# Patient Record
Sex: FEMALE | Race: ASIAN | NOT HISPANIC OR LATINO
[De-identification: names, ages, dates, MRNs, and addresses within clinical notes are randomized per-mention and may not be internally consistent; named-entity substitution may affect disease eponyms.]

---

## 2015-10-21 RX ORDER — LOSARTAN POTASSIUM 100 MG/1
1 TABLET, FILM COATED ORAL
Qty: 0 | Refills: 0 | COMMUNITY
Start: 2015-10-21

## 2015-10-22 RX ORDER — METFORMIN HYDROCHLORIDE 850 MG/1
1 TABLET ORAL
Qty: 0 | Refills: 0 | COMMUNITY
Start: 2015-10-22

## 2015-12-09 RX ORDER — FUROSEMIDE 40 MG
1 TABLET ORAL
Qty: 0 | Refills: 0 | COMMUNITY
Start: 2015-12-09

## 2016-02-14 RX ORDER — GLIMEPIRIDE 1 MG
1 TABLET ORAL
Qty: 90 | Refills: 0 | COMMUNITY
Start: 2016-02-14

## 2017-01-27 ENCOUNTER — MEDICATION RENEWAL (OUTPATIENT)
Age: 82
End: 2017-01-27

## 2017-03-16 ENCOUNTER — RX RENEWAL (OUTPATIENT)
Age: 82
End: 2017-03-16

## 2017-05-11 ENCOUNTER — OTHER (OUTPATIENT)
Age: 82
End: 2017-05-11

## 2017-05-25 ENCOUNTER — OTHER (OUTPATIENT)
Age: 82
End: 2017-05-25

## 2017-05-26 ENCOUNTER — OTHER (OUTPATIENT)
Age: 82
End: 2017-05-26

## 2017-06-02 ENCOUNTER — OTHER (OUTPATIENT)
Age: 82
End: 2017-06-02

## 2017-06-05 ENCOUNTER — RX RENEWAL (OUTPATIENT)
Age: 82
End: 2017-06-05

## 2017-06-14 ENCOUNTER — OUTPATIENT (OUTPATIENT)
Dept: OUTPATIENT SERVICES | Facility: HOSPITAL | Age: 82
LOS: 1 days | End: 2017-06-14

## 2017-06-14 ENCOUNTER — APPOINTMENT (OUTPATIENT)
Dept: CARDIOLOGY | Facility: HOSPITAL | Age: 82
End: 2017-06-14

## 2017-06-14 ENCOUNTER — APPOINTMENT (OUTPATIENT)
Dept: ELECTROPHYSIOLOGY | Facility: CLINIC | Age: 82
End: 2017-06-14

## 2017-06-14 VITALS
DIASTOLIC BLOOD PRESSURE: 67 MMHG | OXYGEN SATURATION: 98 % | HEART RATE: 67 BPM | SYSTOLIC BLOOD PRESSURE: 159 MMHG | BODY MASS INDEX: 31.55 KG/M2 | RESPIRATION RATE: 16 BRPM | WEIGHT: 167 LBS

## 2017-06-14 VITALS — DIASTOLIC BLOOD PRESSURE: 84 MMHG | SYSTOLIC BLOOD PRESSURE: 137 MMHG

## 2017-06-14 DIAGNOSIS — K92.2 GASTROINTESTINAL HEMORRHAGE, UNSPECIFIED: ICD-10-CM

## 2017-06-14 DIAGNOSIS — Z86.2 PERSONAL HISTORY OF DISEASES OF THE BLOOD AND BLOOD-FORMING ORGANS AND CERTAIN DISORDERS INVOLVING THE IMMUNE MECHANISM: ICD-10-CM

## 2017-06-14 LAB
BASOPHILS # BLD AUTO: 0.02 K/UL — SIGNIFICANT CHANGE UP (ref 0–0.2)
BASOPHILS NFR BLD AUTO: 0.2 % — SIGNIFICANT CHANGE UP (ref 0–2)
BUN SERPL-MCNC: 45 MG/DL — HIGH (ref 7–23)
CALCIUM SERPL-MCNC: 10.5 MG/DL — SIGNIFICANT CHANGE UP (ref 8.4–10.5)
CHLORIDE SERPL-SCNC: 102 MMOL/L — SIGNIFICANT CHANGE UP (ref 98–107)
CO2 SERPL-SCNC: 23 MMOL/L — SIGNIFICANT CHANGE UP (ref 22–31)
CREAT SERPL-MCNC: 1.52 MG/DL — HIGH (ref 0.5–1.3)
EOSINOPHIL # BLD AUTO: 0.38 K/UL — SIGNIFICANT CHANGE UP (ref 0–0.5)
EOSINOPHIL NFR BLD AUTO: 3.7 % — SIGNIFICANT CHANGE UP (ref 0–6)
GLUCOSE SERPL-MCNC: 111 MG/DL — HIGH (ref 70–99)
HCT VFR BLD CALC: 36.8 % — SIGNIFICANT CHANGE UP (ref 34.5–45)
HGB BLD-MCNC: 11.5 G/DL — SIGNIFICANT CHANGE UP (ref 11.5–15.5)
IMM GRANULOCYTES NFR BLD AUTO: 0.3 % — SIGNIFICANT CHANGE UP (ref 0–1.5)
LYMPHOCYTES # BLD AUTO: 2.83 K/UL — SIGNIFICANT CHANGE UP (ref 1–3.3)
LYMPHOCYTES # BLD AUTO: 27.2 % — SIGNIFICANT CHANGE UP (ref 13–44)
MCHC RBC-ENTMCNC: 30.9 PG — SIGNIFICANT CHANGE UP (ref 27–34)
MCHC RBC-ENTMCNC: 31.3 % — LOW (ref 32–36)
MCV RBC AUTO: 98.9 FL — SIGNIFICANT CHANGE UP (ref 80–100)
MONOCYTES # BLD AUTO: 1.02 K/UL — HIGH (ref 0–0.9)
MONOCYTES NFR BLD AUTO: 9.8 % — SIGNIFICANT CHANGE UP (ref 2–14)
NEUTROPHILS # BLD AUTO: 6.11 K/UL — SIGNIFICANT CHANGE UP (ref 1.8–7.4)
NEUTROPHILS NFR BLD AUTO: 58.8 % — SIGNIFICANT CHANGE UP (ref 43–77)
NT-PROBNP SERPL-SCNC: 1517 PG/ML — SIGNIFICANT CHANGE UP
PLATELET # BLD AUTO: 196 K/UL — SIGNIFICANT CHANGE UP (ref 150–400)
PMV BLD: 11.3 FL — SIGNIFICANT CHANGE UP (ref 7–13)
POTASSIUM SERPL-MCNC: 4.7 MMOL/L — SIGNIFICANT CHANGE UP (ref 3.5–5.3)
POTASSIUM SERPL-SCNC: 4.7 MMOL/L — SIGNIFICANT CHANGE UP (ref 3.5–5.3)
RBC # BLD: 3.72 M/UL — LOW (ref 3.8–5.2)
RBC # FLD: 13.4 % — SIGNIFICANT CHANGE UP (ref 10.3–14.5)
SODIUM SERPL-SCNC: 141 MMOL/L — SIGNIFICANT CHANGE UP (ref 135–145)
WBC # BLD: 10.39 K/UL — SIGNIFICANT CHANGE UP (ref 3.8–10.5)
WBC # FLD AUTO: 10.39 K/UL — SIGNIFICANT CHANGE UP (ref 3.8–10.5)

## 2017-06-15 ENCOUNTER — APPOINTMENT (OUTPATIENT)
Dept: CARDIOLOGY | Facility: HOSPITAL | Age: 82
End: 2017-06-15

## 2017-06-15 DIAGNOSIS — I50.30 UNSPECIFIED DIASTOLIC (CONGESTIVE) HEART FAILURE: ICD-10-CM

## 2017-06-19 ENCOUNTER — LABORATORY RESULT (OUTPATIENT)
Age: 82
End: 2017-06-19

## 2017-06-19 ENCOUNTER — APPOINTMENT (OUTPATIENT)
Dept: INTERNAL MEDICINE | Facility: HOSPITAL | Age: 82
End: 2017-06-19

## 2017-06-19 ENCOUNTER — OUTPATIENT (OUTPATIENT)
Dept: OUTPATIENT SERVICES | Facility: HOSPITAL | Age: 82
LOS: 1 days | End: 2017-06-19

## 2017-06-19 ENCOUNTER — RESULT CHARGE (OUTPATIENT)
Age: 82
End: 2017-06-19

## 2017-06-19 VITALS — HEIGHT: 61 IN | BODY MASS INDEX: 30.96 KG/M2 | WEIGHT: 164 LBS

## 2017-06-19 VITALS — SYSTOLIC BLOOD PRESSURE: 152 MMHG | DIASTOLIC BLOOD PRESSURE: 92 MMHG | HEART RATE: 61 BPM

## 2017-06-19 DIAGNOSIS — I48.91 UNSPECIFIED ATRIAL FIBRILLATION: ICD-10-CM

## 2017-06-19 DIAGNOSIS — I50.30 UNSPECIFIED DIASTOLIC (CONGESTIVE) HEART FAILURE: ICD-10-CM

## 2017-06-19 DIAGNOSIS — Z00.00 ENCOUNTER FOR GENERAL ADULT MEDICAL EXAMINATION WITHOUT ABNORMAL FINDINGS: ICD-10-CM

## 2017-06-19 DIAGNOSIS — I10 ESSENTIAL (PRIMARY) HYPERTENSION: ICD-10-CM

## 2017-06-19 DIAGNOSIS — E83.52 HYPERCALCEMIA: ICD-10-CM

## 2017-06-19 DIAGNOSIS — E21.0 PRIMARY HYPERPARATHYROIDISM: ICD-10-CM

## 2017-06-19 DIAGNOSIS — N18.3 CHRONIC KIDNEY DISEASE, STAGE 3 (MODERATE): ICD-10-CM

## 2017-06-19 DIAGNOSIS — E11.65 TYPE 2 DIABETES MELLITUS WITH HYPERGLYCEMIA: ICD-10-CM

## 2017-06-19 LAB
APPEARANCE UR: CLEAR — SIGNIFICANT CHANGE UP
BILIRUB UR-MCNC: NEGATIVE — SIGNIFICANT CHANGE UP
BLOOD UR QL VISUAL: NEGATIVE — SIGNIFICANT CHANGE UP
BUN SERPL-MCNC: 50 MG/DL — HIGH (ref 7–23)
CA-I BLD-SCNC: 1.33 MMOL/L — HIGH (ref 1.03–1.23)
CALCIUM SERPL-MCNC: 10.5 MG/DL — SIGNIFICANT CHANGE UP (ref 8.4–10.5)
CALCIUM UR-MCNC: 0.5 MG/DL — SIGNIFICANT CHANGE UP
CHLORIDE SERPL-SCNC: 103 MMOL/L — SIGNIFICANT CHANGE UP (ref 98–107)
CO2 SERPL-SCNC: 25 MMOL/L — SIGNIFICANT CHANGE UP (ref 22–31)
COLOR SPEC: YELLOW — SIGNIFICANT CHANGE UP
CREAT ?TM UR-MCNC: 131.85 MG/DL — SIGNIFICANT CHANGE UP
CREAT SERPL-MCNC: 1.63 MG/DL — HIGH (ref 0.5–1.3)
EPI CELLS # UR: SIGNIFICANT CHANGE UP
GLUCOSE SERPL-MCNC: 142 MG/DL — HIGH (ref 70–99)
GLUCOSE UR-MCNC: NEGATIVE — SIGNIFICANT CHANGE UP
HBA1C BLD-MCNC: 7.6 % — HIGH (ref 4–5.6)
HYALINE CASTS # UR AUTO: SIGNIFICANT CHANGE UP (ref 0–?)
KETONES UR-MCNC: NEGATIVE — SIGNIFICANT CHANGE UP
LEUKOCYTE ESTERASE UR-ACNC: NEGATIVE — SIGNIFICANT CHANGE UP
MAGNESIUM SERPL-MCNC: 1.9 MG/DL — SIGNIFICANT CHANGE UP (ref 1.6–2.6)
MUCOUS THREADS # UR AUTO: SIGNIFICANT CHANGE UP
NITRITE UR-MCNC: NEGATIVE — SIGNIFICANT CHANGE UP
NON-SQ EPI CELLS # UR AUTO: <1 — SIGNIFICANT CHANGE UP
PH UR: 6.5 — SIGNIFICANT CHANGE UP (ref 5–8)
PHOSPHATE SERPL-MCNC: 3.3 MG/DL — SIGNIFICANT CHANGE UP (ref 2.5–4.5)
POTASSIUM SERPL-MCNC: 4.9 MMOL/L — SIGNIFICANT CHANGE UP (ref 3.5–5.3)
POTASSIUM SERPL-SCNC: 4.9 MMOL/L — SIGNIFICANT CHANGE UP (ref 3.5–5.3)
PROT UR-MCNC: 30 — HIGH
PTH-INTACT SERPL-MCNC: 180.9 PG/ML — HIGH (ref 15–65)
RBC CASTS # UR COMP ASSIST: SIGNIFICANT CHANGE UP (ref 0–?)
SODIUM SERPL-SCNC: 142 MMOL/L — SIGNIFICANT CHANGE UP (ref 135–145)
SP GR SPEC: 1.02 — SIGNIFICANT CHANGE UP (ref 1–1.03)
SQUAMOUS # UR AUTO: SIGNIFICANT CHANGE UP
UROBILINOGEN FLD QL: NORMAL E.U. — SIGNIFICANT CHANGE UP (ref 0.2–1)
WBC UR QL: SIGNIFICANT CHANGE UP (ref 0–?)

## 2017-06-20 ENCOUNTER — OTHER (OUTPATIENT)
Age: 82
End: 2017-06-20

## 2017-06-20 LAB
24R-OH-CALCIDIOL SERPL-MCNC: 29 NG/ML — LOW (ref 30–100)
CREAT UR-MCNC: 130 MG/DL — SIGNIFICANT CHANGE UP
GLUCOSE BLDC GLUCOMTR-MCNC: 147
MICROALBUMIN UR-MCNC: 52.6 MG/DL — HIGH (ref 0–0.1)
MICROALBUMIN/CREAT UR-RTO: 405 MG/G — HIGH (ref 0–30)
VIT D25+D1,25 OH+D1,25 PNL SERPL-MCNC: 21.5 PG/ML — SIGNIFICANT CHANGE UP (ref 19.9–79.3)

## 2017-06-20 RX ORDER — METFORMIN HYDROCHLORIDE 500 MG/1
500 TABLET, COATED ORAL DAILY
Qty: 90 | Refills: 3 | Status: DISCONTINUED | COMMUNITY
Start: 2017-05-25 | End: 2017-06-20

## 2017-07-01 ENCOUNTER — OUTPATIENT (OUTPATIENT)
Dept: OUTPATIENT SERVICES | Facility: HOSPITAL | Age: 82
LOS: 1 days | End: 2017-07-01
Payer: MEDICAID

## 2017-07-17 DIAGNOSIS — R69 ILLNESS, UNSPECIFIED: ICD-10-CM

## 2017-07-21 ENCOUNTER — APPOINTMENT (OUTPATIENT)
Dept: ENDOCRINOLOGY | Facility: HOSPITAL | Age: 82
End: 2017-07-21

## 2017-09-01 PROCEDURE — G9001: CPT

## 2017-09-23 ENCOUNTER — INPATIENT (INPATIENT)
Facility: HOSPITAL | Age: 82
LOS: 1 days | Discharge: HOME | End: 2017-09-25
Attending: HOSPITALIST | Admitting: HOSPITALIST
Payer: MEDICARE

## 2017-09-23 VITALS
DIASTOLIC BLOOD PRESSURE: 82 MMHG | SYSTOLIC BLOOD PRESSURE: 162 MMHG | RESPIRATION RATE: 20 BRPM | TEMPERATURE: 97 F | HEART RATE: 65 BPM | OXYGEN SATURATION: 95 %

## 2017-09-23 DIAGNOSIS — I10 ESSENTIAL (PRIMARY) HYPERTENSION: ICD-10-CM

## 2017-09-23 DIAGNOSIS — I50.9 HEART FAILURE, UNSPECIFIED: ICD-10-CM

## 2017-09-23 DIAGNOSIS — E11.9 TYPE 2 DIABETES MELLITUS WITHOUT COMPLICATIONS: ICD-10-CM

## 2017-09-23 DIAGNOSIS — Z29.9 ENCOUNTER FOR PROPHYLACTIC MEASURES, UNSPECIFIED: ICD-10-CM

## 2017-09-23 DIAGNOSIS — R06.02 SHORTNESS OF BREATH: ICD-10-CM

## 2017-09-23 DIAGNOSIS — I49.5 SICK SINUS SYNDROME: ICD-10-CM

## 2017-09-23 DIAGNOSIS — N18.9 CHRONIC KIDNEY DISEASE, UNSPECIFIED: ICD-10-CM

## 2017-09-23 DIAGNOSIS — E78.5 HYPERLIPIDEMIA, UNSPECIFIED: ICD-10-CM

## 2017-09-23 LAB
ALBUMIN SERPL ELPH-MCNC: 3.8 G/DL — SIGNIFICANT CHANGE UP (ref 3.3–5)
ALP SERPL-CCNC: 182 U/L — HIGH (ref 40–120)
ALT FLD-CCNC: 43 U/L — HIGH (ref 4–33)
APPEARANCE UR: CLEAR — SIGNIFICANT CHANGE UP
APTT BLD: 35.4 SEC — SIGNIFICANT CHANGE UP (ref 27.5–37.4)
AST SERPL-CCNC: 46 U/L — HIGH (ref 4–32)
BACTERIA # UR AUTO: SIGNIFICANT CHANGE UP
BASE EXCESS BLDV CALC-SCNC: -3.4 MMOL/L — SIGNIFICANT CHANGE UP
BASOPHILS # BLD AUTO: 0.05 K/UL — SIGNIFICANT CHANGE UP (ref 0–0.2)
BASOPHILS NFR BLD AUTO: 0.4 % — SIGNIFICANT CHANGE UP (ref 0–2)
BILIRUB SERPL-MCNC: 0.6 MG/DL — SIGNIFICANT CHANGE UP (ref 0.2–1.2)
BILIRUB UR-MCNC: NEGATIVE — SIGNIFICANT CHANGE UP
BLOOD GAS VENOUS - CREATININE: 1.28 MG/DL — SIGNIFICANT CHANGE UP (ref 0.5–1.3)
BLOOD UR QL VISUAL: NEGATIVE — SIGNIFICANT CHANGE UP
BUN SERPL-MCNC: 43 MG/DL — HIGH (ref 7–23)
CALCIUM SERPL-MCNC: 10.8 MG/DL — HIGH (ref 8.4–10.5)
CHLORIDE BLDV-SCNC: 99 MMOL/L — SIGNIFICANT CHANGE UP (ref 96–108)
CHLORIDE SERPL-SCNC: 101 MMOL/L — SIGNIFICANT CHANGE UP (ref 98–107)
CK MB BLD-MCNC: 3.46 NG/ML — SIGNIFICANT CHANGE UP (ref 1–4.7)
CK MB BLD-MCNC: 3.81 NG/ML — SIGNIFICANT CHANGE UP (ref 1–4.7)
CK MB BLD-MCNC: SIGNIFICANT CHANGE UP (ref 0–2.5)
CK MB BLD-MCNC: SIGNIFICANT CHANGE UP (ref 0–2.5)
CK SERPL-CCNC: 54 U/L — SIGNIFICANT CHANGE UP (ref 25–170)
CK SERPL-CCNC: 63 U/L — SIGNIFICANT CHANGE UP (ref 25–170)
CO2 SERPL-SCNC: 24 MMOL/L — SIGNIFICANT CHANGE UP (ref 22–31)
COLOR SPEC: SIGNIFICANT CHANGE UP
CREAT SERPL-MCNC: 1.33 MG/DL — HIGH (ref 0.5–1.3)
EOSINOPHIL # BLD AUTO: 0.24 K/UL — SIGNIFICANT CHANGE UP (ref 0–0.5)
EOSINOPHIL NFR BLD AUTO: 1.9 % — SIGNIFICANT CHANGE UP (ref 0–6)
GAS PNL BLDV: 138 MMOL/L — SIGNIFICANT CHANGE UP (ref 136–146)
GLUCOSE BLDV-MCNC: 273 — HIGH (ref 70–99)
GLUCOSE SERPL-MCNC: 270 MG/DL — HIGH (ref 70–99)
GLUCOSE UR-MCNC: NEGATIVE — SIGNIFICANT CHANGE UP
HBA1C BLD-MCNC: 7.7 % — HIGH (ref 4–5.6)
HCO3 BLDV-SCNC: 21 MMOL/L — SIGNIFICANT CHANGE UP (ref 20–27)
HCT VFR BLD CALC: 35.8 % — SIGNIFICANT CHANGE UP (ref 34.5–45)
HCT VFR BLDV CALC: 37.8 % — SIGNIFICANT CHANGE UP (ref 34.5–45)
HGB BLD-MCNC: 11.5 G/DL — SIGNIFICANT CHANGE UP (ref 11.5–15.5)
HGB BLDV-MCNC: 12.3 G/DL — SIGNIFICANT CHANGE UP (ref 11.5–15.5)
HYALINE CASTS # UR AUTO: SIGNIFICANT CHANGE UP (ref 0–?)
IMM GRANULOCYTES # BLD AUTO: 0.03 # — SIGNIFICANT CHANGE UP
IMM GRANULOCYTES NFR BLD AUTO: 0.2 % — SIGNIFICANT CHANGE UP (ref 0–1.5)
INR BLD: 1.17 — SIGNIFICANT CHANGE UP (ref 0.88–1.17)
KETONES UR-MCNC: NEGATIVE — SIGNIFICANT CHANGE UP
LACTATE BLDV-MCNC: 2.2 MMOL/L — HIGH (ref 0.5–2)
LEUKOCYTE ESTERASE UR-ACNC: NEGATIVE — SIGNIFICANT CHANGE UP
LYMPHOCYTES # BLD AUTO: 1.98 K/UL — SIGNIFICANT CHANGE UP (ref 1–3.3)
LYMPHOCYTES # BLD AUTO: 15.7 % — SIGNIFICANT CHANGE UP (ref 13–44)
MAGNESIUM SERPL-MCNC: 1.8 MG/DL — SIGNIFICANT CHANGE UP (ref 1.6–2.6)
MCHC RBC-ENTMCNC: 31.3 PG — SIGNIFICANT CHANGE UP (ref 27–34)
MCHC RBC-ENTMCNC: 32.1 % — SIGNIFICANT CHANGE UP (ref 32–36)
MCV RBC AUTO: 97.3 FL — SIGNIFICANT CHANGE UP (ref 80–100)
MONOCYTES # BLD AUTO: 0.9 K/UL — SIGNIFICANT CHANGE UP (ref 0–0.9)
MONOCYTES NFR BLD AUTO: 7.1 % — SIGNIFICANT CHANGE UP (ref 2–14)
MUCOUS THREADS # UR AUTO: SIGNIFICANT CHANGE UP
NEUTROPHILS # BLD AUTO: 9.39 K/UL — HIGH (ref 1.8–7.4)
NEUTROPHILS NFR BLD AUTO: 74.7 % — SIGNIFICANT CHANGE UP (ref 43–77)
NITRITE UR-MCNC: NEGATIVE — SIGNIFICANT CHANGE UP
NON-SQ EPI CELLS # UR AUTO: <1 — SIGNIFICANT CHANGE UP
NRBC # FLD: 0 — SIGNIFICANT CHANGE UP
NT-PROBNP SERPL-SCNC: 4464 PG/ML — SIGNIFICANT CHANGE UP
PCO2 BLDV: 44 MMHG — SIGNIFICANT CHANGE UP (ref 41–51)
PH BLDV: 7.31 PH — LOW (ref 7.32–7.43)
PH UR: 6 — SIGNIFICANT CHANGE UP (ref 4.6–8)
PLATELET # BLD AUTO: 214 K/UL — SIGNIFICANT CHANGE UP (ref 150–400)
PMV BLD: 10.6 FL — SIGNIFICANT CHANGE UP (ref 7–13)
PO2 BLDV: 49 MMHG — HIGH (ref 35–40)
POTASSIUM BLDV-SCNC: 5.6 MMOL/L — HIGH (ref 3.4–4.5)
POTASSIUM SERPL-MCNC: 4.5 MMOL/L — SIGNIFICANT CHANGE UP (ref 3.5–5.3)
POTASSIUM SERPL-SCNC: 4.5 MMOL/L — SIGNIFICANT CHANGE UP (ref 3.5–5.3)
PROT SERPL-MCNC: 8.3 G/DL — SIGNIFICANT CHANGE UP (ref 6–8.3)
PROT UR-MCNC: 100 — HIGH
PROTHROM AB SERPL-ACNC: 13.1 SEC — SIGNIFICANT CHANGE UP (ref 9.8–13.1)
RBC # BLD: 3.68 M/UL — LOW (ref 3.8–5.2)
RBC # FLD: 13.8 % — SIGNIFICANT CHANGE UP (ref 10.3–14.5)
RBC CASTS # UR COMP ASSIST: SIGNIFICANT CHANGE UP (ref 0–?)
SAO2 % BLDV: 78.6 % — SIGNIFICANT CHANGE UP (ref 60–85)
SODIUM SERPL-SCNC: 137 MMOL/L — SIGNIFICANT CHANGE UP (ref 135–145)
SP GR SPEC: 1.01 — SIGNIFICANT CHANGE UP (ref 1–1.03)
SQUAMOUS # UR AUTO: SIGNIFICANT CHANGE UP
TROPONIN T SERPL-MCNC: 0.11 NG/ML — HIGH (ref 0–0.06)
TROPONIN T SERPL-MCNC: 0.11 NG/ML — HIGH (ref 0–0.06)
TSH SERPL-MCNC: 1.92 UIU/ML — SIGNIFICANT CHANGE UP (ref 0.27–4.2)
UROBILINOGEN FLD QL: NORMAL E.U. — SIGNIFICANT CHANGE UP (ref 0.1–0.2)
WBC # BLD: 12.59 K/UL — HIGH (ref 3.8–10.5)
WBC # FLD AUTO: 12.59 K/UL — HIGH (ref 3.8–10.5)
WBC UR QL: SIGNIFICANT CHANGE UP (ref 0–?)

## 2017-09-23 PROCEDURE — 71010: CPT | Mod: 26

## 2017-09-23 PROCEDURE — 99223 1ST HOSP IP/OBS HIGH 75: CPT

## 2017-09-23 RX ORDER — FUROSEMIDE 40 MG
40 TABLET ORAL DAILY
Qty: 0 | Refills: 0 | Status: DISCONTINUED | OUTPATIENT
Start: 2017-09-24 | End: 2017-09-25

## 2017-09-23 RX ORDER — HEPARIN SODIUM 5000 [USP'U]/ML
5000 INJECTION INTRAVENOUS; SUBCUTANEOUS EVERY 8 HOURS
Qty: 0 | Refills: 0 | Status: DISCONTINUED | OUTPATIENT
Start: 2017-09-23 | End: 2017-09-25

## 2017-09-23 RX ORDER — INSULIN LISPRO 100/ML
VIAL (ML) SUBCUTANEOUS
Qty: 0 | Refills: 0 | Status: DISCONTINUED | OUTPATIENT
Start: 2017-09-23 | End: 2017-09-25

## 2017-09-23 RX ORDER — DEXTROSE 50 % IN WATER 50 %
25 SYRINGE (ML) INTRAVENOUS ONCE
Qty: 0 | Refills: 0 | Status: DISCONTINUED | OUTPATIENT
Start: 2017-09-23 | End: 2017-09-25

## 2017-09-23 RX ORDER — DEXTROSE 50 % IN WATER 50 %
12.5 SYRINGE (ML) INTRAVENOUS ONCE
Qty: 0 | Refills: 0 | Status: DISCONTINUED | OUTPATIENT
Start: 2017-09-23 | End: 2017-09-25

## 2017-09-23 RX ORDER — FUROSEMIDE 40 MG
40 TABLET ORAL ONCE
Qty: 0 | Refills: 0 | Status: COMPLETED | OUTPATIENT
Start: 2017-09-23 | End: 2017-09-23

## 2017-09-23 RX ORDER — ASPIRIN/CALCIUM CARB/MAGNESIUM 324 MG
81 TABLET ORAL DAILY
Qty: 0 | Refills: 0 | Status: DISCONTINUED | OUTPATIENT
Start: 2017-09-23 | End: 2017-09-25

## 2017-09-23 RX ORDER — AMLODIPINE BESYLATE 2.5 MG/1
10 TABLET ORAL DAILY
Qty: 0 | Refills: 0 | Status: DISCONTINUED | OUTPATIENT
Start: 2017-09-23 | End: 2017-09-25

## 2017-09-23 RX ORDER — INFLUENZA VIRUS VACCINE 15; 15; 15; 15 UG/.5ML; UG/.5ML; UG/.5ML; UG/.5ML
0.5 SUSPENSION INTRAMUSCULAR ONCE
Qty: 0 | Refills: 0 | Status: COMPLETED | OUTPATIENT
Start: 2017-09-23 | End: 2017-09-25

## 2017-09-23 RX ORDER — ISOSORBIDE MONONITRATE 60 MG/1
30 TABLET, EXTENDED RELEASE ORAL DAILY
Qty: 0 | Refills: 0 | Status: DISCONTINUED | OUTPATIENT
Start: 2017-09-23 | End: 2017-09-23

## 2017-09-23 RX ORDER — FUROSEMIDE 40 MG
40 TABLET ORAL
Qty: 0 | Refills: 0 | Status: DISCONTINUED | OUTPATIENT
Start: 2017-09-23 | End: 2017-09-23

## 2017-09-23 RX ORDER — NITROGLYCERIN 6.5 MG
0.3 CAPSULE, EXTENDED RELEASE ORAL ONCE
Qty: 0 | Refills: 0 | Status: COMPLETED | OUTPATIENT
Start: 2017-09-23 | End: 2017-09-23

## 2017-09-23 RX ORDER — ISOSORBIDE MONONITRATE 60 MG/1
60 TABLET, EXTENDED RELEASE ORAL DAILY
Qty: 0 | Refills: 0 | Status: DISCONTINUED | OUTPATIENT
Start: 2017-09-24 | End: 2017-09-25

## 2017-09-23 RX ORDER — SODIUM CHLORIDE 9 MG/ML
1000 INJECTION, SOLUTION INTRAVENOUS
Qty: 0 | Refills: 0 | Status: DISCONTINUED | OUTPATIENT
Start: 2017-09-23 | End: 2017-09-25

## 2017-09-23 RX ORDER — ATORVASTATIN CALCIUM 80 MG/1
40 TABLET, FILM COATED ORAL AT BEDTIME
Qty: 0 | Refills: 0 | Status: DISCONTINUED | OUTPATIENT
Start: 2017-09-23 | End: 2017-09-25

## 2017-09-23 RX ORDER — METOPROLOL TARTRATE 50 MG
200 TABLET ORAL DAILY
Qty: 0 | Refills: 0 | Status: DISCONTINUED | OUTPATIENT
Start: 2017-09-23 | End: 2017-09-25

## 2017-09-23 RX ORDER — DEXTROSE 50 % IN WATER 50 %
1 SYRINGE (ML) INTRAVENOUS ONCE
Qty: 0 | Refills: 0 | Status: DISCONTINUED | OUTPATIENT
Start: 2017-09-23 | End: 2017-09-25

## 2017-09-23 RX ORDER — LOSARTAN POTASSIUM 100 MG/1
100 TABLET, FILM COATED ORAL DAILY
Qty: 0 | Refills: 0 | Status: DISCONTINUED | OUTPATIENT
Start: 2017-09-23 | End: 2017-09-25

## 2017-09-23 RX ORDER — INSULIN LISPRO 100/ML
VIAL (ML) SUBCUTANEOUS AT BEDTIME
Qty: 0 | Refills: 0 | Status: DISCONTINUED | OUTPATIENT
Start: 2017-09-23 | End: 2017-09-25

## 2017-09-23 RX ORDER — GLUCAGON INJECTION, SOLUTION 0.5 MG/.1ML
1 INJECTION, SOLUTION SUBCUTANEOUS ONCE
Qty: 0 | Refills: 0 | Status: DISCONTINUED | OUTPATIENT
Start: 2017-09-23 | End: 2017-09-25

## 2017-09-23 RX ADMIN — Medication 40 MILLIGRAM(S): at 17:14

## 2017-09-23 RX ADMIN — Medication 81 MILLIGRAM(S): at 17:14

## 2017-09-23 RX ADMIN — Medication 2: at 17:14

## 2017-09-23 RX ADMIN — AMLODIPINE BESYLATE 10 MILLIGRAM(S): 2.5 TABLET ORAL at 17:14

## 2017-09-23 RX ADMIN — ATORVASTATIN CALCIUM 40 MILLIGRAM(S): 80 TABLET, FILM COATED ORAL at 21:51

## 2017-09-23 RX ADMIN — ISOSORBIDE MONONITRATE 30 MILLIGRAM(S): 60 TABLET, EXTENDED RELEASE ORAL at 17:14

## 2017-09-23 RX ADMIN — Medication 0.3 MILLIGRAM(S): at 10:04

## 2017-09-23 RX ADMIN — HEPARIN SODIUM 5000 UNIT(S): 5000 INJECTION INTRAVENOUS; SUBCUTANEOUS at 21:51

## 2017-09-23 RX ADMIN — LOSARTAN POTASSIUM 100 MILLIGRAM(S): 100 TABLET, FILM COATED ORAL at 17:14

## 2017-09-23 RX ADMIN — Medication 40 MILLIGRAM(S): at 09:30

## 2017-09-23 NOTE — H&P ADULT - PROBLEM SELECTOR PLAN 2
monitor bp, con't meds, adjust as needed BP above goal, but in setting of acute decompensated HF. Will administer home doses of medications--losartan, isosorbide MN, metoprolol. Can increase isosorbide dose if BP remains >150/90.

## 2017-09-23 NOTE — ED ADULT TRIAGE NOTE - CHIEF COMPLAINT QUOTE
pt c/o severe sob for few days now. pt has hx of fluid on lings/ deneis cp but c/o not being able to catch breath/ sat 95

## 2017-09-23 NOTE — H&P ADULT - PROBLEM SELECTOR PLAN 5
VTE 2 (age, cancer history) - started on Heparin 5,000u sc tid (due to slightly elevated cr) PPM in place. ECG with demand pacing, rate in 60s. Will continue to monitor on cardiac telemetry.

## 2017-09-23 NOTE — H&P ADULT - ASSESSMENT
81 y/o female, with a PmHx of HTN, DM, CHF, Uterine Ca s/p radiation, PPM - Medtronic (placed for tachybrady syndrome), HLD, is being admitted to telemetry for acute on chronic heart failure.

## 2017-09-23 NOTE — ED ADULT NURSE NOTE - OBJECTIVE STATEMENT
pt presents to the ER with c/o SOB x 3 days getting worse today pt unable to lay flat and getting more sob on exertion   pt arrived in the ER alert and anxious lungs + wheezing noted decreased BS tachypneic O2 on via N/C PIV placed labs drawn and sent cardiac monitor shows sinus bradycardia   Plan of care discussed  with pt and daughter  will continue to monitor closely

## 2017-09-23 NOTE — ED PROVIDER NOTE - CHIEF COMPLAINT
The patient is a 82y Female complaining of The patient is a 82y Female complaining of shortness of breath

## 2017-09-23 NOTE — H&P ADULT - NEGATIVE OPHTHALMOLOGIC SYMPTOMS
no diplopia/no photophobia/no loss of vision L/no blurred vision L/no blurred vision R/no loss of vision R

## 2017-09-23 NOTE — ED ADULT NURSE NOTE - PMH
Cardiac Pacemaker (ICD9 V45.01)  in , due to bradycardia, started on coumadin since then, pt unsure why  Complete Spontaneous     DM Type 2 (Diabetes Mellitus, Type 2) (ICD9 250.00)    HTN (Hypertension) (ICD9 401.9)    Hyperlipidemia    Obese    Osteoarthritis of Knee (ICD9 715.96)  b/l knees  Uterine Cancer (ICD9 179)  treated with hysterectomy and radiation therapy in

## 2017-09-23 NOTE — H&P ADULT - PMH
DM Type 2 (Diabetes Mellitus, Type 2) (ICD9 250.00)    HTN (Hypertension) (ICD9 401.9)    Hyperlipidemia    Osteoarthritis of Knee (ICD9 715.96)  b/l knees  Uterine Cancer (ICD9 179)  treated with hysterectomy and radiation therapy in 2008

## 2017-09-23 NOTE — H&P ADULT - PSYCHIATRIC
Affect and characteristics of appearance, verbalizations, behaviors are appropriate negative detailed exam details…

## 2017-09-23 NOTE — H&P ADULT - NSHPSOCIALHISTORY_GEN_ALL_CORE
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

## 2017-09-23 NOTE — H&P ADULT - PROBLEM SELECTOR PLAN 4
monitor fs, insulin ssc, hgba1-c, hold po meds sCr below baseline. Electrolytes wnl. Patient mildly hypervolemic in setting of acute decompensated HF; receiving IV furosemide.     -trend chemistry, monitor K+

## 2017-09-23 NOTE — H&P ADULT - PROBLEM SELECTOR PLAN 3
fasting lipid profile, con't statin Hold oral hypoglycemics while admitted.     Monitor FSG. ISS as needed. Check Hgb A1c.

## 2017-09-23 NOTE — PATIENT PROFILE ADULT. - LIVES WITH, PROFILE
children/(daughter), private home, 4 steps-to-enter/exit home with unilateral handrail, no stairs to bedroom/bathroom

## 2017-09-23 NOTE — H&P ADULT - PROBLEM SELECTOR PLAN 1
ekg/telemetry, f/u ce x 2, mg, tsh, echo, house cardio c/s, probnp, daily wts, fluid restriction, strict I & O's, Lasix 40 iv bid Decompensated HF in setting of medication non-adherence. Patient reports frequent urination as cause of not taking furosemide. She feels improved at present. Cardiology recommendations reviewed. ECG without ischemic changes. TPN 0.11, which is new from 2016. May represent demand ischemia vs new baseline TPN in setting of CKD. Lower suspicion for acute MI given lack of chest pain, gradual onset of symptoms over course of a week, and rapid improvement with diuresis. Will continue to trend TPN.    -cardiac telemetry  -IV furosemide 40mg BID  -monitor K+  -daily weights  -monitor I's&O's  -fluid restriction  -TTE  -c/w losartan 100mg daily, metoprolol 200mg ER daily

## 2017-09-23 NOTE — ED ADULT NURSE NOTE - PSH
Cardiac Pacemaker (ICD9 V45.01)  2003  History of Hysterectomy (ICD9 V88.01)    Status Post Total Knee Replacement  LISA, 6 months apart 2010

## 2017-09-23 NOTE — CONSULT NOTE ADULT - ASSESSMENT
80 F PMHx HTN, DM2, obesity, Uterine CA s/p radiation several years prior, Afib on AC, biliary stent, for tachybrady syndrome PPM c/o 2d dyspnea on exertion and orthopnea    + CHF exacerbation Patient is an 80 year old female with PMH of CHF, HTN, DM, Afib, PPM for tachy/wellington syndrome, uterine ca s/p radiation presents with acute heart failure exacerbation in the setting of medication non-adherence. Would recommend gentle diuresis with lasix 40 IV daily. LFTs are slightly elevated, CXR mild edema. BP elevated.   -Would recommend increasing the oral nitrate, imdur to 60 qd.  - Continue hydralazine at 10 qd, toprol xl at 200 qd.  - Patient has a baseline serum creatinine of 1.3-would continue losartan 100 qd.    -Abdomen is slightly distended and patient reports blood in her stool, would recommend fecal occult blood to rule out GI causes of abdominal distension. Would also continue stool softeners.   -Patient does not own a scale. Discussed with patient to weigh herself daily to evaluate if she is accumulating fluid. Daily standing weights while in the hospital, fluid restrict to 1 liter/daily and restrict salt  Plan discussed with .

## 2017-09-23 NOTE — CONSULT NOTE ADULT - SUBJECTIVE AND OBJECTIVE BOX
Date of Admission:  17  CHIEF COMPLAINT:  shortness of breath at home x 3 days, unable to sleep at night because she is short of breath  HISTORY OF PRESENT ILLNESS:      Allergies: NKDA      MEDICATIONS:  hydralazine 10 TID  imdur 30 qd  lasix 40 po qd  glimeperide 4mg po qd  januvia 50 qd,  losartan 100mg po qd  metformin 500  torpol xl 200   [id number]  tolterodine 2mg qd      PAST MEDICAL & SURGICAL HISTORY:  Obese  Complete Spontaneous   Hyperlipidemia  Uterine Cancer (ICD9 179): treated with hysterectomy and radiation therapy in   Osteoarthritis of Knee (ICD9 715.96): b/l knees  DM Type 2 (Diabetes Mellitus, Type 2) (ICD9 250.00)  HTN (Hypertension) (ICD9 401.9)  Cardiac Pacemaker (ICD9 V45.01): in , due to bradycardia, started on coumadin since then, pt unsure why  Status Post Total Knee Replacement: LISA, 6 months apart   Cardiac Pacemaker (ICD9 V45.01):   History of Hysterectomy (ICD9 V88.01)      FAMILY HISTORY:  No pertinent family history in first degree relatives      SOCIAL HISTORY:    [ ] Non-smoker  [ ] Smoker  [ ] Alcohol      REVIEW OF SYSTEMS:  See HPI. Otherwise, 10 point ROS done and otherwise negative.    PHYSICAL EXAM:  T(C): 36.1 (17 @ 08:51), Max: 36.1 (17 @ 08:51)  HR: 60 (17 @ 12:55) (60 - 65)  BP: 159/85 (17 @ 12:55) (131/70 - 181/63)  RR: 22 (17 @ 12:55) (18 - 37)  SpO2: 97% (17 @ 12:55) (92% - 100%)  Wt(kg): --  I&O's Summary    Past Medical History:  Cardiac Pacemaker (ICD9 V45.01)  in , due to bradycardia, started on coumadin since then, pt unsure why  Complete Spontaneous     DM Type 2 (Diabetes Mellitus, Type 2) (ICD9 250.00)    HTN (Hypertension) (ICD9 401.9)    Hyperlipidemia    Obese    Osteoarthritis of Knee (ICD9 715.96)  b/l knees  Uterine Cancer (ICD9 179)  treated with hysterectomy and radiation therapy in .    Past Surgical History:  Cardiac Pacemaker (ICD9 V45.01)  2003  History of Hysterectomy (ICD9 V88.01)    Status Post Total Knee Replacement  LISA, 6 months apart .  Appearance: Normal	      LABS:	 	    CBC Full  -  ( 23 Sep 2017 09:15 )  WBC Count : 12.59 K/uL  Hemoglobin : 11.5 g/dL  Hematocrit : 35.8 %  Platelet Count - Automated : 214 K/uL  Mean Cell Volume : 97.3 fL  Mean Cell Hemoglobin : 31.3 pg  Mean Cell Hemoglobin Concentration : 32.1 %  Auto Neutrophil # : 9.39 K/uL  Auto Lymphocyte # : 1.98 K/uL  Auto Monocyte # : 0.90 K/uL  Auto Eosinophil # : 0.24 K/uL  Auto Basophil # : 0.05 K/uL  Auto Neutrophil % : 74.7 %  Auto Lymphocyte % : 15.7 %  Auto Monocyte % : 7.1 %  Auto Eosinophil % : 1.9 %  Auto Basophil % : 0.4 %        137  |  101  |  43<H>  ----------------------------<  270<H>  4.5   |  24  |  1.33<H>    Ca    10.8<H>      23 Sep 2017 09:15    TPro  8.3  /  Alb  3.8  /  TBili  0.6  /  DBili  x   /  AST  46<H>  /  ALT  43<H>  /  AlkPhos  182<H>        proBNP: Serum Pro-Brain Natriuretic Peptide: 4464 pg/mL ( @ 09:15)  Tele: ventricular pacing  EKG: Date of Admission:  17  CHIEF COMPLAINT:  shortness of breath at home x 3 days, unable to sleep at night because she is short of breath  HISTORY OF PRESENT ILLNESS:  Patient is an 80 year old female with PMH of CHF, HTN, DM, Afib, PPM for tachy/wellington syndrome, uterine ca s/p radiation in   Allergies: NKDA      MEDICATIONS:  hydralazine 10 TID  imdur 30 qd  lasix 40 po qd  glimeperide 4mg po qd  januvia 50 qd,  losartan 100mg po qd  metformin 500  torpol xl 200   [id number]  tolterodine 2mg qd      PAST MEDICAL & SURGICAL HISTORY:  Obese  Complete Spontaneous   Hyperlipidemia  Uterine Cancer (ICD9 179): treated with hysterectomy and radiation therapy in   Osteoarthritis of Knee (ICD9 715.96): b/l knees  DM Type 2 (Diabetes Mellitus, Type 2) (ICD9 250.00)  HTN (Hypertension) (ICD9 401.9)  Cardiac Pacemaker (ICD9 V45.01): in , due to bradycardia, started on coumadin since then, pt unsure why  Status Post Total Knee Replacement: LISA, 6 months apart   Cardiac Pacemaker (ICD9 V45.01):   History of Hysterectomy (ICD9 V88.01)      FAMILY HISTORY:  No pertinent family history in first degree relatives      SOCIAL HISTORY:    [ ] Non-smoker  [ ] Smoker  [ ] Alcohol      REVIEW OF SYSTEMS:  See HPI. Otherwise, 10 point ROS done and otherwise negative.    PHYSICAL EXAM:  T(C): 36.1 (17 @ 08:51), Max: 36.1 (17 @ 08:51)  HR: 60 (17 @ 12:55) (60 - 65)  BP: 159/85 (17 @ 12:55) (131/70 - 181/63)  RR: 22 (17 @ 12:55) (18 - 37)  SpO2: 97% (17 @ 12:55) (92% - 100%)  Wt(kg): --  I&O's Summary    Past Medical History:  Cardiac Pacemaker (ICD9 V45.01)  in , due to bradycardia, started on coumadin since then, pt unsure why  Complete Spontaneous     DM Type 2 (Diabetes Mellitus, Type 2) (ICD9 250.00)    HTN (Hypertension) (ICD9 401.9)    Hyperlipidemia    Obese    Osteoarthritis of Knee (ICD9 715.96)  b/l knees  Uterine Cancer (ICD9 179)  treated with hysterectomy and radiation therapy in .    Past Surgical History:  Cardiac Pacemaker (ICD9 V45.01)  2003  History of Hysterectomy (ICD9 V88.01)    Status Post Total Knee Replacement  LISA, 6 months apart .  Appearance: Normal	      LABS:	 	    CBC Full  -  ( 23 Sep 2017 09:15 )  WBC Count : 12.59 K/uL  Hemoglobin : 11.5 g/dL  Hematocrit : 35.8 %  Platelet Count - Automated : 214 K/uL  Mean Cell Volume : 97.3 fL  Mean Cell Hemoglobin : 31.3 pg  Mean Cell Hemoglobin Concentration : 32.1 %  Auto Neutrophil # : 9.39 K/uL  Auto Lymphocyte # : 1.98 K/uL  Auto Monocyte # : 0.90 K/uL  Auto Eosinophil # : 0.24 K/uL  Auto Basophil # : 0.05 K/uL  Auto Neutrophil % : 74.7 %  Auto Lymphocyte % : 15.7 %  Auto Monocyte % : 7.1 %  Auto Eosinophil % : 1.9 %  Auto Basophil % : 0.4 %        137  |  101  |  43<H>  ----------------------------<  270<H>  4.5   |  24  |  1.33<H>    Ca    10.8<H>      23 Sep 2017 09:15    TPro  8.3  /  Alb  3.8  /  TBili  0.6  /  DBili  x   /  AST  46<H>  /  ALT  43<H>  /  AlkPhos  182<H>        proBNP: Serum Pro-Brain Natriuretic Peptide: 4464 pg/mL ( @ 09:15)  Tele: ventricular pacing  EKG: Date of Admission:  17  CHIEF COMPLAINT:  shortness of breath at home x 3 days, unable to sleep at night because she is short of breath  HISTORY OF PRESENT ILLNESS:  Patient is an 80 year old female with PMH of CHF, HTN, DM, Afib, PPM for tachy/wellington syndrome, uterine ca s/p radiation presents with shortness of breath, PND, unable to sleep at night, worsening in the last several days. Patient had a stress test in 2016-normal. Cardiac cath in -normal  Allergies: NKDA      MEDICATIONS:  hydralazine 10 TID  imdur 30 qd  lasix 40 po qd  glimeperide 4mg po qd  januvia 50 qd,  losartan 100mg po qd  metformin 500  toprol xl 200   [id number]  tolterodine 2mg qd      PAST MEDICAL & SURGICAL HISTORY:  Obese  Complete Spontaneous   Hyperlipidemia  Uterine Cancer (ICD9 179): treated with hysterectomy and radiation therapy in   Osteoarthritis of Knee (ICD9 715.96): b/l knees  DM Type 2 (Diabetes Mellitus, Type 2) (ICD9 250.00)  HTN (Hypertension) (ICD9 401.9)  Cardiac Pacemaker (ICD9 V45.01): in , due to bradycardia, started on coumadin since then, pt unsure why  Status Post Total Knee Replacement: LISA, 6 months apart   Cardiac Pacemaker (ICD9 V45.01):   History of Hysterectomy (ICD9 V88.01)      FAMILY HISTORY:  No pertinent family history in first degree relatives      SOCIAL HISTORY:    [ ] Non-smoker  [ ] Smoker  [ ] Alcohol      REVIEW OF SYSTEMS:  General: +fatigue, +weakness  Resp: +SOB, +KRAUS, +PND  Card: negative for chest pain  Abd: +bloating  Ext: negative for edema      PHYSICAL EXAM:  T(C): 36.1 (17 @ 08:51), Max: 36.1 (17 @ 08:51)  HR: 60 (17 @ 12:55) (60 - 65)  BP: 159/85 (17 @ 12:55) (131/70 - 181/63)  RR: 22 (17 @ 12:55) (18 - 37)  SpO2: 97% (17 @ 12:55) (92% - 100%)  Wt(kg): --  I&O's Summary     Appearance: NAD  Cards: +S1+S2 No M/R/G  Resp: crackles at the bases  Abd: +distended, soft, non-tender  Ext: no edema, no cyanosis, no clubbing  Skin: warm and dry  Neuro: alert and oriented x 3, no focal deficits  Past Medical History:  Cardiac Pacemaker (ICD9 V45.01)  in , due to bradycardia, started on coumadin since then, pt unsure why  Complete Spontaneous     DM Type 2 (Diabetes Mellitus, Type 2) (ICD9 250.00)    HTN (Hypertension) (ICD9 401.9)    Hyperlipidemia    Obese    Osteoarthritis of Knee (ICD9 715.96)  b/l knees  Uterine Cancer (ICD9 179)  treated with hysterectomy and radiation therapy in .    Past Surgical History:  Cardiac Pacemaker (ICD9 V45.01)    History of Hysterectomy (ICD9 V88.01)    Status Post Total Knee Replacement  LISA, 6 months apart .  Appearance: Normal	      LABS:	 	    CBC Full  -  ( 23 Sep 2017 09:15 )  WBC Count : 12.59 K/uL  Hemoglobin : 11.5 g/dL  Hematocrit : 35.8 %  Platelet Count - Automated : 214 K/uL  Mean Cell Volume : 97.3 fL  Mean Cell Hemoglobin : 31.3 pg  Mean Cell Hemoglobin Concentration : 32.1 %  Auto Neutrophil # : 9.39 K/uL  Auto Lymphocyte # : 1.98 K/uL  Auto Monocyte # : 0.90 K/uL  Auto Eosinophil # : 0.24 K/uL  Auto Basophil # : 0.05 K/uL  Auto Neutrophil % : 74.7 %  Auto Lymphocyte % : 15.7 %  Auto Monocyte % : 7.1 %  Auto Eosinophil % : 1.9 %  Auto Basophil % : 0.4 %        137  |  101  |  43<H>  ----------------------------<  270<H>  4.5   |  24  |  1.33<H>    Ca    10.8<H>      23 Sep 2017 09:15    TPro  8.3  /  Alb  3.8  /  TBili  0.6  /  DBili  x   /  AST  46<H>  /  ALT  43<H>  /  AlkPhos  182<H>        proBNP: Serum Pro-Brain Natriuretic Peptide: 4464 pg/mL ( @ 09:15)  Tele: atrial pacing  EKG: demand pacing

## 2017-09-23 NOTE — H&P ADULT - PROBLEM SELECTOR PROBLEM 3
Hyperlipidemia Type 2 diabetes mellitus without complication, without long-term current use of insulin

## 2017-09-23 NOTE — H&P ADULT - NSHPPHYSICALEXAM_GEN_ALL_CORE
Vital Signs Last 24 Hrs  T(C): 36.1 (23 Sep 2017 08:51), Max: 36.1 (23 Sep 2017 08:51)  T(F): 97 (23 Sep 2017 08:51), Max: 97 (23 Sep 2017 08:51)  HR: 60 (23 Sep 2017 12:55) (60 - 65)  BP: 159/85 (23 Sep 2017 12:55) (131/70 - 181/63)  BP(mean): --  RR: 22 (23 Sep 2017 12:55) (18 - 37)  SpO2: 97% (23 Sep 2017 12:55) (92% - 100%)    EKG: Demand Paced @ 67

## 2017-09-23 NOTE — ED PROVIDER NOTE - MEDICAL DECISION MAKING DETAILS
Alfred ARCINIEGA: Patient is an 83 yo F with history of DM, HTN, hyperlipidemia, PM here for 3 days of progressive sob, orthopnea and non-productive cough. Patient reports KRAUS and shortness of breath when laying down. VS noted: 93% on RA, tachypneic, wheezeson R>L, RRR, minimal b/l pitting edema. a/p: CHF vs pneumonia, labs, CXR, ekg, monitor, lasix, admit. Alfred ARCINIEGA: Patient is an 83 yo F with history of DM, HTN, hyperlipidemia, PM, diastolic CHF here for 3 days of progressive sob, orthopnea and non-productive cough. Patient reports KRAUS and shortness of breath when laying down. She admits to not taking her Lasix for the last 3-4 days. VS noted: 93% on RA, tachypneic, wheezes on R>L, RRR, minimal b/l pitting edema. a/p: CHF vs pneumonia, labs, CXR, ekg, monitor, lasix, admit.

## 2017-09-23 NOTE — H&P ADULT - PSH
Cardiac Pacemaker (ICD9 V45.01)  Medtronic (2003 - for tacybrady syndrome @ [facility])  History of Hysterectomy (ICD9 V88.01)    Status Post Total Knee Replacement  LISA, 6 months apart 2010

## 2017-09-23 NOTE — ED ADULT NURSE REASSESSMENT NOTE - NS ED NURSE REASSESS COMMENT FT1
pt resting, tachypneic with exertion, resolved with rest. pt verbalized improvement. safety maintained. pt admitted into hospital to telemetry. cardiology at bedside for evaluation. bed assigned to pt. report to receiving KRISTAL Zepeda. pt transported to floor on 3Lnc, safety maintained in nad.
received report on pt from KRISTAL Mittal at 12pm. pt presents awake a&ox4, denies dizziness or ha. skin warm, dry, appropriate for race. respirations even, unlabored at rest. pt noted to have dyspnea with exertion. denies cp or sob at rest. abdomen soft nontender nondistended. denies n/v/d denies fever or chills. ivl site clean, dry, intact patent. pt voided approximately 100cc's clear yellow urine on bedpan. cardiac monitor in place paced. o2 via nasal canula in place at 4L. family at bedside. safety maintained.

## 2017-09-23 NOTE — H&P ADULT - MUSCULOSKELETAL
No joint pain, swelling or deformity; no limitation of movement details… detailed exam no joint warmth/normal/ROM intact/no joint swelling/no joint erythema/no calf tenderness/normal strength

## 2017-09-23 NOTE — H&P ADULT - HISTORY OF PRESENT ILLNESS
83 y/o female, with a PmHx of HTN, DM, CHF, Uterine Ca s/p radiation, PPM - Medtronic (placed for tachybrady syndrome), HLD, presented with SOB x 3 days. Pt states last week she stopped taking her furosemide due to neglect and then for the last 3 days she has been having worsening sob so she restarted her furosemide yesterday. She states she has been having trouble laying flat due to worsening sob at night but states when she sits up during the day she feels okay. Pt states since she was unable to sleep she has been feeling very uncomfortable so she came to the [facility] ED for an evaluation. She denies any fever, chills, chest pain, HA, dizziness, blurred vision, n/v, back pain, abdominal pain, recent travel (last travel was 7 months ago). She states she is currently feeling a lot better but has some wheezing. The SOB has resolved a lot. In the ED, she was found to have a proBNP of 4,464, She is being admitted to telemetry for acute on chronic chf exacerbation. 81 y/o W, with a PmHx of HTN, T2DM on oral hypoglycemics,  chronic diastolic HF, Uterine Ca s/p radiation, PPM - Medtronic (placed for tachybrady syndrome), HLD, presents with SOB x 3 days. Pt states last week she stopped taking her furosemide because she was urinating frequently and was afraid to leave her house. For the last 3 days she has been having worsening sob so she restarted her furosemide yesterday. She states she has been having trouble laying flat due to worsening sob at night but states when she sits up during the day she feels okay. Pt states since she was unable to sleep she has been feeling very uncomfortable so she came to the [facility] ED for an evaluation. She denies any fever, chills, chest pain, HA, dizziness, blurred vision, n/v, back pain, abdominal pain, recent travel (last travel was 7 months ago). She states she is currently feeling a lot better but has some wheezing. The SOB has resolved a lot. In the ED, she was found to have a proBNP of 4,464, She is being admitted to telemetry for acute on chronic chf exacerbation.

## 2017-09-23 NOTE — H&P ADULT - PROBLEM SELECTOR PROBLEM 1
Acute heart failure Acute on chronic congestive heart failure, unspecified congestive heart failure type

## 2017-09-23 NOTE — ED PROVIDER NOTE - PROGRESS NOTE DETAILS
Alfred ARCINIEGA: Patient tachypneic, tripoding and with accessory muscle use. EKG with no STEMI. 1 SL nitro given with improvement. Alfred ARCINIEGA: Patient now on Bipap. Patient reasessed: She appears comfortable and states she feels much better. Will continue to monitor. Alfred ARCINIEGA: Patient taken off Bipap. She is feeling much better. Will continue to monitor. Alfred ARCINIEGA: Patient reassessed: She is still feeling fine off of bipap. Will admit to medicine.

## 2017-09-24 LAB
BUN SERPL-MCNC: 45 MG/DL — HIGH (ref 7–23)
CALCIUM SERPL-MCNC: 10.4 MG/DL — SIGNIFICANT CHANGE UP (ref 8.4–10.5)
CHLORIDE SERPL-SCNC: 106 MMOL/L — SIGNIFICANT CHANGE UP (ref 98–107)
CHOLEST SERPL-MCNC: 103 MG/DL — LOW (ref 120–199)
CO2 SERPL-SCNC: 22 MMOL/L — SIGNIFICANT CHANGE UP (ref 22–31)
CREAT SERPL-MCNC: 1.3 MG/DL — SIGNIFICANT CHANGE UP (ref 0.5–1.3)
GLUCOSE SERPL-MCNC: 100 MG/DL — HIGH (ref 70–99)
HCT VFR BLD CALC: 33.5 % — LOW (ref 34.5–45)
HDLC SERPL-MCNC: 42 MG/DL — LOW (ref 45–65)
HGB BLD-MCNC: 10.8 G/DL — LOW (ref 11.5–15.5)
LIPID PNL WITH DIRECT LDL SERPL: 51 MG/DL — SIGNIFICANT CHANGE UP
MCHC RBC-ENTMCNC: 31.7 PG — SIGNIFICANT CHANGE UP (ref 27–34)
MCHC RBC-ENTMCNC: 32.2 % — SIGNIFICANT CHANGE UP (ref 32–36)
MCV RBC AUTO: 98.2 FL — SIGNIFICANT CHANGE UP (ref 80–100)
NRBC # FLD: 0 — SIGNIFICANT CHANGE UP
NT-PROBNP SERPL-SCNC: 5452 PG/ML — SIGNIFICANT CHANGE UP
PLATELET # BLD AUTO: 175 K/UL — SIGNIFICANT CHANGE UP (ref 150–400)
PMV BLD: 10.8 FL — SIGNIFICANT CHANGE UP (ref 7–13)
POTASSIUM SERPL-MCNC: 4.2 MMOL/L — SIGNIFICANT CHANGE UP (ref 3.5–5.3)
POTASSIUM SERPL-SCNC: 4.2 MMOL/L — SIGNIFICANT CHANGE UP (ref 3.5–5.3)
RBC # BLD: 3.41 M/UL — LOW (ref 3.8–5.2)
RBC # FLD: 14 % — SIGNIFICANT CHANGE UP (ref 10.3–14.5)
SODIUM SERPL-SCNC: 146 MMOL/L — HIGH (ref 135–145)
TRIGL SERPL-MCNC: 72 MG/DL — SIGNIFICANT CHANGE UP (ref 10–149)
WBC # BLD: 10 K/UL — SIGNIFICANT CHANGE UP (ref 3.8–10.5)
WBC # FLD AUTO: 10 K/UL — SIGNIFICANT CHANGE UP (ref 3.8–10.5)

## 2017-09-24 PROCEDURE — 99233 SBSQ HOSP IP/OBS HIGH 50: CPT | Mod: GC

## 2017-09-24 PROCEDURE — 99233 SBSQ HOSP IP/OBS HIGH 50: CPT

## 2017-09-24 RX ADMIN — Medication 200 MILLIGRAM(S): at 22:25

## 2017-09-24 RX ADMIN — Medication 40 MILLIGRAM(S): at 05:43

## 2017-09-24 RX ADMIN — ATORVASTATIN CALCIUM 40 MILLIGRAM(S): 80 TABLET, FILM COATED ORAL at 21:09

## 2017-09-24 RX ADMIN — Medication 81 MILLIGRAM(S): at 11:06

## 2017-09-24 RX ADMIN — AMLODIPINE BESYLATE 10 MILLIGRAM(S): 2.5 TABLET ORAL at 05:43

## 2017-09-24 RX ADMIN — ISOSORBIDE MONONITRATE 60 MILLIGRAM(S): 60 TABLET, EXTENDED RELEASE ORAL at 11:07

## 2017-09-24 RX ADMIN — LOSARTAN POTASSIUM 100 MILLIGRAM(S): 100 TABLET, FILM COATED ORAL at 05:43

## 2017-09-24 RX ADMIN — Medication 200 MILLIGRAM(S): at 07:28

## 2017-09-24 RX ADMIN — HEPARIN SODIUM 5000 UNIT(S): 5000 INJECTION INTRAVENOUS; SUBCUTANEOUS at 21:10

## 2017-09-24 RX ADMIN — Medication 6: at 17:27

## 2017-09-24 RX ADMIN — Medication 2: at 13:27

## 2017-09-24 RX ADMIN — HEPARIN SODIUM 5000 UNIT(S): 5000 INJECTION INTRAVENOUS; SUBCUTANEOUS at 05:44

## 2017-09-24 RX ADMIN — HEPARIN SODIUM 5000 UNIT(S): 5000 INJECTION INTRAVENOUS; SUBCUTANEOUS at 13:27

## 2017-09-24 NOTE — PROGRESS NOTE ADULT - PROBLEM SELECTOR PLAN 7
VTE 2 (age, cancer history) - started on Heparin 5,000u sc tid (due to slightly elevated cr) c/w Heparin 5,000u sc tid

## 2017-09-24 NOTE — PHYSICAL THERAPY INITIAL EVALUATION ADULT - PREDICTED DURATION OF THERAPY (DAYS/WKS), PT EVAL
Pt. will not be placed on PT program at this time secondary to independently amb. 150' without assistive device & safely amb. up & down 8 stairs.

## 2017-09-24 NOTE — PHYSICAL THERAPY INITIAL EVALUATION ADULT - ADDITIONAL COMMENTS
Pt. left in chair, as received, post-PT in NAD with all lines/tubes intact & call bell within reach.  RN William Winn aware.

## 2017-09-24 NOTE — PHYSICAL THERAPY INITIAL EVALUATION ADULT - PERTINENT HX OF CURRENT PROBLEM, REHAB EVAL
Pt. is an 81 y/o female admitted to [facility] on 09/23/17 with a dx of SOB.  PT consult request secondary to heart failure.  H/O uterine CA, s/p radiation.  CXR = edema.

## 2017-09-24 NOTE — PROGRESS NOTE ADULT - PROBLEM SELECTOR PLAN 2
BP above goal, but in setting of acute decompensated HF. Will administer home doses of medications--losartan, metoprolol. BPs elevated in a.m. before meds. c/w losartan, metoprolol and lasix.  monitoring BPs.

## 2017-09-24 NOTE — PHYSICAL THERAPY INITIAL EVALUATION ADULT - ACTIVE RANGE OF MOTION EXAMINATION, REHAB EVAL
deficits as listed below/Bilateral UE's WFL's except shoulder flexion/extension no greater than 0-90 degrees secondary to history of PPM placement

## 2017-09-24 NOTE — PROGRESS NOTE ADULT - SUBJECTIVE AND OBJECTIVE BOX
Patient is a 82y old  Female who presents with a chief complaint of SOB (23 Sep 2017 14:34)      SUBJECTIVE / OVERNIGHT EVENTS:  Patient has no new complaints. Denies cp, SOB, abdominal pain, N/V/D.     MEDICATIONS  (STANDING):  heparin  Injectable 5000 Unit(s) SubCutaneous every 8 hours  aspirin enteric coated 81 milliGRAM(s) Oral daily  losartan 100 milliGRAM(s) Oral daily  atorvastatin 40 milliGRAM(s) Oral at bedtime  metoprolol succinate  milliGRAM(s) Oral daily  amLODIPine   Tablet 10 milliGRAM(s) Oral daily  insulin lispro (HumaLOG) corrective regimen sliding scale   SubCutaneous three times a day before meals  insulin lispro (HumaLOG) corrective regimen sliding scale   SubCutaneous at bedtime  dextrose 5%. 1000 milliLiter(s) (50 mL/Hr) IV Continuous <Continuous>  dextrose 50% Injectable 12.5 Gram(s) IV Push once  dextrose 50% Injectable 25 Gram(s) IV Push once  dextrose 50% Injectable 25 Gram(s) IV Push once  influenza   Vaccine 0.5 milliLiter(s) IntraMuscular once  furosemide   Injectable 40 milliGRAM(s) IV Push daily  isosorbide   mononitrate ER Tablet (IMDUR) 60 milliGRAM(s) Oral daily    MEDICATIONS  (PRN):  dextrose Gel 1 Dose(s) Oral once PRN Blood Glucose LESS THAN 70 milliGRAM(s)/deciliter  glucagon  Injectable 1 milliGRAM(s) IntraMuscular once PRN Glucose LESS THAN 70 milligrams/deciliter      Vital Signs Last 24 Hrs  T(C): 36.7 (24 Sep 2017 05:02), Max: 36.8 (23 Sep 2017 21:37)  T(F): 98 (24 Sep 2017 05:02), Max: 98.3 (23 Sep 2017 21:37)  HR: 60 (24 Sep 2017 05:02) (59 - 68)  BP: 148/100 (24 Sep 2017 05:02) (147/63 - 171/64)  BP(mean): --  RR: 18 (24 Sep 2017 05:02) (17 - 22)  SpO2: 96% (24 Sep 2017 05:02) (96% - 100%)  CAPILLARY BLOOD GLUCOSE  106 (24 Sep 2017 07:56)  245 (23 Sep 2017 22:05)  195 (23 Sep 2017 17:03)        I&O's Summary    23 Sep 2017 07:01  -  24 Sep 2017 07:00  --------------------------------------------------------  IN: 0 mL / OUT: 600 mL / NET: -600 mL    24 Sep 2017 07:01  -  24 Sep 2017 10:29  --------------------------------------------------------  IN: 200 mL / OUT: 400 mL / NET: -200 mL        PHYSICAL EXAM:  GENERAL: NAD, well-developed  HEAD:  Atraumatic, Normocephalic  EYES: EOMI, PERRLA, conjunctiva and sclera clear  NECK: Supple, No JVD  CHEST/LUNG: Clear to auscultation bilaterally; No wheeze  HEART: Regular rate and rhythm; No murmurs, rubs, or gallops  ABDOMEN: Soft, Nontender, Nondistended; Bowel sounds present  EXTREMITIES:  2+ Peripheral Pulses, No clubbing, cyanosis, or edema  PSYCH: AAOx3  NEUROLOGY: non-focal  SKIN: No rashes or lesions    LABS:                        10.8   10.00 )-----------( 175      ( 24 Sep 2017 06:35 )             33.5         146<H>  |  106  |  45<H>  ----------------------------<  100<H>  4.2   |  22  |  1.30    Ca    10.4      24 Sep 2017 06:35  Mg     1.8         TPro  8.3  /  Alb  3.8  /  TBili  0.6  /  DBili  x   /  AST  46<H>  /  ALT  43<H>  /  AlkPhos  182<H>      PT/INR - ( 23 Sep 2017 09:15 )   PT: 13.1 SEC;   INR: 1.17          PTT - ( 23 Sep 2017 09:15 )  PTT:35.4 SEC  CARDIAC MARKERS ( 23 Sep 2017 15:41 )  x     / 0.11 ng/mL / 54 u/L / 3.81 ng/mL / x      CARDIAC MARKERS ( 23 Sep 2017 09:15 )  x     / 0.11 ng/mL / 63 u/L / 3.46 ng/mL / x          Urinalysis Basic - ( 23 Sep 2017 21:45 )    Color: PLYEL / Appearance: CLEAR / S.009 / pH: 6.0  Gluc: NEGATIVE / Ketone: NEGATIVE  / Bili: NEGATIVE / Urobili: NORMAL E.U.   Blood: NEGATIVE / Protein: 100 / Nitrite: NEGATIVE   Leuk Esterase: NEGATIVE / RBC: 0-2 / WBC 0-2   Sq Epi: OCC / Non Sq Epi: x / Bacteria: FEW        RADIOLOGY & ADDITIONAL TESTS:    Imaging Personally Reviewed: < from: Xray Chest 1 View AP-PORTABLE IMMEDIATE (17 @ 09:33) >  Generalized mild interstitial prominence with mild peribronchial cuffing   and slightly hazy hilar shadows compatible with mild congestion and   edema. No focal patchy airspace consolidations, pleural effusions, or   pneumothorax.    Stable left chest wall dual-lead pacemaker and cardiac and mediastinal   silhouettes including scant aortic arch calcification.    Trachea midline.    Generalized osteopenia and spine and left AC joint degenerative changes   again noted.    < end of copied text >      Consultant(s) Notes Reviewed:      Care Discussed with Consultants/Other Providers:

## 2017-09-24 NOTE — PROGRESS NOTE ADULT - PROBLEM SELECTOR PLAN 4
sCr below baseline. Electrolytes wnl. Patient mildly hypervolemic in setting of acute decompensated HF; receiving IV furosemide.     -trend chemistry, monitor K+ sCr below baseline. Electrolytes wnl. Patient mildly hypervolemic in setting of acute decompensated HF; receiving IV furosemide.   trend creatitine and potassium CKD stage 3  sCr below baseline. Electrolytes wnl. Patient mildly hypervolemic in setting of acute decompensated HF; receiving IV furosemide.   trend creatitine and potassium

## 2017-09-24 NOTE — PROGRESS NOTE ADULT - ASSESSMENT
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

## 2017-09-25 ENCOUNTER — TRANSCRIPTION ENCOUNTER (OUTPATIENT)
Age: 82
End: 2017-09-25

## 2017-09-25 VITALS — WEIGHT: 158.95 LBS

## 2017-09-25 LAB
BUN SERPL-MCNC: 51 MG/DL — HIGH (ref 7–23)
CALCIUM SERPL-MCNC: 10.4 MG/DL — SIGNIFICANT CHANGE UP (ref 8.4–10.5)
CHLORIDE SERPL-SCNC: 102 MMOL/L — SIGNIFICANT CHANGE UP (ref 98–107)
CO2 SERPL-SCNC: 21 MMOL/L — LOW (ref 22–31)
CREAT SERPL-MCNC: 1.5 MG/DL — HIGH (ref 0.5–1.3)
GLUCOSE SERPL-MCNC: 106 MG/DL — HIGH (ref 70–99)
HCT VFR BLD CALC: 33.5 % — LOW (ref 34.5–45)
HGB BLD-MCNC: 10.8 G/DL — LOW (ref 11.5–15.5)
MCHC RBC-ENTMCNC: 31.8 PG — SIGNIFICANT CHANGE UP (ref 27–34)
MCHC RBC-ENTMCNC: 32.2 % — SIGNIFICANT CHANGE UP (ref 32–36)
MCV RBC AUTO: 98.5 FL — SIGNIFICANT CHANGE UP (ref 80–100)
NRBC # FLD: 0 — SIGNIFICANT CHANGE UP
PLATELET # BLD AUTO: 175 K/UL — SIGNIFICANT CHANGE UP (ref 150–400)
PMV BLD: 10.5 FL — SIGNIFICANT CHANGE UP (ref 7–13)
POTASSIUM SERPL-MCNC: 4.2 MMOL/L — SIGNIFICANT CHANGE UP (ref 3.5–5.3)
POTASSIUM SERPL-SCNC: 4.2 MMOL/L — SIGNIFICANT CHANGE UP (ref 3.5–5.3)
RBC # BLD: 3.4 M/UL — LOW (ref 3.8–5.2)
RBC # FLD: 14.3 % — SIGNIFICANT CHANGE UP (ref 10.3–14.5)
SODIUM SERPL-SCNC: 137 MMOL/L — SIGNIFICANT CHANGE UP (ref 135–145)
WBC # BLD: 9.54 K/UL — SIGNIFICANT CHANGE UP (ref 3.8–10.5)
WBC # FLD AUTO: 9.54 K/UL — SIGNIFICANT CHANGE UP (ref 3.8–10.5)

## 2017-09-25 PROCEDURE — 99239 HOSP IP/OBS DSCHRG MGMT >30: CPT

## 2017-09-25 PROCEDURE — 99232 SBSQ HOSP IP/OBS MODERATE 35: CPT

## 2017-09-25 RX ORDER — FUROSEMIDE 40 MG
40 TABLET ORAL DAILY
Qty: 0 | Refills: 0 | Status: DISCONTINUED | OUTPATIENT
Start: 2017-09-25 | End: 2017-09-25

## 2017-09-25 RX ORDER — ISOSORBIDE MONONITRATE 60 MG/1
1 TABLET, EXTENDED RELEASE ORAL
Qty: 30 | Refills: 0 | OUTPATIENT
Start: 2017-09-25 | End: 2017-10-25

## 2017-09-25 RX ADMIN — LOSARTAN POTASSIUM 100 MILLIGRAM(S): 100 TABLET, FILM COATED ORAL at 05:09

## 2017-09-25 RX ADMIN — HEPARIN SODIUM 5000 UNIT(S): 5000 INJECTION INTRAVENOUS; SUBCUTANEOUS at 13:08

## 2017-09-25 RX ADMIN — Medication 200 MILLIGRAM(S): at 06:51

## 2017-09-25 RX ADMIN — Medication 81 MILLIGRAM(S): at 12:01

## 2017-09-25 RX ADMIN — HEPARIN SODIUM 5000 UNIT(S): 5000 INJECTION INTRAVENOUS; SUBCUTANEOUS at 05:09

## 2017-09-25 RX ADMIN — Medication 6: at 13:11

## 2017-09-25 RX ADMIN — INFLUENZA VIRUS VACCINE 0.5 MILLILITER(S): 15; 15; 15; 15 SUSPENSION INTRAMUSCULAR at 16:40

## 2017-09-25 RX ADMIN — Medication 200 MILLIGRAM(S): at 05:10

## 2017-09-25 RX ADMIN — AMLODIPINE BESYLATE 10 MILLIGRAM(S): 2.5 TABLET ORAL at 05:09

## 2017-09-25 RX ADMIN — Medication 40 MILLIGRAM(S): at 05:09

## 2017-09-25 RX ADMIN — ISOSORBIDE MONONITRATE 60 MILLIGRAM(S): 60 TABLET, EXTENDED RELEASE ORAL at 12:01

## 2017-09-25 NOTE — PROGRESS NOTE ADULT - PROBLEM SELECTOR PLAN 3
Hold oral hypoglycemics while admitted.     Monitor FSG. ISS as needed. Check Hgb A1c.
-c/w amlodipine, Toprol XL, losartan, Imdur

## 2017-09-25 NOTE — DISCHARGE NOTE ADULT - MEDICATION SUMMARY - MEDICATIONS TO CHANGE
I will SWITCH the dose or number of times a day I take the medications listed below when I get home from the hospital:  None I will SWITCH the dose or number of times a day I take the medications listed below when I get home from the hospital:    isosorbide mononitrate 30 mg oral tablet, extended release  -- 1 tab(s) by mouth once a day (in the morning)

## 2017-09-25 NOTE — DISCHARGE NOTE ADULT - PROVIDER TOKENS
FREE:[LAST:[Cardiology Clinic],PHONE:[phone]],FAX:[(   )    -]] FREE:[LAST:[Cardiology Clinic],PHONE:[phone]],FAX:[(   )    -]]

## 2017-09-25 NOTE — DIETITIAN INITIAL EVALUATION ADULT. - PERTINENT LABORATORY DATA
(9/25) H/H 10.8/33.5 L, BUN 51 H, Creat 1.50 H, Glu 106 H;        (9/24) Cholesterol 103 L, HDL 42 L;             (9/23) HbA1c 7.8% H, Albumin 3.8, AST 46 H,  H, ALT 43 H

## 2017-09-25 NOTE — DISCHARGE NOTE ADULT - PLAN OF CARE
resolution of symptoms Continue taking Lasix 40 mg daily. Abide by a fluid and sodium restricted diet. You will need to schedule  an appointment with the cardiology clinic to obtain an echocardiogram.

## 2017-09-25 NOTE — DISCHARGE NOTE ADULT - CARE PLAN
Principal Discharge DX:	Acute diastolic (congestive) heart failure  Goal:	resolution of symptoms  Instructions for follow-up, activity and diet:	Continue taking Lasix 40 mg daily. Abide by a fluid and sodium restricted diet. You will need to schedule  an appointment with the cardiology clinic to obtain an echocardiogram.

## 2017-09-25 NOTE — DISCHARGE NOTE ADULT - CARE PROVIDER_API CALL
Cardiology Clinic,   Phone: [phone]  Fax: (   )    - Cardiology Clinic,   Phone: [phone]  Fax: (   )    -

## 2017-09-25 NOTE — PROGRESS NOTE ADULT - SUBJECTIVE AND OBJECTIVE BOX
Patient is a 82y old  Female who presents with a chief complaint of SOB (23 Sep 2017 14:34)      SUBJECTIVE / OVERNIGHT EVENTS: Denies CP, SOB, palpitations, dizziness, LE edema, KRAUS. Wants to go home.    MEDICATIONS  (STANDING):  heparin  Injectable 5000 Unit(s) SubCutaneous every 8 hours  aspirin enteric coated 81 milliGRAM(s) Oral daily  losartan 100 milliGRAM(s) Oral daily  atorvastatin 40 milliGRAM(s) Oral at bedtime  metoprolol succinate  milliGRAM(s) Oral daily  amLODIPine   Tablet 10 milliGRAM(s) Oral daily  insulin lispro (HumaLOG) corrective regimen sliding scale   SubCutaneous three times a day before meals  insulin lispro (HumaLOG) corrective regimen sliding scale   SubCutaneous at bedtime  dextrose 5%. 1000 milliLiter(s) (50 mL/Hr) IV Continuous <Continuous>  dextrose 50% Injectable 12.5 Gram(s) IV Push once  dextrose 50% Injectable 25 Gram(s) IV Push once  dextrose 50% Injectable 25 Gram(s) IV Push once  influenza   Vaccine 0.5 milliLiter(s) IntraMuscular once  isosorbide   mononitrate ER Tablet (IMDUR) 60 milliGRAM(s) Oral daily  furosemide    Tablet 40 milliGRAM(s) Oral daily    MEDICATIONS  (PRN):  dextrose Gel 1 Dose(s) Oral once PRN Blood Glucose LESS THAN 70 milliGRAM(s)/deciliter  glucagon  Injectable 1 milliGRAM(s) IntraMuscular once PRN Glucose LESS THAN 70 milligrams/deciliter  guaiFENesin   Syrup  (Sugar-Free) 200 milliGRAM(s) Oral every 6 hours PRN Cough      Vital Signs Last 24 Hrs  T(C): 36.7 (17 @ 13:24)  T(F): 98 (17 @ 13:24), Max: 98.7 (17 @ 21:14)  HR: 61 (17 @ 13:24) (60 - 64)  BP: 130/60 (17 @ 13:24)  BP(mean): --  RR: 19 (17 @ 13:24) (12 - 19)  SpO2: 100% (17 @ 13:24) (97% - 100%)  Wt(kg): --     @ 07: @ 07:00  --------------------------------------------------------  IN: 535 mL / OUT: 1125 mL / NET: -590 mL     @ 07: @ 14:19  --------------------------------------------------------  IN: 300 mL / OUT: 0 mL / NET: 300 mL      CAPILLARY BLOOD GLUCOSE  295 (25 Sep 2017 12:54)  98 (25 Sep 2017 08:43)  113 (24 Sep 2017 21:38)  254 (24 Sep 2017 17:27)        I&O's Summary    24 Sep 2017 07:  -  25 Sep 2017 07:00  --------------------------------------------------------  IN: 535 mL / OUT: 1125 mL / NET: -590 mL    25 Sep 2017 07:  -  25 Sep 2017 14:19  --------------------------------------------------------  IN: 300 mL / OUT: 0 mL / NET: 300 mL        PHYSICAL EXAM:  GENERAL: NAD, well-developed, on RA, in chair  HEAD:  Atraumatic, Normocephalic  EYES: EOMI, PERRLA, conjunctiva and sclera clear  NECK: Supple, No JVD  CHEST/LUNG: Clear to auscultation bilaterally; No wheeze  HEART: Regular rate and rhythm; No murmurs, rubs, or gallops  ABDOMEN: Soft, Nontender, Nondistended; Bowel sounds present  EXTREMITIES:  2+ Peripheral Pulses, No clubbing, cyanosis, or edema  PSYCH: AAOx3  NEUROLOGY: non-focal  SKIN: No rashes or lesions    LABS:                        10.8   9.54  )-----------( 175      ( 25 Sep 2017 06:57 )             33.5         137  |  102  |  51<H>  ----------------------------<  106<H>  4.2   |  21<L>  |  1.50<H>    Ca    10.4      25 Sep 2017 06:57  Mg     1.8         Creatinine, Serum: 1.30 mg/dL (17 @ 06:35)    Hemoglobin A1C, Whole Blood: 7.7      CARDIAC MARKERS ( 23 Sep 2017 15:41 )  x     / 0.11 ng/mL / 54 u/L / 3.81 ng/mL / x          Urinalysis Basic - ( 23 Sep 2017 21:45 )    Color: PLYEL / Appearance: CLEAR / S.009 / pH: 6.0  Gluc: NEGATIVE / Ketone: NEGATIVE  / Bili: NEGATIVE / Urobili: NORMAL E.U.   Blood: NEGATIVE / Protein: 100 / Nitrite: NEGATIVE   Leuk Esterase: NEGATIVE / RBC: 0-2 / WBC 0-2   Sq Epi: OCC / Non Sq Epi: x / Bacteria: FEW        RADIOLOGY & ADDITIONAL TESTS:    Imaging Personally Reviewed:    Consultant(s) Notes Reviewed:  Cardiology    Care Discussed with Consultants/Other Providers: Cardiology re: dispo planning    Assessment and Plan:

## 2017-09-25 NOTE — DIETITIAN INITIAL EVALUATION ADULT. - PROBLEM SELECTOR PLAN 1
Decompensated HF in setting of medication non-adherence. Patient reports frequent urination as cause of not taking furosemide. She feels improved at present. Cardiology recommendations reviewed. ECG without ischemic changes. TPN 0.11, which is new from 2016. May represent demand ischemia vs new baseline TPN in setting of CKD. Lower suspicion for acute MI given lack of chest pain, gradual onset of symptoms over course of a week, and rapid improvement with diuresis. Will continue to trend TPN.    -cardiac telemetry  -IV furosemide 40mg BID  -monitor K+  -daily weights  -monitor I's&O's  -fluid restriction  -TTE  -c/w losartan 100mg daily, metoprolol 200mg ER daily

## 2017-09-25 NOTE — PROGRESS NOTE ADULT - ATTENDING COMMENTS
Change to oral Lasix. No further inpt w/u. Needs outpt f/u with clinic 1-2 weeks. [phone].
Stable for dc home today. Needs outpatient cardiology follow-up in 1-2 weeks.    DISCHARGE TIME SPENT: 35mins

## 2017-09-25 NOTE — DISCHARGE NOTE ADULT - PATIENT PORTAL LINK FT
“You can access the [employer] Patient Portal, offered by [facility], by registering with the following website: [email or web address]”

## 2017-09-25 NOTE — DIETITIAN INITIAL EVALUATION ADULT. - PROBLEM SELECTOR PLAN 2
BP above goal, but in setting of acute decompensated HF. Will administer home doses of medications--losartan, isosorbide MN, metoprolol. Can increase isosorbide dose if BP remains >150/90.

## 2017-09-25 NOTE — PROGRESS NOTE ADULT - PROBLEM SELECTOR PROBLEM 3
Type 2 diabetes mellitus without complication, without long-term current use of insulin
Essential hypertension

## 2017-09-25 NOTE — PROGRESS NOTE ADULT - SUBJECTIVE AND OBJECTIVE BOX
24H hour events: No acute events overnight. Pt reports that she is feeling better.    MEDICATIONS:  heparin  Injectable 5000 Unit(s) SubCutaneous every 8 hours  aspirin enteric coated 81 milliGRAM(s) Oral daily  losartan 100 milliGRAM(s) Oral daily  metoprolol succinate  milliGRAM(s) Oral daily  amLODIPine   Tablet 10 milliGRAM(s) Oral daily  furosemide   Injectable 40 milliGRAM(s) IV Push daily  isosorbide   mononitrate ER Tablet (IMDUR) 60 milliGRAM(s) Oral daily      guaiFENesin   Syrup  (Sugar-Free) 200 milliGRAM(s) Oral every 6 hours PRN        atorvastatin 40 milliGRAM(s) Oral at bedtime  insulin lispro (HumaLOG) corrective regimen sliding scale   SubCutaneous three times a day before meals  insulin lispro (HumaLOG) corrective regimen sliding scale   SubCutaneous at bedtime  dextrose Gel 1 Dose(s) Oral once PRN  dextrose 50% Injectable 12.5 Gram(s) IV Push once  dextrose 50% Injectable 25 Gram(s) IV Push once  dextrose 50% Injectable 25 Gram(s) IV Push once  glucagon  Injectable 1 milliGRAM(s) IntraMuscular once PRN    dextrose 5%. 1000 milliLiter(s) IV Continuous <Continuous>  influenza   Vaccine 0.5 milliLiter(s) IntraMuscular once      REVIEW OF SYSTEMS:  General: no fatigue/malaise, weight loss/gain.  Skin: no rashes.  Ophthalmologic: no blurred vision, no loss of vision. 	  ENT: no sore throat, rhinorrhea, sinus congestion.  Respiratory: no SOB, cough or wheeze.  Gastrointestinal:  no N/V/D, no melena/hematemesis/hematochezia.  Genitourinary: no dysuria/hesitancy or hematuria.  Musculoskeletal: no myalgias or arthralgias.  Neurological: no changes in vision or hearing, no lightheadedness/dizziness, no syncope/near syncope	  Psychiatric: no unusual stress/anxiety.   Hematology/Lymphatics: no unusual bleeding, bruising and no lymphadenopathy.  Endocrine: no unusual thirst.   All others negative except as stated above and in HPI.    PHYSICAL EXAM:  T(C): 36.7 (09-25-17 @ 13:24), Max: 37.1 (09-24-17 @ 21:14)  HR: 61 (09-25-17 @ 13:24) (60 - 64)  BP: 130/60 (09-25-17 @ 13:24) (130/60 - 151/57)  RR: 19 (09-25-17 @ 13:24) (12 - 19)  SpO2: 100% (09-25-17 @ 13:24) (97% - 100%)  Wt(kg): --  I&O's Summary    24 Sep 2017 07:01  -  25 Sep 2017 07:00  --------------------------------------------------------  IN: 535 mL / OUT: 1125 mL / NET: -590 mL    25 Sep 2017 07:01  -  25 Sep 2017 13:52  --------------------------------------------------------  IN: 300 mL / OUT: 0 mL / NET: 300 mL        Appearance: Normal	  HEENT:   Normal oral mucosa, PERRL, EOMI	  Lymphatic: No lymphadenopathy  Cardiovascular: Normal S1 S2, No JVD, No murmurs, No edema  Respiratory: Lungs clear to auscultation	  Psychiatry: A & O x 3, Mood & affect appropriate  Gastrointestinal:  Soft, Non-tender, + BS	  Skin: No rashes, No ecchymoses, No cyanosis	  Neurologic: Non-focal  Extremities: Normal range of motion, No clubbing, cyanosis or edema  Vascular: Peripheral pulses palpable 2+ bilaterally        LABS:	 	    CBC Full  -  ( 25 Sep 2017 06:57 )  WBC Count : 9.54 K/uL  Hemoglobin : 10.8 g/dL  Hematocrit : 33.5 %  Platelet Count - Automated : 175 K/uL  Mean Cell Volume : 98.5 fL  Mean Cell Hemoglobin : 31.8 pg  Mean Cell Hemoglobin Concentration : 32.2 %  Auto Neutrophil # : x  Auto Lymphocyte # : x  Auto Monocyte # : x  Auto Eosinophil # : x  Auto Basophil # : x  Auto Neutrophil % : x  Auto Lymphocyte % : x  Auto Monocyte % : x  Auto Eosinophil % : x  Auto Basophil % : x    09-25    137  |  102  |  51<H>  ----------------------------<  106<H>  4.2   |  21<L>  |  1.50<H>  09-24    146<H>  |  106  |  45<H>  ----------------------------<  100<H>  4.2   |  22  |  1.30    Ca    10.4      25 Sep 2017 06:57  Ca    10.4      24 Sep 2017 06:35  Mg     1.8     09-23        proBNP: Serum Pro-Brain Natriuretic Peptide: 5452 pg/mL (09-24-17 @ 06:35)  Serum Pro-Brain Natriuretic Peptide: 4464 pg/mL (09-23-17 @ 09:15)    CARDIAC MARKERS:  Troponin T, Serum: 0.11 ng/mL (09-23 @ 15:41)  Troponin T, Serum: 0.11 ng/mL (09-23 @ 09:15)    ASSESSMENT/PLAN: 79 y/o woman with h/o Diastolic HF, HTN, DM, A-Fib, Tachy/Misha Syndrome s/p PPM, Uterine Cancer s/p XRT who p/w ADHF    1) Acute on Chronic Diastolic HF - improved, patient appears to be at or near euvolemic state, would transition to Furosemide 40 mg PO Daily  - continue afterload reduction with BB/CBB/ARB  - elevated CTNT likely due to Demand Ischemia in the setting of ADHF, no further ischemic evaluation planned at this time     Kenyon Luo  Cardiology Fellow  [phone]

## 2017-09-25 NOTE — PROGRESS NOTE ADULT - PROBLEM SELECTOR PROBLEM 2
Essential hypertension
Type 2 diabetes mellitus without complication, without long-term current use of insulin

## 2017-09-25 NOTE — PROGRESS NOTE ADULT - PROBLEM SELECTOR PLAN 1
-Patient now well-appearing and clinically euvolemic  -Cards recs appreciated; will decrease lasix back to patient's home 40mg po Qdaily and c/w losartan, Lipitor, Toprol XL  -Needs cardiology follow-up in the next 1-2 weeks for further outpatient management

## 2017-09-25 NOTE — DIETITIAN INITIAL EVALUATION ADULT. - NS AS NUTRI INTERV MEALS SNACK
Other (specify)/1. Suggest: PO diet rx: Regular, Consistent Carbohydrate (no snacks), DASH/TLC (cholesterol and sodium restricted), Low Fat; Fluid restriction per MD discretion;             2. Monitor PO diet tolerance;              3. Monitor labs, weights, hydration status;/Diets modified for specific foods and ingredients

## 2017-09-25 NOTE — DIETITIAN INITIAL EVALUATION ADULT. - OTHER INFO
Nutrition Consult X hemoglobin A1C greater than 7%. Pt 81 yo female appears alert, oriented. Per Pt her appetite "normal"; No chew/swallow problem voiced; no nausea/vomiting/diarrhea reported @ present. Pt also stated her usual body weight: ~159#. No weight loss or weight changes voiced. Of note Pt's HbA1c level 7.7% (9/23). Consistent Carbohydrate diet with Low Sodium restriction discussed with Pt including better food choices, foods to avoid. Written materials on diet also provided. RDN remains available, Pt made aware.

## 2017-09-25 NOTE — DISCHARGE NOTE ADULT - MEDICATION SUMMARY - MEDICATIONS TO TAKE
I will START or STAY ON the medications listed below when I get home from the hospital:    Aspirin Enteric Coated 81 mg oral delayed release tablet  -- 1 tab(s) by mouth once a day  -- Indication: For Cardiac prophylaxis    losartan 100 mg oral tablet  -- 1 tab(s) by mouth once a day  -- Indication: For Hypertension    isosorbide mononitrate 60 mg oral tablet, extended release  -- 1 tab(s) by mouth once a day  -- Indication: For Hypertension    Januvia 50 mg oral tablet  -- 1 tab(s) by mouth once a day  -- Indication: For Diabetes mellitus    metFORMIN 500 mg oral tablet  -- 1 tab(s) by mouth once a day  -- Indication: For Diabetes mellitus    glimepiride 4 mg oral tablet  -- 1 tab(s) by mouth once a day  -- Indication: For Diabetes mellitus    atorvastatin 40 mg oral tablet  -- 1 tab(s) by mouth once a day (at bedtime)  -- Indication: For Hyperlipidemia    metoprolol succinate 200 mg oral tablet, extended release  -- 1 tab(s) by mouth once a day  -- Indication: For Hypertension    amLODIPine 10 mg oral tablet  -- 1 tab(s) by mouth once a day  -- Indication: For Hypertension    furosemide 40 mg oral tablet  -- 1 tab(s) by mouth once a day  -- Indication: For Diastolic heart failure

## 2017-09-25 NOTE — DISCHARGE NOTE ADULT - HOSPITAL COURSE
81 y/o woman with h/o Diastolic HF, HTN, DM, A-Fib, Tachy/Misha Syndrome s/p PPM, Uterine Cancer s/p XRT who p/w ADHF  Pt was diuresed with IV Lasix 40mg BID, then transitioned to 40 mg daily.Patient appears to be at or near euvolemic state, and is now transitioned to PO Lasix. Pt is cleared for discharge home. Echo planned for outpatient. 81 y/o woman with h/o Diastolic HF, HTN, DM, A-Fib, Tachy/Misha Syndrome s/p PPM, Uterine Cancer s/p XRT who p/w ADHF  Pt was diuresed with IV Lasix 40mg BID, then transitioned to 40 mg daily.Patient appears to be at or near euvolemic state, and is now transitioned to PO Lasix. Pt is cleared for discharge home. Echo planned for outpatient. She will need to follow-up with cardiologist in 1-2 weeks for further evaluation and repeat BMP.

## 2017-09-25 NOTE — DIETITIAN INITIAL EVALUATION ADULT. - PROBLEM SELECTOR PLAN 4
sCr below baseline. Electrolytes wnl. Patient mildly hypervolemic in setting of acute decompensated HF; receiving IV furosemide.     -trend chemistry, monitor K+

## 2017-09-29 ENCOUNTER — RECORD ABSTRACTING (OUTPATIENT)
Age: 82
End: 2017-09-29

## 2017-10-04 ENCOUNTER — APPOINTMENT (OUTPATIENT)
Dept: CARDIOLOGY | Facility: HOSPITAL | Age: 82
End: 2017-10-04

## 2017-10-07 ENCOUNTER — INPATIENT (INPATIENT)
Facility: HOSPITAL | Age: 82
LOS: 1 days | Discharge: HOME | End: 2017-10-09
Attending: HOSPITALIST | Admitting: HOSPITALIST
Payer: MEDICARE

## 2017-10-07 VITALS
DIASTOLIC BLOOD PRESSURE: 53 MMHG | HEART RATE: 63 BPM | TEMPERATURE: 98 F | RESPIRATION RATE: 22 BRPM | OXYGEN SATURATION: 97 % | SYSTOLIC BLOOD PRESSURE: 157 MMHG

## 2017-10-07 DIAGNOSIS — E78.5 HYPERLIPIDEMIA, UNSPECIFIED: ICD-10-CM

## 2017-10-07 DIAGNOSIS — I50.9 HEART FAILURE, UNSPECIFIED: ICD-10-CM

## 2017-10-07 DIAGNOSIS — I10 ESSENTIAL (PRIMARY) HYPERTENSION: ICD-10-CM

## 2017-10-07 DIAGNOSIS — I50.33 ACUTE ON CHRONIC DIASTOLIC (CONGESTIVE) HEART FAILURE: ICD-10-CM

## 2017-10-07 DIAGNOSIS — N18.3 CHRONIC KIDNEY DISEASE, STAGE 3 (MODERATE): ICD-10-CM

## 2017-10-07 DIAGNOSIS — R74.0 NONSPECIFIC ELEVATION OF LEVELS OF TRANSAMINASE AND LACTIC ACID DEHYDROGENASE [LDH]: ICD-10-CM

## 2017-10-07 DIAGNOSIS — E11.9 TYPE 2 DIABETES MELLITUS WITHOUT COMPLICATIONS: ICD-10-CM

## 2017-10-07 DIAGNOSIS — D64.9 ANEMIA, UNSPECIFIED: ICD-10-CM

## 2017-10-07 DIAGNOSIS — D72.829 ELEVATED WHITE BLOOD CELL COUNT, UNSPECIFIED: ICD-10-CM

## 2017-10-07 DIAGNOSIS — Z29.9 ENCOUNTER FOR PROPHYLACTIC MEASURES, UNSPECIFIED: ICD-10-CM

## 2017-10-07 LAB
ALBUMIN SERPL ELPH-MCNC: 3.7 G/DL — SIGNIFICANT CHANGE UP (ref 3.3–5)
ALP SERPL-CCNC: 183 U/L — HIGH (ref 40–120)
ALT FLD-CCNC: 57 U/L — HIGH (ref 4–33)
APPEARANCE UR: SIGNIFICANT CHANGE UP
APTT BLD: 35.1 SEC — SIGNIFICANT CHANGE UP (ref 27.5–37.4)
AST SERPL-CCNC: 70 U/L — HIGH (ref 4–32)
BASE EXCESS BLDV CALC-SCNC: -3.1 MMOL/L — SIGNIFICANT CHANGE UP
BASE EXCESS BLDV CALC-SCNC: -4.8 MMOL/L — SIGNIFICANT CHANGE UP
BASOPHILS # BLD AUTO: 0.05 K/UL — SIGNIFICANT CHANGE UP (ref 0–0.2)
BASOPHILS NFR BLD AUTO: 0.3 % — SIGNIFICANT CHANGE UP (ref 0–2)
BILIRUB SERPL-MCNC: 0.7 MG/DL — SIGNIFICANT CHANGE UP (ref 0.2–1.2)
BILIRUB UR-MCNC: NEGATIVE — SIGNIFICANT CHANGE UP
BLOOD GAS VENOUS - CREATININE: 1.05 MG/DL — SIGNIFICANT CHANGE UP (ref 0.5–1.3)
BLOOD GAS VENOUS - CREATININE: SIGNIFICANT CHANGE UP MG/DL (ref 0.5–1.3)
BLOOD UR QL VISUAL: NEGATIVE — SIGNIFICANT CHANGE UP
BUN SERPL-MCNC: 42 MG/DL — HIGH (ref 7–23)
CALCIUM SERPL-MCNC: 10.3 MG/DL — SIGNIFICANT CHANGE UP (ref 8.4–10.5)
CHLORIDE BLDV-SCNC: 103 MMOL/L — SIGNIFICANT CHANGE UP (ref 96–108)
CHLORIDE BLDV-SCNC: 104 MMOL/L — SIGNIFICANT CHANGE UP (ref 96–108)
CHLORIDE SERPL-SCNC: 99 MMOL/L — SIGNIFICANT CHANGE UP (ref 98–107)
CK MB BLD-MCNC: 2.91 NG/ML — SIGNIFICANT CHANGE UP (ref 1–4.7)
CK MB BLD-MCNC: 3.11 NG/ML — SIGNIFICANT CHANGE UP (ref 1–4.7)
CK SERPL-CCNC: 55 U/L — SIGNIFICANT CHANGE UP (ref 25–170)
CK SERPL-CCNC: 55 U/L — SIGNIFICANT CHANGE UP (ref 25–170)
CO2 SERPL-SCNC: 18 MMOL/L — LOW (ref 22–31)
COLOR SPEC: SIGNIFICANT CHANGE UP
CREAT SERPL-MCNC: 1.23 MG/DL — SIGNIFICANT CHANGE UP (ref 0.5–1.3)
EOSINOPHIL # BLD AUTO: 0.18 K/UL — SIGNIFICANT CHANGE UP (ref 0–0.5)
EOSINOPHIL NFR BLD AUTO: 1.1 % — SIGNIFICANT CHANGE UP (ref 0–6)
GAS PNL BLDV: 131 MMOL/L — LOW (ref 136–146)
GAS PNL BLDV: 132 MMOL/L — LOW (ref 136–146)
GLUCOSE BLDV-MCNC: 181 — HIGH (ref 70–99)
GLUCOSE BLDV-MCNC: 199 — HIGH (ref 70–99)
GLUCOSE SERPL-MCNC: 174 MG/DL — HIGH (ref 70–99)
GLUCOSE UR-MCNC: NEGATIVE — SIGNIFICANT CHANGE UP
HCO3 BLDV-SCNC: 20 MMOL/L — SIGNIFICANT CHANGE UP (ref 20–27)
HCO3 BLDV-SCNC: 21 MMOL/L — SIGNIFICANT CHANGE UP (ref 20–27)
HCT VFR BLD CALC: 34.2 % — LOW (ref 34.5–45)
HCT VFR BLDV CALC: 31.4 % — LOW (ref 34.5–45)
HCT VFR BLDV CALC: 35.1 % — SIGNIFICANT CHANGE UP (ref 34.5–45)
HGB BLD-MCNC: 10.9 G/DL — LOW (ref 11.5–15.5)
HGB BLDV-MCNC: 10.1 G/DL — LOW (ref 11.5–15.5)
HGB BLDV-MCNC: 11.4 G/DL — LOW (ref 11.5–15.5)
IMM GRANULOCYTES # BLD AUTO: 0.08 # — SIGNIFICANT CHANGE UP
IMM GRANULOCYTES NFR BLD AUTO: 0.5 % — SIGNIFICANT CHANGE UP (ref 0–1.5)
INR BLD: 1.24 — HIGH (ref 0.88–1.17)
KETONES UR-MCNC: NEGATIVE — SIGNIFICANT CHANGE UP
LACTATE BLDV-MCNC: 1.4 MMOL/L — SIGNIFICANT CHANGE UP (ref 0.5–2)
LACTATE BLDV-MCNC: 1.9 MMOL/L — SIGNIFICANT CHANGE UP (ref 0.5–2)
LEUKOCYTE ESTERASE UR-ACNC: NEGATIVE — SIGNIFICANT CHANGE UP
LYMPHOCYTES # BLD AUTO: 16 % — SIGNIFICANT CHANGE UP (ref 13–44)
LYMPHOCYTES # BLD AUTO: 2.55 K/UL — SIGNIFICANT CHANGE UP (ref 1–3.3)
MCHC RBC-ENTMCNC: 31.4 PG — SIGNIFICANT CHANGE UP (ref 27–34)
MCHC RBC-ENTMCNC: 31.9 % — LOW (ref 32–36)
MCV RBC AUTO: 98.6 FL — SIGNIFICANT CHANGE UP (ref 80–100)
MONOCYTES # BLD AUTO: 0.87 K/UL — SIGNIFICANT CHANGE UP (ref 0–0.9)
MONOCYTES NFR BLD AUTO: 5.4 % — SIGNIFICANT CHANGE UP (ref 2–14)
MUCOUS THREADS # UR AUTO: SIGNIFICANT CHANGE UP
NEUTROPHILS # BLD AUTO: 12.25 K/UL — HIGH (ref 1.8–7.4)
NEUTROPHILS NFR BLD AUTO: 76.7 % — SIGNIFICANT CHANGE UP (ref 43–77)
NITRITE UR-MCNC: NEGATIVE — SIGNIFICANT CHANGE UP
NRBC # FLD: 0 — SIGNIFICANT CHANGE UP
NT-PROBNP SERPL-SCNC: 5926 PG/ML — SIGNIFICANT CHANGE UP
PCO2 BLDV: 38 MMHG — LOW (ref 41–51)
PCO2 BLDV: 42 MMHG — SIGNIFICANT CHANGE UP (ref 41–51)
PH BLDV: 7.31 PH — LOW (ref 7.32–7.43)
PH BLDV: 7.37 PH — SIGNIFICANT CHANGE UP (ref 7.32–7.43)
PH UR: 6 — SIGNIFICANT CHANGE UP (ref 4.6–8)
PLATELET # BLD AUTO: 272 K/UL — SIGNIFICANT CHANGE UP (ref 150–400)
PMV BLD: 10.1 FL — SIGNIFICANT CHANGE UP (ref 7–13)
PO2 BLDV: 38 MMHG — SIGNIFICANT CHANGE UP (ref 35–40)
PO2 BLDV: 45 MMHG — HIGH (ref 35–40)
POTASSIUM BLDV-SCNC: 4.5 MMOL/L — SIGNIFICANT CHANGE UP (ref 3.4–4.5)
POTASSIUM BLDV-SCNC: 4.7 MMOL/L — HIGH (ref 3.4–4.5)
POTASSIUM SERPL-MCNC: 4.7 MMOL/L — SIGNIFICANT CHANGE UP (ref 3.5–5.3)
POTASSIUM SERPL-SCNC: 4.7 MMOL/L — SIGNIFICANT CHANGE UP (ref 3.5–5.3)
PROT SERPL-MCNC: 8.4 G/DL — HIGH (ref 6–8.3)
PROT UR-MCNC: 100 — HIGH
PROTHROM AB SERPL-ACNC: 14 SEC — HIGH (ref 9.8–13.1)
RBC # BLD: 3.47 M/UL — LOW (ref 3.8–5.2)
RBC # FLD: 14.3 % — SIGNIFICANT CHANGE UP (ref 10.3–14.5)
RBC CASTS # UR COMP ASSIST: SIGNIFICANT CHANGE UP (ref 0–?)
SAO2 % BLDV: 71.9 % — SIGNIFICANT CHANGE UP (ref 60–85)
SAO2 % BLDV: 75.7 % — SIGNIFICANT CHANGE UP (ref 60–85)
SODIUM SERPL-SCNC: 134 MMOL/L — LOW (ref 135–145)
SP GR SPEC: 1.01 — SIGNIFICANT CHANGE UP (ref 1–1.03)
SQUAMOUS # UR AUTO: SIGNIFICANT CHANGE UP
TROPONIN T SERPL-MCNC: < 0.06 NG/ML — SIGNIFICANT CHANGE UP (ref 0–0.06)
TROPONIN T SERPL-MCNC: < 0.06 NG/ML — SIGNIFICANT CHANGE UP (ref 0–0.06)
UROBILINOGEN FLD QL: NORMAL E.U. — SIGNIFICANT CHANGE UP (ref 0.1–0.2)
WBC # BLD: 15.98 K/UL — HIGH (ref 3.8–10.5)
WBC # FLD AUTO: 15.98 K/UL — HIGH (ref 3.8–10.5)
WBC UR QL: SIGNIFICANT CHANGE UP (ref 0–?)

## 2017-10-07 PROCEDURE — 71010: CPT | Mod: 26

## 2017-10-07 PROCEDURE — 99223 1ST HOSP IP/OBS HIGH 75: CPT

## 2017-10-07 RX ORDER — DEXTROSE 50 % IN WATER 50 %
25 SYRINGE (ML) INTRAVENOUS ONCE
Qty: 0 | Refills: 0 | Status: DISCONTINUED | OUTPATIENT
Start: 2017-10-07 | End: 2017-10-09

## 2017-10-07 RX ORDER — DEXTROSE 50 % IN WATER 50 %
12.5 SYRINGE (ML) INTRAVENOUS ONCE
Qty: 0 | Refills: 0 | Status: DISCONTINUED | OUTPATIENT
Start: 2017-10-07 | End: 2017-10-09

## 2017-10-07 RX ORDER — DEXTROSE 50 % IN WATER 50 %
1 SYRINGE (ML) INTRAVENOUS ONCE
Qty: 0 | Refills: 0 | Status: DISCONTINUED | OUTPATIENT
Start: 2017-10-07 | End: 2017-10-09

## 2017-10-07 RX ORDER — ASPIRIN/CALCIUM CARB/MAGNESIUM 324 MG
81 TABLET ORAL DAILY
Qty: 0 | Refills: 0 | Status: DISCONTINUED | OUTPATIENT
Start: 2017-10-07 | End: 2017-10-09

## 2017-10-07 RX ORDER — VANCOMYCIN HCL 1 G
1000 VIAL (EA) INTRAVENOUS ONCE
Qty: 0 | Refills: 0 | Status: COMPLETED | OUTPATIENT
Start: 2017-10-07 | End: 2017-10-07

## 2017-10-07 RX ORDER — GLUCAGON INJECTION, SOLUTION 0.5 MG/.1ML
1 INJECTION, SOLUTION SUBCUTANEOUS ONCE
Qty: 0 | Refills: 0 | Status: DISCONTINUED | OUTPATIENT
Start: 2017-10-07 | End: 2017-10-09

## 2017-10-07 RX ORDER — INSULIN LISPRO 100/ML
VIAL (ML) SUBCUTANEOUS AT BEDTIME
Qty: 0 | Refills: 0 | Status: DISCONTINUED | OUTPATIENT
Start: 2017-10-07 | End: 2017-10-09

## 2017-10-07 RX ORDER — FUROSEMIDE 40 MG
40 TABLET ORAL ONCE
Qty: 0 | Refills: 0 | Status: COMPLETED | OUTPATIENT
Start: 2017-10-07 | End: 2017-10-07

## 2017-10-07 RX ORDER — FUROSEMIDE 40 MG
40 TABLET ORAL
Qty: 0 | Refills: 0 | Status: DISCONTINUED | OUTPATIENT
Start: 2017-10-07 | End: 2017-10-09

## 2017-10-07 RX ORDER — INSULIN LISPRO 100/ML
VIAL (ML) SUBCUTANEOUS
Qty: 0 | Refills: 0 | Status: DISCONTINUED | OUTPATIENT
Start: 2017-10-07 | End: 2017-10-09

## 2017-10-07 RX ORDER — PIPERACILLIN AND TAZOBACTAM 4; .5 G/20ML; G/20ML
3.38 INJECTION, POWDER, LYOPHILIZED, FOR SOLUTION INTRAVENOUS ONCE
Qty: 0 | Refills: 0 | Status: COMPLETED | OUTPATIENT
Start: 2017-10-07 | End: 2017-10-07

## 2017-10-07 RX ORDER — SODIUM CHLORIDE 9 MG/ML
1000 INJECTION, SOLUTION INTRAVENOUS
Qty: 0 | Refills: 0 | Status: DISCONTINUED | OUTPATIENT
Start: 2017-10-07 | End: 2017-10-09

## 2017-10-07 RX ORDER — METOPROLOL TARTRATE 50 MG
200 TABLET ORAL DAILY
Qty: 0 | Refills: 0 | Status: DISCONTINUED | OUTPATIENT
Start: 2017-10-07 | End: 2017-10-09

## 2017-10-07 RX ORDER — LOSARTAN POTASSIUM 100 MG/1
100 TABLET, FILM COATED ORAL DAILY
Qty: 0 | Refills: 0 | Status: DISCONTINUED | OUTPATIENT
Start: 2017-10-07 | End: 2017-10-09

## 2017-10-07 RX ORDER — ATORVASTATIN CALCIUM 80 MG/1
40 TABLET, FILM COATED ORAL AT BEDTIME
Qty: 0 | Refills: 0 | Status: DISCONTINUED | OUTPATIENT
Start: 2017-10-07 | End: 2017-10-09

## 2017-10-07 RX ORDER — AMLODIPINE BESYLATE 2.5 MG/1
10 TABLET ORAL DAILY
Qty: 0 | Refills: 0 | Status: DISCONTINUED | OUTPATIENT
Start: 2017-10-07 | End: 2017-10-09

## 2017-10-07 RX ORDER — ISOSORBIDE MONONITRATE 60 MG/1
60 TABLET, EXTENDED RELEASE ORAL DAILY
Qty: 0 | Refills: 0 | Status: DISCONTINUED | OUTPATIENT
Start: 2017-10-07 | End: 2017-10-09

## 2017-10-07 RX ADMIN — Medication 250 MILLIGRAM(S): at 11:46

## 2017-10-07 RX ADMIN — PIPERACILLIN AND TAZOBACTAM 200 GRAM(S): 4; .5 INJECTION, POWDER, LYOPHILIZED, FOR SOLUTION INTRAVENOUS at 11:00

## 2017-10-07 RX ADMIN — Medication 40 MILLIGRAM(S): at 17:51

## 2017-10-07 RX ADMIN — ATORVASTATIN CALCIUM 40 MILLIGRAM(S): 80 TABLET, FILM COATED ORAL at 22:36

## 2017-10-07 RX ADMIN — Medication 40 MILLIGRAM(S): at 09:47

## 2017-10-07 NOTE — ED PROVIDER NOTE - PSH
Cardiac Pacemaker (ICD9 V45.01)  [employer]tr[employer] (2003 - for tacybrady syndrome @ [facility])  History of Hysterectomy (ICD9 V88.01)    Status Post Total Knee Replacement  LISA, 6 months apart 2010

## 2017-10-07 NOTE — H&P ADULT - PROBLEM SELECTOR PROBLEM 2
Type 2 diabetes mellitus without complication, without long-term current use of insulin Leukocytosis, unspecified type

## 2017-10-07 NOTE — CONSULT NOTE ADULT - SUBJECTIVE AND OBJECTIVE BOX
Patient seen and evaluated at bedside    Chief Complaint:  shortness of breath    HPI:  82F h/o DM2, HTN, Cardiac Pacemaker for Tachybrady Syndrome, a-fib on AC, Uterine CA s/p hysterectomy, HLD, HFpEF presents with increased SOB for the past 3 days, worsening orthopnea and abdominal "gas" pains associated with some nausea, no vomiting or diarrhea. She was recently discharged from the hospital for the same symptoms, treated with Lasix, no BiPAP, sent home on Lasix PO which she has been taking regularly however she reports drinking a lot of fluids at home. She reports some increased swelling in her lower extremities as well as a productive cough with some clear sputum. No chest pain or palpitations. Currently on Bipap and feeling better. Received lasix 40 IV x1 and started on vanc/zosyn in ED.    PMH:   Obese  Complete Spontaneous   Hyperlipidemia  Uterine Cancer (ICD9 179)  Osteoarthritis of Knee (ICD9 715.96)  DM Type 2 (Diabetes Mellitus, Type 2) (ICD9 250.00)  HTN (Hypertension) (ICD9 401.9)  Cardiac Pacemaker (ICD9 V45.01)      PSH:   Status Post Total Knee Replacement  Cardiac Pacemaker (ICD9 V45.01)  History of Hysterectomy (ICD9 V88.01)      Medications:       Allergies:  No Known Allergies      FAMILY HISTORY:  No pertinent family history in first degree relatives      Social History:  Smoking History:  Alcohol Use:  Drug Use:    Review of Systems:  REVIEW OF SYSTEMS:    CONSTITUTIONAL: No weakness, fevers or chills  EYES/ENT: No visual changes;  No dysphagia  NECK: No pain or stiffness  RESPIRATORY: +cough, no wheezing, hemoptysis; +shortness of breath  CARDIOVASCULAR: No chest pain or palpitations; +lower extremity edema  GASTROINTESTINAL: No abdominal or epigastric pain. No nausea, vomiting, or hematemesis; No diarrhea or constipation. No melena or hematochezia.  BACK: No back pain  GENITOURINARY: No dysuria, frequency or hematuria  NEUROLOGICAL: No numbness or weakness  SKIN: No itching, burning, rashes, or lesions   All other review of systems is negative unless indicated above.    Physical Exam:  T(F): 98 (10-), Max: 98 (10-07)  HR: 67 (10-07) (60 - 67)  BP: 140/52 (10-07) (140/52 - 157/53)  RR: 22 (10-07)  SpO2: 100% (10-07)  GENERAL: No acute distress  HEAD:  Atraumatic, Normocephalic  ENT: EOMI, PERRLA, +JVD  CHEST/LUNG: Mild crackles diffusely   BACK: No spinal tenderness  HEART: Regular rate and rhythm; No murmurs, rubs, or gallops  ABDOMEN: Soft, Nontender, Nondistended; Bowel sounds present  EXTREMITIES:  trace to 1+ pitting edema in LE bilaterally  PSYCH: Nl behavior, nl affect  NEUROLOGY: AAOx3, non-focal, cranial nerves intact  SKIN: Normal color, No rashes or lesions        Labs: Personally reviewed                        10.   15.98 )-----------( 272      ( 07 Oct 2017 09:30 )             34.2     10-07    134<L>  |  99  |  42<H>  ----------------------------<  174<H>  4.7   |  18<L>  |  1.23    Ca    10.3      07 Oct 2017 09:30    TPro  8.4<H>  /  Alb  3.7  /  TBili  0.7  /  DBili  x   /  AST  70<H>  /  ALT  57<H>  /  AlkPhos  183<H>  10-07    PT/INR - ( 07 Oct 2017 09:30 )   PT: 14.0 SEC;   INR: 1.24          PTT - ( 07 Oct 2017 09:30 )  PTT:35.1 SEC  CARDIAC MARKERS ( 07 Oct 2017 09:30 )  x     / < 0.06 ng/mL / 55 u/L / 2.91 ng/mL / x          Serum Pro-Brain Natriuretic Peptide: 5926 pg/mL (10-07 @ 09:30)

## 2017-10-07 NOTE — H&P ADULT - PROBLEM SELECTOR PLAN 6
Pt has a baseline Cr of 1.2-1.3, which is where she currently is. No CLARI at this time, so no concerns about holding lasix.   - daily BMP  - monitor I+O's/daily weights  - c/w losartan Review of allacripts reveals that pt has a hx of Afib. CHADs VASC 6 and HAS BLED of 4. She has been followed by cardiology. Last seen 6/2017. Per allscripts pt was previously on warfarin w/ labile INRs and DOAC. She has been off a/c in setting of hemorrhoidal bleeds and radiation proctitis. Cards reccommended pt to be started back on a/c 2/2 higher risk of stroke compared to bleeding risk, however pt did not want to restart a/c. Plan was for patient to f/u w/ GI in re to her hemorrhoids and proctitis w/ plan to likely restart a/c after GI eval. Pt has not followed up with GI yet.   -c/w Toprol for rate control  -will need to revisit a/c w/ patient. She will also need to f/u w/ GI as outpatient.

## 2017-10-07 NOTE — CONSULT NOTE ADULT - ASSESSMENT
82F h/o DM2, HTN, Cardiac Pacemaker for Tachybrady Syndrome, a-fib on AC, Uterine CA s/p hysterectomy, HLD, HFpEF presents with increased SOB for the past 3 days likely 2/2 to acute on chronic diastolic HF in setting of increased fluid intake and possible PNA  -would wean off Bipap as patient received lasix earlier this AM and already feeling better, no indication for CCU at this time  -if patient admitted and primary team would like general cardiology to follow, please consult general cardiology team

## 2017-10-07 NOTE — ED PROVIDER NOTE - RESPIRATORY [+], MLM
ORTHOPNEA/EXERTIONAL DYSPNEA/SHORTNESS OF BREATH EXERTIONAL DYSPNEA/ORTHOPNEA/SHORTNESS OF BREATH/COUGH

## 2017-10-07 NOTE — ED PROVIDER NOTE - CHPI ED SYMPTOMS POS
DIFFICULTY BREATHING/SHORTNESS OF BREATH/DYSPNEA ON EXERTION SHORTNESS OF BREATH/DYSPNEA ON EXERTION/COUGH/DIFFICULTY BREATHING

## 2017-10-07 NOTE — PATIENT PROFILE ADULT. - LIVES WITH, PROFILE
(daughter), private home, 4 steps-to-enter/exit home with unilateral handrail, no stairs to bedroom/bathroom/children

## 2017-10-07 NOTE — H&P ADULT - PROBLEM SELECTOR PLAN 4
On lipitor 40 mg daily. Will continue. Pt has AST/ALT of 70/57. This is most likely 2/2 hepatic congestion from HF.   - will order hep panel to r/o other causes, but low suspicion Pt has AST/ALT of 70/57 and elevated alk phos . This is most likely 2/2 hepatic congestion from HF.   - will order hep panel to r/o other causes, but low suspicion

## 2017-10-07 NOTE — ED PROVIDER NOTE - ATTENDING CONTRIBUTION TO CARE
Attending Statement: I have personally seen and examined this patient. I have fully participated in the care of this patient. I have reviewed all pertinent clinical information, including history physical exam, plan and the Resident's note and agree except as noted   80 year old female with PMH of CHF, HTN, DM, Afib, PPM for tachy/wellington syndrome, uterine ca s/p radiation presents with shortness of breath, PND, unable to sleep at night, worsening in the last several days. no fever or chills. +nausea w dec appetite. no vomit/diarrhea. no change in meds.   Vital signs noted. pulse ox 92 RA tachycardic, tachypneic, talking in short 1-2 words. Dec air entry, +rales bl. soft nondistended nontender abdomen. trace pedal edema bl. no calf tenderness. Aox3  plan labs, ekg, cxr, BIPAP, lasi, cardiac monitor, admission Attending Statement: I have personally seen and examined this patient. I have fully participated in the care of this patient. I have reviewed all pertinent clinical information, including history physical exam, plan and the Resident's note and agree except as noted   80 year old female with PMH of CHF, HTN, DM, Afib, PPM for tachy/wellington syndrome, uterine ca s/p radiation presents with shortness of breath, PND, unable to sleep at night, worsening in the last several days. no fever or chills. +nausea w dec appetite. no vomit/diarrhea. no change in meds. slight productive cough. no chest pain. no palpitations.   Vital signs noted. pulse ox 92 RA tachycardic, tachypneic, talking in short 1-2 words. Dec air entry, +rales bl. soft nondistended nontender abdomen. trace pedal edema bl. no calf tenderness. Aox3  plan labs, ekg, cxr, BIPAP, lasi, cardiac monitor, admission

## 2017-10-07 NOTE — H&P ADULT - PROBLEM SELECTOR PLAN 1
Acute exacerbation appears to be 2/2 drinking lots of fluids at home. She has been adherent to her lasix regimen and has not missed any doses. She has not had any other acute illness that would have tipped her over into an acute episode either. Her pro-BNP is elevated at 5926, CXR showed bilateral pleural effusions, interstitial pulmonary edema. Trops negative x2.   - IV lasix 40 mg BID  - I+O's, if unable daily weights  - c/w nasal cannula and PRN BiPAP Acute exacerbation appears to be 2/2 drinking lots of fluids at home. She has been adherent to her lasix regimen and has not missed any doses. She has not had any other acute illness that would have tipped her over into an acute episode either. Her pro-BNP is elevated at 5926, CXR showed bilateral pleural effusions, interstitial pulmonary edema. Trops negative x2.   - IV lasix 40 mg BID  - I+O's, if unable daily weights  - c/w nasal cannula and PRN BiPAP  - if no ECHO in past year, will get one as inpatient Acute exacerbation appears to be 2/2 drinking lots of fluids at home. She has been adherent to her lasix regimen and has not missed any doses. She has not had any other acute illness that would have tipped her over into an acute episode either. Her pro-BNP is elevated at 5926, CXR showed bilateral pleural effusions, interstitial pulmonary edema. Trops negative x2.   - IV lasix 40 mg BID  - I+O's, if unable daily weights  - c/w nasal cannula and BiPAP as needed   - if no ECHO in past year, will get one as inpatient. Last TTE noted in 4/2016 w/ normal RV/LV dysfunction and mild diastolic dysfunction. Pt states her last TTE was this June 2017. However no record seen in [facility] or allscripts  -pt counseled on appropriate diet and limiting fluid intake

## 2017-10-07 NOTE — ED PROVIDER NOTE - MEDICAL DECISION MAKING DETAILS
82F p/w SOB, O2 sat was  92% off O2. Likely worsening CHF exacerbation (CXR, BNP, CE, Lasix, placed on BiPAP). Unlikely infectious, no fever, cough.

## 2017-10-07 NOTE — H&P ADULT - NSHPREVIEWOFSYSTEMS_GEN_ALL_CORE
REVIEW OF SYSTEMS:    CONSTITUTIONAL: No fever, weight loss, or fatigue  EYES: No eye pain, visual disturbances, or discharge  ENMT:  No difficulty hearing, tinnitus, vertigo; No sinus or throat pain  RESPIRATORY: No wheezing, chills or hemoptysis; +occ cough and + SOB See HPI  CARDIOVASCULAR: No chest pain, palpitations, dizziness . + leg swelling  GASTROINTESTINAL: No abdominal or epigastric pain. No nausea, vomiting, or hematemesis; No diarrhea or constipation. No melena or hematochezia. + gassy bloaty abdominal sensation  GENITOURINARY: No dysuria, frequency, hematuria, or incontinence  NEUROLOGICAL: No headaches, memory loss, loss of strength, numbness, or tremors  SKIN: No itching, burning, rashes, or lesions   LYMPH NODES: No enlarged glands  ENDOCRINE: No heat or cold intolerance; No hair loss  MUSCULOSKELETAL: No joint pain or swelling; No muscle, back, or extremity pain  PSYCHIATRIC: No depression, anxiety, mood swings, or difficulty sleeping  HEME/LYMPH: No easy bruising, or bleeding gums  ALLERY AND IMMUNOLOGIC: No hives or eczema

## 2017-10-07 NOTE — H&P ADULT - PROBLEM SELECTOR PLAN 2
On metformin, januvia, and glimepiride at home. A1c is 7.7%.  - low dose correctional SS WBC count of 15.98, with slight neutrophilic predominance. Does not endorse any cough, fevers, diarrhea. Most likely 2/2 acute HF exacerbation.  - monitor off abx  - trend daily CBC WBC count of 15.98, with slight neutrophilic predominance. Does not endorse any cough, fevers, diarrhea.   - monitor off abx for now  - trend daily CBC w/ diff

## 2017-10-07 NOTE — H&P ADULT - HISTORY OF PRESENT ILLNESS
This is an 83 y/o F with a PMH significant for DM2, HTN, Cardiac Pacemaker for Tachybrady Syndrome, a-fib on AC, Uterine CA s/p hysterectomy, HLD, and HFpEF presents with increased SOB for the past 3 days, worsening orthopnea and abdominal "gas" pains. She was recently discharged from the hospital for the same symptoms, treated with Lasix, no BiPAP, sent home on 40 mg Lasix daily which she has been taking regularly however she reports drinking a lot of fluids at home. She reports some increased swelling in her lower extremities as well as a productive cough with some clear sputum. No chest pain or palpitations. She endorses being able to walk 1-2 blocks at baseline, but this has progressively been decreasing over the course of the past 3 days. She uses 2 pillows to sleep at night, and this has not changed. She denies any recent fevers/chills, n/v/diarrhea/constipation. ROS was otherwise was negative.     In the ED, patient was initially placed on BiPAP for respiratory distress and O2 sat of 92% with decreased air entry. She was given lasix 40 mg IV x2. Over the course of the ED stay, she was transitioned to 4L NC. Her labs showed a leukocytosis 15.98, trops negative x2, pro-BNP 5926, CXR showed small bilateral basilar pleural effusions, with interstitial edema. She also received one dose of vanc/zosyn. This is an 81 y/o F with a PMH significant for DM2, HTN, Cardiac Pacemaker for Tachybrady Syndrome, Uterine CA s/p hysterectomy, HLD, and HFpEF presents with increased SOB for the past 3 days, worsening orthopnea and abdominal "gas" pains. She was recently discharged from the hospital for the same symptoms, treated with Lasix, no BiPAP, sent home on 40 mg Lasix daily which she has been taking regularly however she reports drinking a lot of fluids at home. She reports some increased swelling in her lower extremities as well as a productive cough with some clear sputum. No chest pain or palpitations. She endorses being able to walk 1-2 blocks at baseline, but this has progressively been decreasing over the course of the past 3 days. She uses 2 pillows to sleep at night, and this has not changed. She denies any recent fevers/chills, n/v/diarrhea/constipation. ROS was otherwise was negative.     In the ED, patient was initially placed on BiPAP for respiratory distress and O2 sat of 92% with decreased air entry. She was given lasix 40 mg IV x2. Over the course of the ED stay, she was transitioned to 4L NC. Her labs showed a leukocytosis 15.98, trops negative x2, pro-BNP 5926, CXR showed small bilateral basilar pleural effusions, with interstitial edema. She also received one dose of vanc/zosyn. This is an 81 y/o F with a PMH significant for DM2, HTN, Cardiac Pacemaker for Tachybrady Syndrome, afib (not on a/c 2/2 GI bleed), Uterine CA s/p hysterectomy and RT, HLD, and HFpEF presents with increased SOB for the past 3 days, worsening orthopnea and abdominal "gas" pains. She was recently admitted (9/23-9/25/17) for the same symptoms, treated with Lasix and sent home on 40 mg Lasix daily which she has been taking regularly however she reports drinking a lot of fluids at home. She also reports some increased swelling in her lower extremities. She has occasional cough w/ clear sputum but denies f/c, sore throat, rhinorrhea, sick contacts, chest pain or palpitations. She endorses being able to walk 1-2 blocks at baseline, but this has progressively been decreasing over the course of the past 3 days. She uses 2 pillows to sleep at night, and this has not changed, except for the night PTA where she had to sit up in a chair in order to sleep. She denies any recent fevers/chills, n/v/diarrhea/constipation. ROS was otherwise was negative.     In the ED, patient was initially placed on BiPAP for respiratory distress and O2 sat of 92% with decreased air entry. She was given lasix 40 mg IV x2. Over the course of the ED stay, she was transitioned to 4L NC. Her labs showed a leukocytosis 15.98, trops negative x2, pro-BNP 5926, CXR showed small bilateral basilar pleural effusions, with interstitial edema. She also received one dose of vanc/zosyn.

## 2017-10-07 NOTE — H&P ADULT - NSHPPHYSICALEXAM_GEN_ALL_CORE
GENERAL: no acute distress  HEENT: NC/AT, EOMI, neck supple, MMM  RESPIRATORY: b/l basilar mild crackles, scattered wheezes  CARDIOVASCULAR: RRR, no murmurs, gallops, rubs  ABDOMINAL: soft, non-tender, non-distended, positive bowel sounds   EXTREMITIES: 1+ edema BLE  NEUROLOGICAL: alert and oriented x 3, non-focal  SKIN: no rashes or lesions   MUSCULOSKELETAL: no gross joint deformity GENERAL: no acute distress  HEENT: NC/AT, EOMI, neck supple, MMM  RESPIRATORY: b/l basilar mild crackles, scattered wheezes  CARDIOVASCULAR: RRR, no murmurs, gallops, rubs  BACK: No cva tenderness, no paraspinal or spinal tenderness  ABDOMINAL: soft, non-tender, non-distended, positive bowel sounds   EXTREMITIES: 1+ edema BLE,no clubbing or cyanosis   NEUROLOGICAL: alert and oriented x 3, non-focal  SKIN: no rashes or lesions   MUSCULOSKELETAL: no gross joint deformity

## 2017-10-07 NOTE — H&P ADULT - NSHPLABSRESULTS_GEN_ALL_CORE
.  LABS:                         10.9   15.98 )-----------( 272      ( 07 Oct 2017 09:30 )             34.2     10-07    134<L>  |  99  |  42<H>  ----------------------------<  174<H>  4.7   |  18<L>  |  1.23    Ca    10.3      07 Oct 2017 09:30    TPro  8.4<H>  /  Alb  3.7  /  TBili  0.7  /  DBili  x   /  AST  70<H>  /  ALT  57<H>  /  AlkPhos  183<H>  10-07    PT/INR - ( 07 Oct 2017 09:30 )   PT: 14.0 SEC;   INR: 1.24          PTT - ( 07 Oct 2017 09:30 )  PTT:35.1 SEC  Urinalysis Basic - ( 07 Oct 2017 11:16 )    Color: PLYEL / Appearance: HAZY / S.007 / pH: 6.0  Gluc: NEGATIVE / Ketone: NEGATIVE  / Bili: NEGATIVE / Urobili: NORMAL E.U.   Blood: NEGATIVE / Protein: 100 / Nitrite: NEGATIVE   Leuk Esterase: NEGATIVE / RBC: 0-2 / WBC 0-2   Sq Epi: OCC / Non Sq Epi: x / Bacteria: x      Serum Pro-Brain Natriuretic Peptide: 5926 pg/mL (10-07 @ 09:30)        RADIOLOGY, EKG & ADDITIONAL TESTS: Personally Reviewed.   EXAM:  RAD CHEST AP PA 1 VIEW        PROCEDURE DATE:  Oct  7 2017         INTERPRETATION:  HISTORY: Shortness of breath    TECHNIQUE: Frontal chest x-ray    COMPARISON: Chest x-ray from 2017    FINDINGS:    Left chest wall pacemaker.  Interstitial edema is seen that is generalized and likely with small   effusions.    There is no pneumothorax.   The cardiomediastinal silhouette cannot be adequately assessed on this   projection.    IMPRESSION:    Pulmonary interstitial edema with bilateral small   effusions.    10/7/17 EKG atrial paced at 60 bpm, possible LVH and possible old anteroseptal infarct

## 2017-10-07 NOTE — H&P ADULT - ASSESSMENT
82F h/o DM2, HTN, Cardiac Pacemaker for Tachybrady Syndrome, a-fib on AC, Uterine CA s/p hysterectomy, HLD, HFpEF presents with increased SOB for the past 3 days likely 2/2 to acute on chronic diastolic HF in setting of increased fluid intake. 82F h/o DM2, HTN, Cardiac Pacemaker for Tachybrady Syndrome, Uterine CA s/p hysterectomy, HLD, HFpEF presents with increased SOB for the past 3 days likely 2/2 to acute on chronic diastolic HF in setting of increased fluid intake. 82F h/o DM2, HTN, Cardiac Pacemaker for Tachybrady Syndrome, afib (not on a/c 2/2 GI bleed), Uterine CA s/p hysterectomy, HLD, HFpEF presents with increased SOB for the past 3 days likely 2/2 to acute on chronic diastolic HF in setting of increased fluid intake.

## 2017-10-07 NOTE — ED PROVIDER NOTE - OBJECTIVE STATEMENT
82F h/o DM2, HTN, Uterine CA, HLD presents with increased SOB for the past 3 days, worsening orthopnea and abdominal "gas" pains associated with some nausea, no vomiting or diarrhea. She was recently discharged from the hospital for the same symptoms, 82F h/o DM2, HTN, Uterine CA, HLD presents with increased SOB for the past 3 days, worsening orthopnea and abdominal "gas" pains associated with some nausea, no vomiting or diarrhea. She was recently discharged from the hospital for the same symptoms, treated with Lasix, no BiPAP, sent home on Lasix PO which she has been taking regularly. She admits to some increased swelling in her lower extremities. She denies any chest pain, fevers, chills, V/D, dysuria. 82F h/o DM2, HTN, Cardiac Pacemaker for Tachybrady Syndrome, Uterine CA, HLD presents with increased SOB for the past 3 days, worsening orthopnea and abdominal "gas" pains associated with some nausea, no vomiting or diarrhea. She was recently discharged from the hospital for the same symptoms, treated with Lasix, no BiPAP, sent home on Lasix PO which she has been taking regularly. She admits to some increased swelling in her lower extremities. She denies any chest pain, fevers, chills, V/D, dysuria. 82F h/o DM2, HTN, Cardiac Pacemaker for Tachybrady Syndrome, Uterine CA s/p hysterectomy, HLD presents with increased SOB for the past 3 days, worsening orthopnea and abdominal "gas" pains associated with some nausea, no vomiting or diarrhea. She was recently discharged from the hospital for the same symptoms, treated with Lasix, no BiPAP, sent home on Lasix PO which she has been taking regularly. She admits to some increased swelling in her lower extremities. She denies any chest pain, fevers, chills, V/D, dysuria. 82F h/o DM2, HTN, Cardiac Pacemaker for Tachybrady Syndrome, Uterine CA s/p hysterectomy, HLD presents with increased SOB for the past 3 days, worsening orthopnea and abdominal "gas" pains associated with some nausea, no vomiting or diarrhea. She was recently discharged from the hospital for the same symptoms, treated with Lasix, no BiPAP, sent home on Lasix PO which she has been taking regularly. She admits to some increased swelling in her lower extremities as well as a productive cough with some clear sputum. She denies any chest pain, fevers, chills, V/D, dysuria.

## 2017-10-07 NOTE — H&P ADULT - PROBLEM SELECTOR PLAN 5
DVT ppx: low improve score, SCD's On metformin, januvia, and glimepiride at home. A1c is 7.7%.  - low dose correctional SS On metformin, januvia, and glimepiride at home. A1c is 7.7%.  - monitor FS  -will treat w/ low dose correctional ISS and adjust regimen as needed

## 2017-10-07 NOTE — H&P ADULT - PROBLEM SELECTOR PLAN 7
Has been relatively well controlled. Would continue to monitor VS per routine.   - c/w home meds of losartan, amlodipine, metoprolol Pt has a baseline Cr of 1.2-1.3, which is where she currently is. No CLARI at this time,   - daily BMP  - monitor I+O's/daily weights

## 2017-10-07 NOTE — H&P ADULT - PROBLEM SELECTOR PLAN 3
Has been relatively well controlled. Would continue to monitor VS per routine.   - c/w home meds of losartan, amlodipine, metoprolol Baseline is 11.5 from last admission. Currently 10.9. Most likely 2/2 acute bone marrow suppression from acute HF exacerbation, along with fluid shifts intravascularly. Would not w/u with iron studies, B12, TSH, folate at this time.  - if continues to be anemic on daily CBC, will further w/u, but can monitor for now Baseline is 11.5 from last admission. Currently 10.9. Most likely 2/2 fluid shifts intravascularly, and is not far from baseline. Would not w/u with iron studies, B12, TSH, folate at this time.  - if continues to be anemic on daily CBC, will further w/u, but can monitor for now Normocytic anemia. Baseline is 11.5 from last admission. Currently 10.9. Most likely 2/2 fluid shifts intravascularly, and is not far from baseline.  Iron studies from 12/2016 was c/w anemia of chronic disease. Currently no signs or symptoms of active bleed.   - continue to monitor for now

## 2017-10-07 NOTE — H&P ADULT - PSH
Cardiac Pacemaker (ICD9 V45.01)  [employer] (2003 - for tacybrady syndrome @ [facility])  History of Hysterectomy (ICD9 V88.01)    Status Post Total Knee Replacement  LISA, 6 months apart 2010

## 2017-10-07 NOTE — H&P ADULT - PROBLEM SELECTOR PLAN 8
On lipitor 40 mg daily. Will continue. Has been relatively well controlled. Would continue to monitor VS per routine.   - c/w home meds of losartan, amlodipine, metoprolol

## 2017-10-07 NOTE — H&P ADULT - NSHPSOCIALHISTORY_GEN_ALL_CORE
Retired HHA, no EtOH, tobacco, or illicit drug use. Retired HHA, no EtOH, tobacco, or illicit drug use. Lives at home with daughter. AMbulates with out assistance.

## 2017-10-07 NOTE — ED ADULT NURSE NOTE - OBJECTIVE STATEMENT
pt AO x3, ambulatory, c/o SOB with abdominal pain for 3 days. she was here with same symptoms 2 weeks ago and recently getting worse. As per pt, by now she is using 3 pillows to sleep. Denies chest pain, dizziness and n/v/d at this time. Evaluated by provider. Blood obtained and sent to LAB. BiPAP ordered. Cardiac monitor in place/ sinus. Will continue to monitor.

## 2017-10-08 LAB
HAV IGM SER-ACNC: NONREACTIVE — SIGNIFICANT CHANGE UP
HBV CORE IGM SER-ACNC: NONREACTIVE — SIGNIFICANT CHANGE UP
HBV SURFACE AG SER-ACNC: NONREACTIVE — SIGNIFICANT CHANGE UP
HCV AB S/CO SERPL IA: 1.89 S/CO — SIGNIFICANT CHANGE UP
HCV AB SERPL-IMP: SIGNIFICANT CHANGE UP

## 2017-10-08 PROCEDURE — 99233 SBSQ HOSP IP/OBS HIGH 50: CPT | Mod: GC

## 2017-10-08 RX ADMIN — Medication 40 MILLIGRAM(S): at 06:26

## 2017-10-08 RX ADMIN — ATORVASTATIN CALCIUM 40 MILLIGRAM(S): 80 TABLET, FILM COATED ORAL at 22:12

## 2017-10-08 RX ADMIN — Medication 40 MILLIGRAM(S): at 17:20

## 2017-10-08 RX ADMIN — Medication 81 MILLIGRAM(S): at 11:38

## 2017-10-08 RX ADMIN — AMLODIPINE BESYLATE 10 MILLIGRAM(S): 2.5 TABLET ORAL at 06:26

## 2017-10-08 RX ADMIN — Medication 4: at 13:37

## 2017-10-08 RX ADMIN — Medication 200 MILLIGRAM(S): at 06:26

## 2017-10-08 RX ADMIN — LOSARTAN POTASSIUM 100 MILLIGRAM(S): 100 TABLET, FILM COATED ORAL at 06:26

## 2017-10-08 RX ADMIN — ISOSORBIDE MONONITRATE 60 MILLIGRAM(S): 60 TABLET, EXTENDED RELEASE ORAL at 11:38

## 2017-10-08 NOTE — PROGRESS NOTE ADULT - ATTENDING COMMENTS
Patient seen and examined. Chart/lab reviewed. Agree with above with modifications.    82F h/o DM2, HTN, PPM for tachy-wellington syndrome, Afib (not on AC 2/2 GI bleed), Uterine CA s/p hysterectomy, HLD, HFpEF, p/w dyspnea, acute on chronic diastolic HF, diuresed with iv lasix with improvement.    Pt denies chest pain breathing better today.  PE: base crackle and decreased BS at lung bases; heart regular, 2/6 HERBER; abdomen soft, extrem: no significant leg edema; neuro: a/ox3, nonfocal    Assessment/plan:  # Acute on chronic HFpEF: iv lasix diuresis, fluid restriction/DASH diet, transition to po lasix on discharge  check echo. Patient's last echo in April 2016 showed normal LVSF, mild AS, mild to mod AR  # Patient seen and examined. Chart/lab reviewed. Agree with above with modifications.    82F h/o DM2, HTN, PPM for tachy-wellington syndrome, Afib (not on AC 2/2 GI bleed), Uterine CA s/p hysterectomy, HLD, HFpEF, p/w dyspnea, acute on chronic diastolic HF, diuresed with iv lasix with improvement.    Pt denies chest pain breathing better today.  PE: base crackle and decreased BS at lung bases; heart regular, 2/6 HERBER; abdomen soft, extrem: no significant leg edema; neuro: a/ox3, nonfocal    Assessment/plan:  # Acute on chronic HFpEF: iv lasix diuresis, fluid restriction/DASH diet, transition to po lasix on discharge  check echo. Patient's last echo in April 2016 showed normal LVSF, mild AS, mild to mod AR  #HTN: controlled  # DM-2: A1c 7.7%, humalog ISS, monitor FS

## 2017-10-08 NOTE — PROGRESS NOTE ADULT - PROBLEM SELECTOR PLAN 2
WBC count of 15.98, with slight neutrophilic predominance. Does not endorse any cough, fevers, diarrhea.   - monitor off abx for now  - trend daily CBC w/ diff

## 2017-10-08 NOTE — PROGRESS NOTE ADULT - PROBLEM SELECTOR PLAN 7
Pt has a baseline Cr of 1.2-1.3, which is where she currently is. No CLARI at this time,   - daily BMP  - monitor I+O's/daily weights

## 2017-10-08 NOTE — PROGRESS NOTE ADULT - PROBLEM SELECTOR PLAN 8
Has been relatively well controlled. Would continue to monitor VS per routine.   - c/w home meds of losartan, amlodipine, metoprolol

## 2017-10-08 NOTE — PROGRESS NOTE ADULT - PROBLEM SELECTOR PLAN 4
Pt has AST/ALT of 70/57 and elevated alk phos . This is most likely 2/2 hepatic congestion from HF.   - will order hep panel to r/o other causes, but low suspicion

## 2017-10-08 NOTE — PROGRESS NOTE ADULT - PROBLEM SELECTOR PLAN 1
Acute exacerbation appears to be 2/2 drinking lots of fluids at home. She has been adherent to her lasix regimen and has not missed any doses. She has not had any other acute illness that would have tipped her over into an acute episode either. Her pro-BNP is elevated at 5926, CXR showed bilateral pleural effusions, interstitial pulmonary edema. Trops negative x2.   - IV lasix 40 mg BID  - I+O's, if unable daily weights  - c/w nasal cannula and BiPAP as needed   - TTE ordered because no record of one since 4/2016  - pt counseled on appropriate diet and limiting fluid intake

## 2017-10-09 ENCOUNTER — APPOINTMENT (OUTPATIENT)
Dept: INTERNAL MEDICINE | Facility: HOSPITAL | Age: 82
End: 2017-10-09

## 2017-10-09 ENCOUNTER — TRANSCRIPTION ENCOUNTER (OUTPATIENT)
Age: 82
End: 2017-10-09

## 2017-10-09 VITALS
RESPIRATION RATE: 18 BRPM | TEMPERATURE: 97 F | OXYGEN SATURATION: 99 % | SYSTOLIC BLOOD PRESSURE: 140 MMHG | DIASTOLIC BLOOD PRESSURE: 70 MMHG | HEART RATE: 69 BPM

## 2017-10-09 LAB
ALBUMIN SERPL ELPH-MCNC: 3.4 G/DL — SIGNIFICANT CHANGE UP (ref 3.3–5)
ALP SERPL-CCNC: 152 U/L — HIGH (ref 40–120)
ALT FLD-CCNC: 34 U/L — HIGH (ref 4–33)
ANISOCYTOSIS BLD QL: SLIGHT — SIGNIFICANT CHANGE UP
AST SERPL-CCNC: 32 U/L — SIGNIFICANT CHANGE UP (ref 4–32)
BASOPHILS # BLD AUTO: 0.04 K/UL — SIGNIFICANT CHANGE UP (ref 0–0.2)
BASOPHILS NFR BLD AUTO: 0.4 % — SIGNIFICANT CHANGE UP (ref 0–2)
BILIRUB SERPL-MCNC: 0.4 MG/DL — SIGNIFICANT CHANGE UP (ref 0.2–1.2)
BUN SERPL-MCNC: 44 MG/DL — HIGH (ref 7–23)
CALCIUM SERPL-MCNC: 10.4 MG/DL — SIGNIFICANT CHANGE UP (ref 8.4–10.5)
CHLORIDE SERPL-SCNC: 100 MMOL/L — SIGNIFICANT CHANGE UP (ref 98–107)
CO2 SERPL-SCNC: 24 MMOL/L — SIGNIFICANT CHANGE UP (ref 22–31)
CREAT SERPL-MCNC: 1.19 MG/DL — SIGNIFICANT CHANGE UP (ref 0.5–1.3)
EOSINOPHIL # BLD AUTO: 0.33 K/UL — SIGNIFICANT CHANGE UP (ref 0–0.5)
EOSINOPHIL NFR BLD AUTO: 3.6 % — SIGNIFICANT CHANGE UP (ref 0–6)
GLUCOSE SERPL-MCNC: 89 MG/DL — SIGNIFICANT CHANGE UP (ref 70–99)
HCT VFR BLD CALC: 33.9 % — LOW (ref 34.5–45)
HCV RNA FLD QL NAA+PROBE: SIGNIFICANT CHANGE UP
HCV RNA SPEC QL PROBE+SIG AMP: NOT DETECTED — SIGNIFICANT CHANGE UP
HGB BLD-MCNC: 10.9 G/DL — LOW (ref 11.5–15.5)
IMM GRANULOCYTES # BLD AUTO: 0.02 # — SIGNIFICANT CHANGE UP
IMM GRANULOCYTES NFR BLD AUTO: 0.2 % — SIGNIFICANT CHANGE UP (ref 0–1.5)
LYMPHOCYTES # BLD AUTO: 2.58 K/UL — SIGNIFICANT CHANGE UP (ref 1–3.3)
LYMPHOCYTES # BLD AUTO: 27.8 % — SIGNIFICANT CHANGE UP (ref 13–44)
MACROCYTES BLD QL: SLIGHT — SIGNIFICANT CHANGE UP
MAGNESIUM SERPL-MCNC: 1.9 MG/DL — SIGNIFICANT CHANGE UP (ref 1.6–2.6)
MANUAL SMEAR VERIFICATION: SIGNIFICANT CHANGE UP
MCHC RBC-ENTMCNC: 31.6 PG — SIGNIFICANT CHANGE UP (ref 27–34)
MCHC RBC-ENTMCNC: 32.2 % — SIGNIFICANT CHANGE UP (ref 32–36)
MCV RBC AUTO: 98.3 FL — SIGNIFICANT CHANGE UP (ref 80–100)
MONOCYTES # BLD AUTO: 1.09 K/UL — HIGH (ref 0–0.9)
MONOCYTES NFR BLD AUTO: 11.8 % — SIGNIFICANT CHANGE UP (ref 2–14)
NEUTROPHILS # BLD AUTO: 5.21 K/UL — SIGNIFICANT CHANGE UP (ref 1.8–7.4)
NEUTROPHILS NFR BLD AUTO: 56.2 % — SIGNIFICANT CHANGE UP (ref 43–77)
NRBC # FLD: 0 — SIGNIFICANT CHANGE UP
PHOSPHATE SERPL-MCNC: 3.1 MG/DL — SIGNIFICANT CHANGE UP (ref 2.5–4.5)
PLATELET # BLD AUTO: 162 K/UL — SIGNIFICANT CHANGE UP (ref 150–400)
PLATELET CLUMP BLD QL SMEAR: SLIGHT — SIGNIFICANT CHANGE UP
PLATELET COUNT - ESTIMATE: NORMAL — SIGNIFICANT CHANGE UP
PMV BLD: 11.5 FL — SIGNIFICANT CHANGE UP (ref 7–13)
POLYCHROMASIA BLD QL SMEAR: SLIGHT — SIGNIFICANT CHANGE UP
POTASSIUM SERPL-MCNC: 4.2 MMOL/L — SIGNIFICANT CHANGE UP (ref 3.5–5.3)
POTASSIUM SERPL-SCNC: 4.2 MMOL/L — SIGNIFICANT CHANGE UP (ref 3.5–5.3)
PROT SERPL-MCNC: 8.1 G/DL — SIGNIFICANT CHANGE UP (ref 6–8.3)
RBC # BLD: 3.45 M/UL — LOW (ref 3.8–5.2)
RBC # FLD: 14.4 % — SIGNIFICANT CHANGE UP (ref 10.3–14.5)
SODIUM SERPL-SCNC: 138 MMOL/L — SIGNIFICANT CHANGE UP (ref 135–145)
WBC # BLD: 9.27 K/UL — SIGNIFICANT CHANGE UP (ref 3.8–10.5)
WBC # FLD AUTO: 9.27 K/UL — SIGNIFICANT CHANGE UP (ref 3.8–10.5)

## 2017-10-09 PROCEDURE — 99239 HOSP IP/OBS DSCHRG MGMT >30: CPT

## 2017-10-09 PROCEDURE — 93306 TTE W/DOPPLER COMPLETE: CPT | Mod: 26

## 2017-10-09 RX ORDER — FUROSEMIDE 40 MG
40 TABLET ORAL
Qty: 0 | Refills: 0 | Status: DISCONTINUED | OUTPATIENT
Start: 2017-10-09 | End: 2017-10-09

## 2017-10-09 RX ADMIN — Medication 200 MILLIGRAM(S): at 06:54

## 2017-10-09 RX ADMIN — ISOSORBIDE MONONITRATE 60 MILLIGRAM(S): 60 TABLET, EXTENDED RELEASE ORAL at 15:20

## 2017-10-09 RX ADMIN — Medication 40 MILLIGRAM(S): at 06:54

## 2017-10-09 RX ADMIN — Medication 81 MILLIGRAM(S): at 15:20

## 2017-10-09 RX ADMIN — LOSARTAN POTASSIUM 100 MILLIGRAM(S): 100 TABLET, FILM COATED ORAL at 06:54

## 2017-10-09 RX ADMIN — AMLODIPINE BESYLATE 10 MILLIGRAM(S): 2.5 TABLET ORAL at 06:54

## 2017-10-09 NOTE — PROGRESS NOTE ADULT - SUBJECTIVE AND OBJECTIVE BOX
CC: Pt is here for f/u of her acute HFpEF exacerbation.     Overnight Events: No acute events overnight. Did not require BiPAP or nasal cannula. States she is breathing much better on room air now.     REVIEW OF SYSTEMS:  CONSTITUTIONAL: No weakness, fevers or chills  EYES/ENT: No visual changes, no throat pain   RESPIRATORY: No cough, wheezing, hemoptysis; No shortness of breath  CARDIOVASCULAR: No chest pain or palpitations  GASTROINTESTINAL: No abdominal pain, nausea, vomiting, or hematemesis; No diarrhea or constipation. No melena or hematochezia.  GENITOURINARY: No dysuria, frequency or hematuria  NEUROLOGICAL: No dizziness, numbness, or weakness  SKIN: No itching, burning, rashes, or lesions   All other review of systems is negative unless indicated above.    VITAL SIGNS:  Vital Signs Last 24 Hrs  T(C): 36.5 (09 Oct 2017 06:55), Max: 36.7 (08 Oct 2017 14:01)  T(F): 97.7 (09 Oct 2017 06:55), Max: 98.1 (08 Oct 2017 14:01)  HR: 60 (09 Oct 2017 06:55) (60 - 64)  BP: 155/73 (09 Oct 2017 06:55) (136/57 - 155/73)  BP(mean): --  RR: 18 (09 Oct 2017 06:55) (18 - 18)  SpO2: 99% (09 Oct 2017 06:55) (96% - 99%)    PHYSICAL EXAM:   GENERAL: no acute distress  HEENT: NC/AT, EOMI, neck supple, MMM  RESPIRATORY: LCTAB/L, no rhonchi, rales, or wheezing  CARDIOVASCULAR: RRR, no murmurs, gallops, rubs  ABDOMINAL: soft, non-tender, non-distended, positive bowel sounds   EXTREMITIES: no clubbing, cyanosis, or edema  NEUROLOGICAL: alert and oriented x 3, non-focal  SKIN: no rashes or lesions   MUSCULOSKELETAL: no gross joint deformity                          10.9   15.98 )-----------( 272      ( 07 Oct 2017 09:30 )             34.2     10-07    134<L>  |  99  |  42<H>  ----------------------------<  174<H>  4.7   |  18<L>  |  1.23    Ca    10.3      07 Oct 2017 09:30    TPro  8.4<H>  /  Alb  3.7  /  TBili  0.7  /  DBili  x   /  AST  70<H>  /  ALT  57<H>  /  AlkPhos  183<H>  10-07    PT/INR - ( 07 Oct 2017 09:30 )   PT: 14.0 SEC;   INR: 1.24          PTT - ( 07 Oct 2017 09:30 )  PTT:35.1 SEC    CAPILLARY BLOOD GLUCOSE  151 (08 Oct 2017 22:29)  92 (08 Oct 2017 18:06)  340 (08 Oct 2017 13:22)  97 (08 Oct 2017 09:00)        I&O's Summary    08 Oct 2017 07:01  -  09 Oct 2017 07:00  --------------------------------------------------------  IN: 700 mL / OUT: 0 mL / NET: 700 mL        MEDICATIONS  (STANDING):  amLODIPine   Tablet 10 milliGRAM(s) Oral daily  aspirin enteric coated 81 milliGRAM(s) Oral daily  atorvastatin 40 milliGRAM(s) Oral at bedtime  dextrose 5%. 1000 milliLiter(s) (50 mL/Hr) IV Continuous <Continuous>  dextrose 50% Injectable 12.5 Gram(s) IV Push once  dextrose 50% Injectable 25 Gram(s) IV Push once  dextrose 50% Injectable 25 Gram(s) IV Push once  furosemide   Injectable 40 milliGRAM(s) IV Push two times a day  insulin lispro (HumaLOG) corrective regimen sliding scale   SubCutaneous three times a day before meals  insulin lispro (HumaLOG) corrective regimen sliding scale   SubCutaneous at bedtime  isosorbide   mononitrate ER Tablet (IMDUR) 60 milliGRAM(s) Oral daily  losartan 100 milliGRAM(s) Oral daily  metoprolol succinate  milliGRAM(s) Oral daily
CC: Pt is here for f/u of her acute HFpEF exacerbation.     Overnight Events: No acute events overnight. Did not require BiPAP. States that she was able to sleep comfortably with the head of the bed just slightly elevated. She can speak in complete sentences without the nasal cannula during my interview. She states she just took it off this AM because her nose felt a little dry and she had to use the restroom.     REVIEW OF SYSTEMS:  CONSTITUTIONAL: No weakness, fevers or chills  EYES/ENT: No visual changes, no throat pain   RESPIRATORY: No cough, wheezing, hemoptysis; No shortness of breath  CARDIOVASCULAR: No chest pain or palpitations  GASTROINTESTINAL: No abdominal pain, nausea, vomiting, or hematemesis; No diarrhea or constipation. No melena or hematochezia.  GENITOURINARY: No dysuria, frequency or hematuria  NEUROLOGICAL: No dizziness, numbness, or weakness  SKIN: No itching, burning, rashes, or lesions   All other review of systems is negative unless indicated above.    VITAL SIGNS:  Vital Signs Last 24 Hrs  T(C): 36.4 (08 Oct 2017 06:22), Max: 36.7 (07 Oct 2017 09:35)  T(F): 97.6 (08 Oct 2017 06:22), Max: 98 (07 Oct 2017 09:35)  HR: 62 (08 Oct 2017 06:22) (60 - 67)  BP: 137/63 (08 Oct 2017 06:22) (135/75 - 159/50)  BP(mean): --  RR: 18 (08 Oct 2017 06:22) (18 - 34)  SpO2: 99% (08 Oct 2017 06:22) (97% - 100%)    PHYSICAL EXAM:   GENERAL: no acute distress  HEENT: NC/AT, EOMI, neck supple, MMM  RESPIRATORY: diminished BS at b/l bases, improving rales at b/l bases, no more scattered wheezes; overall improved lung exam from prior  CARDIOVASCULAR: RRR, no murmurs, gallops, rubs  ABDOMINAL: soft, non-tender, non-distended, positive bowel sounds   EXTREMITIES: 1+ edema  NEUROLOGICAL: alert and oriented x 3, non-focal  SKIN: no rashes or lesions   MUSCULOSKELETAL: no gross joint deformity                          10.9   15.98 )-----------( 272      ( 07 Oct 2017 09:30 )             34.2     10-07    134<L>  |  99  |  42<H>  ----------------------------<  174<H>  4.7   |  18<L>  |  1.23    Ca    10.3      07 Oct 2017 09:30    TPro  8.4<H>  /  Alb  3.7  /  TBili  0.7  /  DBili  x   /  AST  70<H>  /  ALT  57<H>  /  AlkPhos  183<H>  10-07    PT/INR - ( 07 Oct 2017 09:30 )   PT: 14.0 SEC;   INR: 1.24          PTT - ( 07 Oct 2017 09:30 )  PTT:35.1 SEC    CAPILLARY BLOOD GLUCOSE  249 (07 Oct 2017 22:46)  170 (07 Oct 2017 08:59)        I&O's Summary    07 Oct 2017 07:01  -  08 Oct 2017 07:00  --------------------------------------------------------  IN: 800 mL / OUT: 300 mL / NET: 500 mL        MEDICATIONS  (STANDING):  amLODIPine   Tablet 10 milliGRAM(s) Oral daily  aspirin enteric coated 81 milliGRAM(s) Oral daily  atorvastatin 40 milliGRAM(s) Oral at bedtime  dextrose 5%. 1000 milliLiter(s) (50 mL/Hr) IV Continuous <Continuous>  dextrose 50% Injectable 12.5 Gram(s) IV Push once  dextrose 50% Injectable 25 Gram(s) IV Push once  dextrose 50% Injectable 25 Gram(s) IV Push once  furosemide   Injectable 40 milliGRAM(s) IV Push two times a day  insulin lispro (HumaLOG) corrective regimen sliding scale   SubCutaneous three times a day before meals  insulin lispro (HumaLOG) corrective regimen sliding scale   SubCutaneous at bedtime  isosorbide   mononitrate ER Tablet (IMDUR) 60 milliGRAM(s) Oral daily  losartan 100 milliGRAM(s) Oral daily  metoprolol succinate  milliGRAM(s) Oral daily

## 2017-10-09 NOTE — DISCHARGE NOTE ADULT - PATIENT PORTAL LINK FT
“You can access the [employer] Patient Portal, offered by [facility], by registering with the following website: [email or web address]”

## 2017-10-09 NOTE — DISCHARGE NOTE ADULT - MEDICATION SUMMARY - MEDICATIONS TO TAKE
I will START or STAY ON the medications listed below when I get home from the hospital:    Aspirin Enteric Coated 81 mg oral delayed release tablet  -- 1 tab(s) by mouth once a day  -- Indication: For Atrial fibrillation    losartan 100 mg oral tablet  -- 1 tab(s) by mouth once a day  -- Indication: For Essential hypertension    isosorbide mononitrate 60 mg oral tablet, extended release  -- 1 tab(s) by mouth once a day  -- Indication: For CHF (congestive heart failure)    Januvia 50 mg oral tablet  -- 1 tab(s) by mouth once a day  -- Indication: For Type 2 diabetes mellitus without complication, without long-term current use of insulin    metFORMIN 500 mg oral tablet  -- 1 tab(s) by mouth once a day  -- Indication: For Type 2 diabetes mellitus without complication, without long-term current use of insulin    glimepiride 4 mg oral tablet  -- 1 tab(s) by mouth once a day  -- Indication: For Type 2 diabetes mellitus without complication, without long-term current use of insulin    atorvastatin 40 mg oral tablet  -- 1 tab(s) by mouth once a day (at bedtime)  -- Indication: For Hyperlipidemia, unspecified hyperlipidemia type    metoprolol succinate 200 mg oral tablet, extended release  -- 1 tab(s) by mouth once a day  -- Indication: For CHF (congestive heart failure)    amLODIPine 10 mg oral tablet  -- 1 tab(s) by mouth once a day  -- Indication: For Essential hypertension    furosemide 40 mg oral tablet  -- 1 tab(s) by mouth once a day  -- Indication: For CHF (congestive heart failure)

## 2017-10-09 NOTE — DISCHARGE NOTE ADULT - PROVIDER TOKENS
[facility]:[[facility]PHONE:[phone]],FAX:[(   )    -]] FREE:[LAST:[[facility] - Dr. Celio Maynard],PHONE:[phone]],FAX:[(   )    -]],TOKEN:'[id number]'

## 2017-10-09 NOTE — PROGRESS NOTE ADULT - ATTENDING COMMENTS
Patient was seen and examined personally by me. I have discussed the plan and reviewed the resident's note and agree with the above physical exam findings including assessment and plan except as indicated below.    Patient now euvolemic. Will switch to PO lasix 40mg daily.  Patient to weigh self daily and if noted increase of 2lb or greater, advised to take extra dose of lasix 40mg PO in evening.  Limit fluid intake  TTE pending-if WNL, plan for DC home    DC time: 35 min

## 2017-10-09 NOTE — PROGRESS NOTE ADULT - PROBLEM SELECTOR PLAN 1
Acute exacerbation appears to be 2/2 drinking lots of fluids at home. She has been adherent to her lasix regimen and has not missed any doses. She has not had any other acute illness that would have tipped her over into an acute episode. Her pro-BNP is elevated at 5926, CXR showed bilateral pleural effusions, interstitial pulmonary edema. Trops negative x2.   - IV lasix 40 mg BID  - I+O's, if unable daily weights  - c/w nasal cannula and BiPAP as needed   - TTE ordered because no record of one since 4/2016  - pt counseled on appropriate diet and limiting fluid intake Acute exacerbation appears to be 2/2 drinking lots of fluids at home. She has been adherent to her lasix regimen and has not missed any doses. She has not had any other acute illness that would have tipped her over into an acute episode. Her pro-BNP is elevated at 5926, CXR showed bilateral pleural effusions, interstitial pulmonary edema. Trops negative x2.   - IV lasix 40 mg BID; will transition to PO  - I+O's, if unable daily weights  - c/w nasal cannula and BiPAP as needed   - TTE ordered because no record of one since 4/2016  - pt counseled on appropriate diet and limiting fluid intake

## 2017-10-09 NOTE — PROGRESS NOTE ADULT - PROBLEM SELECTOR PLAN 5
On metformin, januvia, and glimepiride at home. A1c is 7.7%.  - monitor FS  - will treat w/ low dose correctional ISS and adjust regimen as needed
On metformin, januvia, and glimepiride at home. A1c is 7.7%.  - monitor FS  - will treat w/ low dose correctional ISS and adjust regimen as needed

## 2017-10-09 NOTE — DISCHARGE NOTE ADULT - PLAN OF CARE
To avoid any future exacerbations of heart failure. You came to the hospital with an acute exacerbation of your heart failure. We treated you with IV medications. When you go home, please weigh yourself daily. If you notice an increase in weight of 2 pounds or more, and if you also have any swelling in your ankles or shortness of breath, please take another "water pill" in the evening. Please also follow up with your PCP. followup on discharge your liver tests were slightly high on admission but improved during your stay.  Please followup with your PCP to recheck You came to the hospital with an acute exacerbation of your heart failure. We treated you with IV medications. When you go home, please weigh yourself daily. If you notice an increase in weight of 2 pounds or more, and if you also have any swelling in your ankles or shortness of breath, please take another "water pill" in the evening. Please also follow up with your PCP. Please followup with heart doctor: Dr. King in clinic within 1-2 weeks: [phone]

## 2017-10-09 NOTE — DISCHARGE NOTE ADULT - CARE PROVIDERS DIRECT ADDRESSES
,DirectAddress_Unknown ,DirectAddress_Unknown,[email or web address]

## 2017-10-09 NOTE — PROGRESS NOTE ADULT - ASSESSMENT
82F h/o DM2, HTN, Cardiac Pacemaker for Tachybrady Syndrome, afib (not on a/c 2/2 GI bleed), Uterine CA s/p hysterectomy, HLD, HFpEF presents with increased SOB for the past 3 days likely 2/2 to acute on chronic diastolic HF in setting of increased fluid intake.
82F h/o DM2, HTN, Cardiac Pacemaker for Tachybrady Syndrome, afib (not on a/c 2/2 GI bleed), Uterine CA s/p hysterectomy, HLD, HFpEF presents with increased SOB for the past 3 days likely 2/2 to acute on chronic diastolic HF in setting of increased fluid intake.

## 2017-10-09 NOTE — PROGRESS NOTE ADULT - PROBLEM SELECTOR PLAN 2
WBC count of 15.98, with slight neutrophilic predominance. Does not endorse any cough, fevers, diarrhea.   - monitor off abx for now  - trend daily CBC w/ diff Initial WBC count of 15.98, with slight neutrophilic predominance. Now WNL. Does not endorse any cough, fevers, diarrhea.   - monitor off abx for now  - trend daily CBC w/ diff

## 2017-10-09 NOTE — PROGRESS NOTE ADULT - PROBLEM SELECTOR PLAN 6
Review of allacripts reveals that pt has a hx of Afib. CHADs VASC 6 and HAS BLED of 4. She has been followed by cardiology. Last seen 6/2017. Per allscripts pt was previously on warfarin w/ labile INRs and DOAC. She has been off a/c in setting of hemorrhoidal bleeds and radiation proctitis. Cards recommended pt to be started back on a/c 2/2 higher risk of stroke compared to bleeding risk, however pt did not want to restart a/c. Plan was for patient to f/u w/ GI in re to her hemorrhoids and proctitis w/ plan to likely restart a/c after GI eval. Pt has not followed up with GI yet.   -c/w Toprol for rate control  -will need to revisit a/c w/ patient. She will also need to f/u w/ GI as outpatient.
Review of allacripts reveals that pt has a hx of Afib. CHADs VASC 6 and HAS BLED of 4. She has been followed by cardiology. Last seen 6/2017. Per allscripts pt was previously on warfarin w/ labile INRs and DOAC. She has been off a/c in setting of hemorrhoidal bleeds and radiation proctitis. Cards recommended pt to be started back on a/c 2/2 higher risk of stroke compared to bleeding risk, however pt did not want to restart a/c. Plan was for patient to f/u w/ GI in re to her hemorrhoids and proctitis w/ plan to likely restart a/c after GI eval. Pt has not followed up with GI yet.   - c/w Toprol for rate control  - will need to revisit a/c w/ patient. She will also need to f/u w/ GI as outpatient.

## 2017-10-09 NOTE — DISCHARGE NOTE ADULT - CARE PROVIDER_API CALL
[facility] - Dr. Celio Maynard,   Phone: [phone]  Fax: (   )    - [facility] - Dr. Celio Maynard,   Phone: [phone]  Fax: (   )    -    Ihsan King), Cardiovascular Disease; Nuclear Cardiology  [address] [place] [address]  Phone: [phone]  Fax: [phone]

## 2017-10-09 NOTE — DISCHARGE NOTE ADULT - HOSPITAL COURSE
This is an 81 y/o F with a PMH significant for DM2, HTN, Cardiac Pacemaker for Tachybrady Syndrome, afib (not on a/c 2/2 GI bleed), Uterine CA s/p hysterectomy and RT, HLD, and HFpEF who presented with increased SOB for the past 3 days, worsening orthopnea and abdominal "gas" pains. She was recently admitted (9/23-9/25/17) for the same symptoms, treated with Lasix and sent home on 40 mg Lasix daily which she has been taking regularly however she reported drinking a lot of fluids at home. She also reported some increased swelling in her lower extremities. She had occasional cough w/ clear sputum but denied f/c, sore throat, rhinorrhea, sick contacts, chest pain or palpitations. She endorsed being able to walk 1-2 blocks at baseline, but this had progressively been decreasing over the course of the past 3 days PTA. She uses 2 pillows to sleep at night, and this has not changed, except for the night PTA where she had to sit up in a chair in order to sleep. She denied any recent fevers/chills, n/v/diarrhea/constipation. ROS was otherwise was negative.     In the ED, patient was initially placed on BiPAP for respiratory distress and O2 sat of 92% with decreased air entry. She was given lasix 40 mg IV x2. Over the course of the ED stay, she was transitioned to 4L NC. Her labs showed a leukocytosis 15.98, trops negative x2, pro-BNP 5926, CXR showed small bilateral basilar pleural effusions, with interstitial edema. She also received one dose of vanc/zosyn.     After being transferred to the floor, she remained off BiPAP, and did not require any supplemental O2 by the end of her first day. She was treated with IV lasix 40 mg BID. Her lung exam improved significantly. She was transitioned to 40 mg lasix PO BID, with the intention of discharging her on her home regimen of 40 mg daily. She was told to weigh herself daily and take a second dose of lasix later in the day if she notices a 2 pound increase in weight, or increased lower extremity swelling and/or SOB. At this time, she is stable and safe for discharge. This is an 81 y/o F with a PMH significant for DM2, HTN, Cardiac Pacemaker for Tachybrady Syndrome, afib (not on a/c 2/2 GI bleed), Uterine CA s/p hysterectomy and RT, HLD, and HFpEF who presented with increased SOB for the past 3 days, worsening orthopnea and abdominal "gas" pains. She was recently admitted (9/23-9/25/17) for the same symptoms, treated with Lasix and sent home on 40 mg Lasix daily which she has been taking regularly however she reported drinking a lot of fluids at home. She also reported some increased swelling in her lower extremities. She had occasional cough w/ clear sputum but denied f/c, sore throat, rhinorrhea, sick contacts, chest pain or palpitations. She endorsed being able to walk 1-2 blocks at baseline, but this had progressively been decreasing over the course of the past 3 days PTA. She uses 2 pillows to sleep at night, and this has not changed, except for the night PTA where she had to sit up in a chair in order to sleep. She denied any recent fevers/chills, n/v/diarrhea/constipation. ROS was otherwise was negative.     In the ED, patient was initially placed on BiPAP for respiratory distress and O2 sat of 92% with decreased air entry. She was given lasix 40 mg IV x2. Over the course of the ED stay, she was transitioned to 4L NC. Her labs showed a leukocytosis 15.98, trops negative x2, pro-BNP 5926, CXR showed small bilateral basilar pleural effusions, with interstitial edema. She also received one dose of vanc/zosyn.     After being transferred to the floor, she remained off BiPAP, and did not require any supplemental O2 by the end of her first day. She was treated with IV lasix 40 mg BID. Her lung exam improved significantly. She was transitioned to 40 mg lasix PO BID, with the intention of discharging her on her home regimen of 40 mg daily. A TTE was performed, however the results are unavailable at the time of this discharge note. She was told to weigh herself daily and take a second dose of lasix later in the day if she notices a 2 pound increase in weight, or increased lower extremity swelling and/or SOB. At this time, she is stable and safe for discharge.

## 2017-10-09 NOTE — DISCHARGE NOTE ADULT - CARE PLAN
Principal Discharge DX:	Acute on chronic diastolic congestive heart failure  Goal:	To avoid any future exacerbations of heart failure.  Instructions for follow-up, activity and diet:	You came to the hospital with an acute exacerbation of your heart failure. We treated you with IV medications. When you go home, please weigh yourself daily. If you notice an increase in weight of 2 pounds or more, and if you also have any swelling in your ankles or shortness of breath, please take another "water pill" in the evening. Please also follow up with your PCP. Principal Discharge DX:	Acute on chronic diastolic congestive heart failure  Goal:	To avoid any future exacerbations of heart failure.  Instructions for follow-up, activity and diet:	You came to the hospital with an acute exacerbation of your heart failure. We treated you with IV medications. When you go home, please weigh yourself daily. If you notice an increase in weight of 2 pounds or more, and if you also have any swelling in your ankles or shortness of breath, please take another "water pill" in the evening. Please also follow up with your PCP.  Secondary Diagnosis:	Transaminitis  Goal:	followup on discharge  Instructions for follow-up, activity and diet:	your liver tests were slightly high on admission but improved during your stay.  Please followup with your PCP to recheck Principal Discharge DX:	Acute on chronic diastolic congestive heart failure  Goal:	To avoid any future exacerbations of heart failure.  Instructions for follow-up, activity and diet:	You came to the hospital with an acute exacerbation of your heart failure. We treated you with IV medications. When you go home, please weigh yourself daily. If you notice an increase in weight of 2 pounds or more, and if you also have any swelling in your ankles or shortness of breath, please take another "water pill" in the evening. Please also follow up with your PCP. Please followup with heart doctor: Dr. King in clinic within 1-2 weeks: [phone]  Secondary Diagnosis:	Transaminitis  Goal:	followup on discharge  Instructions for follow-up, activity and diet:	your liver tests were slightly high on admission but improved during your stay.  Please followup with your PCP to recheck

## 2017-10-09 NOTE — PROGRESS NOTE ADULT - PROBLEM SELECTOR PROBLEM 1
Acute on chronic diastolic congestive heart failure
Acute on chronic diastolic congestive heart failure

## 2017-10-09 NOTE — PROGRESS NOTE ADULT - PROBLEM SELECTOR PLAN 4
Pt has AST/ALT of 70/57 and elevated alk phos . This is most likely 2/2 hepatic congestion from HF.   - will order hep panel to r/o other causes, but low suspicion Pt had elevated LFTs, most likely 2/2 hepatic congestion from HF. Downtrending now.   - will order hep panel to r/o other causes, but low suspicion Pt had elevated LFTs, most likely 2/2 hepatic congestion from HF. Downtrending now.   - hep panel with weakly reactive HCV. Can followup with outpatient hepatology

## 2017-10-10 NOTE — CHART NOTE - NSCHARTNOTEFT_GEN_A_CORE
Patient and daughter called at home and informed of TTE results. Cardiology appt made for 10/12 at 2pm with Dr. Zaidi in cardiology clinic. Patient and daughter informed.

## 2017-10-19 ENCOUNTER — APPOINTMENT (OUTPATIENT)
Dept: CARDIOLOGY | Facility: HOSPITAL | Age: 82
End: 2017-10-19

## 2017-10-19 ENCOUNTER — OUTPATIENT (OUTPATIENT)
Dept: OUTPATIENT SERVICES | Facility: HOSPITAL | Age: 82
LOS: 1 days | End: 2017-10-19

## 2017-10-19 ENCOUNTER — NON-APPOINTMENT (OUTPATIENT)
Age: 82
End: 2017-10-19

## 2017-10-19 VITALS
SYSTOLIC BLOOD PRESSURE: 120 MMHG | BODY MASS INDEX: 29.85 KG/M2 | RESPIRATION RATE: 17 BRPM | WEIGHT: 158 LBS | DIASTOLIC BLOOD PRESSURE: 72 MMHG | HEART RATE: 69 BPM | OXYGEN SATURATION: 98 %

## 2017-10-23 DIAGNOSIS — I50.20 UNSPECIFIED SYSTOLIC (CONGESTIVE) HEART FAILURE: ICD-10-CM

## 2017-10-26 ENCOUNTER — OUTPATIENT (OUTPATIENT)
Dept: OUTPATIENT SERVICES | Facility: HOSPITAL | Age: 82
LOS: 1 days | End: 2017-10-26

## 2017-10-26 ENCOUNTER — APPOINTMENT (OUTPATIENT)
Dept: CV DIAGNOSTICS | Facility: HOSPITAL | Age: 82
End: 2017-10-26
Payer: MEDICARE

## 2017-10-26 DIAGNOSIS — I50.20 UNSPECIFIED SYSTOLIC (CONGESTIVE) HEART FAILURE: ICD-10-CM

## 2017-10-26 PROCEDURE — 93018 CV STRESS TEST I&R ONLY: CPT | Mod: GC

## 2017-10-26 PROCEDURE — 78452 HT MUSCLE IMAGE SPECT MULT: CPT | Mod: 26

## 2017-10-26 PROCEDURE — 93016 CV STRESS TEST SUPVJ ONLY: CPT | Mod: GC

## 2017-11-27 ENCOUNTER — OUTPATIENT (OUTPATIENT)
Dept: OUTPATIENT SERVICES | Facility: HOSPITAL | Age: 82
LOS: 1 days | End: 2017-11-27

## 2017-11-27 ENCOUNTER — RESULT CHARGE (OUTPATIENT)
Age: 82
End: 2017-11-27

## 2017-11-27 ENCOUNTER — APPOINTMENT (OUTPATIENT)
Dept: INTERNAL MEDICINE | Facility: HOSPITAL | Age: 82
End: 2017-11-27
Payer: MEDICARE

## 2017-11-27 VITALS — DIASTOLIC BLOOD PRESSURE: 76 MMHG | HEART RATE: 78 BPM | SYSTOLIC BLOOD PRESSURE: 138 MMHG

## 2017-11-27 VITALS — BODY MASS INDEX: 30.02 KG/M2 | WEIGHT: 159 LBS | HEIGHT: 61 IN

## 2017-11-27 DIAGNOSIS — Z00.00 ENCOUNTER FOR GENERAL ADULT MEDICAL EXAMINATION WITHOUT ABNORMAL FINDINGS: ICD-10-CM

## 2017-11-27 LAB — GLUCOSE BLDC GLUCOMTR-MCNC: 262

## 2017-11-27 PROCEDURE — 99214 OFFICE O/P EST MOD 30 MIN: CPT | Mod: GC

## 2017-11-28 ENCOUNTER — OTHER (OUTPATIENT)
Age: 82
End: 2017-11-28

## 2017-11-28 ENCOUNTER — RX RENEWAL (OUTPATIENT)
Age: 82
End: 2017-11-28

## 2017-11-28 RX ORDER — METFORMIN HYDROCHLORIDE 500 MG/1
500 TABLET, EXTENDED RELEASE ORAL DAILY
Qty: 90 | Refills: 0 | Status: DISCONTINUED | COMMUNITY
End: 2017-11-28

## 2017-12-01 DIAGNOSIS — E11.65 TYPE 2 DIABETES MELLITUS WITH HYPERGLYCEMIA: ICD-10-CM

## 2017-12-01 DIAGNOSIS — I50.20 UNSPECIFIED SYSTOLIC (CONGESTIVE) HEART FAILURE: ICD-10-CM

## 2017-12-01 DIAGNOSIS — I10 ESSENTIAL (PRIMARY) HYPERTENSION: ICD-10-CM

## 2017-12-01 DIAGNOSIS — E78.5 HYPERLIPIDEMIA, UNSPECIFIED: ICD-10-CM

## 2017-12-01 DIAGNOSIS — I48.91 UNSPECIFIED ATRIAL FIBRILLATION: ICD-10-CM

## 2017-12-14 ENCOUNTER — APPOINTMENT (OUTPATIENT)
Dept: GASTROENTEROLOGY | Facility: HOSPITAL | Age: 82
End: 2017-12-14

## 2017-12-15 ENCOUNTER — APPOINTMENT (OUTPATIENT)
Dept: INTERNAL MEDICINE | Facility: HOSPITAL | Age: 82
End: 2017-12-15

## 2018-01-05 ENCOUNTER — MEDICATION RENEWAL (OUTPATIENT)
Age: 83
End: 2018-01-05

## 2018-01-18 ENCOUNTER — APPOINTMENT (OUTPATIENT)
Dept: GASTROENTEROLOGY | Facility: HOSPITAL | Age: 83
End: 2018-01-18

## 2018-01-18 ENCOUNTER — MEDICATION RENEWAL (OUTPATIENT)
Age: 83
End: 2018-01-18

## 2018-03-28 ENCOUNTER — RECORD ABSTRACTING (OUTPATIENT)
Age: 83
End: 2018-03-28

## 2018-04-16 ENCOUNTER — OUTPATIENT (OUTPATIENT)
Dept: OUTPATIENT SERVICES | Facility: HOSPITAL | Age: 83
LOS: 1 days | End: 2018-04-16

## 2018-04-16 ENCOUNTER — LABORATORY RESULT (OUTPATIENT)
Age: 83
End: 2018-04-16

## 2018-04-16 ENCOUNTER — APPOINTMENT (OUTPATIENT)
Dept: INTERNAL MEDICINE | Facility: HOSPITAL | Age: 83
End: 2018-04-16
Payer: MEDICARE

## 2018-04-16 VITALS — SYSTOLIC BLOOD PRESSURE: 136 MMHG | HEART RATE: 74 BPM | DIASTOLIC BLOOD PRESSURE: 66 MMHG

## 2018-04-16 VITALS — HEIGHT: 61 IN | BODY MASS INDEX: 26.81 KG/M2 | WEIGHT: 142 LBS

## 2018-04-16 DIAGNOSIS — M75.02 ADHESIVE CAPSULITIS OF LEFT SHOULDER: ICD-10-CM

## 2018-04-16 LAB
BASOPHILS # BLD AUTO: 0.03 K/UL — SIGNIFICANT CHANGE UP (ref 0–0.2)
BASOPHILS NFR BLD AUTO: 0.3 % — SIGNIFICANT CHANGE UP (ref 0–2)
BUN SERPL-MCNC: 51 MG/DL — HIGH (ref 7–23)
CALCIUM SERPL-MCNC: 11.3 MG/DL — HIGH (ref 8.4–10.5)
CHLORIDE SERPL-SCNC: 96 MMOL/L — LOW (ref 98–107)
CO2 SERPL-SCNC: 19 MMOL/L — LOW (ref 22–31)
CREAT SERPL-MCNC: 1.83 MG/DL — HIGH (ref 0.5–1.3)
EOSINOPHIL # BLD AUTO: 0.09 K/UL — SIGNIFICANT CHANGE UP (ref 0–0.5)
EOSINOPHIL NFR BLD AUTO: 1 % — SIGNIFICANT CHANGE UP (ref 0–6)
GLUCOSE SERPL-MCNC: 206 MG/DL — HIGH (ref 70–99)
HBA1C BLD-MCNC: 6.6 % — HIGH (ref 4–5.6)
HCT VFR BLD CALC: 41 % — SIGNIFICANT CHANGE UP (ref 34.5–45)
HGB BLD-MCNC: 12.9 G/DL — SIGNIFICANT CHANGE UP (ref 11.5–15.5)
IMM GRANULOCYTES # BLD AUTO: 0.03 # — SIGNIFICANT CHANGE UP
IMM GRANULOCYTES NFR BLD AUTO: 0.3 % — SIGNIFICANT CHANGE UP (ref 0–1.5)
LYMPHOCYTES # BLD AUTO: 2.31 K/UL — SIGNIFICANT CHANGE UP (ref 1–3.3)
LYMPHOCYTES # BLD AUTO: 26.4 % — SIGNIFICANT CHANGE UP (ref 13–44)
MCHC RBC-ENTMCNC: 29.5 PG — SIGNIFICANT CHANGE UP (ref 27–34)
MCHC RBC-ENTMCNC: 31.5 % — LOW (ref 32–36)
MCV RBC AUTO: 93.8 FL — SIGNIFICANT CHANGE UP (ref 80–100)
MONOCYTES # BLD AUTO: 0.98 K/UL — HIGH (ref 0–0.9)
MONOCYTES NFR BLD AUTO: 11.2 % — SIGNIFICANT CHANGE UP (ref 2–14)
NEUTROPHILS # BLD AUTO: 5.3 K/UL — SIGNIFICANT CHANGE UP (ref 1.8–7.4)
NEUTROPHILS NFR BLD AUTO: 60.8 % — SIGNIFICANT CHANGE UP (ref 43–77)
NRBC # FLD: 0 — SIGNIFICANT CHANGE UP
PLATELET # BLD AUTO: 256 K/UL — SIGNIFICANT CHANGE UP (ref 150–400)
PMV BLD: 10.3 FL — SIGNIFICANT CHANGE UP (ref 7–13)
POTASSIUM SERPL-MCNC: 5.2 MMOL/L — SIGNIFICANT CHANGE UP (ref 3.5–5.3)
POTASSIUM SERPL-SCNC: 5.2 MMOL/L — SIGNIFICANT CHANGE UP (ref 3.5–5.3)
RBC # BLD: 4.37 M/UL — SIGNIFICANT CHANGE UP (ref 3.8–5.2)
RBC # FLD: 16.1 % — HIGH (ref 10.3–14.5)
SODIUM SERPL-SCNC: 133 MMOL/L — LOW (ref 135–145)
WBC # BLD: 8.74 K/UL — SIGNIFICANT CHANGE UP (ref 3.8–10.5)
WBC # FLD AUTO: 8.74 K/UL — SIGNIFICANT CHANGE UP (ref 3.8–10.5)

## 2018-04-16 PROCEDURE — 99213 OFFICE O/P EST LOW 20 MIN: CPT | Mod: GE

## 2018-04-17 ENCOUNTER — RX RENEWAL (OUTPATIENT)
Age: 83
End: 2018-04-17

## 2018-04-17 DIAGNOSIS — E11.65 TYPE 2 DIABETES MELLITUS WITH HYPERGLYCEMIA: ICD-10-CM

## 2018-04-17 DIAGNOSIS — I10 ESSENTIAL (PRIMARY) HYPERTENSION: ICD-10-CM

## 2018-04-17 DIAGNOSIS — I48.91 UNSPECIFIED ATRIAL FIBRILLATION: ICD-10-CM

## 2018-04-17 DIAGNOSIS — I50.20 UNSPECIFIED SYSTOLIC (CONGESTIVE) HEART FAILURE: ICD-10-CM

## 2018-04-17 DIAGNOSIS — N18.3 CHRONIC KIDNEY DISEASE, STAGE 3 (MODERATE): ICD-10-CM

## 2018-04-17 DIAGNOSIS — M75.02 ADHESIVE CAPSULITIS OF LEFT SHOULDER: ICD-10-CM

## 2018-04-17 DIAGNOSIS — Z00.00 ENCOUNTER FOR GENERAL ADULT MEDICAL EXAMINATION WITHOUT ABNORMAL FINDINGS: ICD-10-CM

## 2018-04-26 ENCOUNTER — OUTPATIENT (OUTPATIENT)
Dept: OUTPATIENT SERVICES | Facility: HOSPITAL | Age: 83
LOS: 1 days | End: 2018-04-26

## 2018-04-26 ENCOUNTER — APPOINTMENT (OUTPATIENT)
Dept: ELECTROPHYSIOLOGY | Facility: CLINIC | Age: 83
End: 2018-04-26
Payer: MEDICARE

## 2018-04-26 ENCOUNTER — APPOINTMENT (OUTPATIENT)
Dept: CARDIOLOGY | Facility: HOSPITAL | Age: 83
End: 2018-04-26

## 2018-04-26 VITALS — SYSTOLIC BLOOD PRESSURE: 125 MMHG | HEART RATE: 60 BPM | RESPIRATION RATE: 16 BRPM | DIASTOLIC BLOOD PRESSURE: 50 MMHG

## 2018-04-26 VITALS — BODY MASS INDEX: 27.78 KG/M2 | WEIGHT: 147 LBS

## 2018-04-26 PROCEDURE — 93280 PM DEVICE PROGR EVAL DUAL: CPT

## 2018-04-27 DIAGNOSIS — I50.30 UNSPECIFIED DIASTOLIC (CONGESTIVE) HEART FAILURE: ICD-10-CM

## 2018-04-30 ENCOUNTER — APPOINTMENT (OUTPATIENT)
Dept: ELECTROPHYSIOLOGY | Facility: CLINIC | Age: 83
End: 2018-04-30
Payer: MEDICARE

## 2018-04-30 VITALS
BODY MASS INDEX: 26.5 KG/M2 | RESPIRATION RATE: 16 BRPM | DIASTOLIC BLOOD PRESSURE: 74 MMHG | HEIGHT: 62 IN | OXYGEN SATURATION: 100 % | HEART RATE: 60 BPM | SYSTOLIC BLOOD PRESSURE: 139 MMHG | WEIGHT: 144 LBS

## 2018-04-30 PROCEDURE — 99205 OFFICE O/P NEW HI 60 MIN: CPT

## 2018-04-30 PROCEDURE — 93000 ELECTROCARDIOGRAM COMPLETE: CPT

## 2018-05-02 ENCOUNTER — RX RENEWAL (OUTPATIENT)
Age: 83
End: 2018-05-02

## 2018-05-06 ENCOUNTER — NON-APPOINTMENT (OUTPATIENT)
Age: 83
End: 2018-05-06

## 2018-05-23 ENCOUNTER — LABORATORY RESULT (OUTPATIENT)
Age: 83
End: 2018-05-23

## 2018-05-23 ENCOUNTER — RESULT REVIEW (OUTPATIENT)
Age: 83
End: 2018-05-23

## 2018-05-23 LAB
BUN SERPL-MCNC: 65 MG/DL — HIGH (ref 7–23)
CALCIUM SERPL-MCNC: 10.5 MG/DL — SIGNIFICANT CHANGE UP (ref 8.4–10.5)
CHLORIDE SERPL-SCNC: 96 MMOL/L — LOW (ref 98–107)
CO2 SERPL-SCNC: 22 MMOL/L — SIGNIFICANT CHANGE UP (ref 22–31)
CREAT SERPL-MCNC: 1.6 MG/DL — HIGH (ref 0.5–1.3)
GLUCOSE SERPL-MCNC: 279 MG/DL — HIGH (ref 70–99)
POTASSIUM SERPL-MCNC: 6.1 MMOL/L — HIGH (ref 3.5–5.3)
POTASSIUM SERPL-SCNC: 6.1 MMOL/L — HIGH (ref 3.5–5.3)
PTH-INTACT SERPL-MCNC: 147 PG/ML — HIGH (ref 15–65)
SODIUM SERPL-SCNC: 134 MMOL/L — LOW (ref 135–145)

## 2018-05-24 ENCOUNTER — EMERGENCY (EMERGENCY)
Facility: HOSPITAL | Age: 83
LOS: 1 days | Discharge: HOME | End: 2018-05-24
Attending: EMERGENCY MEDICINE | Admitting: EMERGENCY MEDICINE
Payer: MEDICARE

## 2018-05-24 VITALS
DIASTOLIC BLOOD PRESSURE: 54 MMHG | HEART RATE: 67 BPM | TEMPERATURE: 98 F | SYSTOLIC BLOOD PRESSURE: 127 MMHG | OXYGEN SATURATION: 100 % | RESPIRATION RATE: 14 BRPM

## 2018-05-24 VITALS
OXYGEN SATURATION: 100 % | SYSTOLIC BLOOD PRESSURE: 145 MMHG | RESPIRATION RATE: 12 BRPM | HEART RATE: 65 BPM | DIASTOLIC BLOOD PRESSURE: 76 MMHG

## 2018-05-24 LAB
ALBUMIN SERPL ELPH-MCNC: 3.9 G/DL — SIGNIFICANT CHANGE UP (ref 3.3–5)
ALP SERPL-CCNC: 207 U/L — HIGH (ref 40–120)
ALT FLD-CCNC: 36 U/L — HIGH (ref 4–33)
AST SERPL-CCNC: 46 U/L — HIGH (ref 4–32)
BASE EXCESS BLDV CALC-SCNC: -1.2 MMOL/L — SIGNIFICANT CHANGE UP
BASOPHILS # BLD AUTO: 0.04 K/UL — SIGNIFICANT CHANGE UP (ref 0–0.2)
BASOPHILS NFR BLD AUTO: 0.4 % — SIGNIFICANT CHANGE UP (ref 0–2)
BILIRUB SERPL-MCNC: 0.4 MG/DL — SIGNIFICANT CHANGE UP (ref 0.2–1.2)
BLOOD GAS VENOUS - CREATININE: 1.51 MG/DL — HIGH (ref 0.5–1.3)
BUN SERPL-MCNC: 62 MG/DL — HIGH (ref 7–23)
CALCIUM SERPL-MCNC: 10.5 MG/DL — SIGNIFICANT CHANGE UP (ref 8.4–10.5)
CHLORIDE BLDV-SCNC: 103 MMOL/L — SIGNIFICANT CHANGE UP (ref 96–108)
CHLORIDE SERPL-SCNC: 95 MMOL/L — LOW (ref 98–107)
CO2 SERPL-SCNC: 23 MMOL/L — SIGNIFICANT CHANGE UP (ref 22–31)
CREAT SERPL-MCNC: 1.55 MG/DL — HIGH (ref 0.5–1.3)
EOSINOPHIL # BLD AUTO: 0.25 K/UL — SIGNIFICANT CHANGE UP (ref 0–0.5)
EOSINOPHIL NFR BLD AUTO: 2.5 % — SIGNIFICANT CHANGE UP (ref 0–6)
GAS PNL BLDV: 133 MMOL/L — LOW (ref 136–146)
GLUCOSE BLDV-MCNC: 229 — HIGH (ref 70–99)
GLUCOSE SERPL-MCNC: 234 MG/DL — HIGH (ref 70–99)
HCO3 BLDV-SCNC: 22 MMOL/L — SIGNIFICANT CHANGE UP (ref 20–27)
HCT VFR BLD CALC: 34.8 % — SIGNIFICANT CHANGE UP (ref 34.5–45)
HCT VFR BLDV CALC: 37.5 % — SIGNIFICANT CHANGE UP (ref 34.5–45)
HGB BLD-MCNC: 11.6 G/DL — SIGNIFICANT CHANGE UP (ref 11.5–15.5)
HGB BLDV-MCNC: 12.2 G/DL — SIGNIFICANT CHANGE UP (ref 11.5–15.5)
IMM GRANULOCYTES # BLD AUTO: 0.02 # — SIGNIFICANT CHANGE UP
IMM GRANULOCYTES NFR BLD AUTO: 0.2 % — SIGNIFICANT CHANGE UP (ref 0–1.5)
LACTATE BLDV-MCNC: 0.9 MMOL/L — SIGNIFICANT CHANGE UP (ref 0.5–2)
LYMPHOCYTES # BLD AUTO: 1.5 K/UL — SIGNIFICANT CHANGE UP (ref 1–3.3)
LYMPHOCYTES # BLD AUTO: 15.2 % — SIGNIFICANT CHANGE UP (ref 13–44)
MCHC RBC-ENTMCNC: 30.8 PG — SIGNIFICANT CHANGE UP (ref 27–34)
MCHC RBC-ENTMCNC: 33.3 % — SIGNIFICANT CHANGE UP (ref 32–36)
MCV RBC AUTO: 92.3 FL — SIGNIFICANT CHANGE UP (ref 80–100)
MONOCYTES # BLD AUTO: 1.06 K/UL — HIGH (ref 0–0.9)
MONOCYTES NFR BLD AUTO: 10.7 % — SIGNIFICANT CHANGE UP (ref 2–14)
NEUTROPHILS # BLD AUTO: 7.03 K/UL — SIGNIFICANT CHANGE UP (ref 1.8–7.4)
NEUTROPHILS NFR BLD AUTO: 71 % — SIGNIFICANT CHANGE UP (ref 43–77)
NRBC # FLD: 0 — SIGNIFICANT CHANGE UP
PCO2 BLDV: 46 MMHG — SIGNIFICANT CHANGE UP (ref 41–51)
PH BLDV: 7.33 PH — SIGNIFICANT CHANGE UP (ref 7.32–7.43)
PLATELET # BLD AUTO: 185 K/UL — SIGNIFICANT CHANGE UP (ref 150–400)
PMV BLD: 10.7 FL — SIGNIFICANT CHANGE UP (ref 7–13)
PO2 BLDV: 29 MMHG — LOW (ref 35–40)
POTASSIUM BLDV-SCNC: 5.1 MMOL/L — HIGH (ref 3.4–4.5)
POTASSIUM SERPL-MCNC: 5.3 MMOL/L — SIGNIFICANT CHANGE UP (ref 3.5–5.3)
POTASSIUM SERPL-SCNC: 5.3 MMOL/L — SIGNIFICANT CHANGE UP (ref 3.5–5.3)
PROT SERPL-MCNC: 8.2 G/DL — SIGNIFICANT CHANGE UP (ref 6–8.3)
RBC # BLD: 3.77 M/UL — LOW (ref 3.8–5.2)
RBC # FLD: 14.7 % — HIGH (ref 10.3–14.5)
SAO2 % BLDV: 47.2 % — LOW (ref 60–85)
SODIUM SERPL-SCNC: 131 MMOL/L — LOW (ref 135–145)
WBC # BLD: 9.9 K/UL — SIGNIFICANT CHANGE UP (ref 3.8–10.5)
WBC # FLD AUTO: 9.9 K/UL — SIGNIFICANT CHANGE UP (ref 3.8–10.5)

## 2018-05-24 PROCEDURE — 99284 EMERGENCY DEPT VISIT MOD MDM: CPT | Mod: 25,GC

## 2018-05-24 PROCEDURE — 93010 ELECTROCARDIOGRAM REPORT: CPT

## 2018-05-24 NOTE — ED PROVIDER NOTE - PLAN OF CARE
You were seen for elevated potassium, possibly due to your medications. Please stop taking spironolactone daily until you have followed up at the Clinic. Call our clinic today for an appointment TOMORROW. We will also give them your information, you should receive a call. You MUST get an appointment tomorrow to check your potassium and adjust your medications. You did not have EKG findings and your high potassium appears to be resolving (5.3 today)    Return to the emergency room for chest pain, dizziness, or shortness of breath. or palpitations.     1) Please follow-up with your primary care doctor within the next 1 day.  Please call today or tomorrow for an appointment.  If you cannot follow-up with your doctor(s), please return to the ED for any urgent issues.  2) If you have any worsening of symptoms or any other concerns please return to the ED immediately.  3) Please continue taking your home medications as directed.  4) You may have been given a copy of your labs and/or imaging.  Please go over these with your primary care doctor.

## 2018-05-24 NOTE — ED PROVIDER NOTE - CARE PLAN
Principal Discharge DX:	Hyperkalemia  Assessment and plan of treatment:	You were seen for elevated potassium, possibly due to your medications. Please stop taking spironolactone daily until you have followed up at the Clinic. Call our clinic today for an appointment TOMORROW. We will also give them your information, you should receive a call. You MUST get an appointment tomorrow to check your potassium and adjust your medications. You did not have EKG findings and your high potassium appears to be resolving (5.3 today)    Return to the emergency room for chest pain, dizziness, or shortness of breath. or palpitations.     1) Please follow-up with your primary care doctor within the next 1 day.  Please call today or tomorrow for an appointment.  If you cannot follow-up with your doctor(s), please return to the ED for any urgent issues.  2) If you have any worsening of symptoms or any other concerns please return to the ED immediately.  3) Please continue taking your home medications as directed.  4) You may have been given a copy of your labs and/or imaging.  Please go over these with your primary care doctor.

## 2018-05-24 NOTE — ED PROVIDER NOTE - OBJECTIVE STATEMENT
82F with a PMH per chart significant for DM2, HTN, Cardiac Pacemaker for Tachybrady Syndrome, afib (not on a/c 2/2 GI bleed), Uterine CA s/p hysterectomy and RT, HLD, and HFpEF, sent by PMD for K+ 6.1 82F with a PMH per chart significant for DM2, HTN, Cardiac Pacemaker for Tachybrady Syndrome, afib (not on a/c 2/2 GI bleed), Uterine CA s/p hysterectomy and RT, HLD, and HFpEF, sent by PMD for K+ 6.1  Pt referred for the K but no symptoms.  Pt followed in clinic and went to clinic for routine check up.

## 2018-05-24 NOTE — ED ADULT NURSE NOTE - OBJECTIVE STATEMENT
pt received alert and oriented x3. Was sent in by PMD after routine blood work showed elevated Potassium (6.1). Pt denies any chest pain, palpitation, sob, numbness, tingling, n/v/d.pt has PMHx of HTN ,CHF, on spirolactone and lasix, DM. Respirations equal and unlabored. skin warm,dry and intact. no swelling noted. Md at bedside for eval. 20g placed in right a/c. Pt placed on continuous tele monitoring. vss . will continue to monitor . pt received alert and oriented x3. Was sent in by PMD after routine blood work showed elevated Potassium (6.1). Pt denies any chest pain, palpitation, sob, numbness, tingling, n/v/d or urinary symptoms .pt has PMHx of HTN ,CHF, on spirolactone and lasix, DM and decrease  chronic kidney function. Respirations equal and unlabored. skin warm, dry and intact. no swelling noted. Md at bedside for eval. 20g placed in right a/c. Pt placed on continuous tele monitoring. vss . will continue to monitor .

## 2018-05-24 NOTE — ED PROVIDER NOTE - ATTENDING CONTRIBUTION TO CARE
I performed a face to face evaluation of this patient and performed a full history and physical examination on the patient.  I agree with the resident's history, physical examination, and plan of the patient.  Pt with possible hyperkalemia, no symptoms h/o renal insufficiency, labs, ekg, and manage depending on lab results. normal heart, lung abd and neuro exam

## 2018-05-24 NOTE — ED ADULT TRIAGE NOTE - CHIEF COMPLAINT QUOTE
Patient was seen by her PMD yesterday and was told to come to the ED for elevated potassium level of 6.1

## 2018-05-24 NOTE — ED ADULT NURSE NOTE - PSH
Cardiac Pacemaker (ICD9 V45.01)  [employer]tr[employer] (2003 - for tacybrady syndrome @ [facility])  History of Hysterectomy (ICD9 V88.01)    Status Post Total Knee Replacement  LISA, 6 months apart 2010

## 2018-05-24 NOTE — ED PROVIDER NOTE - PSH
Cardiac Pacemaker (ICD9 V45.01)  Medtronic (2003 - for tacybrady syndrome @ [facility]  History of Hysterectomy (ICD9 V88.01)    Status Post Total Knee Replacement  LISA, 6 months apart 2010

## 2018-05-29 ENCOUNTER — APPOINTMENT (OUTPATIENT)
Dept: INTERNAL MEDICINE | Facility: HOSPITAL | Age: 83
End: 2018-05-29

## 2018-05-29 ENCOUNTER — LABORATORY RESULT (OUTPATIENT)
Age: 83
End: 2018-05-29

## 2018-05-29 ENCOUNTER — OUTPATIENT (OUTPATIENT)
Dept: OUTPATIENT SERVICES | Facility: HOSPITAL | Age: 83
LOS: 1 days | End: 2018-05-29

## 2018-05-29 VITALS
DIASTOLIC BLOOD PRESSURE: 55 MMHG | BODY MASS INDEX: 26.31 KG/M2 | WEIGHT: 143 LBS | HEIGHT: 62 IN | SYSTOLIC BLOOD PRESSURE: 153 MMHG | HEART RATE: 66 BPM

## 2018-05-29 DIAGNOSIS — I50.30 UNSPECIFIED DIASTOLIC (CONGESTIVE) HEART FAILURE: ICD-10-CM

## 2018-05-29 DIAGNOSIS — I48.91 UNSPECIFIED ATRIAL FIBRILLATION: ICD-10-CM

## 2018-05-29 DIAGNOSIS — I10 ESSENTIAL (PRIMARY) HYPERTENSION: ICD-10-CM

## 2018-05-29 DIAGNOSIS — E11.65 TYPE 2 DIABETES MELLITUS WITH HYPERGLYCEMIA: ICD-10-CM

## 2018-05-29 DIAGNOSIS — E87.5 HYPERKALEMIA: ICD-10-CM

## 2018-05-29 LAB
BUN SERPL-MCNC: 59 MG/DL — HIGH (ref 7–23)
CALCIUM SERPL-MCNC: 10.8 MG/DL — HIGH (ref 8.4–10.5)
CHLORIDE SERPL-SCNC: 96 MMOL/L — LOW (ref 98–107)
CO2 SERPL-SCNC: 23 MMOL/L — SIGNIFICANT CHANGE UP (ref 22–31)
CREAT SERPL-MCNC: 1.48 MG/DL — HIGH (ref 0.5–1.3)
GLUCOSE BLDC GLUCOMTR-MCNC: 291
GLUCOSE SERPL-MCNC: 254 MG/DL — HIGH (ref 70–99)
POTASSIUM SERPL-MCNC: 4.8 MMOL/L — SIGNIFICANT CHANGE UP (ref 3.5–5.3)
POTASSIUM SERPL-SCNC: 4.8 MMOL/L — SIGNIFICANT CHANGE UP (ref 3.5–5.3)
SODIUM SERPL-SCNC: 134 MMOL/L — LOW (ref 135–145)

## 2018-05-29 RX ORDER — SPIRONOLACTONE 25 MG/1
25 TABLET ORAL DAILY
Qty: 30 | Refills: 3 | Status: COMPLETED | COMMUNITY
Start: 2018-04-16 | End: 2018-05-29

## 2018-05-29 NOTE — HISTORY OF PRESENT ILLNESS
[FreeTextEntry2] : 83 year old female with HTN, HLD, NIDDM2, HFrEF, tachybrady syndrome s/p PPM, A-fib (back on Eliquis), and uterine CA s/p hysterectomy and RT, who presents to the clinic for ED follow up. \par \par Patient with hyperkalemia (6.1) on 5/23/2018.  She was advised to go to the ED for treatment and an EKG.  Repeat K 5.3 in ED, no EKG changes, patient discharged home.  Instructed to discontinue spironolactone.  Patient was aware of this medication change and has NOT been taking spironolactone. \par \par Patient is leaving for [place] tonight due to a death in the family.  Medications were reviewed with patient and daughter. \par Patient still taking metformin 500 mg at night.  As per chart review, this was discontinue on 11/28/2017 due to CKD III.  She was advised to discontinue this and was removed from medication bag.  Patient has not been taking losartan, was unsure if should be continued.  As per cards note from 4/26/2018, still in plan.  Patient also has only been taking hydralazine once a day at lunch.  She was unaware that dose is TID.

## 2018-05-29 NOTE — PHYSICAL EXAM
[No Acute Distress] : no acute distress [Well-Appearing] : well-appearing [Normal Sclera/Conjunctiva] : normal sclera/conjunctiva [PERRL] : pupils equal round and reactive to light [EOMI] : extraocular movements intact [Normal Outer Ear/Nose] : the outer ears and nose were normal in appearance [Normal Oropharynx] : the oropharynx was normal [Normal Nasal Mucosa] : the nasal mucosa was normal [No JVD] : no jugular venous distention [Supple] : supple [No Lymphadenopathy] : no lymphadenopathy [Thyroid Normal, No Nodules] : the thyroid was normal and there were no nodules present [No Respiratory Distress] : no respiratory distress  [Clear to Auscultation] : lungs were clear to auscultation bilaterally [No Accessory Muscle Use] : no accessory muscle use [Normal Rate] : normal rate  [Regular Rhythm] : with a regular rhythm [Normal S1, S2] : normal S1 and S2 [No Murmur] : no murmur heard [No Carotid Bruits] : no carotid bruits [No Abdominal Bruit] : a ~M bruit was not heard ~T in the abdomen [No Varicosities] : no varicosities [Pedal Pulses Present] : the pedal pulses are present [No Extremity Clubbing/Cyanosis] : no extremity clubbing/cyanosis [No Palpable Aorta] : no palpable aorta [Soft] : abdomen soft [Non Tender] : non-tender [Non-distended] : non-distended [No Masses] : no abdominal mass palpated [No HSM] : no HSM [Normal Bowel Sounds] : normal bowel sounds [Normal Supraclavicular Nodes] : no supraclavicular lymphadenopathy [Normal Posterior Cervical Nodes] : no posterior cervical lymphadenopathy [Normal Anterior Cervical Nodes] : no anterior cervical lymphadenopathy [No CVA Tenderness] : no CVA  tenderness [No Spinal Tenderness] : no spinal tenderness [No Joint Swelling] : no joint swelling [Grossly Normal Strength/Tone] : grossly normal strength/tone [No Rash] : no rash [Normal Gait] : normal gait [Coordination Grossly Intact] : coordination grossly intact [No Focal Deficits] : no focal deficits [Deep Tendon Reflexes (DTR)] : deep tendon reflexes were 2+ and symmetric [Normal Affect] : the affect was normal [Alert and Oriented x3] : oriented to person, place, and time [Normal Insight/Judgement] : insight and judgment were intact [de-identified] : 1 + bilateral pitting edema

## 2018-05-29 NOTE — ASSESSMENT
[FreeTextEntry1] : Hyperkalemia: Repeat BMP today.  Will continue to hold spironolactone at this moment as patient will be leaving [facility].  \par \par HFpEF: Patient with LE edema, however lungs clear, no JVD.  Advised to wear compression stocking for flight tonight.  Patient is taking furosemide 40 mg daily, advised to continue.  Advised to continue with daily weights and if  >2 lb weight increase, additional dose of furosemide 40 mg in afternoon.  Continue metoprolol 200 mg daily, ASA 81 mg daily, Lipitor 40 mg daily, Imdur 60 mg ER.\par \par A-fib:  CHADSVASc score 6, restarted on Eliquis by EP.  Advised to monitor of bleeding and to contact provider if she should note any. Continue with metoprolol succinate 200 mg.\par \par HTN: BP above goal.  Patient not taking losartan as she was uncertain if still active medication.  Advised to resume. She also has only been taking hydralazine once a day, advised to increase dose to TID. Continue with amlodipine 10 mg daily.\par \par DM2: Last A1c 6.6% from April 2018. Advised a low carb diet and exercise as tolerated. Patient advised to discontinue metformin. Continue with Januvia 50 mg daily.\par \par HCM:\par Last colonoscopy: 2015; will need repeat most likely 2020 given findings\par Tdap 5/24/2012\par Pneumovax 10/16/2008\par Prevnar 2/2/2015\par \par Follow up in July with PCP. \par

## 2018-06-01 ENCOUNTER — OUTPATIENT (OUTPATIENT)
Dept: OUTPATIENT SERVICES | Facility: HOSPITAL | Age: 83
LOS: 1 days | End: 2018-06-01
Payer: MEDICAID

## 2018-06-01 PROCEDURE — G9001: CPT

## 2018-06-18 ENCOUNTER — INPATIENT (INPATIENT)
Facility: HOSPITAL | Age: 83
LOS: 2 days | Discharge: HOME HEALTH | End: 2018-06-21
Attending: HOSPITALIST | Admitting: HOSPITALIST
Payer: MEDICARE

## 2018-06-18 VITALS
OXYGEN SATURATION: 97 % | TEMPERATURE: 99 F | SYSTOLIC BLOOD PRESSURE: 154 MMHG | RESPIRATION RATE: 24 BRPM | DIASTOLIC BLOOD PRESSURE: 42 MMHG | HEART RATE: 61 BPM

## 2018-06-18 DIAGNOSIS — I50.9 HEART FAILURE, UNSPECIFIED: ICD-10-CM

## 2018-06-18 DIAGNOSIS — D64.9 ANEMIA, UNSPECIFIED: ICD-10-CM

## 2018-06-18 DIAGNOSIS — I10 ESSENTIAL (PRIMARY) HYPERTENSION: ICD-10-CM

## 2018-06-18 DIAGNOSIS — J18.9 PNEUMONIA, UNSPECIFIED ORGANISM: ICD-10-CM

## 2018-06-18 DIAGNOSIS — I48.91 UNSPECIFIED ATRIAL FIBRILLATION: ICD-10-CM

## 2018-06-18 LAB
ALBUMIN SERPL ELPH-MCNC: 3.3 G/DL — SIGNIFICANT CHANGE UP (ref 3.3–5)
ALBUMIN SERPL ELPH-MCNC: 3.4 G/DL — SIGNIFICANT CHANGE UP (ref 3.3–5)
ALP SERPL-CCNC: 224 U/L — HIGH (ref 40–120)
ALP SERPL-CCNC: 238 U/L — HIGH (ref 40–120)
ALT FLD-CCNC: 36 U/L — HIGH (ref 4–33)
ALT FLD-CCNC: 40 U/L — HIGH (ref 4–33)
APTT BLD: 35.8 SEC — SIGNIFICANT CHANGE UP (ref 27.5–37.4)
AST SERPL-CCNC: 30 U/L — SIGNIFICANT CHANGE UP (ref 4–32)
AST SERPL-CCNC: 43 U/L — HIGH (ref 4–32)
BASOPHILS # BLD AUTO: 0.03 K/UL — SIGNIFICANT CHANGE UP (ref 0–0.2)
BASOPHILS NFR BLD AUTO: 0.2 % — SIGNIFICANT CHANGE UP (ref 0–2)
BILIRUB DIRECT SERPL-MCNC: 0.4 MG/DL — HIGH (ref 0.1–0.2)
BILIRUB SERPL-MCNC: 0.9 MG/DL — SIGNIFICANT CHANGE UP (ref 0.2–1.2)
BILIRUB SERPL-MCNC: 1 MG/DL — SIGNIFICANT CHANGE UP (ref 0.2–1.2)
BUN SERPL-MCNC: 47 MG/DL — HIGH (ref 7–23)
BUN SERPL-MCNC: 49 MG/DL — HIGH (ref 7–23)
CALCIUM SERPL-MCNC: 10 MG/DL — SIGNIFICANT CHANGE UP (ref 8.4–10.5)
CALCIUM SERPL-MCNC: 10.2 MG/DL — SIGNIFICANT CHANGE UP (ref 8.4–10.5)
CHLORIDE SERPL-SCNC: 97 MMOL/L — LOW (ref 98–107)
CHLORIDE SERPL-SCNC: 98 MMOL/L — SIGNIFICANT CHANGE UP (ref 98–107)
CO2 SERPL-SCNC: 19 MMOL/L — LOW (ref 22–31)
CO2 SERPL-SCNC: 20 MMOL/L — LOW (ref 22–31)
CREAT SERPL-MCNC: 1.37 MG/DL — HIGH (ref 0.5–1.3)
CREAT SERPL-MCNC: 1.41 MG/DL — HIGH (ref 0.5–1.3)
EOSINOPHIL # BLD AUTO: 0.02 K/UL — SIGNIFICANT CHANGE UP (ref 0–0.5)
EOSINOPHIL NFR BLD AUTO: 0.1 % — SIGNIFICANT CHANGE UP (ref 0–6)
FERRITIN SERPL-MCNC: 167.1 NG/ML — HIGH (ref 15–150)
GLUCOSE SERPL-MCNC: 279 MG/DL — HIGH (ref 70–99)
GLUCOSE SERPL-MCNC: 318 MG/DL — HIGH (ref 70–99)
HAPTOGLOB SERPL-MCNC: 160 MG/DL — SIGNIFICANT CHANGE UP (ref 34–200)
HCT VFR BLD CALC: 27.2 % — LOW (ref 34.5–45)
HCT VFR BLD CALC: 28.1 % — LOW (ref 34.5–45)
HGB BLD-MCNC: 9 G/DL — LOW (ref 11.5–15.5)
HGB BLD-MCNC: 9.2 G/DL — LOW (ref 11.5–15.5)
IMM GRANULOCYTES # BLD AUTO: 0.06 # — SIGNIFICANT CHANGE UP
IMM GRANULOCYTES NFR BLD AUTO: 0.4 % — SIGNIFICANT CHANGE UP (ref 0–1.5)
INR BLD: 1.43 — HIGH (ref 0.88–1.17)
IRON SATN MFR SERPL: 253 UG/DL — SIGNIFICANT CHANGE UP (ref 140–530)
IRON SATN MFR SERPL: 34 UG/DL — SIGNIFICANT CHANGE UP (ref 30–160)
LDH SERPL L TO P-CCNC: 235 U/L — HIGH (ref 135–225)
LYMPHOCYTES # BLD AUTO: 1.12 K/UL — SIGNIFICANT CHANGE UP (ref 1–3.3)
LYMPHOCYTES # BLD AUTO: 8.4 % — LOW (ref 13–44)
MAGNESIUM SERPL-MCNC: 1.7 MG/DL — SIGNIFICANT CHANGE UP (ref 1.6–2.6)
MCHC RBC-ENTMCNC: 30.4 PG — SIGNIFICANT CHANGE UP (ref 27–34)
MCHC RBC-ENTMCNC: 30.8 PG — SIGNIFICANT CHANGE UP (ref 27–34)
MCHC RBC-ENTMCNC: 32 % — SIGNIFICANT CHANGE UP (ref 32–36)
MCHC RBC-ENTMCNC: 33.8 % — SIGNIFICANT CHANGE UP (ref 32–36)
MCV RBC AUTO: 91 FL — SIGNIFICANT CHANGE UP (ref 80–100)
MCV RBC AUTO: 94.9 FL — SIGNIFICANT CHANGE UP (ref 80–100)
MONOCYTES # BLD AUTO: 1.11 K/UL — HIGH (ref 0–0.9)
MONOCYTES NFR BLD AUTO: 8.3 % — SIGNIFICANT CHANGE UP (ref 2–14)
NEUTROPHILS # BLD AUTO: 11.01 K/UL — HIGH (ref 1.8–7.4)
NEUTROPHILS NFR BLD AUTO: 82.6 % — HIGH (ref 43–77)
NRBC # FLD: 0 — SIGNIFICANT CHANGE UP
NRBC # FLD: 0 — SIGNIFICANT CHANGE UP
NT-PROBNP SERPL-SCNC: SIGNIFICANT CHANGE UP PG/ML
PHOSPHATE SERPL-MCNC: 2.4 MG/DL — LOW (ref 2.5–4.5)
PLATELET # BLD AUTO: 270 K/UL — SIGNIFICANT CHANGE UP (ref 150–400)
PLATELET # BLD AUTO: 292 K/UL — SIGNIFICANT CHANGE UP (ref 150–400)
PMV BLD: 10 FL — SIGNIFICANT CHANGE UP (ref 7–13)
PMV BLD: 10.1 FL — SIGNIFICANT CHANGE UP (ref 7–13)
POTASSIUM SERPL-MCNC: 4.7 MMOL/L — SIGNIFICANT CHANGE UP (ref 3.5–5.3)
POTASSIUM SERPL-MCNC: 5.4 MMOL/L — HIGH (ref 3.5–5.3)
POTASSIUM SERPL-SCNC: 4.7 MMOL/L — SIGNIFICANT CHANGE UP (ref 3.5–5.3)
POTASSIUM SERPL-SCNC: 5.4 MMOL/L — HIGH (ref 3.5–5.3)
PROT SERPL-MCNC: 7.4 G/DL — SIGNIFICANT CHANGE UP (ref 6–8.3)
PROT SERPL-MCNC: 8.1 G/DL — SIGNIFICANT CHANGE UP (ref 6–8.3)
PROTHROM AB SERPL-ACNC: 16.6 SEC — HIGH (ref 9.8–13.1)
RBC # BLD: 2.96 M/UL — LOW (ref 3.8–5.2)
RBC # BLD: 2.99 M/UL — LOW (ref 3.8–5.2)
RBC # FLD: 15.6 % — HIGH (ref 10.3–14.5)
RBC # FLD: 15.7 % — HIGH (ref 10.3–14.5)
RETICS #: 97 K/UL — SIGNIFICANT CHANGE UP (ref 25–125)
RETICS/RBC NFR: 3.3 % — HIGH (ref 0.5–2.5)
SODIUM SERPL-SCNC: 132 MMOL/L — LOW (ref 135–145)
SODIUM SERPL-SCNC: 134 MMOL/L — LOW (ref 135–145)
TROPONIN T, HIGH SENSITIVITY RESULT: 47 NG/L — SIGNIFICANT CHANGE UP (ref ?–14)
TROPONIN T, HIGH SENSITIVITY RESULT: 47 NG/L — SIGNIFICANT CHANGE UP (ref ?–14)
TSH SERPL-MCNC: 1.58 UIU/ML — SIGNIFICANT CHANGE UP (ref 0.27–4.2)
UIBC SERPL-MCNC: 218.8 UG/DL — SIGNIFICANT CHANGE UP (ref 110–370)
VIT B12 SERPL-MCNC: 1727 PG/ML — HIGH (ref 200–900)
WBC # BLD: 12.18 K/UL — HIGH (ref 3.8–10.5)
WBC # BLD: 13.35 K/UL — HIGH (ref 3.8–10.5)
WBC # FLD AUTO: 12.18 K/UL — HIGH (ref 3.8–10.5)
WBC # FLD AUTO: 13.35 K/UL — HIGH (ref 3.8–10.5)

## 2018-06-18 PROCEDURE — 99223 1ST HOSP IP/OBS HIGH 75: CPT

## 2018-06-18 PROCEDURE — 71250 CT THORAX DX C-: CPT | Mod: 26

## 2018-06-18 PROCEDURE — 71046 X-RAY EXAM CHEST 2 VIEWS: CPT | Mod: 26

## 2018-06-18 RX ORDER — DEXTROSE 50 % IN WATER 50 %
12.5 SYRINGE (ML) INTRAVENOUS ONCE
Qty: 0 | Refills: 0 | Status: DISCONTINUED | OUTPATIENT
Start: 2018-06-18 | End: 2018-06-21

## 2018-06-18 RX ORDER — SODIUM CHLORIDE 9 MG/ML
1000 INJECTION, SOLUTION INTRAVENOUS
Qty: 0 | Refills: 0 | Status: DISCONTINUED | OUTPATIENT
Start: 2018-06-18 | End: 2018-06-21

## 2018-06-18 RX ORDER — GLUCAGON INJECTION, SOLUTION 0.5 MG/.1ML
1 INJECTION, SOLUTION SUBCUTANEOUS ONCE
Qty: 0 | Refills: 0 | Status: DISCONTINUED | OUTPATIENT
Start: 2018-06-18 | End: 2018-06-21

## 2018-06-18 RX ORDER — METOPROLOL TARTRATE 50 MG
200 TABLET ORAL DAILY
Qty: 0 | Refills: 0 | Status: DISCONTINUED | OUTPATIENT
Start: 2018-06-18 | End: 2018-06-21

## 2018-06-18 RX ORDER — APIXABAN 2.5 MG/1
2.5 TABLET, FILM COATED ORAL EVERY 12 HOURS
Qty: 0 | Refills: 0 | Status: DISCONTINUED | OUTPATIENT
Start: 2018-06-18 | End: 2018-06-21

## 2018-06-18 RX ORDER — FUROSEMIDE 40 MG
40 TABLET ORAL ONCE
Qty: 0 | Refills: 0 | Status: COMPLETED | OUTPATIENT
Start: 2018-06-18 | End: 2018-06-18

## 2018-06-18 RX ORDER — ASPIRIN/CALCIUM CARB/MAGNESIUM 324 MG
81 TABLET ORAL DAILY
Qty: 0 | Refills: 0 | Status: DISCONTINUED | OUTPATIENT
Start: 2018-06-18 | End: 2018-06-21

## 2018-06-18 RX ORDER — HYDRALAZINE HCL 50 MG
25 TABLET ORAL
Qty: 0 | Refills: 0 | Status: DISCONTINUED | OUTPATIENT
Start: 2018-06-18 | End: 2018-06-19

## 2018-06-18 RX ORDER — SODIUM CHLORIDE 9 MG/ML
3 INJECTION INTRAMUSCULAR; INTRAVENOUS; SUBCUTANEOUS EVERY 8 HOURS
Qty: 0 | Refills: 0 | Status: DISCONTINUED | OUTPATIENT
Start: 2018-06-18 | End: 2018-06-21

## 2018-06-18 RX ORDER — INSULIN LISPRO 100/ML
VIAL (ML) SUBCUTANEOUS
Qty: 0 | Refills: 0 | Status: DISCONTINUED | OUTPATIENT
Start: 2018-06-18 | End: 2018-06-21

## 2018-06-18 RX ORDER — LOSARTAN POTASSIUM 100 MG/1
1 TABLET, FILM COATED ORAL
Qty: 0 | Refills: 0 | COMMUNITY

## 2018-06-18 RX ORDER — INSULIN LISPRO 100/ML
VIAL (ML) SUBCUTANEOUS
Qty: 0 | Refills: 0 | Status: DISCONTINUED | OUTPATIENT
Start: 2018-06-18 | End: 2018-06-18

## 2018-06-18 RX ORDER — ATORVASTATIN CALCIUM 80 MG/1
40 TABLET, FILM COATED ORAL AT BEDTIME
Qty: 0 | Refills: 0 | Status: DISCONTINUED | OUTPATIENT
Start: 2018-06-18 | End: 2018-06-21

## 2018-06-18 RX ORDER — FUROSEMIDE 40 MG
40 TABLET ORAL EVERY 12 HOURS
Qty: 0 | Refills: 0 | Status: DISCONTINUED | OUTPATIENT
Start: 2018-06-18 | End: 2018-06-19

## 2018-06-18 RX ORDER — APIXABAN 2.5 MG/1
2.5 TABLET, FILM COATED ORAL EVERY 12 HOURS
Qty: 0 | Refills: 0 | Status: DISCONTINUED | OUTPATIENT
Start: 2018-06-18 | End: 2018-06-18

## 2018-06-18 RX ORDER — DEXTROSE 50 % IN WATER 50 %
15 SYRINGE (ML) INTRAVENOUS ONCE
Qty: 0 | Refills: 0 | Status: DISCONTINUED | OUTPATIENT
Start: 2018-06-18 | End: 2018-06-21

## 2018-06-18 RX ORDER — AMLODIPINE BESYLATE 2.5 MG/1
10 TABLET ORAL DAILY
Qty: 0 | Refills: 0 | Status: DISCONTINUED | OUTPATIENT
Start: 2018-06-18 | End: 2018-06-21

## 2018-06-18 RX ORDER — DEXTROSE 50 % IN WATER 50 %
25 SYRINGE (ML) INTRAVENOUS ONCE
Qty: 0 | Refills: 0 | Status: DISCONTINUED | OUTPATIENT
Start: 2018-06-18 | End: 2018-06-21

## 2018-06-18 RX ORDER — LOSARTAN POTASSIUM 100 MG/1
50 TABLET, FILM COATED ORAL DAILY
Qty: 0 | Refills: 0 | Status: DISCONTINUED | OUTPATIENT
Start: 2018-06-18 | End: 2018-06-19

## 2018-06-18 RX ORDER — HEPARIN SODIUM 5000 [USP'U]/ML
5000 INJECTION INTRAVENOUS; SUBCUTANEOUS EVERY 8 HOURS
Qty: 0 | Refills: 0 | Status: DISCONTINUED | OUTPATIENT
Start: 2018-06-18 | End: 2018-06-18

## 2018-06-18 RX ORDER — ISOSORBIDE MONONITRATE 60 MG/1
60 TABLET, EXTENDED RELEASE ORAL DAILY
Qty: 0 | Refills: 0 | Status: DISCONTINUED | OUTPATIENT
Start: 2018-06-18 | End: 2018-06-21

## 2018-06-18 RX ADMIN — HEPARIN SODIUM 5000 UNIT(S): 5000 INJECTION INTRAVENOUS; SUBCUTANEOUS at 15:26

## 2018-06-18 RX ADMIN — Medication 40 MILLIGRAM(S): at 11:44

## 2018-06-18 RX ADMIN — Medication 8: at 23:38

## 2018-06-18 RX ADMIN — Medication 6: at 18:36

## 2018-06-18 RX ADMIN — SODIUM CHLORIDE 3 MILLILITER(S): 9 INJECTION INTRAMUSCULAR; INTRAVENOUS; SUBCUTANEOUS at 14:13

## 2018-06-18 RX ADMIN — SODIUM CHLORIDE 3 MILLILITER(S): 9 INJECTION INTRAMUSCULAR; INTRAVENOUS; SUBCUTANEOUS at 22:44

## 2018-06-18 RX ADMIN — LOSARTAN POTASSIUM 50 MILLIGRAM(S): 100 TABLET, FILM COATED ORAL at 18:43

## 2018-06-18 RX ADMIN — ATORVASTATIN CALCIUM 40 MILLIGRAM(S): 80 TABLET, FILM COATED ORAL at 22:44

## 2018-06-18 NOTE — ED PROVIDER NOTE - MEDICAL DECISION MAKING DETAILS
84 yo F with history of HF p/w SOB and non-productive cough, crackles on lung exam, likely HF exacerbation-CBC, CMP, troponin, pro-BNP, EKG, CXR

## 2018-06-18 NOTE — H&P ADULT - NSHPLABSRESULTS_GEN_ALL_CORE
9.2    13.35 )-----------( 292      ( 18 Jun 2018 10:20 )             27.2     06-18    132<L>  |  98  |  47<H>  ----------------------------<  318<H>  5.4<H>   |  20<L>  |  1.37<H>    Ca    10.2      18 Jun 2018 10:20  Phos  2.4     06-18  Mg     1.7     06-18    TPro  8.1  /  Alb  3.3  /  TBili  1.0  /  DBili  x   /  AST  43<H>  /  ALT  40<H>  /  AlkPhos  238<H>  06-18    CAPILLARY BLOOD GLUCOSE      POCT Blood Glucose.: 287 mg/dL (18 Jun 2018 18:12)  POCT Blood Glucose.: 291 mg/dL (18 Jun 2018 14:18)        Vital Signs Last 24 Hrs  T(C): 36.1 (18 Jun 2018 18:24), Max: 37.2 (18 Jun 2018 09:34)  T(F): 97 (18 Jun 2018 18:24), Max: 98.9 (18 Jun 2018 09:34)  HR: 60 (18 Jun 2018 18:24) (59 - 61)  BP: 144/61 (18 Jun 2018 18:24) (144/61 - 165/50)  BP(mean): --  RR: 18 (18 Jun 2018 18:24) (18 - 24)  SpO2: 100% (18 Jun 2018 18:24) (97% - 100%)

## 2018-06-18 NOTE — INPATIENT CERTIFICATION FOR MEDICARE PATIENTS - RISKS OF ADVERSE EVENTS
Concern for cardiopulmonary deterioration/Concern for delay in diagnosis and treatment/Concern for renal deterioration

## 2018-06-18 NOTE — ED PROVIDER NOTE - OBJECTIVE STATEMENT
84 yo F with DM2, HTN, Cardiac Pacemaker for Tachybrady Syndrome, afib (not on a/c 2/2 GI bleed), Uterine CA s/p hysterectomy and RT, HLD, and HFpEF presenting with 1 day of worsening SOB and non-productive cough. Patient states she has been seeing PMD and cardiology over past few weeks for adjustments in meds. Took her water pill this morning. No CP. No fever, but endorses some chills occasionally. No abd pain, no increased leg swelling.     PMD: ROSEANNA RASHIDU, Cardiology: Clinic-Dr. Vasquez

## 2018-06-18 NOTE — H&P ADULT - PSH
Cardiac Pacemaker (ICD9 V45.01)  [employer]tr[employer] (2003 - for tacybrady syndrome @ [facility])  History of Hysterectomy (ICD9 V88.01)    Status Post Total Knee Replacement  LISA, 6 months apart 2010

## 2018-06-18 NOTE — H&P ADULT - PROBLEM SELECTOR PLAN 4
-given recent resumption of Eliquis with h/o GI bleed, will send off fobt in addtioan to anemia panel  -if repeat Hb and coags are stable, will restart eliquis given elevated chads score

## 2018-06-18 NOTE — ED PROVIDER NOTE - PROGRESS NOTE DETAILS
Angela: Pt exam and history consistent with CHf exacerbation, afebrile and no signs of pneumonia. discussed with hospitalist. no abx given.

## 2018-06-18 NOTE — ED PROVIDER NOTE - ATTENDING CONTRIBUTION TO CARE
Angela: 82 yo female with a h/o DM2, HTN, Cardiac Pacemaker for Tachybrady Syndrome, afib (not on a/c 2/2 GI bleed), Uterine CA s/p hysterectomy and RT, HLD, and CHF with multiple recent ED and outpatient visits for CHF requiring changes in medications c/o 24 hours of worsening SOB. No associated chest pain, LE pain or edema. No associated abdominal pain, nausea or vomiting. + non productive cough but no fevers or chills. Exam: GENERAL: well appearing, NAD, HEENT: MMM, CARDIO: +S1/S2, no murmurs, rubs or gallops, LUNGS: + rales at b/l bases, no tachypnea or accessory muscle use. ABD: soft, nontender, BSx4 quadrants, no guarding or rigidity. EXT: No LE edema or calf TTP, 2+ distal pulses x 4 extremities. NEURO: AxOx3, SKIN: no rashes or lesions, well perfused  A/P- 82yo female with likely CHf exacerbation. will obtain cbc, cmp, troponin, bnp, cxr, give IV lasix, ekg and admit.

## 2018-06-18 NOTE — H&P ADULT - NSHPPHYSICALEXAM_GEN_ALL_CORE
PHYSICAL EXAM:      Constitutional: NAD, well-groomed, well-developed  HEENT: EOMI, Normal Hearing  Neck: No LAD, No JVD  Back: Normal spine flexure, No CVA tenderness  Respiratory: CTAB  Cardiovascular: S1 and S2, RRR  Gastrointestinal: BS+, soft, NT/ND  Extremities: No peripheral edema  Vascular: 2+ peripheral pulses  Neurological: A/O x 3, no focal deficits  Psychiatric: Normal mood, normal affect  Musculoskeletal: 4/5 strength b/l upper and lower extremities  Skin: No rashes

## 2018-06-18 NOTE — ED PROVIDER NOTE - PSH
Cardiac Pacemaker (ICD9 V45.01)  Medtronic (2003 - for tacybrady syndrome @ [facility])  History of Hysterectomy (ICD9 V88.01)    Status Post Total Knee Replacement  LISA, 6 months apart 2010

## 2018-06-18 NOTE — H&P ADULT - NSHPREVIEWOFSYSTEMS_GEN_ALL_CORE
Review of Systems:   CONSTITUTIONAL: No fever  EYES: No eye pain, visual disturbances, or discharge  ENMT:  No difficulty hearing, tinnitus, vertigo; No sinus or throat pain  NECK: No pain or stiffness  RESPIRATORY: + mostly dry cough, + shortness of breath  CARDIOVASCULAR: No chest pain, palpitations, dizziness, or leg swelling  GASTROINTESTINAL: No abdominal or epigastric pain. No nausea, vomiting, or hematemesis; No diarrhea or constipation. No melena or hematochezia.  GENITOURINARY: No dysuria, frequency, hematuria, or incontinence  NEUROLOGICAL: No headaches, memory loss, loss of strength, numbness, or tremors  SKIN: No itching, burning, rashes, or lesions   MUSCULOSKELETAL: No joint pain or swelling; No muscle, back, or extremity pain

## 2018-06-18 NOTE — H&P ADULT - PROBLEM SELECTOR PLAN 5
-pt reports taking hydralazine daily instead of tid recommendation; will c/w once a day dose, monitor vitals  -c/w arb, imdur, norvasc, lasix as tolerated

## 2018-06-18 NOTE — CHART NOTE - NSCHARTNOTEFT_GEN_A_CORE
REASON FOR CONSULT: cough and wheezing  ( Initial note lost in [place])    SUBJECTIVE:     82 yo F with AFib, HFpEF, HLD, HTN, Pacemaker, Uterine CA s/p hysterectomy and RT, presenting with 1 day of cough, SOB and wheezing        Was well until today when she c/o SOB and wheezing.   Was in [place] x 2 weeks and returned on 06-13.  No fever or chills  No sick contacts  Wheezing increases when walking and decreases at rest.  was able to sleep last night    In eD received lasix w partial releif of symptoms       Past med Hx  1)	Afib has had hx GI bleed now on eliquis  2)	HFpEF  3)	Diabetes II  4)	Hyperlipidemia  5)	HTN  6)	osteoarthritis  7)	Pacemaker PPM for tachy-wellington syndrome  8)	Uterine CA s/p hysterectomy and RT  	  Past Surg Hx  1)	PPM [employer] for tachy-wellington at [facility]  2)	Hysterectomy for uterine CA  3)	Total knee replacement to both knees, 6 months apart 2010    Fam Hx   denied cad, dm, htn    Social Hx  never smoked  lives w daughter      ROS  general no fever or chills  No sick contacts  + travel  HEENT denied sorethroat  RESP + cough of sputum above, wheezing/SOB  CARD no palpitationsa or chest pain, no leg edema  GI no nausea or difficulty eating  MSK no complaints      PHYSICAL EXAM: Tele-evaluation precludes physical exam. Pertinent physical exam findings as per verbal communication by …… and nurse at bedside are as following:  Alert, pleasant and cooperative  Able to answer me in complete sentences    Vital Signs Last 24 Hrs  T(C): 36.8 (18 Jun 2018 12:43), Max: 37.2 (18 Jun 2018 09:34)  T(F): 98.3 (18 Jun 2018 12:43), Max: 98.9 (18 Jun 2018 09:34)  HR: 59 (18 Jun 2018 12:43) (59 - 61)  BP: 165/50 (18 Jun 2018 12:43) (154/42 - 165/50)  BP(mean): --  RR: 20 (18 Jun 2018 12:43) (20 - 24)  SpO2: 99% (18 Jun 2018 12:43) (97% - 99%)    LABS AND IMAGING DATA:                        9.2    13.35 )-----------( 292      ( 18 Jun 2018 10:20 )             27.2     06-18    132<L>  |  98  |  47<H>  ----------------------------<  318<H>  5.4<H>   |  20<L>  |  1.37<H>    Ca    10.2      18 Jun 2018 10:20  Phos  2.4     06-18  Mg     1.7     06-18    TPro  8.1  /  Alb  3.3  /  TBili  1.0  /  DBili  x   /  AST  43<H>  /  ALT  40<H>  /  AlkPhos  238<H>  06-18          ASSESSMENT AND PLAN: 82 yo F with AFib, HFpEF, HLD, HTN, Pacemaker, Uterine CA s/p hysterectomy and RT, presenting with 1 day of cough, SOB and [id number] yo F with AFib, HFpEF, HLD, HTN, PPM, Uterine CA hx presenting with 1 day of cough, SOB and wheezing    1) ACute n chronic CHF decompensation  Hx HFpEF  A)   B) elavted LFT likely due to passive congestionelevated BNP  C) CXR RUL/RLL opacities   likely fluid but given recent travel to [facility], follow exam and temp curve  1. daily weights  2. i/o  3. IV lasix 2 12 hr  4. to consider repeat echo  5. BNP 06-20  6. continue losartan 50  7. continue beta blocker  8. asa 81 mg  9. trend temp  10 . CBC in AM  11. TSH      2) Hyperkalemia  1. s/p lasix after that lab  2. repeat in AM    3) Likely CKD but reduced GFR now  1. follow Cr  2) Hx AF  now on eliquis 2.5 mg po BID    4) PPM     5 HTn  1. lasix IV  2. betablocker  3. monitor BP    6) HLD  continue statin    #VTE on eliquis    #med rec done      Care plan discussed with NP Shelley REASON FOR CONSULT: cough and wheezing  ( Initial note lost in [place])    SUBJECTIVE:     82 yo F with AFib, DM II, HFpEF, HLD, HTN, Pacemaker, Uterine CA s/p hysterectomy and RT, presenting with 1 day of cough, SOB and wheezing        Was well until today when she c/o SOB and wheezing.   Was in [place] x 2 weeks and returned on 06-13.  No fever or chills  No sick contacts  Wheezing increases when walking and decreases at rest.  was able to sleep last night    In eD received lasix w partial rellief of symptoms       Past med Hx  1)	Afib has had hx GI bleed now on eliquis  2)	HFpEF  3)	Diabetes II  4)	Hyperlipidemia  5)	HTN  6)	osteoarthritis  7)	Pacemaker PPM for tachy-wellington syndrome  8)	Uterine CA s/p hysterectomy and RT  	  Past Surg Hx  1)	PPM [employer] for tachy-wellington at [facility]  2)	Hysterectomy for uterine CA  3)	Total knee replacement to both knees, 6 months apart 2010    Fam Hx   denied cad, dm, htn    Social Hx  never smoked  lives w daughter      ROS  general no fever or chills  No sick contacts  + travel  HEENT denied sorethroat  RESP + cough of sputum above, wheezing/SOB  CARD no palpitationsa or chest pain, no leg edema  GI no nausea or difficulty eating  MSK no complaints      PHYSICAL EXAM: Tele-evaluation precludes physical exam. Pertinent physical exam findings as per verbal communication by …… and nurse at bedside are as following:  Alert, pleasant and cooperative  Able to answer me in complete sentences    Vital Signs Last 24 Hrs  T(C): 36.8 (18 Jun 2018 12:43), Max: 37.2 (18 Jun 2018 09:34)  T(F): 98.3 (18 Jun 2018 12:43), Max: 98.9 (18 Jun 2018 09:34)  HR: 59 (18 Jun 2018 12:43) (59 - 61)  BP: 165/50 (18 Jun 2018 12:43) (154/42 - 165/50)  BP(mean): --  RR: 20 (18 Jun 2018 12:43) (20 - 24)  SpO2: 99% (18 Jun 2018 12:43) (97% - 99%)    LABS AND IMAGING DATA:                        9.2    13.35 )-----------( 292      ( 18 Jun 2018 10:20 )             27.2     06-18    132<L>  |  98  |  47<H>  ----------------------------<  318<H>  5.4<H>   |  20<L>  |  1.37<H>    Ca    10.2      18 Jun 2018 10:20  Phos  2.4     06-18  Mg     1.7     06-18    TPro  8.1  /  Alb  3.3  /  TBili  1.0  /  DBili  x   /  AST  43<H>  /  ALT  40<H>  /  AlkPhos  238<H>  06-18          ASSESSMENT AND PLAN: 82 yo F with AFib, HFpEF, HLD, HTN, Pacemaker, Uterine CA s/p hysterectomy and RT, presenting with 1 day of cough, SOB and [id number] yo F with AFib, HFpEF, HLD, HTN, PPM, Uterine CA hx presenting with 1 day of cough, SOB and wheezing    1) ACute n chronic CHF decompensation  Hx HFpEF  A)   B) elavted LFT likely due to passive congestionelevated BNP  C) CXR RUL/RLL opacities   likely fluid but given recent travel to [facility], follow exam and temp curve  1. daily weights  2. i/o  3. IV lasix 2 12 hr  4. to consider repeat echo  5. BNP 06-20  6. continue losartan 50  7. continue beta blocker  8. asa 81 mg  9. trend temp  10 . CBC in AM  11. TSH      2) Hyperkalemia  1. s/p lasix after that lab  2. repeat in AM    3) Likely CKD but reduced GFR now  1. follow Cr  2) Hx AF  now on eliquis 2.5 mg po BID    4) PPM     5 HTn  1. lasix IV  2. betablocker  3. monitor BP    6)DM  1. hold oral agents  2. ISS  3. BGM    7) HLD  continue statin    #VTE on eliquis    #med rec done      Care plan discussed with LUNA Rosa

## 2018-06-18 NOTE — H&P ADULT - ASSESSMENT
82 yo f with h/o CHF with mild to mod LV systolic dysfuction pwer  tachybrady syndrome s/p PPM, Afib back on eliquis (had been off 2/2 GIB) 84 yo f with h/o CHF with mild to mod LV systolic dysfuction pwer  tachybrady syndrome s/p PPM, presenting with clinical suspicion for acute decompensated chf

## 2018-06-18 NOTE — H&P ADULT - PROBLEM SELECTOR PLAN 2
-cxr read suggestive of right-sided focal pna  -pt afebrile with occasional, mild mostly non-productive cough  -will monitor off abx for now; ct chest and resp/blood cultures ordered -cxr read suggestive of right-sided focal pna  -pt afebrile with occasional, mild mostly non-productive cough  -will monitor off abx for now; ct chest and resp/blood cultures ordered    ADDEND  prelim ct chest suggestive of multifocal pna; will dose levofloxacin for now  follow official read

## 2018-06-18 NOTE — H&P ADULT - PROBLEM SELECTOR PLAN 1
-decompensation may be driven in part by infectious activity and/or suboptimal med compliance (water pills or other BP meds) and/or tachy or wellington arrhythmia. Trop negative. Would monitor on tele  -ensure that both cardiology and and EP are called  -will place on  lasix 40 IV bid  -MONITOR RENAL FUNCTION, I'S/O'S  -c/w home toprol, and htn meds -decompensation may be driven in part by infectious activity and/or suboptimal med compliance (water pills or other BP meds) and/or tachy or wellington arrhythmia. Trop negative. Would monitor on tele  -ensure that both cardiology and and EP are called  -will place on  lasix 40 IV bid  -MONITOR RENAL FUNCTION, I'S/O'S  -c/w home toprol, and htn meds  -TTE ordered -decompensation may be driven in part by one or some of the following: infectious activity, anemia, suboptimal med compliance (water pills or other BP meds) tachy or wellington arrhythmia. Trop negative. Would monitor on tele  -ensure that both cardiology and and EP are called  -will place on  lasix 40 IV bid  -MONITOR RENAL FUNCTION, I'S/O'S  -c/w home toprol, and htn meds  -TTE ordered  -serial Hb, fobt

## 2018-06-18 NOTE — H&P ADULT - HISTORY OF PRESENT ILLNESS
84 yo f with h/o CHF with mild to mod LV systolic dysfunction per 10/2017 TTE,   tachybrady syndrome s/p PPM, Afib back on eliquis (had been off 2/2 GIB) 82 yo f with h/o CHF with mild to mod LV systolic dysfunction per 10/2017 TTE,   tachybrady syndrome s/p PPM, CAD per stress  test performed 10/2017 suggestive od prior infarcts. Pt also with h/o Afib recently placed back on AC (eliquis; had been off of AC 2/2 GIB while on coumadin) h/o uterine ca s/p hysterectomy and RT, DM2.   Pt repots several days of persistent KRAUS with mild mostly non-productive cough. Denies cp, leg swelling or inability to take home water pills or BP meds. Endorses feeling cold during last few days, but denies fevers. Recently came back from trip to [facility]  (5 days ago) but denies sick contacts. CXR suggestive of right-sided multifocal PNA, but pt afebrile, not coughing during my exam, breathing/saturating well on RA. 82 yo f with h/o CHF with mild to mod LV systolic dysfunction per 10/2017 TTE,   tachybrady syndrome s/p PPM, CAD per stress  test performed 10/2017 suggestive od prior infarcts. Pt also with h/o Afib recently placed back on AC (eliquis; had been off of AC 2/2 GIB while on coumadin) h/o uterine ca s/p hysterectomy and RT, DM2.   Pt repots several days of persistent KRAUS with mild mostly non-productive cough. Denies cp, leg swelling or inability to take home water pills or BP meds. Unable to answer if she gained weight recently.  Endorses feeling cold during last few days, but denies fevers. Recently came back from trip to [facility]  (5 days ago) but denies sick contacts. CXR suggestive of right-sided multifocal PNA, but pt afebrile, not coughing during my exam, breathing/saturating well on RA. pBNP 28K, trop x 2 unlikely ACS. Given lasix 40 IV in ed with some improvement   Hb 9.2 compare to 11-12 as recently as april and may; denies melena, hematochezia, recent trauma 84 yo f with h/o CHF with mild to mod LV systolic dysfunction per 10/2017 TTE,   tachybrady syndrome s/p PPM, CAD per stress  test performed 10/2017 suggestive of prior infarcts. Pt also with h/o Afib recently placed back on AC (eliquis; had been off of AC 2/2 GIB while on coumadin) h/o uterine ca s/p hysterectomy and RT, DM2.   Pt reports several days of persistent KRAUS with mild mostly non-productive cough. Denies cp, leg swelling or inability to take home water pills or BP meds. Unable to answer if she gained weight recently.  Endorses feeling cold during last few days, but denies fevers. Recently came back from trip to [facility]  (5 days ago) but denies sick contacts. CXR suggestive of right-sided multifocal PNA, but pt afebrile, not coughing during my exam, breathing/saturating well on RA. pBNP 28K, trop x 2 unlikely ACS. Given lasix 40 IV in ed with some improvement   Hb 9.2 compare to 11-12 as recently as april and may; denies melena, hematochezia, recent trauma

## 2018-06-19 DIAGNOSIS — E11.22 TYPE 2 DIABETES MELLITUS WITH DIABETIC CHRONIC KIDNEY DISEASE: ICD-10-CM

## 2018-06-19 DIAGNOSIS — R69 ILLNESS, UNSPECIFIED: ICD-10-CM

## 2018-06-19 LAB
ALBUMIN SERPL ELPH-MCNC: 3.1 G/DL — LOW (ref 3.3–5)
ALBUMIN SERPL ELPH-MCNC: 3.1 G/DL — LOW (ref 3.3–5)
ALP SERPL-CCNC: 199 U/L — HIGH (ref 40–120)
ALP SERPL-CCNC: 199 U/L — HIGH (ref 40–120)
ALT FLD-CCNC: 31 U/L — SIGNIFICANT CHANGE UP (ref 4–33)
ALT FLD-CCNC: 31 U/L — SIGNIFICANT CHANGE UP (ref 4–33)
AST SERPL-CCNC: 22 U/L — SIGNIFICANT CHANGE UP (ref 4–32)
AST SERPL-CCNC: 22 U/L — SIGNIFICANT CHANGE UP (ref 4–32)
BASOPHILS # BLD AUTO: 0.03 K/UL — SIGNIFICANT CHANGE UP (ref 0–0.2)
BASOPHILS NFR BLD AUTO: 0.2 % — SIGNIFICANT CHANGE UP (ref 0–2)
BILIRUB DIRECT SERPL-MCNC: 0.4 MG/DL — HIGH (ref 0.1–0.2)
BILIRUB SERPL-MCNC: 1 MG/DL — SIGNIFICANT CHANGE UP (ref 0.2–1.2)
BILIRUB SERPL-MCNC: 1 MG/DL — SIGNIFICANT CHANGE UP (ref 0.2–1.2)
BLD GP AB SCN SERPL QL: NEGATIVE — SIGNIFICANT CHANGE UP
BUN SERPL-MCNC: 49 MG/DL — HIGH (ref 7–23)
CALCIUM SERPL-MCNC: 10.5 MG/DL — SIGNIFICANT CHANGE UP (ref 8.4–10.5)
CHLORIDE SERPL-SCNC: 98 MMOL/L — SIGNIFICANT CHANGE UP (ref 98–107)
CO2 SERPL-SCNC: 22 MMOL/L — SIGNIFICANT CHANGE UP (ref 22–31)
CREAT SERPL-MCNC: 1.39 MG/DL — HIGH (ref 0.5–1.3)
EOSINOPHIL # BLD AUTO: 0.01 K/UL — SIGNIFICANT CHANGE UP (ref 0–0.5)
EOSINOPHIL NFR BLD AUTO: 0.1 % — SIGNIFICANT CHANGE UP (ref 0–6)
FOLATE SERPL-MCNC: 17.7 NG/ML — SIGNIFICANT CHANGE UP (ref 4.7–20)
GLUCOSE SERPL-MCNC: 168 MG/DL — HIGH (ref 70–99)
HBA1C BLD-MCNC: 8.5 % — HIGH (ref 4–5.6)
HCT VFR BLD CALC: 26.1 % — LOW (ref 34.5–45)
HCT VFR BLD CALC: 26.1 % — LOW (ref 34.5–45)
HCT VFR BLD CALC: 27.9 % — LOW (ref 34.5–45)
HGB BLD-MCNC: 8.5 G/DL — LOW (ref 11.5–15.5)
HGB BLD-MCNC: 8.5 G/DL — LOW (ref 11.5–15.5)
HGB BLD-MCNC: 9.4 G/DL — LOW (ref 11.5–15.5)
IMM GRANULOCYTES # BLD AUTO: 0.1 # — SIGNIFICANT CHANGE UP
IMM GRANULOCYTES NFR BLD AUTO: 0.7 % — SIGNIFICANT CHANGE UP (ref 0–1.5)
INR BLD: 1.53 — HIGH (ref 0.88–1.17)
LYMPHOCYTES # BLD AUTO: 1.43 K/UL — SIGNIFICANT CHANGE UP (ref 1–3.3)
LYMPHOCYTES # BLD AUTO: 10.6 % — LOW (ref 13–44)
MCHC RBC-ENTMCNC: 31.1 PG — SIGNIFICANT CHANGE UP (ref 27–34)
MCHC RBC-ENTMCNC: 31.1 PG — SIGNIFICANT CHANGE UP (ref 27–34)
MCHC RBC-ENTMCNC: 31.5 PG — SIGNIFICANT CHANGE UP (ref 27–34)
MCHC RBC-ENTMCNC: 32.6 % — SIGNIFICANT CHANGE UP (ref 32–36)
MCHC RBC-ENTMCNC: 32.6 % — SIGNIFICANT CHANGE UP (ref 32–36)
MCHC RBC-ENTMCNC: 33.7 % — SIGNIFICANT CHANGE UP (ref 32–36)
MCV RBC AUTO: 93.6 FL — SIGNIFICANT CHANGE UP (ref 80–100)
MCV RBC AUTO: 95.6 FL — SIGNIFICANT CHANGE UP (ref 80–100)
MCV RBC AUTO: 95.6 FL — SIGNIFICANT CHANGE UP (ref 80–100)
MONOCYTES # BLD AUTO: 1.19 K/UL — HIGH (ref 0–0.9)
MONOCYTES NFR BLD AUTO: 8.8 % — SIGNIFICANT CHANGE UP (ref 2–14)
NEUTROPHILS # BLD AUTO: 10.69 K/UL — HIGH (ref 1.8–7.4)
NEUTROPHILS NFR BLD AUTO: 79.6 % — HIGH (ref 43–77)
NRBC # FLD: 0 — SIGNIFICANT CHANGE UP
NRBC # FLD: 0 — SIGNIFICANT CHANGE UP
NRBC # FLD: 0.03 — SIGNIFICANT CHANGE UP
PLATELET # BLD AUTO: 288 K/UL — SIGNIFICANT CHANGE UP (ref 150–400)
PLATELET # BLD AUTO: 288 K/UL — SIGNIFICANT CHANGE UP (ref 150–400)
PLATELET # BLD AUTO: 333 K/UL — SIGNIFICANT CHANGE UP (ref 150–400)
PMV BLD: 10.1 FL — SIGNIFICANT CHANGE UP (ref 7–13)
POTASSIUM SERPL-MCNC: 4.3 MMOL/L — SIGNIFICANT CHANGE UP (ref 3.5–5.3)
POTASSIUM SERPL-SCNC: 4.3 MMOL/L — SIGNIFICANT CHANGE UP (ref 3.5–5.3)
PROT SERPL-MCNC: 7.7 G/DL — SIGNIFICANT CHANGE UP (ref 6–8.3)
PROT SERPL-MCNC: 7.7 G/DL — SIGNIFICANT CHANGE UP (ref 6–8.3)
PROTHROM AB SERPL-ACNC: 17.1 SEC — HIGH (ref 9.8–13.1)
RBC # BLD: 2.73 M/UL — LOW (ref 3.8–5.2)
RBC # BLD: 2.73 M/UL — LOW (ref 3.8–5.2)
RBC # BLD: 2.98 M/UL — LOW (ref 3.8–5.2)
RBC # FLD: 15.9 % — HIGH (ref 10.3–14.5)
RBC # FLD: 15.9 % — HIGH (ref 10.3–14.5)
RBC # FLD: 16.1 % — HIGH (ref 10.3–14.5)
RH IG SCN BLD-IMP: POSITIVE — SIGNIFICANT CHANGE UP
SODIUM SERPL-SCNC: 135 MMOL/L — SIGNIFICANT CHANGE UP (ref 135–145)
SPECIMEN SOURCE: SIGNIFICANT CHANGE UP
SPECIMEN SOURCE: SIGNIFICANT CHANGE UP
TSH SERPL-MCNC: 1.34 UIU/ML — SIGNIFICANT CHANGE UP (ref 0.27–4.2)
WBC # BLD: 12.59 K/UL — HIGH (ref 3.8–10.5)
WBC # BLD: 13.45 K/UL — HIGH (ref 3.8–10.5)
WBC # BLD: 13.45 K/UL — HIGH (ref 3.8–10.5)
WBC # FLD AUTO: 12.59 K/UL — HIGH (ref 3.8–10.5)
WBC # FLD AUTO: 13.45 K/UL — HIGH (ref 3.8–10.5)
WBC # FLD AUTO: 13.45 K/UL — HIGH (ref 3.8–10.5)

## 2018-06-19 PROCEDURE — 99233 SBSQ HOSP IP/OBS HIGH 50: CPT

## 2018-06-19 PROCEDURE — 99222 1ST HOSP IP/OBS MODERATE 55: CPT

## 2018-06-19 RX ORDER — LOSARTAN POTASSIUM 100 MG/1
100 TABLET, FILM COATED ORAL DAILY
Qty: 0 | Refills: 0 | Status: DISCONTINUED | OUTPATIENT
Start: 2018-06-19 | End: 2018-06-21

## 2018-06-19 RX ORDER — FUROSEMIDE 40 MG
80 TABLET ORAL
Qty: 0 | Refills: 0 | Status: DISCONTINUED | OUTPATIENT
Start: 2018-06-19 | End: 2018-06-21

## 2018-06-19 RX ORDER — HYDRALAZINE HCL 50 MG
25 TABLET ORAL THREE TIMES A DAY
Qty: 0 | Refills: 0 | Status: DISCONTINUED | OUTPATIENT
Start: 2018-06-19 | End: 2018-06-19

## 2018-06-19 RX ORDER — FUROSEMIDE 40 MG
40 TABLET ORAL ONCE
Qty: 0 | Refills: 0 | Status: COMPLETED | OUTPATIENT
Start: 2018-06-19 | End: 2018-06-19

## 2018-06-19 RX ADMIN — ATORVASTATIN CALCIUM 40 MILLIGRAM(S): 80 TABLET, FILM COATED ORAL at 21:16

## 2018-06-19 RX ADMIN — SODIUM CHLORIDE 3 MILLILITER(S): 9 INJECTION INTRAMUSCULAR; INTRAVENOUS; SUBCUTANEOUS at 21:11

## 2018-06-19 RX ADMIN — APIXABAN 2.5 MILLIGRAM(S): 2.5 TABLET, FILM COATED ORAL at 17:10

## 2018-06-19 RX ADMIN — Medication 2: at 21:17

## 2018-06-19 RX ADMIN — AMLODIPINE BESYLATE 10 MILLIGRAM(S): 2.5 TABLET ORAL at 06:26

## 2018-06-19 RX ADMIN — ISOSORBIDE MONONITRATE 60 MILLIGRAM(S): 60 TABLET, EXTENDED RELEASE ORAL at 13:31

## 2018-06-19 RX ADMIN — Medication 40 MILLIGRAM(S): at 18:27

## 2018-06-19 RX ADMIN — Medication 40 MILLIGRAM(S): at 06:27

## 2018-06-19 RX ADMIN — Medication 40 MILLIGRAM(S): at 17:10

## 2018-06-19 RX ADMIN — Medication 25 MILLIGRAM(S): at 06:27

## 2018-06-19 RX ADMIN — SODIUM CHLORIDE 3 MILLILITER(S): 9 INJECTION INTRAMUSCULAR; INTRAVENOUS; SUBCUTANEOUS at 13:32

## 2018-06-19 RX ADMIN — Medication 2: at 08:15

## 2018-06-19 RX ADMIN — SODIUM CHLORIDE 3 MILLILITER(S): 9 INJECTION INTRAMUSCULAR; INTRAVENOUS; SUBCUTANEOUS at 06:27

## 2018-06-19 RX ADMIN — Medication 200 MILLIGRAM(S): at 06:27

## 2018-06-19 RX ADMIN — Medication 4: at 17:14

## 2018-06-19 RX ADMIN — Medication 6: at 12:12

## 2018-06-19 RX ADMIN — Medication 81 MILLIGRAM(S): at 13:31

## 2018-06-19 RX ADMIN — APIXABAN 2.5 MILLIGRAM(S): 2.5 TABLET, FILM COATED ORAL at 06:26

## 2018-06-19 RX ADMIN — LOSARTAN POTASSIUM 50 MILLIGRAM(S): 100 TABLET, FILM COATED ORAL at 06:27

## 2018-06-19 NOTE — PHYSICAL THERAPY INITIAL EVALUATION ADULT - GENERAL OBSERVATIONS, REHAB EVAL
Consult received, chart reviewed. Patient received supine in bed, NAD, +tele, sons present. Patient agreed to Evaluation from Physical Therapist.

## 2018-06-19 NOTE — CONSULT NOTE ADULT - ASSESSMENT
84 yo f with h/o HFrEF with mild to mod LV systolic dysfunction per 10/2017 TTE, tachybrady syndrome s/p PPM, CAD per stress  test performed 10/2017 suggestive of prior infarcts presenting with decompensated heart failure. NYHA Class 2.   -would increase to furosemide 80 mg BID IV  -c/w metoprolol 200 mg XL  -c/w losartan 50 mg daily  -increase hydralazine to 25 mg TID (from BID)  -c/w isorbide mononitrate 60 mg daily    Please call on call cardiology team at [phone] with any further questions.

## 2018-06-19 NOTE — PHYSICAL THERAPY INITIAL EVALUATION ADULT - PERTINENT HX OF CURRENT PROBLEM, REHAB EVAL
Pt. admitted for SOB, KRAUS. Acute on chronic congestive heart failure, unspecified heart failure type.

## 2018-06-19 NOTE — PHYSICAL THERAPY INITIAL EVALUATION ADULT - CRITERIA FOR SKILLED THERAPEUTIC INTERVENTIONS
impairments found/rehab potential/therapy frequency/anticipated discharge recommendation/predicted duration of therapy intervention

## 2018-06-19 NOTE — PHYSICAL THERAPY INITIAL EVALUATION ADULT - ADDITIONAL COMMENTS
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 Pt. reports daughter helps with ADLs, mainly ambulates within the house.     Pt. was left seated at EOB post PT Evaluation, NAD, sons present. KRISTAL Maharaj aware of pt. status.

## 2018-06-19 NOTE — CONSULT NOTE ADULT - SUBJECTIVE AND OBJECTIVE BOX
Date of Consult: 6/19/2018    CHIEF COMPLAINT:    HISTORY OF PRESENT ILLNESS:  Rody White is a 84 yo f with h/o HFrEF with mild to mod LV systolic dysfunction per 10/2017 TTE, tachybrady syndrome s/p PPM, CAD per stress  test performed 10/2017 suggestive of prior infarcts who presents with shortness of breath. Patient also has a history of atrial fibrillation recently placed back on AC Eliquis had been off of AC 2/2 GIB while on coumadin). She follows in the cardiology clinic, last seen by her cardiologist Dr. Armenta, who uptitrated her oral diuretic from 40 mg daily to 80 mg daily.     In the past few days, patient has had persistent KRAUS with mild mostly non-productive cough. Denies cp, leg swelling or inability to take home water pills or BP meds. Unable to answer if she gained weight recently.  Endorses feeling cold during last few days, but denies fevers. Recently came back from trip to [facility]  (5 days ago) but denies sick contacts. CXR suggestive of right-sided multifocal PNA, but pt afebrile, not coughing during my exam, breathing/saturating well on RA. pBNP 28K, trop x 2 unlikely ACS. Given furosmide 40 IV in ED with some improvement     Allergies    No Known Allergies    Intolerances    MEDICATIONS:  amLODIPine   Tablet 10 milliGRAM(s) Oral daily  apixaban 2.5 milliGRAM(s) Oral every 12 hours  aspirin enteric coated 81 milliGRAM(s) Oral daily  furosemide   Injectable 40 milliGRAM(s) IV Push every 12 hours  hydrALAZINE 25 milliGRAM(s) Oral two times a day  isosorbide   mononitrate ER Tablet (IMDUR) 60 milliGRAM(s) Oral daily  losartan 50 milliGRAM(s) Oral daily  metoprolol succinate  milliGRAM(s) Oral daily  levoFLOXacin IVPB 250 milliGRAM(s) IV Intermittent at bedtime  atorvastatin 40 milliGRAM(s) Oral at bedtime  dextrose 40% Gel 15 Gram(s) Oral once PRN  dextrose 50% Injectable 12.5 Gram(s) IV Push once  dextrose 50% Injectable 25 Gram(s) IV Push once  dextrose 50% Injectable 25 Gram(s) IV Push once  glucagon  Injectable 1 milliGRAM(s) IntraMuscular once PRN  insulin lispro (HumaLOG) corrective regimen sliding scale   SubCutaneous Before meals and at bedtime  dextrose 5%. 1000 milliLiter(s) IV Continuous <Continuous>  sodium chloride 0.9% lock flush 3 milliLiter(s) IV Push every 8 hours    PAST MEDICAL & SURGICAL HISTORY:  Hyperlipidemia  Uterine Cancer (ICD9 179): treated with hysterectomy and radiation therapy in 2008  Osteoarthritis of Knee (ICD9 715.96): b/l knees  DM Type 2 (Diabetes Mellitus, Type 2) (ICD9 250.00)  HTN (Hypertension) (ICD9 401.9)  Status Post Total Knee Replacement: LISA, 6 months apart 2010  Cardiac Pacemaker (ICD9 V45.01): [employer] (2003 - for tacybrady syndrome @ [facility])  History of Hysterectomy (ICD9 V88.01)      FAMILY HISTORY:  No pertinent family history in first degree relatives      SOCIAL HISTORY:    [ ] Non-smoker  [ ] Smoker  [ ] Alcohol      REVIEW OF SYSTEMS:  See HPI. Otherwise, 10 point ROS done and otherwise negative.    PHYSICAL EXAM:  T(C): 36.9 (06-19-18 @ 13:29), Max: 36.9 (06-19-18 @ 13:29)  HR: 60 (06-19-18 @ 13:29) (60 - 63)  BP: 156/65 (06-19-18 @ 13:29) (144/61 - 167/54)  RR: 18 (06-19-18 @ 13:29) (16 - 18)  SpO2: 94% (06-19-18 @ 13:29) (94% - 100%)  Wt(kg): --  I&O's Summary    18 Jun 2018 07:01  -  19 Jun 2018 07:00  --------------------------------------------------------  IN: 0 mL / OUT: 260 mL / NET: -260 mL    19 Jun 2018 07:01  -  19 Jun 2018 15:12  --------------------------------------------------------  IN: 0 mL / OUT: 500 mL / NET: -500 mL        Appearance: Normal	  HEENT:   Normal oral mucosa, PERRL, EOMI	  Lymphatic: No lymphadenopathy  Cardiovascular: Normal S1 S2, No JVD, No murmurs, No edema  Respiratory: Lungs clear to auscultation	  Psychiatry: A & O x 3, Mood & affect appropriate  Gastrointestinal:  Soft, Non-tender, + BS	  Skin: No rashes, No ecchymoses, No cyanosis	  Neurologic: Non-focal  Extremities: Normal range of motion, No clubbing, cyanosis or edema  Vascular: Peripheral pulses palpable 2+ bilaterally        LABS:	 	    CBC Full  -  ( 19 Jun 2018 06:30 )  WBC Count : 13.45 K/uL  Hemoglobin : 8.5 g/dL  Hematocrit : 26.1 %  Platelet Count - Automated : 288 K/uL  Mean Cell Volume : 95.6 fL  Mean Cell Hemoglobin : 31.1 pg  Mean Cell Hemoglobin Concentration : 32.6 %  Auto Neutrophil # : 10.69 K/uL  Auto Lymphocyte # : 1.43 K/uL  Auto Monocyte # : 1.19 K/uL  Auto Eosinophil # : 0.01 K/uL  Auto Basophil # : 0.03 K/uL  Auto Neutrophil % : 79.6 %  Auto Lymphocyte % : 10.6 %  Auto Monocyte % : 8.8 %  Auto Eosinophil % : 0.1 %  Auto Basophil % : 0.2 %    06-19    135  |  98  |  49<H>  ----------------------------<  168<H>  4.3   |  22  |  1.39<H>  06-18    134<L>  |  97<L>  |  49<H>  ----------------------------<  279<H>  4.7   |  19<L>  |  1.41<H>    Ca    10.5      19 Jun 2018 06:30  Ca    10.0      18 Jun 2018 19:35  Phos  2.4     06-18  Mg     1.7     06-18    TPro  7.7  /  Alb  3.1<L>  /  TBili  1.0  /  DBili  0.4<H>  /  AST  22  /  ALT  31  /  AlkPhos  199<H>  06-19  TPro  7.4  /  Alb  3.4  /  TBili  0.9  /  DBili  0.4<H>  /  AST  30  /  ALT  36<H>  /  AlkPhos  224<H>  06-18      proBNP: 28, 000 (4,000 in Oct 2017)  Lipid Profile:   HgA1c: Hemoglobin A1C, Whole Blood: 8.5 % (06-19 @ 06:30)    TSH: Thyroid Stimulating Hormone, Serum: 1.34 uIU/mL (06-19 @ 06:30)  Thyroid Stimulating Hormone, Serum: 1.58 uIU/mL (06-18 @ 19:35)    CARDIAC MARKERS:    TELEMETRY: 	    ECG:  	  RADIOLOGY:  IMPRESSION:  Multifocal right-sided opacities consistent with pneumonia   that should be followed to resolution.    OTHER: 	    PREVIOUS DIAGNOSTIC TESTING:    [ ] Echocardiogram:  [ ]  Catheterization:  [ ] Stress Test:  	  	  ASSESSMENT/PLAN: 	    Marek Tobar MD Date of Consult: 6/19/2018    CHIEF COMPLAINT:    HISTORY OF PRESENT ILLNESS:  Rody White is a 84 yo f with h/o HFrEF with mild to mod LV systolic dysfunction per 10/2017 TTE, tachybrady syndrome s/p PPM, CAD per stress  test performed 10/2017 suggestive of prior infarcts who presents with shortness of breath. Patient also has a history of atrial fibrillation recently placed back on AC Eliquis had been off of AC 2/2 GIB while on coumadin). She follows in the cardiology clinic, last seen by her cardiologist Dr. Armenta, who uptitrated her oral diuretic from 40 mg daily to 80 mg daily.     In the past few days, patient has had persistent KRAUS with mild mostly non-productive cough. Denies cp, leg swelling or inability to take home water pills or BP meds. Unable to answer if she gained weight recently.  Endorses feeling cold during last few days, but denies fevers. Recently came back from trip to [facility]  (5 days ago) but denies sick contacts. CXR suggestive of right-sided multifocal PNA, but pt afebrile, not coughing during my exam, breathing/saturating well on RA. pBNP 28K, trop x 2 unlikely ACS. Given furosmide 40 IV in ED with some improvement     Allergies    No Known Allergies    Intolerances    MEDICATIONS:  amLODIPine   Tablet 10 milliGRAM(s) Oral daily  apixaban 2.5 milliGRAM(s) Oral every 12 hours  aspirin enteric coated 81 milliGRAM(s) Oral daily  furosemide   Injectable 40 milliGRAM(s) IV Push every 12 hours  hydrALAZINE 25 milliGRAM(s) Oral two times a day  isosorbide   mononitrate ER Tablet (IMDUR) 60 milliGRAM(s) Oral daily  losartan 50 milliGRAM(s) Oral daily  metoprolol succinate  milliGRAM(s) Oral daily  levoFLOXacin IVPB 250 milliGRAM(s) IV Intermittent at bedtime  atorvastatin 40 milliGRAM(s) Oral at bedtime  dextrose 40% Gel 15 Gram(s) Oral once PRN  dextrose 50% Injectable 12.5 Gram(s) IV Push once  dextrose 50% Injectable 25 Gram(s) IV Push once  dextrose 50% Injectable 25 Gram(s) IV Push once  glucagon  Injectable 1 milliGRAM(s) IntraMuscular once PRN  insulin lispro (HumaLOG) corrective regimen sliding scale   SubCutaneous Before meals and at bedtime  dextrose 5%. 1000 milliLiter(s) IV Continuous <Continuous>  sodium chloride 0.9% lock flush 3 milliLiter(s) IV Push every 8 hours    PAST MEDICAL & SURGICAL HISTORY:  Hyperlipidemia  Uterine Cancer (ICD9 179): treated with hysterectomy and radiation therapy in 2008  Osteoarthritis of Knee (ICD9 715.96): b/l knees  DM Type 2 (Diabetes Mellitus, Type 2) (ICD9 250.00)  HTN (Hypertension) (ICD9 401.9)  Status Post Total Knee Replacement: LISA, 6 months apart 2010  Cardiac Pacemaker (ICD9 V45.01): [employer]ronic (2003 - for tacybrady syndrome @ [facility])  History of Hysterectomy (ICD9 V88.01)      FAMILY HISTORY:  No pertinent family history in first degree relatives      SOCIAL HISTORY:    [ ] Non-smoker  [ ] Smoker  [ ] Alcohol      REVIEW OF SYSTEMS:  See HPI. Otherwise, 10 point ROS done and otherwise negative.    PHYSICAL EXAM:  T(C): 36.9 (06-19-18 @ 13:29), Max: 36.9 (06-19-18 @ 13:29)  HR: 60 (06-19-18 @ 13:29) (60 - 63)  BP: 156/65 (06-19-18 @ 13:29) (144/61 - 167/54)  RR: 18 (06-19-18 @ 13:29) (16 - 18)  SpO2: 94% (06-19-18 @ 13:29) (94% - 100%)  Wt(kg): --  I&O's Summary    18 Jun 2018 07:01  -  19 Jun 2018 07:00  --------------------------------------------------------  IN: 0 mL / OUT: 260 mL / NET: -260 mL    19 Jun 2018 07:01  -  19 Jun 2018 15:12  --------------------------------------------------------  IN: 0 mL / OUT: 500 mL / NET: -500 mL        Appearance: Normal	  HEENT:   Normal oral mucosa, PERRL, EOMI	  Lymphatic: No lymphadenopathy  Cardiovascular: Normal S1 S2, No JVD, No murmurs, No edema  Respiratory: Lungs clear to auscultation	  Psychiatry: A & O x 3, Mood & affect appropriate  Gastrointestinal:  Soft, Non-tender, + BS	  Skin: No rashes, No ecchymoses, No cyanosis	  Neurologic: Non-focal  Extremities: Normal range of motion, No clubbing, cyanosis or edema  Vascular: Peripheral pulses palpable 2+ bilaterally        LABS:	 	    CBC Full  -  ( 19 Jun 2018 06:30 )  WBC Count : 13.45 K/uL  Hemoglobin : 8.5 g/dL  Hematocrit : 26.1 %  Platelet Count - Automated : 288 K/uL  Mean Cell Volume : 95.6 fL  Mean Cell Hemoglobin : 31.1 pg  Mean Cell Hemoglobin Concentration : 32.6 %  Auto Neutrophil # : 10.69 K/uL  Auto Lymphocyte # : 1.43 K/uL  Auto Monocyte # : 1.19 K/uL  Auto Eosinophil # : 0.01 K/uL  Auto Basophil # : 0.03 K/uL  Auto Neutrophil % : 79.6 %  Auto Lymphocyte % : 10.6 %  Auto Monocyte % : 8.8 %  Auto Eosinophil % : 0.1 %  Auto Basophil % : 0.2 %    06-19    135  |  98  |  49<H>  ----------------------------<  168<H>  4.3   |  22  |  1.39<H>  06-18    134<L>  |  97<L>  |  49<H>  ----------------------------<  279<H>  4.7   |  19<L>  |  1.41<H>    Ca    10.5      19 Jun 2018 06:30  Ca    10.0      18 Jun 2018 19:35  Phos  2.4     06-18  Mg     1.7     06-18    TPro  7.7  /  Alb  3.1<L>  /  TBili  1.0  /  DBili  0.4<H>  /  AST  22  /  ALT  31  /  AlkPhos  199<H>  06-19  TPro  7.4  /  Alb  3.4  /  TBili  0.9  /  DBili  0.4<H>  /  AST  30  /  ALT  36<H>  /  AlkPhos  224<H>  06-18      proBNP: 28, 000 (4,000 in Oct 2017)  Lipid Profile:   HgA1c: Hemoglobin A1C, Whole Blood: 8.5 % (06-19 @ 06:30)    TSH: Thyroid Stimulating Hormone, Serum: 1.34 uIU/mL (06-19 @ 06:30)  Thyroid Stimulating Hormone, Serum: 1.58 uIU/mL (06-18 @ 19:35)    CARDIAC MARKERS:    TELEMETRY: 	    ECG:  	  RADIOLOGY:  IMPRESSION:  Multifocal right-sided opacities consistent with pneumonia   that should be followed to resolution.    OTHER: 	    PREVIOUS DIAGNOSTIC TESTING:    [ ] Echocardiogram:  CONCLUSIONS:  1. Mitral annular calcification and calcified mitral  leaflets with decreased diastolic opening. Moderate-severe  mitral regurgitation. Mean transmitral valve gradient  equals 3 mm Hg, consistent with mild mitral stenosis.  2. Aortic valve not well visualized; appears calcified.  Peak transaortic valve gradient equals 28 mm Hg, mean  transaortic valve gradient equals 13 mm Hg, consistent with  mild aortic stenosis. Moderate aortic regurgitation.  3. Mild to moderate segmental left ventricular systolic  dysfunction. The apex, distal inferior, mid to distal  septal walls are hypokinetic.  4. Normal right ventricular size and function. Device wire  is noted in the right heart.  [ ]  Catheterization:  [ ] Stress Test:  	    IMPRESSION:     1.  Bilateral opacities, septal thickening and pleural effusions are more   suggestive of pulmonary edema rather than pneumonia.    2.  Enlarged main pulmonary artery, which can be seen in setting of   pulmonary artery hypertension.      IMPRESSIONS:Normal Study  * Myocardial Perfusion SPECT results are abnormal.  * Review of raw data shows: The study is of good technical  quality.  * The left ventricle was normal in size. There are medium  sized, severe defects in anteroseptal, apical walls that  are fixed, suggestive of infarct.  * Post-stress gated wall motion analysis was performed  (LVEF = 42 %;LVEDV = 103 ml.), revealing moderate overall  hypokinesis. There was apical and anteroseptal akinesis.  The remaining segments contracted well.  *** Compared with Nuclear/Stress test of 4/17/2016, the  current findings are new, suggesting interval infarction  ADDENDUM 10/26/2017: previous interpretation was based on  incorrect data from prior study      	  ASSESSMENT/PLAN: 	    Marek Tobar MD Date of Consult: 6/19/2018    CHIEF COMPLAINT:    HISTORY OF PRESENT ILLNESS:  Rody White is a 82 yo f with h/o HFrEF with mild to mod LV systolic dysfunction per 10/2017 TTE, tachybrady syndrome s/p PPM, CAD per stress  test performed 10/2017 suggestive of prior infarcts who presents with shortness of breath. Patient also has a history of atrial fibrillation recently placed back on AC Eliquis had been off of AC 2/2 GIB while on coumadin). She follows in the cardiology clinic, last seen by her cardiologist Dr. Armenta, who uptitrated her oral diuretic from 40 mg daily to 80 mg daily.     In the past few days, patient has had persistent KRAUS with mild mostly non-productive cough. Denies cp, leg swelling or inability to take home water pills or BP meds. Unable to answer if she gained weight recently.  Endorses feeling cold during last few days, but denies fevers. Recently came back from trip to [facility]  (5 days ago) but denies sick contacts. CXR suggestive of right-sided multifocal PNA, but pt afebrile, not coughing during my exam, breathing/saturating well on RA. pBNP 28K, trop x 2 unlikely ACS. Given furosmide 40 IV in ED with some improvement     Allergies    No Known Allergies    Intolerances    MEDICATIONS:  amLODIPine   Tablet 10 milliGRAM(s) Oral daily  apixaban 2.5 milliGRAM(s) Oral every 12 hours  aspirin enteric coated 81 milliGRAM(s) Oral daily  furosemide   Injectable 40 milliGRAM(s) IV Push every 12 hours  hydrALAZINE 25 milliGRAM(s) Oral two times a day  isosorbide   mononitrate ER Tablet (IMDUR) 60 milliGRAM(s) Oral daily  losartan 50 milliGRAM(s) Oral daily  metoprolol succinate  milliGRAM(s) Oral daily  levoFLOXacin IVPB 250 milliGRAM(s) IV Intermittent at bedtime  atorvastatin 40 milliGRAM(s) Oral at bedtime  dextrose 40% Gel 15 Gram(s) Oral once PRN  dextrose 50% Injectable 12.5 Gram(s) IV Push once  dextrose 50% Injectable 25 Gram(s) IV Push once  dextrose 50% Injectable 25 Gram(s) IV Push once  glucagon  Injectable 1 milliGRAM(s) IntraMuscular once PRN  insulin lispro (HumaLOG) corrective regimen sliding scale   SubCutaneous Before meals and at bedtime  dextrose 5%. 1000 milliLiter(s) IV Continuous <Continuous>  sodium chloride 0.9% lock flush 3 milliLiter(s) IV Push every 8 hours    PAST MEDICAL & SURGICAL HISTORY:  Hyperlipidemia  Uterine Cancer (ICD9 179): treated with hysterectomy and radiation therapy in 2008  Osteoarthritis of Knee (ICD9 715.96): b/l knees  DM Type 2 (Diabetes Mellitus, Type 2) (ICD9 250.00)  HTN (Hypertension) (ICD9 401.9)  Status Post Total Knee Replacement: LISA, 6 months apart 2010  Cardiac Pacemaker (ICD9 V45.01): [employer] (2003 - for tacybrady syndrome @ [facility])  History of Hysterectomy (ICD9 V88.01)      FAMILY HISTORY:  No pertinent family history in first degree relatives      SOCIAL HISTORY:    [ ] Non-smoker  [ ] Smoker  [ ] Alcohol      REVIEW OF SYSTEMS:  See HPI. Otherwise, 10 point ROS done and otherwise negative.    PHYSICAL EXAM:  T(C): 36.9 (06-19-18 @ 13:29), Max: 36.9 (06-19-18 @ 13:29)  HR: 60 (06-19-18 @ 13:29) (60 - 63)  BP: 156/65 (06-19-18 @ 13:29) (144/61 - 167/54)  RR: 18 (06-19-18 @ 13:29) (16 - 18)  SpO2: 94% (06-19-18 @ 13:29) (94% - 100%)  Wt(kg): --  I&O's Summary    18 Jun 2018 07:01  -  19 Jun 2018 07:00  --------------------------------------------------------  IN: 0 mL / OUT: 260 mL / NET: -260 mL    19 Jun 2018 07:01  -  19 Jun 2018 15:12  --------------------------------------------------------  IN: 0 mL / OUT: 500 mL / NET: -500 mL        Appearance: Normal	  HEENT:   Normal oral mucosa, PERRL, EOMI	  Lymphatic: No lymphadenopathy  Cardiovascular: Normal S1 S2, No JVD, No murmurs, No edema  Respiratory: Lungs clear to auscultation	  Psychiatry: A & O x 3, Mood & affect appropriate  Gastrointestinal:  Soft, Non-tender, + BS	  Skin: No rashes, No ecchymoses, No cyanosis	  Neurologic: Non-focal  Extremities: Normal range of motion, No clubbing, cyanosis or edema  Vascular: Peripheral pulses palpable 2+ bilaterally        LABS:	 	    CBC Full  -  ( 19 Jun 2018 06:30 )  WBC Count : 13.45 K/uL  Hemoglobin : 8.5 g/dL  Hematocrit : 26.1 %  Platelet Count - Automated : 288 K/uL  Mean Cell Volume : 95.6 fL  Mean Cell Hemoglobin : 31.1 pg  Mean Cell Hemoglobin Concentration : 32.6 %  Auto Neutrophil # : 10.69 K/uL  Auto Lymphocyte # : 1.43 K/uL  Auto Monocyte # : 1.19 K/uL  Auto Eosinophil # : 0.01 K/uL  Auto Basophil # : 0.03 K/uL  Auto Neutrophil % : 79.6 %  Auto Lymphocyte % : 10.6 %  Auto Monocyte % : 8.8 %  Auto Eosinophil % : 0.1 %  Auto Basophil % : 0.2 %    06-19    135  |  98  |  49<H>  ----------------------------<  168<H>  4.3   |  22  |  1.39<H>  06-18    134<L>  |  97<L>  |  49<H>  ----------------------------<  279<H>  4.7   |  19<L>  |  1.41<H>    Ca    10.5      19 Jun 2018 06:30  Ca    10.0      18 Jun 2018 19:35  Phos  2.4     06-18  Mg     1.7     06-18    TPro  7.7  /  Alb  3.1<L>  /  TBili  1.0  /  DBili  0.4<H>  /  AST  22  /  ALT  31  /  AlkPhos  199<H>  06-19  TPro  7.4  /  Alb  3.4  /  TBili  0.9  /  DBili  0.4<H>  /  AST  30  /  ALT  36<H>  /  AlkPhos  224<H>  06-18      proBNP: 28, 000 (4,000 in Oct 2017)  Lipid Profile:   HgA1c: Hemoglobin A1C, Whole Blood: 8.5 % (06-19 @ 06:30)    TSH: Thyroid Stimulating Hormone, Serum: 1.34 uIU/mL (06-19 @ 06:30)  Thyroid Stimulating Hormone, Serum: 1.58 uIU/mL (06-18 @ 19:35)    CARDIAC MARKERS:    TELEMETRY: 	    ECG:  	  RADIOLOGY:  IMPRESSION:  Multifocal right-sided opacities consistent with pneumonia   that should be followed to resolution.    OTHER: 	    PREVIOUS DIAGNOSTIC TESTING:    [ ] Echocardiogram:  CONCLUSIONS:  1. Mitral annular calcification and calcified mitral  leaflets with decreased diastolic opening. Moderate-severe  mitral regurgitation. Mean transmitral valve gradient  equals 3 mm Hg, consistent with mild mitral stenosis.  2. Aortic valve not well visualized; appears calcified.  Peak transaortic valve gradient equals 28 mm Hg, mean  transaortic valve gradient equals 13 mm Hg, consistent with  mild aortic stenosis. Moderate aortic regurgitation.  3. Mild to moderate segmental left ventricular systolic  dysfunction. The apex, distal inferior, mid to distal  septal walls are hypokinetic.  4. Normal right ventricular size and function. Device wire  is noted in the right heart.  [ ]  Catheterization:  [ ] Stress Test:  	    IMPRESSION:     1.  Bilateral opacities, septal thickening and pleural effusions are more   suggestive of pulmonary edema rather than pneumonia.    2.  Enlarged main pulmonary artery, which can be seen in setting of   pulmonary artery hypertension.      IMPRESSIONS:Normal Study  * Myocardial Perfusion SPECT results are abnormal.  * Review of raw data shows: The study is of good technical  quality.  * The left ventricle was normal in size. There are medium  sized, severe defects in anteroseptal, apical walls that  are fixed, suggestive of infarct.  * Post-stress gated wall motion analysis was performed  (LVEF = 42 %;LVEDV = 103 ml.), revealing moderate overall  hypokinesis. There was apical and anteroseptal akinesis.  The remaining segments contracted well.  *** Compared with Nuclear/Stress test of 4/17/2016, the  current findings are new, suggesting interval infarction  ADDENDUM 10/26/2017: previous interpretation was based on  incorrect data from prior study      	  ASSESSMENT/PLAN:

## 2018-06-20 LAB
B PERT DNA SPEC QL NAA+PROBE: SIGNIFICANT CHANGE UP
BUN SERPL-MCNC: 51 MG/DL — HIGH (ref 7–23)
C PNEUM DNA SPEC QL NAA+PROBE: NOT DETECTED — SIGNIFICANT CHANGE UP
CALCIUM SERPL-MCNC: 10.4 MG/DL — SIGNIFICANT CHANGE UP (ref 8.4–10.5)
CHLORIDE SERPL-SCNC: 99 MMOL/L — SIGNIFICANT CHANGE UP (ref 98–107)
CO2 SERPL-SCNC: 24 MMOL/L — SIGNIFICANT CHANGE UP (ref 22–31)
CREAT SERPL-MCNC: 1.4 MG/DL — HIGH (ref 0.5–1.3)
FLUAV H1 2009 PAND RNA SPEC QL NAA+PROBE: NOT DETECTED — SIGNIFICANT CHANGE UP
FLUAV H1 RNA SPEC QL NAA+PROBE: NOT DETECTED — SIGNIFICANT CHANGE UP
FLUAV H3 RNA SPEC QL NAA+PROBE: NOT DETECTED — SIGNIFICANT CHANGE UP
FLUAV SUBTYP SPEC NAA+PROBE: SIGNIFICANT CHANGE UP
FLUBV RNA SPEC QL NAA+PROBE: NOT DETECTED — SIGNIFICANT CHANGE UP
GLUCOSE SERPL-MCNC: 124 MG/DL — HIGH (ref 70–99)
HADV DNA SPEC QL NAA+PROBE: NOT DETECTED — SIGNIFICANT CHANGE UP
HCOV 229E RNA SPEC QL NAA+PROBE: NOT DETECTED — SIGNIFICANT CHANGE UP
HCOV HKU1 RNA SPEC QL NAA+PROBE: NOT DETECTED — SIGNIFICANT CHANGE UP
HCOV NL63 RNA SPEC QL NAA+PROBE: NOT DETECTED — SIGNIFICANT CHANGE UP
HCOV OC43 RNA SPEC QL NAA+PROBE: NOT DETECTED — SIGNIFICANT CHANGE UP
HCT VFR BLD CALC: 30.4 % — LOW (ref 34.5–45)
HGB BLD-MCNC: 9.6 G/DL — LOW (ref 11.5–15.5)
HMPV RNA SPEC QL NAA+PROBE: NOT DETECTED — SIGNIFICANT CHANGE UP
HPIV1 RNA SPEC QL NAA+PROBE: NOT DETECTED — SIGNIFICANT CHANGE UP
HPIV2 RNA SPEC QL NAA+PROBE: NOT DETECTED — SIGNIFICANT CHANGE UP
HPIV3 RNA SPEC QL NAA+PROBE: NOT DETECTED — SIGNIFICANT CHANGE UP
HPIV4 RNA SPEC QL NAA+PROBE: NOT DETECTED — SIGNIFICANT CHANGE UP
M PNEUMO DNA SPEC QL NAA+PROBE: NOT DETECTED — SIGNIFICANT CHANGE UP
MCHC RBC-ENTMCNC: 30.4 PG — SIGNIFICANT CHANGE UP (ref 27–34)
MCHC RBC-ENTMCNC: 31.6 % — LOW (ref 32–36)
MCV RBC AUTO: 96.2 FL — SIGNIFICANT CHANGE UP (ref 80–100)
NRBC # FLD: 0 — SIGNIFICANT CHANGE UP
NT-PROBNP SERPL-SCNC: SIGNIFICANT CHANGE UP PG/ML
PLATELET # BLD AUTO: 322 K/UL — SIGNIFICANT CHANGE UP (ref 150–400)
PMV BLD: 10.1 FL — SIGNIFICANT CHANGE UP (ref 7–13)
POTASSIUM SERPL-MCNC: 3.9 MMOL/L — SIGNIFICANT CHANGE UP (ref 3.5–5.3)
POTASSIUM SERPL-SCNC: 3.9 MMOL/L — SIGNIFICANT CHANGE UP (ref 3.5–5.3)
PROCALCITONIN SERPL-MCNC: 0.34 NG/ML — HIGH (ref 0.02–0.1)
RBC # BLD: 3.16 M/UL — LOW (ref 3.8–5.2)
RBC # FLD: 16.2 % — HIGH (ref 10.3–14.5)
RETICS #: 124 K/UL — SIGNIFICANT CHANGE UP (ref 25–125)
RETICS/RBC NFR: 3.9 % — HIGH (ref 0.5–2.5)
RSV RNA SPEC QL NAA+PROBE: NOT DETECTED — SIGNIFICANT CHANGE UP
RV+EV RNA SPEC QL NAA+PROBE: POSITIVE — HIGH
SODIUM SERPL-SCNC: 138 MMOL/L — SIGNIFICANT CHANGE UP (ref 135–145)
WBC # BLD: 10.89 K/UL — HIGH (ref 3.8–10.5)
WBC # FLD AUTO: 10.89 K/UL — HIGH (ref 3.8–10.5)

## 2018-06-20 PROCEDURE — 99233 SBSQ HOSP IP/OBS HIGH 50: CPT

## 2018-06-20 PROCEDURE — 99232 SBSQ HOSP IP/OBS MODERATE 35: CPT

## 2018-06-20 RX ORDER — BENZOCAINE AND MENTHOL 5; 1 G/100ML; G/100ML
1 LIQUID ORAL
Qty: 0 | Refills: 0 | Status: DISCONTINUED | OUTPATIENT
Start: 2018-06-20 | End: 2018-06-21

## 2018-06-20 RX ADMIN — Medication 81 MILLIGRAM(S): at 12:22

## 2018-06-20 RX ADMIN — Medication 80 MILLIGRAM(S): at 17:43

## 2018-06-20 RX ADMIN — AMLODIPINE BESYLATE 10 MILLIGRAM(S): 2.5 TABLET ORAL at 12:22

## 2018-06-20 RX ADMIN — APIXABAN 2.5 MILLIGRAM(S): 2.5 TABLET, FILM COATED ORAL at 17:43

## 2018-06-20 RX ADMIN — Medication 4: at 17:42

## 2018-06-20 RX ADMIN — BENZOCAINE AND MENTHOL 1 LOZENGE: 5; 1 LIQUID ORAL at 19:53

## 2018-06-20 RX ADMIN — LOSARTAN POTASSIUM 100 MILLIGRAM(S): 100 TABLET, FILM COATED ORAL at 05:20

## 2018-06-20 RX ADMIN — Medication 80 MILLIGRAM(S): at 05:21

## 2018-06-20 RX ADMIN — Medication 4: at 21:55

## 2018-06-20 RX ADMIN — ISOSORBIDE MONONITRATE 60 MILLIGRAM(S): 60 TABLET, EXTENDED RELEASE ORAL at 12:23

## 2018-06-20 RX ADMIN — Medication 200 MILLIGRAM(S): at 12:22

## 2018-06-20 RX ADMIN — SODIUM CHLORIDE 3 MILLILITER(S): 9 INJECTION INTRAMUSCULAR; INTRAVENOUS; SUBCUTANEOUS at 12:23

## 2018-06-20 RX ADMIN — Medication 4: at 12:20

## 2018-06-20 RX ADMIN — SODIUM CHLORIDE 3 MILLILITER(S): 9 INJECTION INTRAMUSCULAR; INTRAVENOUS; SUBCUTANEOUS at 21:56

## 2018-06-20 RX ADMIN — ATORVASTATIN CALCIUM 40 MILLIGRAM(S): 80 TABLET, FILM COATED ORAL at 21:55

## 2018-06-20 RX ADMIN — SODIUM CHLORIDE 3 MILLILITER(S): 9 INJECTION INTRAMUSCULAR; INTRAVENOUS; SUBCUTANEOUS at 05:20

## 2018-06-20 RX ADMIN — APIXABAN 2.5 MILLIGRAM(S): 2.5 TABLET, FILM COATED ORAL at 05:21

## 2018-06-21 ENCOUNTER — TRANSCRIPTION ENCOUNTER (OUTPATIENT)
Age: 83
End: 2018-06-21

## 2018-06-21 VITALS
SYSTOLIC BLOOD PRESSURE: 123 MMHG | OXYGEN SATURATION: 98 % | RESPIRATION RATE: 18 BRPM | HEART RATE: 63 BPM | DIASTOLIC BLOOD PRESSURE: 72 MMHG

## 2018-06-21 LAB
BUN SERPL-MCNC: 60 MG/DL — HIGH (ref 7–23)
CALCIUM SERPL-MCNC: 10.7 MG/DL — HIGH (ref 8.4–10.5)
CHLORIDE SERPL-SCNC: 98 MMOL/L — SIGNIFICANT CHANGE UP (ref 98–107)
CO2 SERPL-SCNC: 24 MMOL/L — SIGNIFICANT CHANGE UP (ref 22–31)
CREAT SERPL-MCNC: 1.43 MG/DL — HIGH (ref 0.5–1.3)
GLUCOSE SERPL-MCNC: 180 MG/DL — HIGH (ref 70–99)
HCT VFR BLD CALC: 30.4 % — LOW (ref 34.5–45)
HGB BLD-MCNC: 9.7 G/DL — LOW (ref 11.5–15.5)
L PNEUMO AG UR QL: NEGATIVE — SIGNIFICANT CHANGE UP
MCHC RBC-ENTMCNC: 30.8 PG — SIGNIFICANT CHANGE UP (ref 27–34)
MCHC RBC-ENTMCNC: 31.9 % — LOW (ref 32–36)
MCV RBC AUTO: 96.5 FL — SIGNIFICANT CHANGE UP (ref 80–100)
NRBC # FLD: 0 — SIGNIFICANT CHANGE UP
PLATELET # BLD AUTO: 357 K/UL — SIGNIFICANT CHANGE UP (ref 150–400)
PMV BLD: 10 FL — SIGNIFICANT CHANGE UP (ref 7–13)
POTASSIUM SERPL-MCNC: 4.5 MMOL/L — SIGNIFICANT CHANGE UP (ref 3.5–5.3)
POTASSIUM SERPL-SCNC: 4.5 MMOL/L — SIGNIFICANT CHANGE UP (ref 3.5–5.3)
RBC # BLD: 3.15 M/UL — LOW (ref 3.8–5.2)
RBC # FLD: 16.1 % — HIGH (ref 10.3–14.5)
SODIUM SERPL-SCNC: 139 MMOL/L — SIGNIFICANT CHANGE UP (ref 135–145)
WBC # BLD: 14.29 K/UL — HIGH (ref 3.8–10.5)
WBC # FLD AUTO: 14.29 K/UL — HIGH (ref 3.8–10.5)

## 2018-06-21 PROCEDURE — 99239 HOSP IP/OBS DSCHRG MGMT >30: CPT

## 2018-06-21 RX ORDER — LOSARTAN POTASSIUM 100 MG/1
1 TABLET, FILM COATED ORAL
Qty: 30 | Refills: 0 | OUTPATIENT
Start: 2018-06-21 | End: 2018-07-20

## 2018-06-21 RX ORDER — LOSARTAN POTASSIUM 100 MG/1
1 TABLET, FILM COATED ORAL
Qty: 0 | Refills: 0 | COMMUNITY

## 2018-06-21 RX ORDER — HYDRALAZINE HCL 50 MG
25 TABLET ORAL THREE TIMES A DAY
Qty: 0 | Refills: 0 | Status: DISCONTINUED | OUTPATIENT
Start: 2018-06-21 | End: 2018-06-21

## 2018-06-21 RX ORDER — HYDRALAZINE HCL 50 MG
1 TABLET ORAL
Qty: 0 | Refills: 0 | COMMUNITY

## 2018-06-21 RX ORDER — SPIRONOLACTONE 25 MG/1
0.5 TABLET, FILM COATED ORAL
Qty: 0 | Refills: 0 | COMMUNITY

## 2018-06-21 RX ADMIN — ISOSORBIDE MONONITRATE 60 MILLIGRAM(S): 60 TABLET, EXTENDED RELEASE ORAL at 11:53

## 2018-06-21 RX ADMIN — Medication 25 MILLIGRAM(S): at 05:03

## 2018-06-21 RX ADMIN — Medication 200 MILLIGRAM(S): at 12:50

## 2018-06-21 RX ADMIN — APIXABAN 2.5 MILLIGRAM(S): 2.5 TABLET, FILM COATED ORAL at 05:01

## 2018-06-21 RX ADMIN — LOSARTAN POTASSIUM 100 MILLIGRAM(S): 100 TABLET, FILM COATED ORAL at 11:53

## 2018-06-21 RX ADMIN — SODIUM CHLORIDE 3 MILLILITER(S): 9 INJECTION INTRAMUSCULAR; INTRAVENOUS; SUBCUTANEOUS at 05:00

## 2018-06-21 RX ADMIN — Medication 40 MILLIGRAM(S): at 05:01

## 2018-06-21 RX ADMIN — SODIUM CHLORIDE 3 MILLILITER(S): 9 INJECTION INTRAMUSCULAR; INTRAVENOUS; SUBCUTANEOUS at 11:50

## 2018-06-21 RX ADMIN — BENZOCAINE AND MENTHOL 1 LOZENGE: 5; 1 LIQUID ORAL at 11:54

## 2018-06-21 RX ADMIN — Medication 8: at 12:48

## 2018-06-21 RX ADMIN — Medication 81 MILLIGRAM(S): at 11:53

## 2018-06-21 RX ADMIN — AMLODIPINE BESYLATE 10 MILLIGRAM(S): 2.5 TABLET ORAL at 05:01

## 2018-06-21 NOTE — DISCHARGE NOTE ADULT - PROVIDER TOKENS
FREE:[LAST:[[facility]],PHONE:[phone]],FAX:[(   [phone]    -],ADDRESS:[[phone] [place] [address]]]

## 2018-06-21 NOTE — DISCHARGE NOTE ADULT - HOSPITAL COURSE
84 yo f with h/o CHF with mild to mod LV systolic dysfunction per 10/2017 TTE,   tachybrady syndrome s/p PPM, CAD per stress  test performed 10/2017 suggestive of prior infarcts. Pt also with h/o Afib recently placed back on AC (eliquis; had been off of AC 2/2 GIB while on coumadin) h/o uterine ca s/p hysterectomy and RT, DM2.   Pt reports several days of persistent KRAUS with mild mostly non-productive cough.    Patient was started on IV duresis with Lasix 40 mg BID which was then increased to 80 mg BID. Heart failure medications optimized. When euvolemic , IV Lasix changed to PO Torsemide.   CT chest obtained which showed Bilateral opacities, septal thickening and pleural effusions suggestive of pulmonary edema.   Levaquin started prophylactically for possible multifocal pneumonia. Pt also tested positive for rhinovirus. Pt to complete a 5 day course of Levaquin.     Patient is symptomatically feeling better and is now ready for discharge home. A follow up appointment with the cardiology clinic has been scheduled. 82 yo f with h/o CHF with mild to mod LV systolic dysfunction per 10/2017 TTE,   tachybrady syndrome s/p PPM, CAD per stress  test performed 10/2017 suggestive of prior infarcts. Pt also with h/o Afib recently placed back on AC (eliquis; had been off of AC 2/2 GIB while on coumadin) h/o uterine ca s/p hysterectomy and RT, DM2.   Pt reports several days of persistent KRAUS with mild mostly non-productive cough.    Patient was started on IV diuresis with Lasix 40 mg BID which was then increased to 80 mg BID. Heart failure medications optimized. When euvolemic , IV Lasix changed to PO Torsemide 40 mg BID.  CT chest obtained which showed Bilateral opacities, septal thickening and pleural effusions suggestive of pulmonary edema.  Levaquin given for possible multifocal pneumonia in context of positive for rhinovirus and question of superimposed bacterial PNA. Pt to complete a 5 day course of Levaquin.     Patient is symptomatically feeling better (no longer dyspneic, RA sats on ambulation 98%) and is now ready for discharge home. A follow up appointment with the cardiology clinic has been scheduled.     Of note patient reported poor compliance with meds stating there are "too many pills".  She was taking all meds once per day even if were TID or BID (ie eliquis and hydralazine).  Patient med regimen simplified to help with compliance.  Patient advised of need to take medication as prescribed.  She will have her son get her a pill box.

## 2018-06-21 NOTE — DISCHARGE NOTE ADULT - CARE PLAN
Principal Discharge DX:	Acute on chronic congestive heart failure, unspecified heart failure type  Goal:	To relieve and prevent worsening symptoms associated with congestive heart failure, to improve quality of life, and to treat underlying conditions such as coronary heart disease, high blood pressure, or diabetes, and to maintain euvolemia.  Assessment and plan of treatment:	It is very important for you to take your medications exactly as they are prescribed to you. Abide by a Low salt diet, fluid restriction to 1500 ml daily, monitor your fluid intake and weight daily, exercise as tolerated 30 minutes daily. You have a follow up appointment with the Dr. Zaidi at the cardiology clinic on Thursday 6/28/2018.  Secondary Diagnosis:	Rhinovirus  Assessment and plan of treatment:	You were found to have the common cold in the hospital with a possible pneumonia and you were therefore started on Levaquin. You have 2 doses left, tonight and tomorrow night. You will need a repeat chest CT in 4-6 weeks to monitor for resolution of the pulmonary edema seen on initial CT.  Secondary Diagnosis:	Anemia, unspecified type  Assessment and plan of treatment:	You will need further workup for your anemia. You can schedule an appointment with a gastroenterologist for further workup and management.

## 2018-06-21 NOTE — PROGRESS NOTE ADULT - PROBLEM SELECTOR PLAN 2
Coarse BS w/ wheeze and elevated WBC, CT read as multifocal PNA; RVP now + for rhinovirus  -c/w Levaquin, D4/5 today
Coarse BS w/ wheeze and elevated WBC, CT read as multifocal PNA  -check procal  -c/w levaquin  -check RVP (recent travel in airport from [facility] last Wednesday 6/13)
Coarse BS w/ wheeze and elevated WBC, CT read as multifocal PNA; RVP now + for rhinovirus   -f/u procal  -c/w Levaquin, D3/5 today, change to PO from IV

## 2018-06-21 NOTE — DISCHARGE NOTE ADULT - CARE PROVIDER_API CALL
[facility],   [id number] [place], [place]  Phone: [phone]  Fax: (   )    -

## 2018-06-21 NOTE — PROGRESS NOTE ADULT - PROBLEM SELECTOR PLAN 5
-c/w losartan  -c/w metoprolol   -c/w amlodipine
-c/w losartan  -c/w metoprolol   -c/w amlodipine
-c/w losartan, increase  -c/w metoprolol   -c/w amlodipine

## 2018-06-21 NOTE — PROGRESS NOTE ADULT - ASSESSMENT
82 yo F with systolic HF,  tachybrady syndrome s/p PPM, CKD3 presenting with acute systolic heart exacerbation and multifocal PNA 2/2 rhinovirus.
82 yo F with systolic HF,  tachybrady syndrome s/p PPM, CKD3 presenting with acute systolic heart exacerbation and multifocal PNA 2/2 rhinovirus.
82 yo f with h/o HFrEF with mild to mod LV systolic dysfunction per 10/2017 TTE, tachybrady syndrome s/p PPM, CAD per stress  test performed 10/2017 suggestive of prior infarcts presenting with decompensated heart failure. NYHA Class 2.   -c/w furosemide 80 mg BID IV for today, transition to torsemide 40 mg PO BID tomorrow   -c/w metoprolol 200 mg XL  -c/w losartan 50 mg daily  -increase hydralazine to 25 mg TID (from BID)  -c/w isorbide mononitrate 60 mg daily    Please call on call cardiology team at [phone] with any further questions.
84 yo F with acute systolic HF,  tachybrady syndrome s/p PPM, CKD3 presenting with acute systolic heart exacerbation and PNA.

## 2018-06-21 NOTE — DISCHARGE NOTE ADULT - MEDICATION SUMMARY - MEDICATIONS TO STOP TAKING
I will STOP taking the medications listed below when I get home from the hospital:    furosemide 40 mg oral tablet  -- 1 tab(s) by mouth 2 times a day    hydrALAZINE 25 mg oral tablet  -- 1 tab(s) by mouth 3 times a day    spironolactone 25 mg oral tablet  -- 0.5 tab(s) by mouth once a day

## 2018-06-21 NOTE — PROGRESS NOTE ADULT - PROBLEM SELECTOR PLAN 4
Patient denies bleeding; normocytic, no hemolysis, Folate, B12, iron wnl  -check FOBT, monitor stool for melena (patient denies dark stool or BRBPR); having brown stool  -trend H/H  -OP f/u for anemia
Patient denies bleeding; normocytic, no hemolysis, Folate, B12, iron wnl  -check FOBT, monitor stool for melena  -trend H/H
Patient denies bleeding; normocytic, no hemolysis, Folate, B12, iron wnl  -check FOBT, monitor stool for melena (patient denies dark stool or BRBPR)  -trend H/H

## 2018-06-21 NOTE — PROGRESS NOTE ADULT - PROBLEM SELECTOR PROBLEM 2
Pneumonia of right lung due to infectious organism, unspecified part of lung

## 2018-06-21 NOTE — PROGRESS NOTE ADULT - SUBJECTIVE AND OBJECTIVE BOX
24H hour events: No acute events overnight. Improving with diuresis.     MEDICATIONS:  amLODIPine   Tablet 10 milliGRAM(s) Oral daily  apixaban 2.5 milliGRAM(s) Oral every 12 hours  aspirin enteric coated 81 milliGRAM(s) Oral daily  furosemide   Injectable 80 milliGRAM(s) IV Push two times a day  isosorbide   mononitrate ER Tablet (IMDUR) 60 milliGRAM(s) Oral daily  losartan 100 milliGRAM(s) Oral daily  metoprolol succinate  milliGRAM(s) Oral daily  levoFLOXacin IVPB 250 milliGRAM(s) IV Intermittent at bedtime  atorvastatin 40 milliGRAM(s) Oral at bedtime  dextrose 40% Gel 15 Gram(s) Oral once PRN  dextrose 50% Injectable 12.5 Gram(s) IV Push once  dextrose 50% Injectable 25 Gram(s) IV Push once  dextrose 50% Injectable 25 Gram(s) IV Push once  glucagon  Injectable 1 milliGRAM(s) IntraMuscular once PRN  insulin lispro (HumaLOG) corrective regimen sliding scale   SubCutaneous Before meals and at bedtime  dextrose 5%. 1000 milliLiter(s) IV Continuous <Continuous>  sodium chloride 0.9% lock flush 3 milliLiter(s) IV Push every 8 hours    REVIEW OF SYSTEMS:  Complete 10point ROS negative except as documented above.    PHYSICAL EXAM:  T(C): 36.9 (06-20-18 @ 04:58), Max: 36.9 (06-19-18 @ 13:29)  HR: 60 (06-20-18 @ 04:58) (60 - 63)  BP: 134/51 (06-20-18 @ 04:58) (134/51 - 156/65)  RR: 16 (06-20-18 @ 04:58) (16 - 18)  SpO2: 96% (06-20-18 @ 04:58) (94% - 100%)  Wt(kg): --  I&O's Summary    19 Jun 2018 07:01  -  20 Jun 2018 07:00  --------------------------------------------------------  IN: 250 mL / OUT: 1800 mL / NET: -1550 mL    20 Jun 2018 07:01  -  20 Jun 2018 08:53  --------------------------------------------------------  IN: 0 mL / OUT: 150 mL / NET: -150 mL    Appearance: Normal	  HEENT:   Normal oral mucosa, PERRL, EOMI	  Lymphatic: No lymphadenopathy  Cardiovascular: Normal S1 S2, No JVD, No murmurs, No edema  Respiratory: Lungs clear to auscultation	  Psychiatry: A & O x 3, Mood & affect appropriate  Gastrointestinal:  Soft, Non-tender, + BS	  Skin: No rashes, No ecchymoses, No cyanosis	  Neurologic: Non-focal  Extremities: Normal range of motion, No clubbing, cyanosis or edema  Vascular: Peripheral pulses palpable 2+ bilaterally    LABS:	 	    CBC Full  -  ( 20 Jun 2018 07:10 )  WBC Count : 10.89 K/uL  Hemoglobin : 9.6 g/dL  Hematocrit : 30.4 %  Platelet Count - Automated : 322 K/uL  Mean Cell Volume : 96.2 fL  Mean Cell Hemoglobin : 30.4 pg  Mean Cell Hemoglobin Concentration : 31.6 %  Auto Neutrophil # : x  Auto Lymphocyte # : x  Auto Monocyte # : x  Auto Eosinophil # : x  Auto Basophil # : x  Auto Neutrophil % : x  Auto Lymphocyte % : x  Auto Monocyte % : x  Auto Eosinophil % : x  Auto Basophil % : x    06-20    138  |  99  |  51<H>  ----------------------------<  124<H>  3.9   |  24  |  1.40<H>  06-19    135  |  98  |  49<H>  ----------------------------<  168<H>  4.3   |  22  |  1.39<H>    Ca    10.4      20 Jun 2018 07:10  Ca    10.5      19 Jun 2018 06:30  Phos  2.4     06-18  Mg     1.7     06-18    TPro  7.7  /  Alb  3.1<L>  /  TBili  1.0  /  DBili  0.4<H>  /  AST  22  /  ALT  31  /  AlkPhos  199<H>  06-19  TPro  7.4  /  Alb  3.4  /  TBili  0.9  /  DBili  0.4<H>  /  AST  30  /  ALT  36<H>  /  AlkPhos  224<H>  06-18      proBNP: Serum Pro-Brain Natriuretic Peptide: 79347 pg/mL (06-20 @ 07:10)  Serum Pro-Brain Natriuretic Peptide: 14654 pg/mL (06-18 @ 10:20)    TELEMETRY: a paced	      PREVIOUS DIAGNOSTIC TESTING:    [ ] Echocardiogram:  [ ]  Catheterization:  [ ] Stress Test:  	  	  ASSESSMENT/PLAN:
Patient is a 83y old  Female who presents with a chief complaint of sob    SUBJECTIVE / OVERNIGHT EVENTS:    Feeling better, able to walk with more ease/less SOB with PT today  No CP   Coughing, no known sick contacts, recently in airport     MEDICATIONS  (STANDING):  amLODIPine   Tablet 10 milliGRAM(s) Oral daily  apixaban 2.5 milliGRAM(s) Oral every 12 hours  aspirin enteric coated 81 milliGRAM(s) Oral daily  atorvastatin 40 milliGRAM(s) Oral at bedtime  dextrose 5%. 1000 milliLiter(s) (50 mL/Hr) IV Continuous <Continuous>  dextrose 50% Injectable 12.5 Gram(s) IV Push once  dextrose 50% Injectable 25 Gram(s) IV Push once  dextrose 50% Injectable 25 Gram(s) IV Push once  furosemide   Injectable 80 milliGRAM(s) IV Push two times a day  insulin lispro (HumaLOG) corrective regimen sliding scale   SubCutaneous Before meals and at bedtime  isosorbide   mononitrate ER Tablet (IMDUR) 60 milliGRAM(s) Oral daily  levoFLOXacin IVPB 250 milliGRAM(s) IV Intermittent at bedtime  losartan 100 milliGRAM(s) Oral daily  metoprolol succinate  milliGRAM(s) Oral daily  sodium chloride 0.9% lock flush 3 milliLiter(s) IV Push every 8 hours    MEDICATIONS  (PRN):  dextrose 40% Gel 15 Gram(s) Oral once PRN Blood Glucose LESS THAN 70 milliGRAM(s)/deciliter  glucagon  Injectable 1 milliGRAM(s) IntraMuscular once PRN Glucose LESS THAN 70 milligrams/deciliter    T(C): 36.9 (06-19-18 @ 13:29), Max: 36.9 (06-19-18 @ 13:29)  HR: 61 (06-19-18 @ 17:08) (60 - 63)  BP: 155/60 (06-19-18 @ 17:08) (144/61 - 167/54)  RR: 18 (06-19-18 @ 13:29) (16 - 18)  SpO2: 94% (06-19-18 @ 13:29) (94% - 100%)    CAPILLARY BLOOD GLUCOSE  POCT Blood Glucose.: 237 mg/dL (19 Jun 2018 17:08)  POCT Blood Glucose.: 265 mg/dL (19 Jun 2018 11:52)  POCT Blood Glucose.: 186 mg/dL (19 Jun 2018 07:54)  POCT Blood Glucose.: 322 mg/dL (18 Jun 2018 22:57)  POCT Blood Glucose.: 287 mg/dL (18 Jun 2018 18:12)    I&O's Summary    18 Jun 2018 07:01  -  19 Jun 2018 07:00  --------------------------------------------------------  IN: 0 mL / OUT: 260 mL / NET: -260 mL    19 Jun 2018 07:01  -  19 Jun 2018 17:38  --------------------------------------------------------  IN: 0 mL / OUT: 700 mL / NET: -700 mL    PHYSICAL EXAM:  GENERAL: NAD  HEAD:  Atraumatic, Normocephalic  EYES: EOMI, PERRLA, conjunctiva and sclera clear  NECK: Supple, No JVD  CHEST/LUNG: coarse BS, basilar crackles, exp wheeze   HEART: Regular rate and rhythm; No murmurs, rubs, or gallops  ABDOMEN: Soft, Nontender, Nondistended; Bowel sounds present  EXTREMITIES:   warm and well perfused, No clubbing, cyanosis, or edema  PSYCH: AAOx3  NEUROLOGY: non-focal  SKIN: No rashes or lesions    LABS:                        8.5    13.45 )-----------( 288      ( 19 Jun 2018 06:30 )             26.1     06-19    135  |  98  |  49<H>  ----------------------------<  168<H>  4.3   |  22  |  1.39<H>    Ca    10.5      19 Jun 2018 06:30  Phos  2.4     06-18  Mg     1.7     06-18    TPro  7.7  /  Alb  3.1<L>  /  TBili  1.0  /  DBili  0.4<H>  /  AST  22  /  ALT  31  /  AlkPhos  199<H>  06-19    PT/INR - ( 19 Jun 2018 06:30 )   PT: 17.1 SEC;   INR: 1.53     PTT - ( 18 Jun 2018 19:35 )  PTT:35.8 SEC    Consultant(s) Notes Reviewed:  cards    Care Discussed with Consultants/Other Providers: cards
Patient is a 83y old  Female who presents with a chief complaint of sob    SUBJECTIVE / OVERNIGHT EVENTS:    Patient states she is feeling much better and would like to leave soon  able to ambulate better, no CP, SOB improved  No LE swelling, tolerating diet     MEDICATIONS  (STANDING):  amLODIPine   Tablet 10 milliGRAM(s) Oral daily  apixaban 2.5 milliGRAM(s) Oral every 12 hours  aspirin enteric coated 81 milliGRAM(s) Oral daily  atorvastatin 40 milliGRAM(s) Oral at bedtime  furosemide   Injectable 80 milliGRAM(s) IV Push two times a day  insulin lispro (HumaLOG) corrective regimen sliding scale   SubCutaneous Before meals and at bedtime  isosorbide   mononitrate ER Tablet (IMDUR) 60 milliGRAM(s) Oral daily  levoFLOXacin  Tablet 250 milliGRAM(s) Oral <User Schedule>  losartan 100 milliGRAM(s) Oral daily  metoprolol succinate  milliGRAM(s) Oral daily    MEDICATIONS  (PRN):  dextrose 40% Gel 15 Gram(s) Oral once PRN Blood Glucose LESS THAN 70 milliGRAM(s)/deciliter  glucagon  Injectable 1 milliGRAM(s) IntraMuscular once PRN Glucose LESS THAN 70 milligrams/deciliter    T(C): 36.9 (06-20-18 @ 04:58), Max: 36.9 (06-19-18 @ 13:29)  HR: 60 (06-20-18 @ 04:58) (60 - 63)  BP: 134/51 (06-20-18 @ 04:58) (134/51 - 156/65)  RR: 16 (06-20-18 @ 04:58) (16 - 18)  SpO2: 96% (06-20-18 @ 04:58) (94% - 100%)    CAPILLARY BLOOD GLUCOSE  POCT Blood Glucose.: 130 mg/dL (20 Jun 2018 07:43)  POCT Blood Glucose.: 164 mg/dL (19 Jun 2018 21:01)  POCT Blood Glucose.: 237 mg/dL (19 Jun 2018 17:08)  POCT Blood Glucose.: 265 mg/dL (19 Jun 2018 11:52)    I&O's Summary    19 Jun 2018 07:01  -  20 Jun 2018 07:00  --------------------------------------------------------  IN: 250 mL / OUT: 1800 mL / NET: -1550 mL    20 Jun 2018 07:01  -  20 Jun 2018 11:15  --------------------------------------------------------  IN: 200 mL / OUT: 550 mL / NET: -350 mL    PHYSICAL EXAM:  GENERAL: NAD  HEAD:  Atraumatic, Normocephalic  EYES: EOMI, PERRLA, conjunctiva and sclera clear  NECK: Supple, No JVD  CHEST/LUNG: coarse BS, basilar crackles, wheeze improved   HEART: Regular rate and rhythm; No murmurs, rubs, or gallops  ABDOMEN: Soft, Nontender, Nondistended; Bowel sounds present  EXTREMITIES:   warm and well perfused, No clubbing, cyanosis, or edema  PSYCH: AAOx3  NEUROLOGY: non-focal  SKIN: No rashes or lesions      LABS:                        9.6    10.89 )-----------( 322      ( 20 Jun 2018 07:10 )             30.4     06-20    138  |  99  |  51<H>  ----------------------------<  124<H>  3.9   |  24  |  1.40<H>    Ca    10.4      20 Jun 2018 07:10    TPro  7.7  /  Alb  3.1<L>  /  TBili  1.0  /  DBili  0.4<H>  /  AST  22  /  ALT  31  /  AlkPhos  199<H>  06-19    PT/INR - ( 19 Jun 2018 06:30 )   PT: 17.1 SEC;   INR: 1.53     PTT - ( 18 Jun 2018 19:35 )  PTT:35.8 SEC      Microbiology:     RVP: +rhinovirus       Consultant(s) Notes Reviewed:  cardiology    Care Discussed with Consultants/Other Providers:
Patient is a 83y old  Female who presents with a chief complaint of sob    SUBJECTIVE / OVERNIGHT EVENTS:    Feels much improved  eating but low appetite   no CP/SOB, fever/chills, nausea/vomiting     MEDICATIONS  (STANDING):  amLODIPine   Tablet 10 milliGRAM(s) Oral daily  apixaban 2.5 milliGRAM(s) Oral every 12 hours  aspirin enteric coated 81 milliGRAM(s) Oral daily  atorvastatin 40 milliGRAM(s) Oral at bedtime  dextrose 5%. 1000 milliLiter(s) (50 mL/Hr) IV Continuous <Continuous>  dextrose 50% Injectable 12.5 Gram(s) IV Push once  dextrose 50% Injectable 25 Gram(s) IV Push once  dextrose 50% Injectable 25 Gram(s) IV Push once  insulin lispro (HumaLOG) corrective regimen sliding scale   SubCutaneous Before meals and at bedtime  isosorbide   mononitrate ER Tablet (IMDUR) 60 milliGRAM(s) Oral daily  levoFLOXacin  Tablet 250 milliGRAM(s) Oral <User Schedule>  losartan 100 milliGRAM(s) Oral daily  metoprolol succinate  milliGRAM(s) Oral daily  sodium chloride 0.9% lock flush 3 milliLiter(s) IV Push every 8 hours  torsemide 40 milliGRAM(s) Oral two times a day    MEDICATIONS  (PRN):  benzocaine 15 mG/menthol 3.6 mG Lozenge 1 Lozenge Oral four times a day PRN Sore Throat  dextrose 40% Gel 15 Gram(s) Oral once PRN Blood Glucose LESS THAN 70 milliGRAM(s)/deciliter  glucagon  Injectable 1 milliGRAM(s) IntraMuscular once PRN Glucose LESS THAN 70 milligrams/deciliter    T(C): 36.2 (06-21-18 @ 11:51), Max: 36.8 (06-20-18 @ 12:17)  HR: 59 (06-21-18 @ 11:51) (59 - 62)  BP: 118/41 (06-21-18 @ 11:51) (118/41 - 155/49)  RR: 18 (06-21-18 @ 11:51) (16 - 18)  SpO2: 98% (06-21-18 @ 11:51) (97% - 98%)    CAPILLARY BLOOD GLUCOSE  POCT Blood Glucose.: 147 mg/dL (21 Jun 2018 07:41)  POCT Blood Glucose.: 236 mg/dL (20 Jun 2018 21:42)  POCT Blood Glucose.: 215 mg/dL (20 Jun 2018 17:29)  POCT Blood Glucose.: 235 mg/dL (20 Jun 2018 12:02)      I&O's Summary    20 Jun 2018 07:01  -  21 Jun 2018 07:00  --------------------------------------------------------  IN: 440 mL / OUT: 2400 mL / NET: -1960 mL    21 Jun 2018 07:01  -  21 Jun 2018 11:58  --------------------------------------------------------  IN: 0 mL / OUT: 150 mL / NET: -150 mL      PHYSICAL EXAM:  GENERAL: NAD  HEAD:  Atraumatic, Normocephalic  EYES: EOMI, PERRLA, conjunctiva and sclera clear  NECK: Supple, No JVD  CHEST/LUNG: no wheeze, minimal crackles at bases  HEART: Regular rate and rhythm; No murmurs, rubs, or gallops  ABDOMEN: Soft, Nontender, Nondistended; Bowel sounds present  EXTREMITIES:   warm and well perfused, No clubbing, cyanosis, or edema  PSYCH: AAOx3  NEUROLOGY: non-focal  SKIN: No rashes or lesions      LABS:                        9.7    14.29 )-----------( 357      ( 21 Jun 2018 09:26 )             30.4     06-21    139  |  98  |  60<H>  ----------------------------<  180<H>  4.5   |  24  |  1.43<H>    Ca    10.7<H>      21 Jun 2018 09:26      Consultant(s) Notes Reviewed:  cardiology     Care Discussed with Consultants/Other Providers: cardiology fellow

## 2018-06-21 NOTE — PROGRESS NOTE ADULT - ATTENDING COMMENTS
Patient explained that she needs to be compliant with meds to avoid readmission.  Advised we are trying to simplify regimen; she wont take hydral TID so we are dc.  Advised two meds must be taken BID (eliquis and torsemide).  Advised to limit water intake to 1.2L per day.  Will arrange for OP cards f/u.  Needs f/u of imaging for PNA.  dispo time 40 min
care d/w family at bedside

## 2018-06-21 NOTE — DISCHARGE NOTE ADULT - PATIENT PORTAL LINK FT
You can access the [facility], offered by [facility], by registering with the following website: [email or web address]

## 2018-06-21 NOTE — PROGRESS NOTE ADULT - PROBLEM SELECTOR PROBLEM 6
Type 2 diabetes mellitus with chronic kidney disease, without long-term current use of insulin, unspecified CKD stage

## 2018-06-21 NOTE — DISCHARGE NOTE ADULT - PLAN OF CARE
To relieve and prevent worsening symptoms associated with congestive heart failure, to improve quality of life, and to treat underlying conditions such as coronary heart disease, high blood pressure, or diabetes, and to maintain euvolemia. It is very important for you to take your medications exactly as they are prescribed to you. Abide by a Low salt diet, fluid restriction to 1500 ml daily, monitor your fluid intake and weight daily, exercise as tolerated 30 minutes daily. You have a follow up appointment with the Dr. Zaidi at the cardiology clinic on Thursday 6/28/2018. You were found to have the common cold in the hospital with a possible pneumonia and you were therefore started on Levaquin. You have 2 doses left, tonight and tomorrow night. You will need a repeat chest CT in 4-6 weeks to monitor for resolution of the pulmonary edema seen on initial CT. You will need further workup for your anemia. You can schedule an appointment with a gastroenterologist for further workup and management.

## 2018-06-21 NOTE — PROGRESS NOTE ADULT - PROBLEM SELECTOR PLAN 1
Lung exam much improved, able to ambulate and maintain sat 98%  -transition to torsemide 40 mg BID per cards recs   -daily weights, I&Os; f/u standing weight and advise patient of it  -c/w metoprolol  -c/w losartan 100 mg (increased from 50 mg)   -patient reports minimal compliance with meds that are more than 1x per day, taking hydral, lasix, Eliquis only 1x per day; attempting to make most meds 1x per day
-c/w tele  -appreciate cards c/s  -c/w lasix to 80 mg IV BID for now  -daily weights, I&Os  -c/w metoprolol, c/w losartan 100 mg (increased from 50 mg)  Still with crackles bilaterally  -patient reports minimal compliance with meds that are more than 1x per day, taking hydral, lasix, Eliquis only 1x per day; also advised to increase lasix to 80 mg recently but decided to take 40 mg 1x per day instead
-c/w tele  -appreciate cards c/s  -increase lasix to 80 mg IV BID  -daily weights, I&Os  -c/w metoprolol, c/w losartan increase from 50 mg to 100 mg  -patient reports minimal compliance with meds that are more than 1x per day, taking hydral, lasix, eliquis only 1x per day; also advised to increase lasix to 80 last admit but decided to take 40 mg 1x per day instead

## 2018-06-23 LAB
BACTERIA BLD CULT: SIGNIFICANT CHANGE UP
BACTERIA BLD CULT: SIGNIFICANT CHANGE UP

## 2018-06-28 ENCOUNTER — OTHER (OUTPATIENT)
Age: 83
End: 2018-06-28

## 2018-06-28 ENCOUNTER — APPOINTMENT (OUTPATIENT)
Dept: CARDIOLOGY | Facility: HOSPITAL | Age: 83
End: 2018-06-28

## 2018-06-28 ENCOUNTER — RX RENEWAL (OUTPATIENT)
Age: 83
End: 2018-06-28

## 2018-06-28 ENCOUNTER — OUTPATIENT (OUTPATIENT)
Dept: OUTPATIENT SERVICES | Facility: HOSPITAL | Age: 83
LOS: 1 days | End: 2018-06-28

## 2018-06-28 VITALS
SYSTOLIC BLOOD PRESSURE: 146 MMHG | WEIGHT: 145 LBS | RESPIRATION RATE: 15 BRPM | HEART RATE: 64 BPM | BODY MASS INDEX: 26.52 KG/M2 | OXYGEN SATURATION: 98 % | DIASTOLIC BLOOD PRESSURE: 66 MMHG

## 2018-06-28 DIAGNOSIS — I48.91 UNSPECIFIED ATRIAL FIBRILLATION: ICD-10-CM

## 2018-06-28 RX ORDER — FUROSEMIDE 40 MG/1
40 TABLET ORAL DAILY
Qty: 90 | Refills: 1 | Status: DISCONTINUED | COMMUNITY
Start: 2018-05-29 | End: 2018-06-28

## 2018-06-29 NOTE — DISCUSSION/SUMMARY
[Home] : patient was discharged to home [FreeTextEntry1] :  83 year old female with HTN, HLD, NIDDM2, HFrEF, tachybrady syndrome s/p PPM, A-fib (back on Eliquis), and uterine CA s/p hysterectomy and RT admitted to the hospital for acute on chronic heart failure exacerbation. Pt also treated for CAP due +rhinovirus on RVP with levaquini. Medications were reduced to help with compliance, given pt complained of "too many meds".  [FreeTextEntry3] : task  to setup a post hospital discharge visit.

## 2018-07-01 ENCOUNTER — OUTPATIENT (OUTPATIENT)
Dept: OUTPATIENT SERVICES | Facility: HOSPITAL | Age: 83
LOS: 1 days | End: 2018-07-01

## 2018-07-08 NOTE — ED PROVIDER NOTE - OBJECTIVE STATEMENT
ambulatory
79 y/o F w/ PMHx CHF, HTN, DM2, obesity, Uterine CA s/p radiation several years prior, tachybrady syndrome PPM c/o shortness of breath over the last 3 days. Daughter reports non-compliance with furosemide. Unclear whether pt is taking AC for AF. Pt c/o 3d dyspnea on exertion, orthopnea, pnd. Mild cough over the last week. No known asthma/COPD. No recent illness, flu-like sx, or recent hospitalizations. Prior hosp. last year with similar sx, BNP elevated at the time.   Pt states that over the past three days her orthopnea has become much worse and can only walk a few steps before becoming severely short of breath. Pt admits to stopping her diuretic several months ago because she thought it was causing blood in her stool. Pt denies N/V/D, fevers, chills, palpitations, chest pain, syncope, claudication.

## 2018-07-12 DIAGNOSIS — Z71.89 OTHER SPECIFIED COUNSELING: ICD-10-CM

## 2018-07-26 ENCOUNTER — RX RENEWAL (OUTPATIENT)
Age: 83
End: 2018-07-26

## 2018-07-30 ENCOUNTER — APPOINTMENT (OUTPATIENT)
Dept: ELECTROPHYSIOLOGY | Facility: CLINIC | Age: 83
End: 2018-07-30

## 2018-07-31 NOTE — ED PROVIDER NOTE - PSH
Cardiac Pacemaker (ICD9 V45.01)  2003  History of Hysterectomy (ICD9 V88.01)    Status Post Total Knee Replacement  LISA, 6 months apart 2010 3

## 2018-08-02 ENCOUNTER — OUTPATIENT (OUTPATIENT)
Dept: OUTPATIENT SERVICES | Facility: HOSPITAL | Age: 83
LOS: 1 days | End: 2018-08-02

## 2018-08-02 ENCOUNTER — RX RENEWAL (OUTPATIENT)
Age: 83
End: 2018-08-02

## 2018-08-02 ENCOUNTER — APPOINTMENT (OUTPATIENT)
Dept: INTERNAL MEDICINE | Facility: HOSPITAL | Age: 83
End: 2018-08-02
Payer: MEDICARE

## 2018-08-02 ENCOUNTER — LABORATORY RESULT (OUTPATIENT)
Age: 83
End: 2018-08-02

## 2018-08-02 VITALS — SYSTOLIC BLOOD PRESSURE: 144 MMHG | DIASTOLIC BLOOD PRESSURE: 54 MMHG | HEART RATE: 61 BPM

## 2018-08-02 VITALS — BODY MASS INDEX: 26.13 KG/M2 | WEIGHT: 142 LBS | HEIGHT: 62 IN

## 2018-08-02 DIAGNOSIS — E11.65 TYPE 2 DIABETES MELLITUS WITH HYPERGLYCEMIA: ICD-10-CM

## 2018-08-02 DIAGNOSIS — I50.30 UNSPECIFIED DIASTOLIC (CONGESTIVE) HEART FAILURE: ICD-10-CM

## 2018-08-02 LAB — HBA1C BLD-MCNC: 6.7 % — HIGH (ref 4–5.6)

## 2018-08-02 PROCEDURE — 99214 OFFICE O/P EST MOD 30 MIN: CPT | Mod: GC

## 2018-08-02 RX ORDER — HYDRALAZINE HYDROCHLORIDE 25 MG/1
25 TABLET ORAL TWICE DAILY
Qty: 60 | Refills: 0 | Status: DISCONTINUED | COMMUNITY
Start: 2018-08-02 | End: 2018-08-02

## 2018-08-02 RX ORDER — HYDRALAZINE HYDROCHLORIDE 25 MG/1
25 TABLET ORAL TWICE DAILY
Qty: 60 | Refills: 3 | Status: DISCONTINUED | COMMUNITY
Start: 2018-03-28 | End: 2018-08-02

## 2018-08-02 NOTE — PHYSICAL EXAM
[No Acute Distress] : no acute distress [Well Nourished] : well nourished [Normal Sclera/Conjunctiva] : normal sclera/conjunctiva [PERRL] : pupils equal round and reactive to light [EOMI] : extraocular movements intact [No JVD] : no jugular venous distention [Supple] : supple [No Respiratory Distress] : no respiratory distress  [Clear to Auscultation] : lungs were clear to auscultation bilaterally [No Accessory Muscle Use] : no accessory muscle use [Normal Rate] : normal rate  [Normal S1, S2] : normal S1 and S2 [No Murmur] : no murmur heard [Pedal Pulses Present] : the pedal pulses are present [No Edema] : there was no peripheral edema [Soft] : abdomen soft [Non Tender] : non-tender [Non-distended] : non-distended [Normal Supraclavicular Nodes] : no supraclavicular lymphadenopathy [Normal Posterior Cervical Nodes] : no posterior cervical lymphadenopathy [Normal Anterior Cervical Nodes] : no anterior cervical lymphadenopathy [No CVA Tenderness] : no CVA  tenderness [No Spinal Tenderness] : no spinal tenderness [No Rash] : no rash [Normal Affect] : the affect was normal [Alert and Oriented x3] : oriented to person, place, and time [Normal Insight/Judgement] : insight and judgment were intact [de-identified] : irregularly irregular rhythm

## 2018-08-02 NOTE — ASSESSMENT
[FreeTextEntry1] : 84 y/o F with a PMH significant for HTN, HLD, NIDDM2, HFrEF, tachybrady syndrome s/p PPM, A-fib on Eliquis, and uterine CA s/p hysterectomy and RT, who presents to the clinic for routine follow up. \par \par #HFrEF\par - euvolemic on exam today\par - c/w torsemide 40 mg BID, metoprolol 200 mg daily, ASA 81 mg daily, lipitor 40 mg daily, imdur 60 mg ER\par - daily weights\par \par #A-fib\par - CHADSVASc score 6, on eliquis 2.5 mg BID\par - c/w metoprolol succinate 200 mg\par \par #HTN\par - BP wnl at this time\par - c/w amlodipine 10 mg daily, isosorbide mononitrate ER 60 mg daily, hydralazine 25 mg BID, losartan 100 mg daily\par \par #NIDDM2\par - A1c 8.5% from 6/2018; recommended exercise and diet modifications along with medication adherence\par - will rpt A1c today\par - c/w januvia 50 mg daily and glimepiride 4 mg daily\par \par #HCM\par - UTD with pneumovax and flu shot\par - colonoscopy in 2015; will need repeat most likely 2020 given findings\par \par d/w Dr. Butt\par rtc in 6 months\par Celio Maynard MD\par PGY-2, [employer] [facility]

## 2018-08-02 NOTE — REVIEW OF SYSTEMS
[Fever] : no fever [Chills] : no chills [Fatigue] : no fatigue [Chest Pain] : no chest pain [Palpitations] : no palpitations [Lower Ext Edema] : no lower extremity edema [Shortness Of Breath] : no shortness of breath [Wheezing] : no wheezing [Cough] : no cough [Dyspnea on Exertion] : no dyspnea on exertion [Abdominal Pain] : no abdominal pain [Nausea] : no nausea [Constipation] : no constipation [Diarrhea] : diarrhea [Vomiting] : no vomiting [Dysuria] : no dysuria [Incontinence] : no incontinence [Joint Pain] : no joint pain

## 2018-08-02 NOTE — HISTORY OF PRESENT ILLNESS
[Family Member] : family member [de-identified] : This is an 84 y/o F with a PMH significant for HTN, HLD, NIDDM2, HFrEF, tachybrady syndrome s/p PPM, A-fib on Eliquis, and uterine CA s/p hysterectomy and RT, who presents to the clinic for routine follow up. Of note, patient was hospitalized from 6/18/18-6/21/18 for an acute HF exacerbation in the setting of a rhinovirus PNA. She was diuresed and her other HF medications were also titrated. She weighs herself daily, and states that her weight is within a range of a few pounds everyday (approx 140 lbs). \par \par Patient's daughter is accompanying her at this visit, and states that her HHA comes in 3x/week for 3 hours at a time and is definitely a big help w/ medications, weighing the patient, and general questions about health and her care.

## 2018-08-03 DIAGNOSIS — I10 ESSENTIAL (PRIMARY) HYPERTENSION: ICD-10-CM

## 2018-08-03 DIAGNOSIS — N18.3 CHRONIC KIDNEY DISEASE, STAGE 3 (MODERATE): ICD-10-CM

## 2018-08-03 DIAGNOSIS — I48.91 UNSPECIFIED ATRIAL FIBRILLATION: ICD-10-CM

## 2018-08-03 DIAGNOSIS — E78.5 HYPERLIPIDEMIA, UNSPECIFIED: ICD-10-CM

## 2018-08-03 DIAGNOSIS — E11.65 TYPE 2 DIABETES MELLITUS WITH HYPERGLYCEMIA: ICD-10-CM

## 2018-08-03 DIAGNOSIS — I50.20 UNSPECIFIED SYSTOLIC (CONGESTIVE) HEART FAILURE: ICD-10-CM

## 2018-08-03 LAB — GLUCOSE BLDC GLUCOMTR-MCNC: 178

## 2018-08-20 ENCOUNTER — RX RENEWAL (OUTPATIENT)
Age: 83
End: 2018-08-20

## 2018-08-22 ENCOUNTER — OTHER (OUTPATIENT)
Age: 83
End: 2018-08-22

## 2018-08-22 ENCOUNTER — MEDICATION RENEWAL (OUTPATIENT)
Age: 83
End: 2018-08-22

## 2018-08-23 ENCOUNTER — RX RENEWAL (OUTPATIENT)
Age: 83
End: 2018-08-23

## 2018-09-25 ENCOUNTER — RX RENEWAL (OUTPATIENT)
Age: 83
End: 2018-09-25

## 2018-10-07 ENCOUNTER — INPATIENT (INPATIENT)
Facility: HOSPITAL | Age: 83
LOS: 3 days | Discharge: HOME | End: 2018-10-11
Attending: HOSPITALIST | Admitting: HOSPITALIST
Payer: COMMERCIAL

## 2018-10-07 VITALS
DIASTOLIC BLOOD PRESSURE: 55 MMHG | HEART RATE: 61 BPM | RESPIRATION RATE: 16 BRPM | OXYGEN SATURATION: 100 % | TEMPERATURE: 98 F | SYSTOLIC BLOOD PRESSURE: 129 MMHG

## 2018-10-07 DIAGNOSIS — R58 HEMORRHAGE, NOT ELSEWHERE CLASSIFIED: ICD-10-CM

## 2018-10-07 DIAGNOSIS — R04.0 EPISTAXIS: ICD-10-CM

## 2018-10-07 DIAGNOSIS — S02.2XXA FRACTURE OF NASAL BONES, INITIAL ENCOUNTER FOR CLOSED FRACTURE: ICD-10-CM

## 2018-10-07 DIAGNOSIS — I50.22 CHRONIC SYSTOLIC (CONGESTIVE) HEART FAILURE: ICD-10-CM

## 2018-10-07 DIAGNOSIS — E87.5 HYPERKALEMIA: ICD-10-CM

## 2018-10-07 DIAGNOSIS — I49.5 SICK SINUS SYNDROME: ICD-10-CM

## 2018-10-07 DIAGNOSIS — E11.8 TYPE 2 DIABETES MELLITUS WITH UNSPECIFIED COMPLICATIONS: ICD-10-CM

## 2018-10-07 DIAGNOSIS — R55 SYNCOPE AND COLLAPSE: ICD-10-CM

## 2018-10-07 DIAGNOSIS — N17.9 ACUTE KIDNEY FAILURE, UNSPECIFIED: ICD-10-CM

## 2018-10-07 DIAGNOSIS — Z29.9 ENCOUNTER FOR PROPHYLACTIC MEASURES, UNSPECIFIED: ICD-10-CM

## 2018-10-07 DIAGNOSIS — I48.2 CHRONIC ATRIAL FIBRILLATION: ICD-10-CM

## 2018-10-07 LAB
ALBUMIN SERPL ELPH-MCNC: 4.3 G/DL — SIGNIFICANT CHANGE UP (ref 3.3–5)
ALP SERPL-CCNC: 154 U/L — HIGH (ref 40–120)
ALT FLD-CCNC: 22 U/L — SIGNIFICANT CHANGE UP (ref 4–33)
APTT BLD: 36.7 SEC — SIGNIFICANT CHANGE UP (ref 27.5–37.4)
AST SERPL-CCNC: 24 U/L — SIGNIFICANT CHANGE UP (ref 4–32)
BASOPHILS # BLD AUTO: 0.05 K/UL — SIGNIFICANT CHANGE UP (ref 0–0.2)
BASOPHILS NFR BLD AUTO: 0.6 % — SIGNIFICANT CHANGE UP (ref 0–2)
BILIRUB SERPL-MCNC: 0.4 MG/DL — SIGNIFICANT CHANGE UP (ref 0.2–1.2)
BLD GP AB SCN SERPL QL: NEGATIVE — SIGNIFICANT CHANGE UP
BUN SERPL-MCNC: 83 MG/DL — HIGH (ref 7–23)
CALCIUM SERPL-MCNC: 10.8 MG/DL — HIGH (ref 8.4–10.5)
CHLORIDE SERPL-SCNC: 99 MMOL/L — SIGNIFICANT CHANGE UP (ref 98–107)
CO2 SERPL-SCNC: 23 MMOL/L — SIGNIFICANT CHANGE UP (ref 22–31)
CREAT SERPL-MCNC: 2.34 MG/DL — HIGH (ref 0.5–1.3)
EOSINOPHIL # BLD AUTO: 0.28 K/UL — SIGNIFICANT CHANGE UP (ref 0–0.5)
EOSINOPHIL NFR BLD AUTO: 3.1 % — SIGNIFICANT CHANGE UP (ref 0–6)
GLUCOSE SERPL-MCNC: 172 MG/DL — HIGH (ref 70–99)
HCT VFR BLD CALC: 28.9 % — LOW (ref 34.5–45)
HCT VFR BLD CALC: 30.9 % — LOW (ref 34.5–45)
HGB BLD-MCNC: 10 G/DL — LOW (ref 11.5–15.5)
HGB BLD-MCNC: 9.3 G/DL — LOW (ref 11.5–15.5)
IMM GRANULOCYTES # BLD AUTO: 0.03 # — SIGNIFICANT CHANGE UP
IMM GRANULOCYTES NFR BLD AUTO: 0.3 % — SIGNIFICANT CHANGE UP (ref 0–1.5)
INR BLD: 1.17 — SIGNIFICANT CHANGE UP (ref 0.88–1.17)
LYMPHOCYTES # BLD AUTO: 2.02 K/UL — SIGNIFICANT CHANGE UP (ref 1–3.3)
LYMPHOCYTES # BLD AUTO: 22.5 % — SIGNIFICANT CHANGE UP (ref 13–44)
MCHC RBC-ENTMCNC: 31.3 PG — SIGNIFICANT CHANGE UP (ref 27–34)
MCHC RBC-ENTMCNC: 31.6 PG — SIGNIFICANT CHANGE UP (ref 27–34)
MCHC RBC-ENTMCNC: 32.2 % — SIGNIFICANT CHANGE UP (ref 32–36)
MCHC RBC-ENTMCNC: 32.4 % — SIGNIFICANT CHANGE UP (ref 32–36)
MCV RBC AUTO: 97.3 FL — SIGNIFICANT CHANGE UP (ref 80–100)
MCV RBC AUTO: 97.8 FL — SIGNIFICANT CHANGE UP (ref 80–100)
MONOCYTES # BLD AUTO: 1.16 K/UL — HIGH (ref 0–0.9)
MONOCYTES NFR BLD AUTO: 12.9 % — SIGNIFICANT CHANGE UP (ref 2–14)
NEUTROPHILS # BLD AUTO: 5.42 K/UL — SIGNIFICANT CHANGE UP (ref 1.8–7.4)
NEUTROPHILS NFR BLD AUTO: 60.6 % — SIGNIFICANT CHANGE UP (ref 43–77)
NRBC # FLD: 0 — SIGNIFICANT CHANGE UP
NRBC # FLD: 0 — SIGNIFICANT CHANGE UP
PLATELET # BLD AUTO: 174 K/UL — SIGNIFICANT CHANGE UP (ref 150–400)
PLATELET # BLD AUTO: 181 K/UL — SIGNIFICANT CHANGE UP (ref 150–400)
PMV BLD: 10.4 FL — SIGNIFICANT CHANGE UP (ref 7–13)
PMV BLD: 10.5 FL — SIGNIFICANT CHANGE UP (ref 7–13)
POTASSIUM SERPL-MCNC: 5.8 MMOL/L — HIGH (ref 3.5–5.3)
POTASSIUM SERPL-SCNC: 5.8 MMOL/L — HIGH (ref 3.5–5.3)
PROT SERPL-MCNC: 8.8 G/DL — HIGH (ref 6–8.3)
PROTHROM AB SERPL-ACNC: 13.5 SEC — HIGH (ref 9.8–13.1)
RBC # BLD: 2.97 M/UL — LOW (ref 3.8–5.2)
RBC # BLD: 3.16 M/UL — LOW (ref 3.8–5.2)
RBC # FLD: 13.6 % — SIGNIFICANT CHANGE UP (ref 10.3–14.5)
RBC # FLD: 13.6 % — SIGNIFICANT CHANGE UP (ref 10.3–14.5)
RH IG SCN BLD-IMP: POSITIVE — SIGNIFICANT CHANGE UP
SODIUM SERPL-SCNC: 136 MMOL/L — SIGNIFICANT CHANGE UP (ref 135–145)
WBC # BLD: 8.96 K/UL — SIGNIFICANT CHANGE UP (ref 3.8–10.5)
WBC # BLD: 9.61 K/UL — SIGNIFICANT CHANGE UP (ref 3.8–10.5)
WBC # FLD AUTO: 8.96 K/UL — SIGNIFICANT CHANGE UP (ref 3.8–10.5)
WBC # FLD AUTO: 9.61 K/UL — SIGNIFICANT CHANGE UP (ref 3.8–10.5)

## 2018-10-07 PROCEDURE — 72125 CT NECK SPINE W/O DYE: CPT | Mod: 26

## 2018-10-07 PROCEDURE — 70486 CT MAXILLOFACIAL W/O DYE: CPT | Mod: 26

## 2018-10-07 PROCEDURE — 70450 CT HEAD/BRAIN W/O DYE: CPT | Mod: 26

## 2018-10-07 PROCEDURE — 99223 1ST HOSP IP/OBS HIGH 75: CPT | Mod: GC

## 2018-10-07 RX ORDER — ASPIRIN/CALCIUM CARB/MAGNESIUM 324 MG
81 TABLET ORAL DAILY
Qty: 0 | Refills: 0 | Status: DISCONTINUED | OUTPATIENT
Start: 2018-10-07 | End: 2018-10-11

## 2018-10-07 RX ORDER — ALBUTEROL 90 UG/1
10 AEROSOL, METERED ORAL ONCE
Qty: 0 | Refills: 0 | Status: COMPLETED | OUTPATIENT
Start: 2018-10-07 | End: 2018-10-07

## 2018-10-07 RX ORDER — INSULIN HUMAN 100 [IU]/ML
6 INJECTION, SOLUTION SUBCUTANEOUS ONCE
Qty: 0 | Refills: 0 | Status: COMPLETED | OUTPATIENT
Start: 2018-10-07 | End: 2018-10-07

## 2018-10-07 RX ORDER — DEXTROSE 50 % IN WATER 50 %
15 SYRINGE (ML) INTRAVENOUS ONCE
Qty: 0 | Refills: 0 | Status: DISCONTINUED | OUTPATIENT
Start: 2018-10-07 | End: 2018-10-11

## 2018-10-07 RX ORDER — SODIUM CHLORIDE 9 MG/ML
1000 INJECTION, SOLUTION INTRAVENOUS ONCE
Qty: 0 | Refills: 0 | Status: DISCONTINUED | OUTPATIENT
Start: 2018-10-07 | End: 2018-10-07

## 2018-10-07 RX ORDER — TETANUS TOXOID, REDUCED DIPHTHERIA TOXOID AND ACELLULAR PERTUSSIS VACCINE, ADSORBED 5; 2.5; 8; 8; 2.5 [IU]/.5ML; [IU]/.5ML; UG/.5ML; UG/.5ML; UG/.5ML
0.5 SUSPENSION INTRAMUSCULAR ONCE
Qty: 0 | Refills: 0 | Status: COMPLETED | OUTPATIENT
Start: 2018-10-07 | End: 2018-10-07

## 2018-10-07 RX ORDER — ISOSORBIDE MONONITRATE 60 MG/1
60 TABLET, EXTENDED RELEASE ORAL DAILY
Qty: 0 | Refills: 0 | Status: DISCONTINUED | OUTPATIENT
Start: 2018-10-08 | End: 2018-10-11

## 2018-10-07 RX ORDER — DEXTROSE 50 % IN WATER 50 %
50 SYRINGE (ML) INTRAVENOUS ONCE
Qty: 0 | Refills: 0 | Status: COMPLETED | OUTPATIENT
Start: 2018-10-07 | End: 2018-10-07

## 2018-10-07 RX ORDER — ACETAMINOPHEN 500 MG
975 TABLET ORAL ONCE
Qty: 0 | Refills: 0 | Status: COMPLETED | OUTPATIENT
Start: 2018-10-07 | End: 2018-10-07

## 2018-10-07 RX ORDER — SODIUM CHLORIDE 9 MG/ML
500 INJECTION INTRAMUSCULAR; INTRAVENOUS; SUBCUTANEOUS ONCE
Qty: 0 | Refills: 0 | Status: COMPLETED | OUTPATIENT
Start: 2018-10-07 | End: 2018-10-07

## 2018-10-07 RX ORDER — AMLODIPINE BESYLATE 2.5 MG/1
10 TABLET ORAL DAILY
Qty: 0 | Refills: 0 | Status: DISCONTINUED | OUTPATIENT
Start: 2018-10-08 | End: 2018-10-11

## 2018-10-07 RX ORDER — INSULIN LISPRO 100/ML
VIAL (ML) SUBCUTANEOUS
Qty: 0 | Refills: 0 | Status: DISCONTINUED | OUTPATIENT
Start: 2018-10-07 | End: 2018-10-11

## 2018-10-07 RX ORDER — HYDRALAZINE HCL 50 MG
25 TABLET ORAL
Qty: 0 | Refills: 0 | Status: DISCONTINUED | OUTPATIENT
Start: 2018-10-07 | End: 2018-10-11

## 2018-10-07 RX ORDER — SODIUM CHLORIDE 9 MG/ML
1000 INJECTION, SOLUTION INTRAVENOUS
Qty: 0 | Refills: 0 | Status: DISCONTINUED | OUTPATIENT
Start: 2018-10-07 | End: 2018-10-11

## 2018-10-07 RX ORDER — DEXTROSE 50 % IN WATER 50 %
25 SYRINGE (ML) INTRAVENOUS ONCE
Qty: 0 | Refills: 0 | Status: DISCONTINUED | OUTPATIENT
Start: 2018-10-07 | End: 2018-10-11

## 2018-10-07 RX ORDER — DEXTROSE 50 % IN WATER 50 %
12.5 SYRINGE (ML) INTRAVENOUS ONCE
Qty: 0 | Refills: 0 | Status: DISCONTINUED | OUTPATIENT
Start: 2018-10-07 | End: 2018-10-11

## 2018-10-07 RX ORDER — GLUCAGON INJECTION, SOLUTION 0.5 MG/.1ML
1 INJECTION, SOLUTION SUBCUTANEOUS ONCE
Qty: 0 | Refills: 0 | Status: DISCONTINUED | OUTPATIENT
Start: 2018-10-07 | End: 2018-10-11

## 2018-10-07 RX ORDER — INSULIN LISPRO 100/ML
VIAL (ML) SUBCUTANEOUS AT BEDTIME
Qty: 0 | Refills: 0 | Status: DISCONTINUED | OUTPATIENT
Start: 2018-10-07 | End: 2018-10-11

## 2018-10-07 RX ORDER — ATORVASTATIN CALCIUM 80 MG/1
40 TABLET, FILM COATED ORAL AT BEDTIME
Qty: 0 | Refills: 0 | Status: DISCONTINUED | OUTPATIENT
Start: 2018-10-07 | End: 2018-10-11

## 2018-10-07 RX ADMIN — INSULIN HUMAN 6 UNIT(S): 100 INJECTION, SOLUTION SUBCUTANEOUS at 23:19

## 2018-10-07 RX ADMIN — Medication 975 MILLIGRAM(S): at 23:19

## 2018-10-07 RX ADMIN — TETANUS TOXOID, REDUCED DIPHTHERIA TOXOID AND ACELLULAR PERTUSSIS VACCINE, ADSORBED 0.5 MILLILITER(S): 5; 2.5; 8; 8; 2.5 SUSPENSION INTRAMUSCULAR at 17:32

## 2018-10-07 RX ADMIN — SODIUM CHLORIDE 250 MILLILITER(S): 9 INJECTION INTRAMUSCULAR; INTRAVENOUS; SUBCUTANEOUS at 23:20

## 2018-10-07 RX ADMIN — Medication 50 MILLILITER(S): at 23:20

## 2018-10-07 NOTE — ED ADULT NURSE NOTE - OBJECTIVE STATEMENT
Pt received aa&ox3 PMH of CHF, DM, Pacemaker, on Eliquis presents to ED 3 days s/p fall at home. As per pt family pt got up from the couch and fell on face. Pt refused to come to ED at time of fall but came today because of nose bleeding and sever bruising on face. Pt also c/o HA in ED. Neuro check complete, no deficits noted. 20g  IV placed in Rt AC, labs sent. Pt awaiting imaging, will monitor.

## 2018-10-07 NOTE — H&P ADULT - PROBLEM SELECTOR PROBLEM 10
Preventive measure Type 2 diabetes mellitus with complication, without long-term current use of insulin

## 2018-10-07 NOTE — H&P ADULT - PROBLEM SELECTOR PLAN 9
Hx of DM2, on oral medications  ISS, A1c in AM  FS monitoring Hx of tachy-wellington syndrome  PPM (meditronic)  EKG - Apaced, NSR, no ST changes  Consult EP in AM for PPM interrogation

## 2018-10-07 NOTE — ED PROVIDER NOTE - ATTENDING CONTRIBUTION TO CARE
Locurto   pt c/o epistaxis today (mostly left nostril  had fall Tuesday am (syncope on rising)  no further sxs Pt denies  further dizziness  CP  SOB  vomiting  diarrhea   Has noted facial bruising after event  no neck pain    exam  pt in NAD  card RRR S1S2  murmur  at LSB  nl S2    benign abd  nl strength b/l  CN intact   dried blood lt nostril  nose swollen  healing lac to nose

## 2018-10-07 NOTE — ED PROVIDER NOTE - MEDICAL DECISION MAKING DETAILS
Face trauma x 5 days ago, with possible syncope prior to fall. Pt asymptomatic since then until nosebleed today. Pt had obvious nasal deformity with ecchymosis throughout face. Will get CTH/Max Face likely consult ENT.

## 2018-10-07 NOTE — ED PROVIDER NOTE - PSH
Cardiac Pacemaker (ICD9 V45.01)  Medtronic (2003 - for tacybrady syndrome @ [facility])  History of Hysterectomy (ICD9 V88.01)    Status Post Total Knee Replacement  LISA, 6 months apart 2010

## 2018-10-07 NOTE — H&P ADULT - NSHPPHYSICALEXAM_GEN_ALL_CORE
· Constitutional        Lying in bed mildly uncomfortable   · HEENT	               PERRL; EOMI; conjunctiva clear, ecchymosis on face, nontender, except on tip of nose, L nostril with nasal packing, soaked with blood, minimal blood coming out of L nostril   · Neck	               Supple; no JVD  · Respiratory             adequate air movement; CTAB  · Cardiovascular       S1 & S2 with RRR; ++systolic murmur   · Gastrointestinal     soft; no distention; bowel sounds normal; no rigidity  · Extremities             no pedal edema, L4th and 5th digit ecchymosis, TTP on palm and digits, no erythema or obvious dislocation   · Vascular	               Radial Pulse: right normal; left normal  · Neurological           alert and oriented x 3; responds to verbal commands; sensation intact; cranial nerves intact; strength normal  · Musculoskeletal    no calf tenderness; diminished strength  · Psychiatric              normal mood and affect · Constitutional        Lying in bed mildly uncomfortable   · HEENT	               PERRL; EOMI; conjunctiva clear, ecchymosis on face, nontender, except on tip of nose, R nostril with nasal packing, soaked with blood, minimal blood coming out of R nostril   · Neck	               Supple; no JVD  · Respiratory             adequate air movement; CTAB  · Cardiovascular       S1 & S2 with RRR; ++systolic murmur   · Gastrointestinal     soft; no distention; bowel sounds normal; no rigidity  · Extremities             no pedal edema, L4th and 5th digit ecchymosis, TTP on palm and digits, no erythema or obvious dislocation   · Vascular	               Radial Pulse: right normal; left normal  · Neurological           alert and oriented x 3; responds to verbal commands; sensation intact; cranial nerves intact; strength normal  · Musculoskeletal    no calf tenderness; diminished strength  · Psychiatric              normal mood and affect T(C): 36.6 (10-08-18 @ 04:26), Max: 36.8 (10-08-18 @ 00:30)  HR: 61 (10-08-18 @ 04:26) (60 - 77)  BP: 153/50 (10-08-18 @ 04:26) (129/55 - 153/50)  RR: 16 (10-08-18 @ 04:26) (16 - 22)  SpO2: 100% (10-08-18 @ 04:26) (99% - 100%)    Constitutional: lying in bed mildly uncomfortable   HEENT: PERRL; EOMI; conjunctiva clear, ecchymosis on face, nontender, except on tip of nose, R nostril with nasal packing, soaked with blood, minimal blood coming out of R nostril  Neck: supple, no JVD  Respiratory: Clear to auscultation bilaterally. No wheezes, rales or rhonchi. Normal respiratory effort  Cardiovascular: RRR, ++systolic murmur, no edema, 2+ Peripheral Pulses  Gastrointestinal: soft, nontender, nondistended, +BS, no hernia  Skin: warm, dry, no rash  Neurologic: sensation grossly intact, CN grossly intact, non-focal exam  Musculoskeletal: no clubbing, no cyanosis, no joint swelling, L4th and 5th digit ecchymosis, TTP on palm and digits, no erythema or obvious dislocation   Psychiatric: AOX3, normal mood, affect

## 2018-10-07 NOTE — ED ADULT TRIAGE NOTE - CHIEF COMPLAINT QUOTE
Pt s/p trip and fall 2 days ago c/o nose bleed this am . and headache.   Ecchymosis with welling noted to nose and facial area and small laceration on bridge of nose.  As per family , pt stated she was dizzy prior to falling.  Denies chest pain, dizziness, sob. pt is on eliquis

## 2018-10-07 NOTE — ED PROVIDER NOTE - NS ED ROS FT
CONSTITUTIONAL: No fevers, no chills  Eyes: no visual changes  Ears: no ear drainage, no ear pain  Nose: nosebleed  Mouth/Throat: no sore throat  Cardiovascular: No Chest pain  Respiratory: No SOB  Gastrointestinal: No n/v/d, no abd pain  Genitourinary: no dysuria, no hematuria  SKIN: no rashes.  NEURO: no headache

## 2018-10-07 NOTE — ED PROVIDER NOTE - OBJECTIVE STATEMENT
83 YOF on Eliquis for AF p/w 83 YOF on Eliquis for AF p/w fall 5 days ago and nose bleed from the fall now. Pt denies any fevers, chills, cough, chest pain, denies any difficulty walking. Pt had syncopal episode after walking up out of bed, with +LOC and hitting her face on ground. No chest pain before or after. Pt now has been bleeding from her nose intermittently with large amounts of blood.

## 2018-10-07 NOTE — H&P ADULT - PMH
Chronic systolic heart failure    DM Type 2 (Diabetes Mellitus, Type 2) (ICD9 250.00)    HTN (Hypertension) (ICD9 401.9)    Hyperlipidemia    Osteoarthritis of Knee (ICD9 715.96)  b/l knees  Tachycardia-bradycardia syndrome    Uterine Cancer (ICD9 179)  treated with hysterectomy and radiation therapy in 2008

## 2018-10-07 NOTE — H&P ADULT - PSH
Cardiac Pacemaker (ICD9 V45.01)  Medtronic (2003 - for tacybrady syndrome @ [facility])  History of Hysterectomy (ICD9 V88.01)    Status Post Total Knee Replacement  LISA, 6 months apart 2010

## 2018-10-07 NOTE — H&P ADULT - PROBLEM SELECTOR PLAN 5
Patient with CLARI likely 2/2 epistaxis (hypovolemia)  patient hypovolemic on exam   Will hold losartan and Torsemide for now   Will restart as renal function improves  Avoid nephrotoxic agents

## 2018-10-07 NOTE — H&P ADULT - PROBLEM SELECTOR PLAN 10
DVT PPX: SCDS given epistaxis  Diet: DASH/Diabetic   Dispo: pending  PT consult Hx of DM2, on oral medications  ISS, A1c in AM  FS monitoring    DVT PPX: SCDS given epistaxis  Diet: DASH/Diabetic   Dispo: pending  PT consult

## 2018-10-07 NOTE — ED PROVIDER NOTE - CARE PLAN
Principal Discharge DX:	CLARI (acute kidney injury)  Secondary Diagnosis:	Hyperkalemia  Secondary Diagnosis:	Nasal fracture

## 2018-10-07 NOTE — H&P ADULT - PROBLEM SELECTOR PLAN 4
Patient with hyperkalemia likely 2/2 CLARI  Will given IVF given patient hypovolemic on exam  Will treat with albuterol, D50, Insulin 6 IV, no evidence of hyperkalemia seen on EKG  Patient asymptomatic  Check BMP with AM albs

## 2018-10-07 NOTE — H&P ADULT - HISTORY OF PRESENT ILLNESS
83F with PMHx of afib (on Eliquis), DM2 (on PO meds), HFrEF (moderate rEF), HLD, HTN, PPM 2/2 to tachybrady syndrome, Uterine CA s/p hysterectomy and RT presenting s/p syncopal episode while on Eliquis, Five days ago, patient was getting out of bed when she had a syncopal episode x 1, with LOC. Patient reports "few hours" of LOC, with associated head trauma. Denies precipitating chest pain, SOB, palpitations. However, endorses "feeling hot" prior to syncopal episode. Patient reports she woke up and called her daughter. She reports bruises on her face, no epistaxis at that time, but endorse "pain at the top of my nose". She denies previous episodes of syncope or presyncope. She reports she was doing fine up until this AM when she laid on the couch to sleep, and felt "blood all over my face" and came to the ED. Per daughter, last dose of Eliquis 10/6 PM. Did not take PM meds today. Patient currently denies chest pain, SOB, lower extremity edema. She does endorse L 4th and 5th digit pain. She reports the epistaxis continued to worsen in the ED, however has stopped since nasal packing. Patient denies dysuria, hematuria, abdominal pain, cough, fevers, shakes, chills, neurological changes, fecal or urinary incontinence or other seizure like activity. At baseline, patient ambulates with cane, however has been told she should ambulate with walker.     In ED: /55 HR61 RR16 T98 100%RA  Patient received Tylenol and Tetanus in ED 83F with PMHx of afib (on Eliquis), DM2 (on PO meds), HFrEF (moderate rEF), HLD, HTN, PPM 2/2 to tachybrady syndrome, Uterine CA s/p hysterectomy and RT presenting s/p syncopal episode while on Eliquis, Five days ago, patient was getting out of bed when she had a syncopal episode x 1, with LOC. Patient reports "few hours" of LOC, with associated head trauma. Denies precipitating chest pain, SOB, palpitations. However, endorses "feeling hot" prior to syncopal episode. Patient reports she woke up and called her daughter. She reports bruises on her face, no epistaxis at that time, but endorse "pain at the top of my nose". She denies previous episodes of syncope or presyncope. She reports she was doing fine up until this AM when she laid on the couch to sleep, and felt "blood all over my face" and came to the ED. Per daughter, last dose of Eliquis 10/6 PM. Did not take PM meds today. Patient currently denies chest pain, SOB, lower extremity edema. She does endorse L 4th and 5th digit pain. She reports the epistaxis continued to worsen in the ED, however has stopped since nasal packing. Patient denies dysuria, hematuria, abdominal pain, cough, fevers, shakes, chills, neurological changes, fecal or urinary incontinence or other seizure like activity. At baseline, patient ambulates with cane, however has been told she should ambulate with walker.     In ED: /55 HR61 RR16 T98 100%RA  Patient received Tylenol and Tetanus in ED    No family history pertinent to patient’s current condition/encounter

## 2018-10-07 NOTE — H&P ADULT - NSHPLABSRESULTS_GEN_ALL_CORE
T(C): 36.7 (10-07-18 @ 17:19), Max: 36.7 (10-07-18 @ 16:28)  T(F): 98 (10-07-18 @ 17:19), Max: 98 (10-07-18 @ 16:28)  HR: 61 (10-07-18 @ 21:27) (60 - 77)  BP: 140/51 (10-07-18 @ 21:27) (129/55 - 151/69)  RR: 18 (10-07-18 @ 21:27) (16 - 22)  SpO2: 100% (10-07-18 @ 21:27) (99% - 100%)                          9.3    9.61  )-----------( 174      ( 07 Oct 2018 19:31 )             28.9       10-07    136  |  99  |  83<H>  ----------------------------<  172<H>  5.8<H>   |  23  |  2.34<H>    Ca    10.8<H>      07 Oct 2018 17:28    TPro  8.8<H>  /  Alb  4.3  /  TBili  0.4  /  DBili  x   /  AST  24  /  ALT  22  /  AlkPhos  154<H>  10-07      < from: CT Cervical Spine No Cont (10.07.18 @ 19:40) >      IMPRESSION:  Head CT: No evidence for intracranial hemorrhage, mass effect, or   displaced calvarial fracture. Hyperostosis frontalis is present.    Cervical spine CT: No evidence for acute displaced fracture or traumatic   malalignment. Cervical degenerative spondylosis, as described above. MRI   can be performed if there is concern for ligamentous or cord injury (and   if there are no contraindications to such).    Maxillofacial CT: There is an acute fracture of the right nasal bone with   probable acute fracture of the nasal septum.          < end of copied text >

## 2018-10-07 NOTE — H&P ADULT - ASSESSMENT
83F with PMHx of afib (on Eliquis), DM2 (on PO meds), HFrEF (moderate rEF), HLD, HTN, PPM 2/2 to tachybrady syndrome, Uterine CA s/p hysterectomy and RT presenting s/p syncopal episode while on Eliquis, found to have acute RT nasal bone and nasal septum fracture with epistaxis

## 2018-10-07 NOTE — H&P ADULT - PROBLEM SELECTOR PLAN 7
CHFrEF (mod-severe) 2017 TTE  F/u TTE  Currently hypovolemic on exam  Continue ASA, statin, BB, Imdur, Hydralazine   Torsemide and Losartan held in the setting of CLARI PAF, NSR on EKG currently  ChadsVasc 6, on Eliquis 2.5 BID  Will hold PM dose of Eliquis given epistaxis  Will restart once H/H established stable   Continue home medication Metoprolol 200 mg with holding parameters

## 2018-10-07 NOTE — H&P ADULT - PROBLEM SELECTOR PLAN 6
PAF, NSR on EKG currently  ChadsVasc 6, on Eliquis 2.5 BID  Will hold PM dose of Eliquis given epistaxis  Will restart once H/H established stable   Continue home medication Metoprolol 200 mg with holding parameters ecchymosis of 4th and 5th L fingers, TTP, no obvious erythema, or dislocation  Will get hand xray to rule out fracture  Pulses palpable, mild TTP on ROM, no decreased sensation

## 2018-10-07 NOTE — H&P ADULT - PROBLEM SELECTOR PLAN 2
Nasal septum and R bone fx, with epistaxis  ENT consulted, f/u recs  Patient s/p R nostril nasal packing Nasal septum and R bone fx, with epistaxis  ENT consult in AM  Patient s/p R nostril nasal packing

## 2018-10-07 NOTE — ED ADULT NURSE NOTE - NSIMPLEMENTINTERV_GEN_ALL_ED
Implemented All Fall with Harm Risk Interventions:  [place] to call system. Call bell, personal items and telephone within reach. Instruct patient to call for assistance. Room bathroom lighting operational. Non-slip footwear when patient is off stretcher. Physically safe environment: no spills, clutter or unnecessary equipment. Stretcher in lowest position, wheels locked, appropriate side rails in place. Provide visual cue, wrist band, yellow gown, etc. Monitor gait and stability. Monitor for mental status changes and reorient to person, place, and time. Review medications for side effects contributing to fall risk. Reinforce activity limits and safety measures with patient and family. Provide visual clues: red socks.

## 2018-10-07 NOTE — H&P ADULT - PROBLEM SELECTOR PROBLEM 9
Type 2 diabetes mellitus with complication, without long-term current use of insulin Tachycardia-bradycardia syndrome

## 2018-10-07 NOTE — H&P ADULT - PROBLEM SELECTOR PLAN 1
Patient with +syncope, +LOC, +head trauma  Unclear etiology, unlikely 2/2 seizure  Patient with PPM (Medtronic), NSR, A-paced on EKG  EP for interrogation PPM tmrw   Obtain OH given occurred when standing, consider medication adjustment   TTE, UA  Tele monitoring Patient with +syncope, +LOC, +head trauma  Unclear etiology, unlikely 2/2 seizure  Patient with PPM (Medtronic), NSR, A-paced on EKG, troponin added on   EP for interrogation PPM tmrw   Obtain OH given occurred when standing, consider medication adjustment   TTE, UA  Tele monitoring

## 2018-10-08 LAB
ALBUMIN SERPL ELPH-MCNC: 3.7 G/DL — SIGNIFICANT CHANGE UP (ref 3.3–5)
ALP SERPL-CCNC: 114 U/L — SIGNIFICANT CHANGE UP (ref 40–120)
ALT FLD-CCNC: 17 U/L — SIGNIFICANT CHANGE UP (ref 4–33)
APPEARANCE UR: CLEAR — SIGNIFICANT CHANGE UP
AST SERPL-CCNC: 21 U/L — SIGNIFICANT CHANGE UP (ref 4–32)
BILIRUB SERPL-MCNC: 0.3 MG/DL — SIGNIFICANT CHANGE UP (ref 0.2–1.2)
BILIRUB UR-MCNC: NEGATIVE — SIGNIFICANT CHANGE UP
BLOOD UR QL VISUAL: NEGATIVE — SIGNIFICANT CHANGE UP
BUN SERPL-MCNC: 91 MG/DL — HIGH (ref 7–23)
BUN SERPL-MCNC: 93 MG/DL — HIGH (ref 7–23)
BUN SERPL-MCNC: 95 MG/DL — HIGH (ref 7–23)
CALCIUM SERPL-MCNC: 10.3 MG/DL — SIGNIFICANT CHANGE UP (ref 8.4–10.5)
CALCIUM SERPL-MCNC: 10.6 MG/DL — HIGH (ref 8.4–10.5)
CALCIUM SERPL-MCNC: 9.7 MG/DL — SIGNIFICANT CHANGE UP (ref 8.4–10.5)
CHLORIDE SERPL-SCNC: 100 MMOL/L — SIGNIFICANT CHANGE UP (ref 98–107)
CHLORIDE SERPL-SCNC: 102 MMOL/L — SIGNIFICANT CHANGE UP (ref 98–107)
CHLORIDE SERPL-SCNC: 102 MMOL/L — SIGNIFICANT CHANGE UP (ref 98–107)
CO2 SERPL-SCNC: 19 MMOL/L — LOW (ref 22–31)
CO2 SERPL-SCNC: 20 MMOL/L — LOW (ref 22–31)
CO2 SERPL-SCNC: 22 MMOL/L — SIGNIFICANT CHANGE UP (ref 22–31)
COLOR SPEC: SIGNIFICANT CHANGE UP
CREAT SERPL-MCNC: 2.11 MG/DL — HIGH (ref 0.5–1.3)
CREAT SERPL-MCNC: 2.14 MG/DL — HIGH (ref 0.5–1.3)
CREAT SERPL-MCNC: 2.18 MG/DL — HIGH (ref 0.5–1.3)
GLUCOSE SERPL-MCNC: 131 MG/DL — HIGH (ref 70–99)
GLUCOSE SERPL-MCNC: 240 MG/DL — HIGH (ref 70–99)
GLUCOSE SERPL-MCNC: 257 MG/DL — HIGH (ref 70–99)
GLUCOSE UR-MCNC: NEGATIVE — SIGNIFICANT CHANGE UP
HBA1C BLD-MCNC: 6.9 % — HIGH (ref 4–5.6)
HCT VFR BLD CALC: 26.9 % — LOW (ref 34.5–45)
HCT VFR BLD CALC: 28.1 % — LOW (ref 34.5–45)
HGB BLD-MCNC: 8.7 G/DL — LOW (ref 11.5–15.5)
HGB BLD-MCNC: 9.1 G/DL — LOW (ref 11.5–15.5)
KETONES UR-MCNC: NEGATIVE — SIGNIFICANT CHANGE UP
LEUKOCYTE ESTERASE UR-ACNC: NEGATIVE — SIGNIFICANT CHANGE UP
MCHC RBC-ENTMCNC: 30.8 PG — SIGNIFICANT CHANGE UP (ref 27–34)
MCHC RBC-ENTMCNC: 31.2 PG — SIGNIFICANT CHANGE UP (ref 27–34)
MCHC RBC-ENTMCNC: 32.3 % — SIGNIFICANT CHANGE UP (ref 32–36)
MCHC RBC-ENTMCNC: 32.4 % — SIGNIFICANT CHANGE UP (ref 32–36)
MCV RBC AUTO: 95.3 FL — SIGNIFICANT CHANGE UP (ref 80–100)
MCV RBC AUTO: 96.4 FL — SIGNIFICANT CHANGE UP (ref 80–100)
NITRITE UR-MCNC: NEGATIVE — SIGNIFICANT CHANGE UP
NRBC # FLD: 0 — SIGNIFICANT CHANGE UP
NRBC # FLD: 0 — SIGNIFICANT CHANGE UP
PH UR: 6 — SIGNIFICANT CHANGE UP (ref 5–8)
PLATELET # BLD AUTO: 160 K/UL — SIGNIFICANT CHANGE UP (ref 150–400)
PLATELET # BLD AUTO: 163 K/UL — SIGNIFICANT CHANGE UP (ref 150–400)
PMV BLD: 10.3 FL — SIGNIFICANT CHANGE UP (ref 7–13)
PMV BLD: 10.5 FL — SIGNIFICANT CHANGE UP (ref 7–13)
POTASSIUM SERPL-MCNC: 5.3 MMOL/L — SIGNIFICANT CHANGE UP (ref 3.5–5.3)
POTASSIUM SERPL-MCNC: 5.6 MMOL/L — HIGH (ref 3.5–5.3)
POTASSIUM SERPL-MCNC: 5.8 MMOL/L — HIGH (ref 3.5–5.3)
POTASSIUM SERPL-SCNC: 5.3 MMOL/L — SIGNIFICANT CHANGE UP (ref 3.5–5.3)
POTASSIUM SERPL-SCNC: 5.6 MMOL/L — HIGH (ref 3.5–5.3)
POTASSIUM SERPL-SCNC: 5.8 MMOL/L — HIGH (ref 3.5–5.3)
PROT SERPL-MCNC: 7.7 G/DL — SIGNIFICANT CHANGE UP (ref 6–8.3)
PROT UR-MCNC: 10 — SIGNIFICANT CHANGE UP
RBC # BLD: 2.79 M/UL — LOW (ref 3.8–5.2)
RBC # BLD: 2.95 M/UL — LOW (ref 3.8–5.2)
RBC # FLD: 13.6 % — SIGNIFICANT CHANGE UP (ref 10.3–14.5)
RBC # FLD: 13.9 % — SIGNIFICANT CHANGE UP (ref 10.3–14.5)
SODIUM SERPL-SCNC: 135 MMOL/L — SIGNIFICANT CHANGE UP (ref 135–145)
SODIUM SERPL-SCNC: 137 MMOL/L — SIGNIFICANT CHANGE UP (ref 135–145)
SODIUM SERPL-SCNC: 137 MMOL/L — SIGNIFICANT CHANGE UP (ref 135–145)
SP GR SPEC: 1.01 — SIGNIFICANT CHANGE UP (ref 1–1.04)
TROPONIN T, HIGH SENSITIVITY: 32 NG/L — SIGNIFICANT CHANGE UP (ref ?–14)
UROBILINOGEN FLD QL: NORMAL — SIGNIFICANT CHANGE UP
WBC # BLD: 11.91 K/UL — HIGH (ref 3.8–10.5)
WBC # BLD: 9.72 K/UL — SIGNIFICANT CHANGE UP (ref 3.8–10.5)
WBC # FLD AUTO: 11.91 K/UL — HIGH (ref 3.8–10.5)
WBC # FLD AUTO: 9.72 K/UL — SIGNIFICANT CHANGE UP (ref 3.8–10.5)

## 2018-10-08 PROCEDURE — 93280 PM DEVICE PROGR EVAL DUAL: CPT | Mod: 26

## 2018-10-08 PROCEDURE — 73130 X-RAY EXAM OF HAND: CPT | Mod: 26,50

## 2018-10-08 PROCEDURE — 99233 SBSQ HOSP IP/OBS HIGH 50: CPT

## 2018-10-08 RX ORDER — SODIUM POLYSTYRENE SULFONATE 4.1 MEQ/G
30 POWDER, FOR SUSPENSION ORAL ONCE
Qty: 0 | Refills: 0 | Status: COMPLETED | OUTPATIENT
Start: 2018-10-08 | End: 2018-10-08

## 2018-10-08 RX ORDER — SODIUM CHLORIDE 9 MG/ML
1000 INJECTION INTRAMUSCULAR; INTRAVENOUS; SUBCUTANEOUS
Qty: 0 | Refills: 0 | Status: DISCONTINUED | OUTPATIENT
Start: 2018-10-08 | End: 2018-10-09

## 2018-10-08 RX ORDER — CEPHALEXIN 500 MG
250 CAPSULE ORAL EVERY 12 HOURS
Qty: 0 | Refills: 0 | Status: DISCONTINUED | OUTPATIENT
Start: 2018-10-08 | End: 2018-10-11

## 2018-10-08 RX ORDER — METOPROLOL TARTRATE 50 MG
100 TABLET ORAL EVERY 12 HOURS
Qty: 0 | Refills: 0 | Status: DISCONTINUED | OUTPATIENT
Start: 2018-10-08 | End: 2018-10-09

## 2018-10-08 RX ORDER — APIXABAN 2.5 MG/1
2.5 TABLET, FILM COATED ORAL EVERY 12 HOURS
Qty: 0 | Refills: 0 | Status: DISCONTINUED | OUTPATIENT
Start: 2018-10-08 | End: 2018-10-11

## 2018-10-08 RX ORDER — INFLUENZA VIRUS VACCINE 15; 15; 15; 15 UG/.5ML; UG/.5ML; UG/.5ML; UG/.5ML
0.5 SUSPENSION INTRAMUSCULAR ONCE
Qty: 0 | Refills: 0 | Status: COMPLETED | OUTPATIENT
Start: 2018-10-08 | End: 2018-10-11

## 2018-10-08 RX ORDER — ACETAMINOPHEN 500 MG
650 TABLET ORAL EVERY 6 HOURS
Qty: 0 | Refills: 0 | Status: DISCONTINUED | OUTPATIENT
Start: 2018-10-08 | End: 2018-10-11

## 2018-10-08 RX ADMIN — Medication 25 MILLIGRAM(S): at 18:14

## 2018-10-08 RX ADMIN — Medication 650 MILLIGRAM(S): at 12:10

## 2018-10-08 RX ADMIN — ISOSORBIDE MONONITRATE 60 MILLIGRAM(S): 60 TABLET, EXTENDED RELEASE ORAL at 12:12

## 2018-10-08 RX ADMIN — ALBUTEROL 10 MILLIGRAM(S): 90 AEROSOL, METERED ORAL at 00:05

## 2018-10-08 RX ADMIN — AMLODIPINE BESYLATE 10 MILLIGRAM(S): 2.5 TABLET ORAL at 09:54

## 2018-10-08 RX ADMIN — Medication 81 MILLIGRAM(S): at 12:12

## 2018-10-08 RX ADMIN — Medication 100 MILLIGRAM(S): at 18:14

## 2018-10-08 RX ADMIN — Medication 975 MILLIGRAM(S): at 01:43

## 2018-10-08 RX ADMIN — Medication 25 MILLIGRAM(S): at 09:54

## 2018-10-08 RX ADMIN — Medication 100 MILLIGRAM(S): at 09:54

## 2018-10-08 RX ADMIN — APIXABAN 2.5 MILLIGRAM(S): 2.5 TABLET, FILM COATED ORAL at 18:14

## 2018-10-08 RX ADMIN — Medication 1: at 19:18

## 2018-10-08 RX ADMIN — Medication 2: at 13:39

## 2018-10-08 RX ADMIN — SODIUM CHLORIDE 50 MILLILITER(S): 9 INJECTION INTRAMUSCULAR; INTRAVENOUS; SUBCUTANEOUS at 15:46

## 2018-10-08 RX ADMIN — SODIUM POLYSTYRENE SULFONATE 30 GRAM(S): 4.1 POWDER, FOR SUSPENSION ORAL at 18:08

## 2018-10-08 RX ADMIN — ATORVASTATIN CALCIUM 40 MILLIGRAM(S): 80 TABLET, FILM COATED ORAL at 23:56

## 2018-10-08 RX ADMIN — Medication 250 MILLIGRAM(S): at 15:38

## 2018-10-08 RX ADMIN — Medication 650 MILLIGRAM(S): at 13:10

## 2018-10-08 NOTE — PHYSICAL THERAPY INITIAL EVALUATION ADULT - DIAGNOSIS, PT EVAL
Pt admitted for syncope;  acute RT nasal bone and nasal septum fracture; pt presents with (+)Orthostatic; decreased strength, and decreased balance. Pt admitted for syncope;  acute RT nasal bone and nasal septum fracture; CT of head and C/S (-);  pt presents with (+)Orthostatic; decreased strength, and decreased balance.

## 2018-10-08 NOTE — PROCEDURE NOTE - INTERROGATION NOTE: COMMENTS
normal sensing pacing; excellent threshold capture; no reprogramming; no correlated event to her syncope recorded

## 2018-10-08 NOTE — PHYSICAL THERAPY INITIAL EVALUATION ADULT - PATIENT PROFILE REVIEW, REHAB EVAL
No formal Activity order in the computer; spoke with ED KRISTAL Mooney prior to PT evaluation--> Pt OK for PT consult/yes

## 2018-10-08 NOTE — PHYSICAL THERAPY INITIAL EVALUATION ADULT - MANUAL MUSCLE TESTING RESULTS, REHAB EVAL
cardiac precautions; Bilateral Shoulders 3-/5, bilateral Elbows/hands/wrist at least 3+/5, bilateral LE 3+/5/grossly assessed due to

## 2018-10-08 NOTE — PROGRESS NOTE ADULT - SUBJECTIVE AND OBJECTIVE BOX
CHIEF COMPLAINT: Patient is a 83y old  female who presents with a chief complaint of syncope on Eliquis, epistaxis (08 Oct 2018 13:12)    SUBJECTIVE / OVERNIGHT EVENTS: Patient seen and examined. No acute events overnight. Patient c/o pain in her face, nasal area and packing area. She seemed a little anxious about whether she lost a lot of blood and for how long will the nasal packing remain.     MEDICATIONS  (STANDING):  amLODIPine   Tablet 10 milliGRAM(s) Oral daily  aspirin enteric coated 81 milliGRAM(s) Oral daily  atorvastatin 40 milliGRAM(s) Oral at bedtime  cephalexin 500 milliGRAM(s) Oral every 12 hours  dextrose 5%. 1000 milliLiter(s) (50 mL/Hr) IV Continuous <Continuous>  dextrose 50% Injectable 12.5 Gram(s) IV Push once  dextrose 50% Injectable 25 Gram(s) IV Push once  dextrose 50% Injectable 25 Gram(s) IV Push once  hydrALAZINE 25 milliGRAM(s) Oral two times a day  influenza   Vaccine 0.5 milliLiter(s) IntraMuscular once  insulin lispro (HumaLOG) corrective regimen sliding scale   SubCutaneous three times a day before meals  insulin lispro (HumaLOG) corrective regimen sliding scale   SubCutaneous at bedtime  isosorbide   mononitrate ER Tablet (IMDUR) 60 milliGRAM(s) Oral daily  metoprolol tartrate 100 milliGRAM(s) Oral every 12 hours    MEDICATIONS  (PRN):  acetaminophen   Tablet .. 650 milliGRAM(s) Oral every 6 hours PRN Mild Pain (1 - 3), Moderate Pain (4 - 6)  dextrose 40% Gel 15 Gram(s) Oral once PRN Blood Glucose LESS THAN 70 milliGRAM(s)/deciliter  glucagon  Injectable 1 milliGRAM(s) IntraMuscular once PRN Glucose LESS THAN 70 milligrams/deciliter      VITALS:  T(F): 97.2 (10-08-18 @ 14:40), Max: 98.7 (10-08-18 @ 09:44)  HR: 60 (10-08-18 @ 14:40) (60 - 77)  BP: 112/66 (10-08-18 @ 14:40) (112/66 - 153/50)  RR: 18 (10-08-18 @ 14:40) (16 - 22)  SpO2: 100% (10-08-18 @ 14:40)    PHYSICAL EXAM:  GENERAL: NAD, well-developed  HEAD:  Atraumatic, Normocephalic  EYES: EOMI, PERRLA, conjunctiva and sclera clear; ecchymosis on face, nontender, except on tip of nose, R nostril with nasal packing, soaked with blood, minimal blood coming out of R nostril  NECK: Supple, No JVD  CHEST/LUNG: Clear to auscultation bilaterally; No wheeze  HEART: Regular rate and rhythm; systolic murmur  ABDOMEN: Soft, Nontender, Nondistended; Bowel sounds present  EXTREMITIES:  2+ Peripheral Pulses, No clubbing, cyanosis, or edema  PSYCH: AAOx3  NEUROLOGY: non-focal  SKIN: ecchymosis chin, cheeks and nose    LABS:              9.1                  x    | x    | x            11.91 >-----------< 160     ------------------------< x                     28.1                 x    | x    | x                                            Ca x     Mg x     Ph x      H/H trend  10-08 @ 11:58   HgB  9.1   HCT  28.1  10-08 @ 06:10   HgB  8.7   HCT  26.9  10-07 @ 19:31   HgB  9.3   HCT  28.9  10-07 @ 17:28   HgB  10.0  HCT  30.9         TPro  7.7  /  Alb  3.7      TBili  0.3  /  DBili  x         AST  21  /  ALT  17            AlkPhos  114      INR: 1.17 ;    PT: 13.5 SEC<H>;    PTT: 36.7 SEC    RADIOLOGY & ADDITIONAL TESTS:  Imaging Personally Reviewed:  < from: Xray Hand 3 Views, Bilateral (10.08.18 @ 06:47) >  IMPRESSION:  Metal ring overlies and obscures midportion of left 4th proximal phalanx.    No fractures or dislocations.    Faint amorphous intra-articular calcific deposits in the TFCregion   compatible with chondrocalcinosis, most commonly seen in association with   CPPD. Bilateral STT joint osteoarthritic change. Preserved remaining   joint spaces and no joint margin erosions.    Carpal bones normally aligned.    Neutral ulnar variance.    Generalized osteopenia otherwise no discrete lytic or blastic lesions.     Vascular calcifications.    < end of copied text >      [ ] Consultant(s) Notes Reviewed:  [x] Care Discussed with Consultants/Other Providers: ENT PA - discussed management of epistaxis

## 2018-10-08 NOTE — PHYSICAL THERAPY INITIAL EVALUATION ADULT - ADDITIONAL COMMENTS
Pt reports that she lives in a private house with daughter with ~3STE; (+)bilateral Handrails; bedroom/bathroom on first floor. Prior to hospital admission pt was ambulating independently using single axis cane. Pt does required assistance with showering and dressing that she gets from her daughter. Pt's daughter is her home health aide. Pt denies any recent falls other than the fall from Tuesday 10/02/2018.  (+)orthostatic: BP Supine 159/40mmHg, BP Sitting 107/81mmHg, BP standing 139/40mm Hg    Pt left comfortable on stretcher, NAD, all lines intact, all precautions maintained, with family @bedside, and RN aware of PT evaluation and pt being orthostatic

## 2018-10-08 NOTE — PHYSICAL THERAPY INITIAL EVALUATION ADULT - GENERAL OBSERVATIONS, REHAB EVAL
Pt encountered on stretcher in the ED, no distress, AxOx4, with +IV, +tele, epistaxis, with family @bedside.

## 2018-10-08 NOTE — ED ADULT NURSE REASSESSMENT NOTE - NS ED NURSE REASSESS COMMENT FT1
Pt resting comfortably. AM labs drawn and sent. Vs as noted. Spoke with Dr. Scales regarding BP, will hold BP meds and reassess for administration at 9am. Will continue to monitor.

## 2018-10-08 NOTE — CONSULT NOTE ADULT - SUBJECTIVE AND OBJECTIVE BOX
S/P syncope and fall, Nasal trauma and fracture. Patient had left epistaxis. Packed by ED   with rhino rocket.    AVSS, P02 98%  HEENT:  Left  rhino rocket in place.   No bleeding noted from nares or oropharynx.

## 2018-10-08 NOTE — CONSULT NOTE ADULT - SUBJECTIVE AND OBJECTIVE BOX
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 Patient reports "few hours" of LOC, with associated head trauma. Denies precipitating chest pain, SOB, palpitations. However, endorses "feeling hot" prior to syncopal episode. Patient reports she woke up and called her daughter. She reports bruises on her face, no epistaxis at that time, but endorse "pain at the top of my nose". She denies previous episodes of syncope or presyncope. She reports she was doing fine up until this AM when she laid on the couch to sleep, and felt "blood all over my face" and came to the ED. Per daughter, last dose of Eliquis 10/6 PM. Did not take PM meds today. Patient currently denies chest pain, SOB, lower extremity edema. She does endorse L 4th and 5th digit pain. She reports the epistaxis continued to worsen in the ED, however has stopped since nasal packing. Patient denies dysuria, hematuria, abdominal pain, cough, fevers, shakes, chills, neurological changes, fecal or urinary incontinence or other seizure like activity. At baseline, patient ambulates with cane, however has been told she should ambulate with walker.     In ED: /55 HR61 RR16 T98 100%RA  Patient received Tylenol and Tetanus in ED    Admitted to medicine for medical optimization , ENT eval with left nasal rhino rocket plan to maintain 48 to 72 hrs asked to eval patient for nasal septal fracture    No family history pertinent to patient’s current condition/encounter (07 Oct 2018 22:56)      PAST MEDICAL & SURGICAL HISTORY:  Chronic systolic heart failure  Tachycardia-bradycardia syndrome  Hyperlipidemia  Uterine Cancer (ICD9 179): treated with hysterectomy and radiation therapy in   Osteoarthritis of Knee (ICD9 715.96): b/l knees  DM Type 2 (Diabetes Mellitus, Type 2) (ICD9 250.00)  HTN (Hypertension) (ICD9 401.9)  Status Post Total Knee Replacement: LISA, 6 months apart   Cardiac 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 ( - for tacybrady syndrome @ [facility])  History of Hysterectomy (ICD9 V88.01)      Allergies    No Known Allergies    Intolerances        Home Medications:  amLODIPine 10 mg oral tablet: 1 tab(s) orally once a day (07 Oct 2018 23:13)  Aspirin Enteric Coated 81 mg oral delayed release tablet: 1 tab(s) orally once a day (07 Oct 2018 23:13)  atorvastatin 40 mg oral tablet: 1 tab(s) orally once a day (at bedtime) (07 Oct 2018 23:13)  Eliquis 2.5 mg oral tablet: 1 tab(s) orally 2 times a day (07 Oct 2018 23:13)  glimepiride 4 mg oral tablet: 1 tab(s) orally once a day (07 Oct 2018 23:13)  hydrALAZINE 25 mg oral tablet: 1 tab(s) orally 2 times a day (07 Oct 2018 23:13)  Januvia 50 mg oral tablet: 1 tab(s) orally once a day (07 Oct 2018 23:13)  metoprolol succinate 200 mg oral tablet, extended release: 1 tab(s) orally once a day (07 Oct 2018 23:13)      MEDICATIONS  (STANDING):  amLODIPine   Tablet 10 milliGRAM(s) Oral daily  apixaban 2.5 milliGRAM(s) Oral every 12 hours  aspirin enteric coated 81 milliGRAM(s) Oral daily  atorvastatin 40 milliGRAM(s) Oral at bedtime  cephalexin 250 milliGRAM(s) Oral every 12 hours  dextrose 5%. 1000 milliLiter(s) (50 mL/Hr) IV Continuous <Continuous>  dextrose 50% Injectable 12.5 Gram(s) IV Push once  dextrose 50% Injectable 25 Gram(s) IV Push once  dextrose 50% Injectable 25 Gram(s) IV Push once  hydrALAZINE 25 milliGRAM(s) Oral two times a day  influenza   Vaccine 0.5 milliLiter(s) IntraMuscular once  insulin lispro (HumaLOG) corrective regimen sliding scale   SubCutaneous three times a day before meals  insulin lispro (HumaLOG) corrective regimen sliding scale   SubCutaneous at bedtime  isosorbide   mononitrate ER Tablet (IMDUR) 60 milliGRAM(s) Oral daily  metoprolol tartrate 100 milliGRAM(s) Oral every 12 hours  sodium chloride 0.9%. 1000 milliLiter(s) (50 mL/Hr) IV Continuous <Continuous>      SOCIAL HISTORY:    FAMILY HISTORY:  No pertinent family history in first degree relatives      ___________________________________________  REVIEW OF SYSTEMS:    · Negative General Symptoms	no fever; no chills	  · Negative Skin Symptoms	no rash	  · Skin Symptoms	++ecchymosis on nose	  · Breast Symptoms	breast tenderness L	  · Negative Ophthalmologic Symptoms	no diplopia; no photophobia	  · Negative ENMT Symptoms	no vertigo	  · ENMT Symptoms	epistaxis	  · Negative Respiratory and Thorax Symptoms	no dyspnea; no cough; no hemoptysis	  · Negative Cardiovascular Symptoms	no chest pain; no palpitations	  · Negative Gastrointestinal Symptoms	no nausea; no vomiting; no change in bowel habits	  · Negative General Genitourinary Symptoms	no hematuria; no dysuria	  · Negative Musculoskeletal Symptoms	no joint swelling; no muscle weakness	  · Negative Neurological Symptoms	no focal seizures	  · Neurological Symptoms	syncope	    ___________________________________________  OBJECTIVE:  Vital Signs Last 24 Hrs  T(C): 36.4 (08 Oct 2018 18:10), Max: 37.1 (08 Oct 2018 09:44)  T(F): 97.6 (08 Oct 2018 18:10), Max: 98.7 (08 Oct 2018 09:44)  HR: 61 (08 Oct 2018 18:10) (60 - 77)  BP: 108/48 (08 Oct 2018 18:10) (108/48 - 153/50)  BP(mean): --  RR: 15 (08 Oct 2018 18:10) (15 - 22)  SpO2: 100% (08 Oct 2018 18:10) (99% - 100%)CAPILLARY BLOOD GLUCOSE      POCT Blood Glucose.: 222 mg/dL (08 Oct 2018 13:20)    I&O's Detail      PHYSICAL EXAM:    General: Alert, NAD  Neuro: CN II-XII intact, motor and sensory grossly intact with no focal deficits.  HEENT: facial ecchymosis, nasal deformity with some nasal tenderness  nasal packing in place left nasal airway EOMI  Respiratory: Breathing non-labored, airway patent  Extremities:  MSK: Intact ROM.  ____________________________________________  LABS:  CBC Full  -  ( 08 Oct 2018 11:58 )  WBC Count : 11.91 K/uL  Hemoglobin : 9.1 g/dL  Hematocrit : 28.1 %  Platelet Count - Automated : 160 K/uL  Mean Cell Volume : 95.3 fL  Mean Cell Hemoglobin : 30.8 pg  Mean Cell Hemoglobin Concentration : 32.4 %  Auto Neutrophil # : x  Auto Lymphocyte # : x  Auto Monocyte # : x  Auto Eosinophil # : x  Auto Basophil # : x  Auto Neutrophil % : x  Auto Lymphocyte % : x  Auto Monocyte % : x  Auto Eosinophil % : x  Auto Basophil % : x    10-08    137  |  102  |  93<H>  ----------------------------<  240<H>  5.8<H>   |  22  |  2.11<H>    Ca    10.3      08 Oct 2018 15:45    TPro  7.7  /  Alb  3.7  /  TBili  0.3  /  DBili  x   /  AST  21  /  ALT  17  /  AlkPhos  114  10-08    LIVER FUNCTIONS - ( 08 Oct 2018 06:10 )  Alb: 3.7 g/dL / Pro: 7.7 g/dL / ALK PHOS: 114 u/L / ALT: 17 u/L / AST: 21 u/L / GGT: x           PT/INR - ( 07 Oct 2018 17:28 )   PT: 13.5 SEC;   INR: 1.17          PTT - ( 07 Oct 2018 17:28 )  PTT:36.7 SEC  Urinalysis Basic - ( 08 Oct 2018 16:35 )    Color: LT. YELLOW / Appearance: CLEAR / S.015 / pH: 6.0  Gluc: NEGATIVE / Ketone: NEGATIVE  / Bili: NEGATIVE / Urobili: NORMAL   Blood: NEGATIVE / Protein: 10 / Nitrite: NEGATIVE   Leuk Esterase: NEGATIVE / RBC: x / WBC x   Sq Epi: x / Non Sq Epi: x / Bacteria: x            CT reviewed    IMP : 5 day old nasal fracture with  24hr of epistaxis   Would not perform any elective nasal instrumentation at present due to anticoagulation and packing being in place   One epistaxis resolved RBA of nasal reduction to be re addressed with patient to see if she wishes to proceed. Lewis had tonight with patient and she asked me to speak with her son Mr Echavarria at [phone]  I have left him a voicemail  Presently indication for  closed reduction would be to minimize risk of cosmetic deformity this would be weighed with risks of general anesthesia which would be needed for  procedure and being off systemic anticoagulation  Will await further discussion with patient and family and medical stabilization

## 2018-10-08 NOTE — PROGRESS NOTE ADULT - ASSESSMENT
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

## 2018-10-08 NOTE — PHYSICAL THERAPY INITIAL EVALUATION ADULT - ACTIVE RANGE OF MOTION EXAMINATION, REHAB EVAL
Right shoulder flexion ~60 degrees, right elbow/hand/wrist WFL, Left shoulder flexion ~40 degrees (2/2 pain and OA), left elbow/hand/wrist WFL/bilateral  lower extremity Active ROM was WFL (within functional limits) Right shoulder flexion ~60 degrees, right elbow/hand/wrist WFL, Left shoulder flexion ~40 degrees (2/2 weakness and OA), left elbow/hand/wrist WFL/bilateral  lower extremity Active ROM was WFL (within functional limits)

## 2018-10-08 NOTE — PROGRESS NOTE ADULT - PROBLEM SELECTOR PLAN 10
Hx of DM2, on oral medications  LuX3b=9.9%. FS not well controlled. Continue with ISS for now and continue to monitor FS closely. Encourage fluid hydration.  -will place on gentle hydration with NS @50cc/hr for 12hrs only

## 2018-10-09 LAB
BUN SERPL-MCNC: 82 MG/DL — HIGH (ref 7–23)
CALCIUM SERPL-MCNC: 10.6 MG/DL — HIGH (ref 8.4–10.5)
CHLORIDE SERPL-SCNC: 103 MMOL/L — SIGNIFICANT CHANGE UP (ref 98–107)
CO2 SERPL-SCNC: 20 MMOL/L — LOW (ref 22–31)
CREAT SERPL-MCNC: 1.98 MG/DL — HIGH (ref 0.5–1.3)
GLUCOSE BLDC GLUCOMTR-MCNC: 183 MG/DL — HIGH (ref 70–99)
GLUCOSE BLDC GLUCOMTR-MCNC: 299 MG/DL — HIGH (ref 70–99)
GLUCOSE BLDC GLUCOMTR-MCNC: 306 MG/DL — HIGH (ref 70–99)
GLUCOSE SERPL-MCNC: 143 MG/DL — HIGH (ref 70–99)
HCT VFR BLD CALC: 26.3 % — LOW (ref 34.5–45)
HGB BLD-MCNC: 8.4 G/DL — LOW (ref 11.5–15.5)
MAGNESIUM SERPL-MCNC: 1.9 MG/DL — SIGNIFICANT CHANGE UP (ref 1.6–2.6)
MCHC RBC-ENTMCNC: 31.5 PG — SIGNIFICANT CHANGE UP (ref 27–34)
MCHC RBC-ENTMCNC: 31.9 % — LOW (ref 32–36)
MCV RBC AUTO: 98.5 FL — SIGNIFICANT CHANGE UP (ref 80–100)
NRBC # FLD: 0 — SIGNIFICANT CHANGE UP
PHOSPHATE SERPL-MCNC: 3.1 MG/DL — SIGNIFICANT CHANGE UP (ref 2.5–4.5)
PLATELET # BLD AUTO: 170 K/UL — SIGNIFICANT CHANGE UP (ref 150–400)
PMV BLD: 10.5 FL — SIGNIFICANT CHANGE UP (ref 7–13)
POTASSIUM SERPL-MCNC: 5.1 MMOL/L — SIGNIFICANT CHANGE UP (ref 3.5–5.3)
POTASSIUM SERPL-SCNC: 5.1 MMOL/L — SIGNIFICANT CHANGE UP (ref 3.5–5.3)
RBC # BLD: 2.67 M/UL — LOW (ref 3.8–5.2)
RBC # FLD: 14.1 % — SIGNIFICANT CHANGE UP (ref 10.3–14.5)
SODIUM SERPL-SCNC: 137 MMOL/L — SIGNIFICANT CHANGE UP (ref 135–145)
WBC # BLD: 11.8 K/UL — HIGH (ref 3.8–10.5)
WBC # FLD AUTO: 11.8 K/UL — HIGH (ref 3.8–10.5)

## 2018-10-09 PROCEDURE — 99233 SBSQ HOSP IP/OBS HIGH 50: CPT

## 2018-10-09 PROCEDURE — 99222 1ST HOSP IP/OBS MODERATE 55: CPT

## 2018-10-09 RX ORDER — METOPROLOL TARTRATE 50 MG
75 TABLET ORAL
Qty: 0 | Refills: 0 | Status: DISCONTINUED | OUTPATIENT
Start: 2018-10-09 | End: 2018-10-11

## 2018-10-09 RX ADMIN — Medication 81 MILLIGRAM(S): at 11:45

## 2018-10-09 RX ADMIN — ATORVASTATIN CALCIUM 40 MILLIGRAM(S): 80 TABLET, FILM COATED ORAL at 21:32

## 2018-10-09 RX ADMIN — Medication 3: at 17:17

## 2018-10-09 RX ADMIN — APIXABAN 2.5 MILLIGRAM(S): 2.5 TABLET, FILM COATED ORAL at 05:48

## 2018-10-09 RX ADMIN — Medication 25 MILLIGRAM(S): at 05:47

## 2018-10-09 RX ADMIN — APIXABAN 2.5 MILLIGRAM(S): 2.5 TABLET, FILM COATED ORAL at 17:16

## 2018-10-09 RX ADMIN — Medication 250 MILLIGRAM(S): at 05:48

## 2018-10-09 RX ADMIN — ISOSORBIDE MONONITRATE 60 MILLIGRAM(S): 60 TABLET, EXTENDED RELEASE ORAL at 11:45

## 2018-10-09 RX ADMIN — Medication 100 MILLIGRAM(S): at 05:47

## 2018-10-09 RX ADMIN — Medication 25 MILLIGRAM(S): at 17:18

## 2018-10-09 RX ADMIN — Medication 75 MILLIGRAM(S): at 17:19

## 2018-10-09 RX ADMIN — AMLODIPINE BESYLATE 10 MILLIGRAM(S): 2.5 TABLET ORAL at 05:48

## 2018-10-09 RX ADMIN — Medication 4: at 12:17

## 2018-10-09 RX ADMIN — Medication 250 MILLIGRAM(S): at 17:16

## 2018-10-09 NOTE — CONSULT NOTE ADULT - ASSESSMENT
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  Not consistent with arrhythmia or ACS, physical exam not consistent with severe AS  Possibly 2/2 vagal etiology    #Syncope  -- check orthostatics  -- suspect vagal  -- can monitor on tele for 24 hours  -- PPM already interrogated  -- check TTE    #AFib  -- continue apixaban    #HTN  -- continue amlodipine    #HFrEF  -- continue hydralazine/imdur    #HLD  -- continue atorvastatin    Jose D Amaro MD

## 2018-10-09 NOTE — PROGRESS NOTE ADULT - ASSESSMENT
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

## 2018-10-09 NOTE — CONSULT NOTE ADULT - SUBJECTIVE AND OBJECTIVE BOX
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 1 week ago, patient was getting out of bed when she had a syncopal episode x 1, with LOC. Patient reports "few hours" (approximately 2 hours) of LOC, with associated head trauma. Denies precipitating chest pain, SOB, palpitations. However, endorses "feeling hot" prior to syncopal episode. Patient reports she woke up and called her daughter. She reports bruises on her face, no epistaxis at that time, but endorse "pain at the top of my nose". She denies previous episodes of syncope or presyncope. She reports she was doing fine up until this AM when she laid on the couch to sleep, and felt "blood all over my face" and came to the ED. Per daughter, last dose of Eliquis 10/6 PM. Did not take PM meds today. Patient currently denies chest pain, SOB, lower extremity edema. She does endorse L 4th and 5th digit pain. She reports the epistaxis continued to worsen in the ED, however has stopped since nasal packing. Patient denies dysuria, hematuria, abdominal pain, cough, fevers, shakes, chills, neurological changes, fecal or urinary incontinence or other seizure like activity. At baseline, patient ambulates with cane, however has been told she should ambulate with walker.     In ED: /55 HR61 RR16 T98 100%RA  Patient received Tylenol and Tetanus in ED    No family history pertinent to patient’s current condition/encounter.    Last TTE with mild AS. PPM with no events on interrogation.    PMH:   Chronic systolic heart failure  Tachycardia-bradycardia syndrome  Obese  Complete Spontaneous   Hyperlipidemia  Uterine Cancer (ICD9 179)  Osteoarthritis of Knee (ICD9 715.96)  DM Type 2 (Diabetes Mellitus, Type 2) (ICD9 250.00)  HTN (Hypertension) (ICD9 401.9)  Cardiac Pacemaker (ICD9 V45.01)    PSH:   Status Post Total Knee Replacement  Cardiac Pacemaker (ICD9 V45.01)  History of Hysterectomy (ICD9 V88.01)    Medications:   acetaminophen   Tablet .. 650 milliGRAM(s) Oral every 6 hours PRN  amLODIPine   Tablet 10 milliGRAM(s) Oral daily  apixaban 2.5 milliGRAM(s) Oral every 12 hours  aspirin enteric coated 81 milliGRAM(s) Oral daily  atorvastatin 40 milliGRAM(s) Oral at bedtime  cephalexin 250 milliGRAM(s) Oral every 12 hours  dextrose 40% Gel 15 Gram(s) Oral once PRN  dextrose 5%. 1000 milliLiter(s) IV Continuous <Continuous>  dextrose 50% Injectable 12.5 Gram(s) IV Push once  dextrose 50% Injectable 25 Gram(s) IV Push once  dextrose 50% Injectable 25 Gram(s) IV Push once  glucagon  Injectable 1 milliGRAM(s) IntraMuscular once PRN  hydrALAZINE 25 milliGRAM(s) Oral two times a day  influenza   Vaccine 0.5 milliLiter(s) IntraMuscular once  insulin lispro (HumaLOG) corrective regimen sliding scale   SubCutaneous three times a day before meals  insulin lispro (HumaLOG) corrective regimen sliding scale   SubCutaneous at bedtime  isosorbide   mononitrate ER Tablet (IMDUR) 60 milliGRAM(s) Oral daily  metoprolol tartrate 100 milliGRAM(s) Oral every 12 hours    Allergies:  No Known Allergies    FAMILY HISTORY:  No pertinent family history in first degree relatives    Social History:  [place]    Review of Systems:  Constitutional: [ ] Fever [ ] Chills [ ] Fatigue [ ] Weight change   HEENT: [ ] Blurred vision [ ] Eye Pain [ ] Headache [ ] Runny nose [ ] Sore Throat   Respiratory: [ ] Cough [ ] Wheezing [ ] Shortness of breath  Cardiovascular: [ ] Chest Pain [ ] Palpitations [ ] KRAUS [ ] PND [ ] Orthopnea  Gastrointestinal: [ ] Abdominal Pain [ ] Diarrhea [ ] Constipation [ ] Hemorrhoids [ ] Nausea [ ] Vomiting  Genitourinary: [ ] Nocturia [ ] Dysuria [ ] Incontinence  Extremities: [ ] Swelling [ ] Joint Pain  Neurologic: [ ] Focal deficit [ ] Paresthesias [ ] Syncope  Lymphatic: [ ] Swelling [ ] Lymphadenopathy   Skin: [ ] Rash [ ] Ecchymoses [ ] Wounds [ ] Lesions  Psychiatry: [ ] Depression [ ] Suicidal/Homicidal Ideation [ ] Anxiety [ ] Sleep Disturbances  [x] 10 point review of systems is otherwise negative except as mentioned above            [ ]Unable to obtain    Physical Exam:  T(C): 36.7 (10-09-18 @ 05:45), Max: 37 (10-08-18 @ 19:50)  HR: 61 (10-09-18 @ 05:45) (60 - 64)  BP: 148/57 (10-09-18 @ 05:45) (108/48 - 148/57)  RR: 17 (10-09-18 @ 05:45) (15 - 18)  SpO2: 100% (10-09-18 @ 05:45) (100% - 100%)  Wt(kg): --    Daily Height in cm: 157.48 (09 Oct 2018 02:54)    Daily     Appearance: NAD  Eyes: PERRL, EOMI  HENT: Normal oral muscosa, NC/AT  Cardiovascular: normal S1 and S2, RRR, 2/6 systolic murmur early peaking, no edema, normal JVP  Respiratory: Clear to auscultation bilaterally  Gastrointestinal: Soft, non-tender, non-distended, BS+  Musculoskeletal: No clubbing, no joint deformity   Neurologic: Non-focal  Lymphatic: No lymphadenopathy  Psychiatry: AAOx3, mood & affect appropriate  Skin: No rashes, no ecchymoses, no cyanosis    Cardiovascular Diagnostic Testing:  ECG: a paced rhythm    Echo:   10/9/2017  CONCLUSIONS:  1. Mitral annular calcification and calcified mitral  leaflets with decreased diastolic opening. Moderate-severe  mitral regurgitation. Mean transmitral valve gradient  equals 3 mm Hg, consistent with mild mitral stenosis.  2. Aortic valve not well visualized; appears calcified.  Peak transaortic valve gradient equals 28 mm Hg, mean  transaortic valve gradient equals 13 mm Hg, consistent with  mild aortic stenosis. Moderate aortic regurgitation.  3. Mild to moderate segmental left ventricular systolic  dysfunction. The apex, distal inferior, mid to distal  septal walls are hypokinetic.  4. Normal right ventricular size and function. Device wire  is noted in the right heart.    Stress Testing:  10/26/2017  IMPRESSIONS:Normal Study  * Myocardial Perfusion SPECT results are abnormal.  * Review of raw data shows: The study is of good technical  quality.  * The left ventricle was normal in size. There are medium  sized, severe defects in anteroseptal, apical walls that  are fixed, suggestive of infarct.  * Post-stress gated wall motion analysis was performed  (LVEF = 42 %;LVEDV = 103 ml.), revealing moderate overall  hypokinesis. There was apical and anteroseptal akinesis.  The remaining segments contracted well.  ***Compared with Nuclear/Stress test of 2016, the  current findings are new, suggesting interval infarction  ADDENDUM 10/26/2017: previous interpretation was based on  incorrect data from prior study    Cath:    Ventricles: EF estimated by contrast ventriculography was 60 %.  Coronary vessels: The coronary circulation is right dominant.  LM:      LM: Angiography showed minor luminal irregularities with no flow  limiting lesions.  LAD:      LAD: Angiography showed minor luminal irregularities with no flow  limiting lesions.  CX:      Circumflex: Angiography showed minor luminal irregularities with  no flow limiting lesions.  RCA:      RCA: Angiography showed minor luminal irregularities with no flow  limiting lesions.  Complications: There were no complications.  Recommendations:  The patient should continue with the present medications.    Interpretation of Telemetry:  a paced    Imaging:  CT  IMPRESSION:  Head CT: No evidence for intracranial hemorrhage, mass effect, or   displaced calvarial fracture. Hyperostosis frontalis is present.    Cervical spine CT: No evidence for acute displaced fracture or traumatic   malalignment. Cervical degenerative spondylosis, as described above. MRI   can be performed if there is concern for ligamentous or cord injury (and   if there are no contraindications to such).    Maxillofacial CT: There is an acute fracture of the right nasal bone with   probable acute fracture of the nasal septum.    Labs:                        8.4    11.80 )-----------( 170      ( 09 Oct 2018 06:25 )             26.3     10-09    137  |  103  |  82<H>  ----------------------------<  143<H>  5.1   |  20<L>  |  1.98<H>    Ca    10.6<H>      09 Oct 2018 06:25  Phos  3.1     10-09  Mg     1.9     10-09    TPro  7.7  /  Alb  3.7  /  TBili  0.3  /  DBili  x   /  AST  21  /  ALT  17  /  AlkPhos  114  10-08    PT/INR - ( 07 Oct 2018 17:28 )   PT: 13.5 SEC;   INR: 1.17          PTT - ( 07 Oct 2018 17:28 )  PTT:36.7 SEC          Hemoglobin A1C, Whole Blood: 6.9 % (10-08 @ 06:10)

## 2018-10-09 NOTE — PROGRESS NOTE ADULT - SUBJECTIVE AND OBJECTIVE BOX
Patient is a 83y old  Female who presents with a chief complaint of syncope on Eliquis, epistaxis (09 Oct 2018 11:20)      SUBJECTIVE / OVERNIGHT EVENTS: feels overall well, denies DZ, LOC, CP, SOB. no further bleed, packing in place     ROS:  No HA/DZ  No Vision changes   No CP, SOB  No N/V/D  No Edema  No Rash  NO weakness, numbness    MEDICATIONS  (STANDING):  amLODIPine   Tablet 10 milliGRAM(s) Oral daily  apixaban 2.5 milliGRAM(s) Oral every 12 hours  aspirin enteric coated 81 milliGRAM(s) Oral daily  atorvastatin 40 milliGRAM(s) Oral at bedtime  cephalexin 250 milliGRAM(s) Oral every 12 hours  dextrose 5%. 1000 milliLiter(s) (50 mL/Hr) IV Continuous <Continuous>  dextrose 50% Injectable 12.5 Gram(s) IV Push once  dextrose 50% Injectable 25 Gram(s) IV Push once  dextrose 50% Injectable 25 Gram(s) IV Push once  hydrALAZINE 25 milliGRAM(s) Oral two times a day  influenza   Vaccine 0.5 milliLiter(s) IntraMuscular once  insulin lispro (HumaLOG) corrective regimen sliding scale   SubCutaneous three times a day before meals  insulin lispro (HumaLOG) corrective regimen sliding scale   SubCutaneous at bedtime  isosorbide   mononitrate ER Tablet (IMDUR) 60 milliGRAM(s) Oral daily  metoprolol tartrate 100 milliGRAM(s) Oral every 12 hours    MEDICATIONS  (PRN):  acetaminophen   Tablet .. 650 milliGRAM(s) Oral every 6 hours PRN Mild Pain (1 - 3), Moderate Pain (4 - 6)  dextrose 40% Gel 15 Gram(s) Oral once PRN Blood Glucose LESS THAN 70 milliGRAM(s)/deciliter  glucagon  Injectable 1 milliGRAM(s) IntraMuscular once PRN Glucose LESS THAN 70 milligrams/deciliter      T(C): 36.5 (10-09-18 @ 11:46)  HR: 63 (10-09-18 @ 11:46)  BP: 129/48 (10-09-18 @ 11:46)  RR: 18 (10-09-18 @ 11:46)  SpO2: 100% (10-09-18 @ 11:46)  CAPILLARY BLOOD GLUCOSE      POCT Blood Glucose.: 306 mg/dL (09 Oct 2018 11:50)  POCT Blood Glucose.: 150 mg/dL (09 Oct 2018 07:36)  POCT Blood Glucose.: 247 mg/dL (08 Oct 2018 22:38)  POCT Blood Glucose.: 195 mg/dL (08 Oct 2018 18:58)  POCT Blood Glucose.: 222 mg/dL (08 Oct 2018 13:20)    I&O's Summary      PHYSICAL EXAM:  GENERAL: NAD, well-developed, AOx3  HEAD:  facial ecchymosis, R nasal pack   EYES: EOMI, PERRL, conjunctiva and sclera clear  NECK: Supple, No JVD  CHEST/LUNG: Clear to auscultation bilaterally  HEART: 2/6 systolic murmur   ABDOMEN: Soft, Nontender, Nondistended; Bowel sounds present  EXTREMITIES:  2+ Peripheral Pulses, No clubbing, cyanosis  PSYCH: No SI/HI  NEUROLOGY: non-focal  SKIN: No rashes or lesions    LABS:                        8.4    11.80 )-----------( 170      ( 09 Oct 2018 06:25 )             26.3     10    137  |  103  |  82<H>  ----------------------------<  143<H>  5.1   |  20<L>  |  1.98<H>    Ca    10.6<H>      09 Oct 2018 06:25  Phos  3.1     10-  Mg     1.9     10-09    TPro  7.7  /  Alb  3.7  /  TBili  0.3  /  DBili  x   /  AST  21  /  ALT  17  /  AlkPhos  114  10-08    PT/INR - ( 07 Oct 2018 17:28 )   PT: 13.5 SEC;   INR: 1.17          PTT - ( 07 Oct 2018 17:28 )  PTT:36.7 SEC      Urinalysis Basic - ( 08 Oct 2018 16:35 )    Color: LT. YELLOW / Appearance: CLEAR / S.015 / pH: 6.0  Gluc: NEGATIVE / Ketone: NEGATIVE  / Bili: NEGATIVE / Urobili: NORMAL   Blood: NEGATIVE / Protein: 10 / Nitrite: NEGATIVE   Leuk Esterase: NEGATIVE / RBC: x / WBC x   Sq Epi: x / Non Sq Epi: x / Bacteria: x            RADIOLOGY & ADDITIONAL TESTS:    Imaging Personally Reviewed: CTH - no bleed     Consultant(s) Notes Reviewed:      Care Discussed with Consultants/Other Providers: EP - Dr Ismail

## 2018-10-10 ENCOUNTER — TRANSCRIPTION ENCOUNTER (OUTPATIENT)
Age: 83
End: 2018-10-10

## 2018-10-10 LAB
BUN SERPL-MCNC: 88 MG/DL — HIGH (ref 7–23)
CALCIUM SERPL-MCNC: 9.8 MG/DL — SIGNIFICANT CHANGE UP (ref 8.4–10.5)
CHLORIDE SERPL-SCNC: 104 MMOL/L — SIGNIFICANT CHANGE UP (ref 98–107)
CO2 SERPL-SCNC: 18 MMOL/L — LOW (ref 22–31)
CREAT SERPL-MCNC: 1.77 MG/DL — HIGH (ref 0.5–1.3)
GLUCOSE BLDC GLUCOMTR-MCNC: 127 MG/DL — HIGH (ref 70–99)
GLUCOSE BLDC GLUCOMTR-MCNC: 226 MG/DL — HIGH (ref 70–99)
GLUCOSE BLDC GLUCOMTR-MCNC: 313 MG/DL — HIGH (ref 70–99)
GLUCOSE BLDC GLUCOMTR-MCNC: 318 MG/DL — HIGH (ref 70–99)
GLUCOSE SERPL-MCNC: 110 MG/DL — HIGH (ref 70–99)
HCT VFR BLD CALC: 24.6 % — LOW (ref 34.5–45)
HGB BLD-MCNC: 8 G/DL — LOW (ref 11.5–15.5)
MAGNESIUM SERPL-MCNC: 1.9 MG/DL — SIGNIFICANT CHANGE UP (ref 1.6–2.6)
MCHC RBC-ENTMCNC: 32.5 % — SIGNIFICANT CHANGE UP (ref 32–36)
MCHC RBC-ENTMCNC: 32.7 PG — SIGNIFICANT CHANGE UP (ref 27–34)
MCV RBC AUTO: 100.4 FL — HIGH (ref 80–100)
NRBC # FLD: 0 — SIGNIFICANT CHANGE UP
PLATELET # BLD AUTO: 118 K/UL — LOW (ref 150–400)
PMV BLD: 11.2 FL — SIGNIFICANT CHANGE UP (ref 7–13)
POTASSIUM SERPL-MCNC: 4.7 MMOL/L — SIGNIFICANT CHANGE UP (ref 3.5–5.3)
POTASSIUM SERPL-SCNC: 4.7 MMOL/L — SIGNIFICANT CHANGE UP (ref 3.5–5.3)
RBC # BLD: 2.45 M/UL — LOW (ref 3.8–5.2)
RBC # FLD: 14.2 % — SIGNIFICANT CHANGE UP (ref 10.3–14.5)
SODIUM SERPL-SCNC: 138 MMOL/L — SIGNIFICANT CHANGE UP (ref 135–145)
WBC # BLD: 9.55 K/UL — SIGNIFICANT CHANGE UP (ref 3.8–10.5)
WBC # FLD AUTO: 9.55 K/UL — SIGNIFICANT CHANGE UP (ref 3.8–10.5)

## 2018-10-10 PROCEDURE — 99233 SBSQ HOSP IP/OBS HIGH 50: CPT

## 2018-10-10 PROCEDURE — 93306 TTE W/DOPPLER COMPLETE: CPT | Mod: 26

## 2018-10-10 RX ORDER — REPAGLINIDE 1 MG/1
1 TABLET ORAL
Qty: 90 | Refills: 0 | OUTPATIENT
Start: 2018-10-10 | End: 2018-11-08

## 2018-10-10 RX ORDER — APIXABAN 2.5 MG/1
1 TABLET, FILM COATED ORAL
Qty: 0 | Refills: 0 | COMMUNITY
Start: 2018-10-10

## 2018-10-10 RX ORDER — OXYMETAZOLINE HYDROCHLORIDE 0.5 MG/ML
2 SPRAY NASAL
Qty: 0 | Refills: 0 | Status: DISCONTINUED | OUTPATIENT
Start: 2018-10-10 | End: 2018-10-11

## 2018-10-10 RX ORDER — SODIUM CHLORIDE 0.65 %
2 AEROSOL, SPRAY (ML) NASAL
Qty: 0 | Refills: 0 | Status: DISCONTINUED | OUTPATIENT
Start: 2018-10-10 | End: 2018-10-11

## 2018-10-10 RX ORDER — PETROLATUM,WHITE
1 JELLY (GRAM) TOPICAL AT BEDTIME
Qty: 0 | Refills: 0 | Status: DISCONTINUED | OUTPATIENT
Start: 2018-10-10 | End: 2018-10-11

## 2018-10-10 RX ADMIN — Medication 81 MILLIGRAM(S): at 11:12

## 2018-10-10 RX ADMIN — APIXABAN 2.5 MILLIGRAM(S): 2.5 TABLET, FILM COATED ORAL at 05:34

## 2018-10-10 RX ADMIN — Medication 75 MILLIGRAM(S): at 05:36

## 2018-10-10 RX ADMIN — Medication 25 MILLIGRAM(S): at 17:12

## 2018-10-10 RX ADMIN — ATORVASTATIN CALCIUM 40 MILLIGRAM(S): 80 TABLET, FILM COATED ORAL at 21:16

## 2018-10-10 RX ADMIN — Medication 4: at 12:27

## 2018-10-10 RX ADMIN — Medication 250 MILLIGRAM(S): at 05:34

## 2018-10-10 RX ADMIN — AMLODIPINE BESYLATE 10 MILLIGRAM(S): 2.5 TABLET ORAL at 05:34

## 2018-10-10 RX ADMIN — ISOSORBIDE MONONITRATE 60 MILLIGRAM(S): 60 TABLET, EXTENDED RELEASE ORAL at 11:12

## 2018-10-10 RX ADMIN — Medication 250 MILLIGRAM(S): at 17:12

## 2018-10-10 RX ADMIN — APIXABAN 2.5 MILLIGRAM(S): 2.5 TABLET, FILM COATED ORAL at 17:12

## 2018-10-10 RX ADMIN — Medication 4: at 17:14

## 2018-10-10 RX ADMIN — Medication 25 MILLIGRAM(S): at 05:34

## 2018-10-10 RX ADMIN — Medication 75 MILLIGRAM(S): at 17:12

## 2018-10-10 RX ADMIN — Medication 1 APPLICATION(S): at 21:16

## 2018-10-10 NOTE — DISCHARGE NOTE ADULT - PATIENT PORTAL LINK FT
You can access the [facility] Patient Portal, offered by [facility], by registering with the following website: [email or web address]

## 2018-10-10 NOTE — DISCHARGE NOTE ADULT - MEDICATION SUMMARY - MEDICATIONS TO TAKE
I will START or STAY ON the medications listed below when I get home from the hospital:    Aspirin Enteric Coated 81 mg oral delayed release tablet  -- 1 tab(s) by mouth once a day  -- Indication: For Prophylaxis     acetaminophen 325 mg oral tablet  -- 2 tab(s) by mouth every 6 hours, As needed, Mild Pain (1 - 3), Moderate Pain (4 - 6)  -- Indication: For Pain    losartan 100 mg oral tablet  -- 1 tab(s) by mouth once a day  -- Indication: For Htn    isosorbide mononitrate 60 mg oral tablet, extended release  -- 1 tab(s) by mouth once a day  -- Indication: For Htn    Eliquis 2.5 mg oral tablet  -- 1 tab(s) by mouth 2 times a day  -- Indication: For Afib    Januvia 50 mg oral tablet  -- 1 tab(s) by mouth once a day  -- Indication: For diabetes mellitus     atorvastatin 40 mg oral tablet  -- 1 tab(s) by mouth once a day (at bedtime)  -- Indication: For Hyperlipidemia     metoprolol tartrate 75 mg oral tablet  -- 1 tab(s) by mouth 2 times a day  hold for sbp <100 HR <60  -- Indication: For Htn     amLODIPine 10 mg oral tablet  -- 1 tab(s) by mouth once a day  -- Indication: For Htn     petrolatum topical ointment  -- 1 application on skin once a day (at bedtime)  -- Indication: For Epistaxis    torsemide 20 mg oral tablet  -- 2 tab(s) by mouth 2 times a day  -- Indication: For Chronic systolic heart failure    oxymetazoline 0.05% nasal spray  -- 2 spray(s) into nose 2 times a day   -- Indication: For Epistaxis    sodium chloride 0.65% nasal spray  -- 2 spray(s) into nose 2 times a day   -- Indication: For Epistaxis    hydrALAZINE 25 mg oral tablet  -- 1 tab(s) by mouth 2 times a day  -- Indication: For Htn

## 2018-10-10 NOTE — DISCHARGE NOTE ADULT - CARE PROVIDERS DIRECT ADDRESSES
[email or web address]

## 2018-10-10 NOTE — PROGRESS NOTE ADULT - SUBJECTIVE AND OBJECTIVE BOX
Cardiology Progress Note    Interval events: No acute events overnight. No chest pain or syncope.    Medications:  acetaminophen   Tablet .. 650 milliGRAM(s) Oral every 6 hours PRN  amLODIPine   Tablet 10 milliGRAM(s) Oral daily  apixaban 2.5 milliGRAM(s) Oral every 12 hours  aspirin enteric coated 81 milliGRAM(s) Oral daily  atorvastatin 40 milliGRAM(s) Oral at bedtime  cephalexin 250 milliGRAM(s) Oral every 12 hours  dextrose 40% Gel 15 Gram(s) Oral once PRN  dextrose 5%. 1000 milliLiter(s) IV Continuous <Continuous>  dextrose 50% Injectable 12.5 Gram(s) IV Push once  dextrose 50% Injectable 25 Gram(s) IV Push once  dextrose 50% Injectable 25 Gram(s) IV Push once  glucagon  Injectable 1 milliGRAM(s) IntraMuscular once PRN  hydrALAZINE 25 milliGRAM(s) Oral two times a day  influenza   Vaccine 0.5 milliLiter(s) IntraMuscular once  insulin lispro (HumaLOG) corrective regimen sliding scale   SubCutaneous three times a day before meals  insulin lispro (HumaLOG) corrective regimen sliding scale   SubCutaneous at bedtime  isosorbide   mononitrate ER Tablet (IMDUR) 60 milliGRAM(s) Oral daily  metoprolol tartrate 75 milliGRAM(s) Oral two times a day      Review of Systems:  Constitutional: [ ] Fever [ ] Chills [ ] Fatigue [ ] Weight change   HEENT: [ ] Blurred vision [ ] Eye Pain [ ] Headache [ ] Runny nose [ ] Sore Throat   Respiratory: [ ] Cough [ ] Wheezing [ ] Shortness of breath  Cardiovascular: [ ] Chest Pain [ ] Palpitations [ ] KRAUS [ ] PND [ ] Orthopnea  Gastrointestinal: [ ] Abdominal Pain [ ] Diarrhea [ ] Constipation [ ] Hemorrhoids [ ] Nausea [ ] Vomiting  Genitourinary: [ ] Nocturia [ ] Dysuria [ ] Incontinence  Extremities: [ ] Swelling [ ] Joint Pain  Neurologic: [ ] Focal deficit [ ] Paresthesias [ ] Syncope  Lymphatic: [ ] Swelling [ ] Lymphadenopathy   Skin: [ ] Rash [ ] Ecchymoses [ ] Wounds [ ] Lesions  Psychiatry: [ ] Depression [ ] Suicidal/Homicidal Ideation [ ] Anxiety [ ] Sleep Disturbances  [x] 10 point review of systems is otherwise negative except as mentioned above            [ ]Unable to obtain    Vitals:  T(C): 36.7 (10-10-18 @ 05:33), Max: 36.8 (10-09-18 @ 21:48)  HR: 64 (10-10-18 @ 05:33) (60 - 64)  BP: 120/69 (10-10-18 @ 05:33) (116/88 - 129/48)  BP(mean): --  RR: 16 (10-10-18 @ 05:33) (16 - 18)  SpO2: 100% (10-10-18 @ 05:33) (100% - 100%)  Wt(kg): --  Daily     Daily   I&O's Summary      Physical Exam:  Appearance: NAD  Eyes: PERRL, EOMI  HENT: Normal oral mus[place], NC/AT  Cardiovascular: normal S1 and S2, RRR, 2/6 systolic murmur early peaking, no edema, normal JVP  Respiratory: Clear to auscultation bilaterally  Gastrointestinal: Soft, non-tender, non-distended, BS+  Musculoskeletal: No clubbing, no joint deformity   Neurologic: Non-focal  Lymphatic: No lymphadenopathy  Psychiatry: AAOx3, mood & affect appropriate  Skin: No rashes, no ecchymoses, no cyanosis    Labs:                        8.4    11.80 )-----------( 170      ( 09 Oct 2018 06:25 )             26.3     10-09    137  |  103  |  82<H>  ----------------------------<  143<H>  5.1   |  20<L>  |  1.98<H>    Ca    10.6<H>      09 Oct 2018 06:25  Phos  3.1     10-09  Mg     1.9     10-09    New results/imaging:

## 2018-10-10 NOTE — PROGRESS NOTE ADULT - ATTENDING COMMENTS
dispo - pt declining rehab, dispo is home likely later today iuf mo objection by ENT   Time spent on DC planning 35 min dispo - pt declining rehab, dispo is home likely later today after Endo eval and if no objection by ENT   Time spent on DC planning 35 min dispo - pt declining rehab, rec home PT, dispo is home likely later today ot [facility] after Endo eval and if no objection by ENT pending HHA and home PT set up   Time spent on DC planning 35 min

## 2018-10-10 NOTE — PROGRESS NOTE ADULT - SUBJECTIVE AND OBJECTIVE BOX
Patient is a 83y old  Female who presents with a chief complaint of syncope on Eliquis, epistaxis (10 Oct 2018 06:26)      SUBJECTIVE / OVERNIGHT EVENTS: c/o some discomfort at packing area, no further bleed, no LOC, CP, SOB. orthostatics this morning were neg     ROS:  No HA/DZ  No Vision changes   No CP, SOB  No N/V/D  No Edema  No Rash  NO weakness, numbness    MEDICATIONS  (STANDING):  amLODIPine   Tablet 10 milliGRAM(s) Oral daily  apixaban 2.5 milliGRAM(s) Oral every 12 hours  aspirin enteric coated 81 milliGRAM(s) Oral daily  atorvastatin 40 milliGRAM(s) Oral at bedtime  cephalexin 250 milliGRAM(s) Oral every 12 hours  dextrose 5%. 1000 milliLiter(s) (50 mL/Hr) IV Continuous <Continuous>  dextrose 50% Injectable 12.5 Gram(s) IV Push once  dextrose 50% Injectable 25 Gram(s) IV Push once  dextrose 50% Injectable 25 Gram(s) IV Push once  hydrALAZINE 25 milliGRAM(s) Oral two times a day  influenza   Vaccine 0.5 milliLiter(s) IntraMuscular once  insulin lispro (HumaLOG) corrective regimen sliding scale   SubCutaneous three times a day before meals  insulin lispro (HumaLOG) corrective regimen sliding scale   SubCutaneous at bedtime  isosorbide   mononitrate ER Tablet (IMDUR) 60 milliGRAM(s) Oral daily  metoprolol tartrate 75 milliGRAM(s) Oral two times a day    MEDICATIONS  (PRN):  acetaminophen   Tablet .. 650 milliGRAM(s) Oral every 6 hours PRN Mild Pain (1 - 3), Moderate Pain (4 - 6)  dextrose 40% Gel 15 Gram(s) Oral once PRN Blood Glucose LESS THAN 70 milliGRAM(s)/deciliter  glucagon  Injectable 1 milliGRAM(s) IntraMuscular once PRN Glucose LESS THAN 70 milligrams/deciliter      T(C): 36.6 (10-10-18 @ 09:50)  HR: 60 (10-10-18 @ 11:12)  BP: 138/54 (10-10-18 @ 11:12)  RR: 18 (10-10-18 @ 11:12)  SpO2: 100% (10-10-18 @ 11:12)  CAPILLARY BLOOD GLUCOSE      POCT Blood Glucose.: 127 mg/dL (10 Oct 2018 07:54)  POCT Blood Glucose.: 183 mg/dL (09 Oct 2018 21:33)  POCT Blood Glucose.: 299 mg/dL (09 Oct 2018 16:56)    I&O's Summary      PHYSICAL EXAM:  GENERAL: NAD, well-developed, AOx3  HEAD:  facial ecchymosis, nasal packing   EYES: EOMI, PERRL, conjunctiva and sclera clear  NECK: Supple, No JVD  CHEST/LUNG: Clear to auscultation bilaterally  HEART: Regular rate and rhythm; 2/6 systolic murmur, no rubs, or gallops, No Edema  ABDOMEN: Soft, Nontender, Nondistended; Bowel sounds present  EXTREMITIES:  2+ Peripheral Pulses, No clubbing, cyanosis  PSYCH: No SI/HI  NEUROLOGY: non-focal  SKIN: No rashes or lesions    LABS:                        8.0    9.55  )-----------( 118      ( 10 Oct 2018 06:40 )             24.6     10-10    138  |  104  |  88<H>  ----------------------------<  110<H>  4.7   |  18<L>  |  1.77<H>    Ca    9.8      10 Oct 2018 06:40  Phos  3.1     10-09  Mg     1.9     10-10            Urinalysis Basic - ( 08 Oct 2018 16:35 )    Color: LT. YELLOW / Appearance: CLEAR / S.015 / pH: 6.0  Gluc: NEGATIVE / Ketone: NEGATIVE  / Bili: NEGATIVE / Urobili: NORMAL   Blood: NEGATIVE / Protein: 10 / Nitrite: NEGATIVE   Leuk Esterase: NEGATIVE / RBC: x / WBC x   Sq Epi: x / Non Sq Epi: x / Bacteria: x            RADIOLOGY & ADDITIONAL TESTS:    Imaging Personally Reviewed:    Consultant(s) Notes Reviewed:      Care Discussed with Consultants/Other Providers: ZAC Gee

## 2018-10-10 NOTE — DISCHARGE NOTE ADULT - HOSPITAL COURSE
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 (TRANSFER FROM )    Hospital Course   Syncope, unspecified syncope type.    - found orthostatic likely due to vasovagal in setting of diabetes with possible peripheral neuropathy vs inability to compensate for HR due to BB therapy and PPM dependency - possible dehydration on diuretics and CLARI  - likely not arrhythmic - PPM interrogation normal, no evidence of ACS  - EP reprogrammed PPM this morning for rate drop   - reduced BB dosing to improve ability to increase HR for compensation during a vagal episode  - clinically appears euvolemic now, would not give further fluids in setting of CHF - hold diuretics while CLARI is resolving  - cards input iraida, no need for further in-pt cardiac work up.     Closed fracture of nasal bone, initial encounter.    -Nasal septum and R bone fx, with epistaxis  -Patient s/p R nostril nasal packing  -ENT consulted and recommended Keflex 250mg BID while packing is in place  and Rhino rocket - duration per ENT - will call and clarify today   -Plastic surgery recs appreciated - I dw pt, she is not interested on cosmetic sx - back on Eliquis as h/h stable and no need for transfusion.     Epistaxis.    Epistaxis 2/2 trauma, s/p R nasal packing  F/u ENT recs  Currently not bleeding  Monitor H/H - hemoglobin stable.       Hyperkalemia.  Patient with hyperkalemia likely 2/2 CLARI  Will encourage fluid intake given patient hypovolemic on exam and hold torsemide  -pt s/p albuterol, D50, Insulin 6 IV, no evidence of hyperkalemia seen on EKG  -Patient asymptomatic  - resolved.     CLARI (acute kidney injury).     resolving - Patient with CLARI likely 2/2 epistaxis (hypovolemia), diuretic induced dehydration   patient now euvolemic   Will hold losartan and Torsemide for now until CLARI resolves fully, off IVF   Avoid nephrotoxic agents.     Ecchymosis.  -ecchymosis of 4th and 5th L fingers, TTP, no obvious erythema, or dislocation  -hand x-ray reviewed without evidence of fracture.    Chronic atrial fibrillation.    -PAF  -Paced currently  -ChadsVasc 6, on Eliquis 2.5 BID  - will reduce BB to 75 mg BID for now   - her fall was not mechanical / due to weakness and therefore if vagal symptoms are managed she should be low risk for fall again and will cw AC      Chronic systolic heart failure.   CHFrEF (mod-severe) 2017 TTE  Currently euvolemic on exam  Continue ASA, statin, BB, Imdur, Hydralazine   Torsemide and Losartan held in the setting of CLARI.     Tachycardia-bradycardia syndrome.    Hx of tachy-wellington syndrome  PPM ([employer])  EKG - Apaced, NSR, no ST changes  PPM interrogated, no events.     Type 2 diabetes mellitus with complication, without long-term current use of insulin.  Hx of DM2, on oral medications  GqO4k=4.9%  no evidence of hypoglycemia but FS uncontrolled on NISS  Endo consulted on home regimen.   cw NISS.      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 (TRANSFER FROM )    Hospital Course   Syncope, unspecified syncope type.    - found orthostatic likely due to vasovagal in setting of diabetes with possible peripheral neuropathy vs inability to compensate for HR due to BB therapy and PPM dependency - possible dehydration on diuretics and CLARI  - likely not arrhythmic - PPM interrogation normal, no evidence of ACS  - EP reprogrammed PPM for rate drop   - reduced BB dosing to improve ability to increase HR for compensation during a vagal episode  - clinically appears euvolemic now, would not give further fluids in setting of CHF - resuming Torsemide as CLARI resolved, to resume Losartan as out-pt if renal fx remains stable with out-pt labs   - cards input iraida, no need for further in-pt cardiac work up.     Closed fracture of nasal bone, initial encounter.    -Nasal septum and R bone fx, with epistaxis  -Patient s/p R nostril nasal packing  -ENT consulted and recommended Keflex 250mg BID while packing is in place  and Rhino rocket - duration per ENT - will call and clarify today   -Plastic surgery recs appreciated - I dw pt, she is not interested on cosmetic sx - back on Eliquis as h/h stable and no need for transfusion.     Epistaxis.    Epistaxis 2/2 trauma, s/p R nasal packing  F/u ENT recs  Currently not bleeding  Monitor H/H - hemoglobin stable.       Hyperkalemia.  Patient with hyperkalemia likely 2/2 CLARI  Will encourage fluid intake given patient hypovolemic on exam and hold torsemide  -pt s/p albuterol, D50, Insulin 6 IV, no evidence of hyperkalemia seen on EKG  -Patient asymptomatic  - resolved.     CLARI (acute kidney injury).     resolved - Patient with CLARI likely 2/2 epistaxis (hypovolemia), diuretic induced dehydration   patient now euvolemic     Ecchymosis.  -ecchymosis of 4th and 5th L fingers, TTP, no obvious erythema, or dislocation  -hand x-ray reviewed without evidence of fracture.    Chronic atrial fibrillation.    -PAF  -Paced currently  -ChadsVasc 6, on Eliquis 2.5 BID  - will reduce BB to 75 mg BID for now   - her fall was not mechanical / due to weakness and therefore if vagal symptoms are managed she should be low risk for fall again and will cw AC      Chronic systolic heart failure.   CHFrEF (mod-severe) 2017 TTE  Currently euvolemic on exam  Continue ASA, statin, BB, Imdur, Hydralazine   Resume Torsemide now and Losartan as out-pt if repeat labs with stable renal fx    Tachycardia-bradycardia syndrome.    Hx of tachy-wellington syndrome  PPM ([employer])  EKG - Apaced, NSR, no ST changes  PPM interrogated, no events.     Type 2 diabetes mellitus with complication, without long-term current use of insulin.  Hx of DM2, on oral medications  IyH1w=4.9%  no evidence of hypoglycemia but FS uncontrolled on NISS  Endo consulted on home regimen. to DC on Prandin and Januvia, will DC sulfonylurea in setting of age and CKD    worsening anemia, GI consulted, 1 episode of melena thought to be from epistaxis. no plans for EGD - likely due to chronic inflammation and blood loss from nose bleed, no need for transfusion, repeat CBC stable  to follow up at med clinic with Dr Butt for repeat labs within 1 week     Dispo: Home with home care

## 2018-10-10 NOTE — PROGRESS NOTE ADULT - PROBLEM SELECTOR PLAN 10
Hx of DM2, on oral medications  AbO3o=3.9%  no evidence of hypoglycemia   cw NISS Hx of DM2, on oral medications  SdL1e=5.9%  no evidence of hypoglycemia but FS uncontrolled on NISS  although she was controlled on orals at home, with her renal insufficiency and age sulfonylurea are not the best options, she also reports after vagal episode, her FS was low. metformin is contraindicated due to low GFR - will have Endo consult on home regimen.   donte NISS

## 2018-10-10 NOTE — DISCHARGE NOTE ADULT - CARE PLAN
Principal Discharge DX:	Syncope, unspecified syncope type  Goal:	resolved  Assessment and plan of treatment:	- found orthostatic likely due to vasovagal in setting of diabetes with possible peripheral neuropathy vs inability to compensate for HR due to BB therapy and PPM dependency - possible dehydration on diuretics and CLARI  - likely not arrhythmic - PPM interrogation normal, no evidence of ACS  - EP reprogrammed PPM for rate drop   - reduced BB dosing to improve ability to increase HR for compensation during a vagal episode  - clinically appears euvolemic now, would not give further fluids in setting of CHF -   - cards input iraida, no need for further in-pt cardiac work up.  Please follow up with your pcp within 1 week of discharge.  Please call to make an appointment within 1 week of discharge.  Secondary Diagnosis:	Closed fracture of nasal bone, initial encounter  Goal:	remain stable  Assessment and plan of treatment:	Please follow up with your pcp within 1 week of discharge.  Please call to make an appointment within 1 week of discharge.  Patient s/p R nostril nasal packing  - packing removed, DC ppx abx  - pt declining cosmetic sx at this time, out-pt follow up.  Secondary Diagnosis:	Epistaxis  Goal:	resolved  Assessment and plan of treatment:	pistaxis 2/2 trauma, s/p R nasal packing  packing removed  --Afrin 2 sprays in left naris BID x 3 days  -nasal saline 2 sprays in bl nares BID x 2 weeks  -aquaphor in bl nares qhs (apply with cotton part of qtip)  -for acute bleed: afrin 2 sprays and hold pressure for 20 min  -no acute intervention for right nasal bone fx as it is minimally displaced   -fu in clinic prn [id number]  Currently not bleeding.  Secondary Diagnosis:	CLARI (acute kidney injury)  Goal:	resolving  Assessment and plan of treatment:	resolving - Patient with CLARI likely 2/2 epistaxis (hypovolemia), diuretic induced dehydration   patient now euvolemic   will resume home diuretic, if renal fx remains stable, can resume ARB as out-pt.   Please follow up with your pcp within 1 week of discharge.  Please call to make an appointment within 1 week of discharge.  Secondary Diagnosis:	Tachycardia-bradycardia syndrome  Goal:	stable  Assessment and plan of treatment:	Hx of tachy-wellington syndrome  PPM ([employer])  EKG - Apaced, NSR, no ST changes  PPM interrogated, no events.   Please follow up with your pcp and cardiologist within 1 week of discharge.  Please call to make an appointment within 1 week of discharge.  Secondary Diagnosis:	Chronic atrial fibrillation  Goal:	continue anticoagulation  Assessment and plan of treatment:	Please follow up with your pcp and cardiologist within 1 week of discharge.  Please call to make an appointment within 1 week of discharge.  n Eliquis 2.5 BID  - will reduce BB to 75 mg BID for now   - her fall was not mechanical / due to weakness and therefore if vagal symptoms are managed she should be low risk for fall again and will cw AC  Secondary Diagnosis:	Type 2 diabetes mellitus with complication, without long-term current use of insulin  Goal:	maintain adequate glucose control  Assessment and plan of treatment:	Please follow up with your pcp and endocrinologist  within 1 week of discharge.  Please call to make an appointment within 1 week of dsicharge.  LeS0r=7.9%  no evidence of hypoglycemia but FS uncontrolled on NISS  iraida endo, dc on Januvia and Prandin.

## 2018-10-10 NOTE — DISCHARGE NOTE ADULT - MEDICATION SUMMARY - MEDICATIONS TO STOP TAKING
I will STOP taking the medications listed below when I get home from the hospital:    losartan 100 mg oral tablet  -- 1 tab(s) by mouth once a day    glimepiride 4 mg oral tablet  -- 1 tab(s) by mouth once a day

## 2018-10-10 NOTE — DISCHARGE NOTE ADULT - CARE PROVIDER_API CALL
Pierce Butt (MD), Internal Medicine  [address] [place] [address]  Phone: [phone]  Fax: [phone]    ENT,   Phone: [phone]  Fax: (   )    -

## 2018-10-10 NOTE — PROGRESS NOTE ADULT - SUBJECTIVE AND OBJECTIVE BOX
left sided rhino rocket deflated and removed 2 hours later. left nasal cavity with raw mucosa without active bleeding. pt instructed to hold pressure for 10 min following removal of rhino rocket.     plan:  -Afrin 2 sprays in left naris BID x 3 days  -nasal saline 2 sprays in bl nares BID x 2 weeks  -aquaphor in bl nares qhs (apply with cotton part of qtip)  -for acute bleed: afrin 2 sprays and hold pressure for 20 min  -no acute intervention for right nasal bone fx as it is minimally displaced   -fu in clinic prn [phone]  -no abx needed from ent perspective  -discharge per primary team

## 2018-10-10 NOTE — DISCHARGE NOTE ADULT - ADDITIONAL INSTRUCTIONS
Hyperkalemia: resolved  Echymossis:- of 4th and 5th L fingers, TTP, no obvious erythema, or dislocation  -hand x-ray reviewed without evidence of fracture.  CHF:Currently euvolemic on exam  Continue ASA, statin, BB, Imdur, Hydralazine   echo shows moderate systolic dysfunction  resume Torsemide now and Losartan later if renal fx stable.     ANEMIA:worsening anemia - this can be due to a combination of anemia of chronic illness and recent epistaxis, her reported dark stool can also potentially be from residual epistaxis - however, she is on AC, and need to consider GI bleeding - repeat h/h noted, will check occult blood and have GI eval.

## 2018-10-10 NOTE — CHART NOTE - NSCHARTNOTEFT_GEN_A_CORE
Reprogrammed [employer] PPM rate response from medium low to high for orthostatic hypotension per Dr. Gee.

## 2018-10-10 NOTE — DISCHARGE NOTE ADULT - PLAN OF CARE
resolved - found orthostatic likely due to vasovagal in setting of diabetes with possible peripheral neuropathy vs inability to compensate for HR due to BB therapy and PPM dependency - possible dehydration on diuretics and CLARI  - likely not arrhythmic - PPM interrogation normal, no evidence of ACS  - EP reprogrammed PPM for rate drop   - reduced BB dosing to improve ability to increase HR for compensation during a vagal episode  - clinically appears euvolemic now, would not give further fluids in setting of CHF -   - cards input iraida, no need for further in-pt cardiac work up.  Please follow up with your pcp within 1 week of discharge.  Please call to make an appointment within 1 week of discharge. remain stable Please follow up with your pcp within 1 week of discharge.  Please call to make an appointment within 1 week of discharge.  Patient s/p R nostril nasal packing  - packing removed, DC ppx abx  - pt declining cosmetic sx at this time, out-pt follow up. pistaxis 2/2 trauma, s/p R nasal packing  packing removed  --Afrin 2 sprays in left naris BID x 3 days  -nasal saline 2 sprays in bl nares BID x 2 weeks  -aquaphor in bl nares qhs (apply with cotton part of qtip)  -for acute bleed: afrin 2 sprays and hold pressure for 20 min  -no acute intervention for right nasal bone fx as it is minimally displaced   -fu in clinic prn [phone]  Currently not bleeding. resolving resolving - Patient with CLARI likely 2/2 epistaxis (hypovolemia), diuretic induced dehydration   patient now euvolemic   will resume home diuretic, if renal fx remains stable, can resume ARB as out-pt.   Please follow up with your pcp within 1 week of discharge.  Please call to make an appointment within 1 week of discharge. stable Hx of tachy-wellington syndrome  PPM ([employer])  EKG - Apaced, NSR, no ST changes  PPM interrogated, no events.   Please follow up with your pcp and cardiologist within 1 week of discharge.  Please call to make an appointment within 1 week of discharge. continue anticoagulation Please follow up with your pcp and cardiologist within 1 week of discharge.  Please call to make an appointment within 1 week of discharge.  n Eliquis 2.5 BID  - will reduce BB to 75 mg BID for now   - her fall was not mechanical / due to weakness and therefore if vagal symptoms are managed she should be low risk for fall again and will cw AC maintain adequate glucose control Please follow up with your pcp and endocrinologist  within 1 week of discharge.  Please call to make an appointment within 1 week of dsicharge.  IeX0b=1.9%  no evidence of hypoglycemia but FS uncontrolled on NISS  iraida rosalind lopez on Januvia and Prandin.

## 2018-10-10 NOTE — CHART NOTE - NSCHARTNOTEFT_GEN_A_CORE
Advice requested for optimization of diabetes medications. As discussed with Dr. Weinstein full consult is not required as patient to be discharged now.  Agree with stop glimepiride given age and renal status (CKD).  Upon discharge can resume Januvia 50mg daily and would add Prandin 0.5 mg qac TID.  Can follow up with endocrine as outpatient if more input needed [phone].

## 2018-10-10 NOTE — DISCHARGE NOTE ADULT - SECONDARY DIAGNOSIS.
Closed fracture of nasal bone, initial encounter Epistaxis CLARI (acute kidney injury) Tachycardia-bradycardia syndrome Chronic atrial fibrillation Type 2 diabetes mellitus with complication, without long-term current use of insulin

## 2018-10-11 VITALS
RESPIRATION RATE: 18 BRPM | DIASTOLIC BLOOD PRESSURE: 44 MMHG | SYSTOLIC BLOOD PRESSURE: 130 MMHG | OXYGEN SATURATION: 100 % | HEART RATE: 60 BPM

## 2018-10-11 LAB
BLD GP AB SCN SERPL QL: NEGATIVE — SIGNIFICANT CHANGE UP
BUN SERPL-MCNC: 91 MG/DL — HIGH (ref 7–23)
CALCIUM SERPL-MCNC: 9.3 MG/DL — SIGNIFICANT CHANGE UP (ref 8.4–10.5)
CHLORIDE SERPL-SCNC: 102 MMOL/L — SIGNIFICANT CHANGE UP (ref 98–107)
CO2 SERPL-SCNC: 19 MMOL/L — LOW (ref 22–31)
CREAT SERPL-MCNC: 1.88 MG/DL — HIGH (ref 0.5–1.3)
GLUCOSE BLDC GLUCOMTR-MCNC: 177 MG/DL — HIGH (ref 70–99)
GLUCOSE BLDC GLUCOMTR-MCNC: 273 MG/DL — HIGH (ref 70–99)
GLUCOSE SERPL-MCNC: 167 MG/DL — HIGH (ref 70–99)
HCT VFR BLD CALC: 23.7 % — LOW (ref 34.5–45)
HCT VFR BLD CALC: 25 % — LOW (ref 34.5–45)
HGB BLD-MCNC: 7.6 G/DL — LOW (ref 11.5–15.5)
HGB BLD-MCNC: 7.9 G/DL — LOW (ref 11.5–15.5)
MCHC RBC-ENTMCNC: 30.7 PG — SIGNIFICANT CHANGE UP (ref 27–34)
MCHC RBC-ENTMCNC: 31.4 PG — SIGNIFICANT CHANGE UP (ref 27–34)
MCHC RBC-ENTMCNC: 31.6 % — LOW (ref 32–36)
MCHC RBC-ENTMCNC: 32.1 % — SIGNIFICANT CHANGE UP (ref 32–36)
MCV RBC AUTO: 97.3 FL — SIGNIFICANT CHANGE UP (ref 80–100)
MCV RBC AUTO: 97.9 FL — SIGNIFICANT CHANGE UP (ref 80–100)
NRBC # FLD: 0 — SIGNIFICANT CHANGE UP
NRBC # FLD: 0 — SIGNIFICANT CHANGE UP
PLATELET # BLD AUTO: 149 K/UL — LOW (ref 150–400)
PLATELET # BLD AUTO: 170 K/UL — SIGNIFICANT CHANGE UP (ref 150–400)
PMV BLD: 10.2 FL — SIGNIFICANT CHANGE UP (ref 7–13)
PMV BLD: 10.3 FL — SIGNIFICANT CHANGE UP (ref 7–13)
POTASSIUM SERPL-MCNC: 4.5 MMOL/L — SIGNIFICANT CHANGE UP (ref 3.5–5.3)
POTASSIUM SERPL-SCNC: 4.5 MMOL/L — SIGNIFICANT CHANGE UP (ref 3.5–5.3)
RBC # BLD: 2.42 M/UL — LOW (ref 3.8–5.2)
RBC # BLD: 2.57 M/UL — LOW (ref 3.8–5.2)
RBC # FLD: 14.3 % — SIGNIFICANT CHANGE UP (ref 10.3–14.5)
RBC # FLD: 14.5 % — SIGNIFICANT CHANGE UP (ref 10.3–14.5)
RH IG SCN BLD-IMP: POSITIVE — SIGNIFICANT CHANGE UP
SODIUM SERPL-SCNC: 137 MMOL/L — SIGNIFICANT CHANGE UP (ref 135–145)
WBC # BLD: 10.13 K/UL — SIGNIFICANT CHANGE UP (ref 3.8–10.5)
WBC # BLD: 12.32 K/UL — HIGH (ref 3.8–10.5)
WBC # FLD AUTO: 10.13 K/UL — SIGNIFICANT CHANGE UP (ref 3.8–10.5)
WBC # FLD AUTO: 12.32 K/UL — HIGH (ref 3.8–10.5)

## 2018-10-11 PROCEDURE — 99239 HOSP IP/OBS DSCHRG MGMT >30: CPT

## 2018-10-11 PROCEDURE — 99222 1ST HOSP IP/OBS MODERATE 55: CPT | Mod: GC

## 2018-10-11 RX ORDER — ACETAMINOPHEN 500 MG
2 TABLET ORAL
Qty: 0 | Refills: 0 | DISCHARGE
Start: 2018-10-11

## 2018-10-11 RX ORDER — METOPROLOL TARTRATE 50 MG
1 TABLET ORAL
Qty: 60 | Refills: 0
Start: 2018-10-11 | End: 2018-11-09

## 2018-10-11 RX ORDER — SODIUM CHLORIDE 0.65 %
2 AEROSOL, SPRAY (ML) NASAL
Qty: 1 | Refills: 0 | OUTPATIENT
Start: 2018-10-11 | End: 2018-10-24

## 2018-10-11 RX ORDER — PETROLATUM,WHITE
1 JELLY (GRAM) TOPICAL
Qty: 1 | Refills: 0 | OUTPATIENT
Start: 2018-10-11 | End: 2018-10-24

## 2018-10-11 RX ORDER — OXYMETAZOLINE HYDROCHLORIDE 0.5 MG/ML
2 SPRAY NASAL
Qty: 1 | Refills: 0 | OUTPATIENT
Start: 2018-10-11 | End: 2018-10-13

## 2018-10-11 RX ADMIN — Medication 81 MILLIGRAM(S): at 12:05

## 2018-10-11 RX ADMIN — APIXABAN 2.5 MILLIGRAM(S): 2.5 TABLET, FILM COATED ORAL at 05:56

## 2018-10-11 RX ADMIN — ISOSORBIDE MONONITRATE 60 MILLIGRAM(S): 60 TABLET, EXTENDED RELEASE ORAL at 12:05

## 2018-10-11 RX ADMIN — Medication 1: at 08:18

## 2018-10-11 RX ADMIN — Medication 25 MILLIGRAM(S): at 05:56

## 2018-10-11 RX ADMIN — Medication 2 SPRAY(S): at 05:56

## 2018-10-11 RX ADMIN — INFLUENZA VIRUS VACCINE 0.5 MILLILITER(S): 15; 15; 15; 15 SUSPENSION INTRAMUSCULAR at 12:50

## 2018-10-11 RX ADMIN — AMLODIPINE BESYLATE 10 MILLIGRAM(S): 2.5 TABLET ORAL at 05:56

## 2018-10-11 RX ADMIN — Medication 250 MILLIGRAM(S): at 05:56

## 2018-10-11 RX ADMIN — Medication 3: at 12:05

## 2018-10-11 RX ADMIN — Medication 75 MILLIGRAM(S): at 05:56

## 2018-10-11 RX ADMIN — OXYMETAZOLINE HYDROCHLORIDE 2 SPRAY(S): 0.5 SPRAY NASAL at 05:56

## 2018-10-11 NOTE — PROGRESS NOTE ADULT - PROBLEM SELECTOR PLAN 8
CHFrEF (mod-severe) 2017 TTE  Currently euvolemic on exam  Continue ASA, statin, BB, Imdur, Hydralazine   Torsemide and Losartan held in the setting of CLARI
CHFrEF (mod-severe) 2017 TTE  Currently euvolemic on exam  Continue ASA, statin, BB, Imdur, Hydralazine   resume Torsemide now and Losartan later if renal fx stable
CHFrEF (mod-severe) 2017 TTE  F/u TTE  Currently euvolemic on exam  Continue ASA, statin, BB, Imdur, Hydralazine   Torsemide and Losartan held in the setting of CLARI
CHFrEF (mod-severe) 2017 TTE  F/u TTE  Currently hypovolemic on exam; repeat orthostatics BID  Continue ASA, statin, BB, Imdur, Hydralazine   Torsemide and Losartan held in the setting of CLARI

## 2018-10-11 NOTE — PROGRESS NOTE ADULT - ASSESSMENT
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 melena

## 2018-10-11 NOTE — CONSULT NOTE ADULT - SUBJECTIVE AND OBJECTIVE BOX
GASTROENTEROLOGY INITIAL CONSULT NOTE    Chief Complaint:  Patient is a 83y old  Female who presents with a chief complaint of syncope on Eliquis, epistaxis (11 Oct 2018 12:09)      HPI: 83y Female with past medical history atrial fibrillation on Eliquis, HFrEF, tachy-wellington s/p PPM, HFrEF, HTN, HLD, DM2 who presented after a syncopal episode. Patient presented on 10/7 in the setting of a syncopal episode. She lost consciousness fell onto her face, and injured her nose. This was followed by multiple episodes of epistaxis. She ultimately had her nose packed in the ED with resolution of epistaxis. GI consulted for an episode of melena. Patient states that the day she presented to the ED, she had an episode of "black" stool. Prior to the epistaxis, her bowel movements have been formed brown stool. She has not had a BM in the hospital since the one aforementioned bowel movement. She does not have abdominal pain, nausea, vomiting, hematemesis, or coffee ground emesis. She had an EGD/ERCP and colonoscopy in .     Allergies:  No Known Allergies      Hospital Medications:  acetaminophen   Tablet .. 650 milliGRAM(s) Oral every 6 hours PRN  amLODIPine   Tablet 10 milliGRAM(s) Oral daily  apixaban 2.5 milliGRAM(s) Oral every 12 hours  AQUAPHOR (petrolatum Ointment) 1 Application(s) Topical at bedtime  aspirin enteric coated 81 milliGRAM(s) Oral daily  atorvastatin 40 milliGRAM(s) Oral at bedtime  dextrose 40% Gel 15 Gram(s) Oral once PRN  dextrose 5%. 1000 milliLiter(s) IV Continuous <Continuous>  dextrose 50% Injectable 12.5 Gram(s) IV Push once  dextrose 50% Injectable 25 Gram(s) IV Push once  dextrose 50% Injectable 25 Gram(s) IV Push once  glucagon  Injectable 1 milliGRAM(s) IntraMuscular once PRN  hydrALAZINE 25 milliGRAM(s) Oral two times a day  insulin lispro (HumaLOG) corrective regimen sliding scale   SubCutaneous three times a day before meals  insulin lispro (HumaLOG) corrective regimen sliding scale   SubCutaneous at bedtime  isosorbide   mononitrate ER Tablet (IMDUR) 60 milliGRAM(s) Oral daily  metoprolol tartrate 75 milliGRAM(s) Oral two times a day  oxymetazoline 0.05% Nasal Spray 2 Spray(s) Left Nostril two times a day  sodium chloride 0.65% Nasal 2 Spray(s) Both Nostrils two times a day      PMHX/PSHX:  Chronic systolic heart failure  Tachycardia-bradycardia syndrome  Obese  Complete Spontaneous   Hyperlipidemia  Uterine Cancer (ICD9 179)  Osteoarthritis of Knee (ICD9 715.96)  DM Type 2 (Diabetes Mellitus, Type 2) (ICD9 250.00)  HTN (Hypertension) (ICD9 401.9)  Cardiac Pacemaker (ICD9 V45.01)  Status Post Total Knee Replacement  Cardiac Pacemaker (ICD9 V45.01)  History of Hysterectomy (ICD9 V88.01)      Family history:  No pertinent family history in first degree relatives      Social History:     ROS:     General:  No wt loss, fevers, chills, night sweats, fatigue,   Eyes:  Good vision, no reported pain  ENT:  No sore throat, pain, runny nose, dysphagia  CV:  No pain, palpitations, hypo/hypertension  Resp:  No dyspnea, cough, tachypnea, wheezing  GI:  see HPI  :  No pain, bleeding, incontinence, nocturia  Muscle:  No pain, weakness  Neuro:  No weakness, tingling, memory problems  Psych:  No fatigue, insomnia, mood problems, depression  Endocrine:  No polyuria, polydipsia, cold/heat intolerance  Heme:  No petechiae, ecchymosis, easy bruisability  Skin:  No rash, tattoos, scars, edema      PHYSICAL EXAM:   Vital Signs:  Vital Signs Last 24 Hrs  T(C): 36.5 (11 Oct 2018 12:07), Max: 36.8 (10 Oct 2018 21:37)  T(F): 97.7 (11 Oct 2018 12:07), Max: 98.3 (10 Oct 2018 21:37)  HR: 60 (11 Oct 2018 12:07) (58 - 60)  BP: 125/90 (11 Oct 2018 12:07) (123/46 - 135/44)  BP(mean): --  RR: 18 (11 Oct 2018 12:07) (17 - 18)  SpO2: 100% (11 Oct 2018 12:07) (100% - 100%)  Daily     Daily     GENERAL:  no acute distress  HEENT:  -icterus, facial ecchymoses   CHEST:  clear bilaterally, no wheezes or rales  HEART:  RRR, S1S2  ABDOMEN:  soft, non-tender, non-distended, normoactive bowel sounds,  no hepato-splenomegaly  EXTEREMITIES:  no  edema  SKIN:  warm/dry  NEURO:  alert, oriented, conversant     LABS:                        7.9    12.32 )-----------( 170      ( 11 Oct 2018 11:31 )             25.0     10-11    137  |  102  |  91<H>  ----------------------------<  167<H>  4.5   |  19<L>  |  1.88<H>    Ca    9.3      11 Oct 2018 05:15  Mg     1.9     10-10      Imaging:      < from: ERCP (06.04.15 @ 16:22) >       EGD:       -The distal esophagus contained a 3 cm segment of salmon colored mucosa        suggestive of Cuevas's esophagus. Mucosal changes seen at the proximal        end of the suspected Cuevas's. This was biopsied.       -The stomach was normal.       -The duodenal bulb and D2 were normal.    < end of copied text >    1. GE junction, biopsy  - Hyperplastic polyp  - Chronic active carditis with intestinal metaplasia  - No squamous epithelium is present

## 2018-10-11 NOTE — PROGRESS NOTE ADULT - PROBLEM SELECTOR PLAN 1
- found orthostatic likely due to vasovagal in setting of diabetes with possible peripheral neuropathy vs inability to compensate for HR due to BB therapy and PPM dependency - possible dehydration on diuretics and CLARI  - likely not arrhythmic - PPM interrogation normal, no evidence of ACS  - DW EP - possible rate drop to improve HR on PPM  - will reduce BB dosing to improve ability to increase HR for compensation during a vagal episode  - clinically appears euvolemic, would not give further fluids in setting of CHF - hold diuretics while CLARI is resolving  - cards input on valvular dz and awaiting repeat TTE - though no exam evidence of critical AS
- found orthostatic likely due to vasovagal in setting of diabetes with possible peripheral neuropathy vs inability to compensate for HR due to BB therapy and PPM dependency - possible dehydration on diuretics and CLARI  - likely not arrhythmic - PPM interrogation normal, no evidence of ACS  - EP reprogrammed PPM for rate drop   - reduced BB dosing to improve ability to increase HR for compensation during a vagal episode  - clinically appears euvolemic now, would not give further fluids in setting of CHF -   - cards input iraida, no need for further in-pt cardiac work up
-Patient with +syncope, +LOC, +head trauma  -Etiology likely 2/2 to dehydration (patient taking torsemide for the past 3 months which was a new medication); patient with + orthostatics  -will repeat orthostatics BID  -Patient with PPM ([employer]tr[employer]), NSR, A-paced on EKG; EP for interrogation PPM today   -f/u TTE and check UA  -c/w Tele monitoring  -will place on gentle hydration with NS @50cc/hr for 12hrs only
- found orthostatic likely due to vasovagal in setting of diabetes with possible peripheral neuropathy vs inability to compensate for HR due to BB therapy and PPM dependency - possible dehydration on diuretics and CLARI  - likely not arrhythmic - PPM interrogation normal, no evidence of ACS  - EP reprogrammed PPM this morning for rate drop   - reduced BB dosing to improve ability to increase HR for compensation during a vagal episode  - clinically appears euvolemic now, would not give further fluids in setting of CHF - hold diuretics while CLARI is resolving  - cards input iraida, no need for further in-pt cardiac work up

## 2018-10-11 NOTE — CONSULT NOTE ADULT - ASSESSMENT
Impression:  1) Melena x1 in the setting of epistaxis: patient had one episode of melena after swallowing a large amount of blood during epistaxis. Prior to epistaxis, patient reports normal, formed, brown bowel movements daily. No associated GI symptoms including abdominal pain, nausea, vomiting. Suspect melena solely from swallowed blood as opposed to GI bleed.    2) Epistaxis in the setting of syncope    3) Atrial fibrillation on Eliquis    Recommendations:  - management of epistaxis per ENT/primary  - anticoagulation per primary/cardiology; no GI contraindication  - no plan for EGD  - daily CBC; transfuse as needed  - call GI with questions    Sam, [id number] Impression:  1) Melena x1 in the setting of epistaxis: patient had one episode of melena after swallowing a large amount of blood during epistaxis. Prior to epistaxis, patient reports normal, formed, brown bowel movements daily. No associated GI symptoms including abdominal pain, nausea, vomiting. Suspect melena solely from swallowed blood as opposed to GI bleed.    2) Epistaxis in the setting of syncope    3) Atrial fibrillation on Eliquis    Recommendations:  - prior EGD report reviewed  - management of epistaxis per ENT/primary  - anticoagulation per primary/cardiology; no GI contraindication  - no plan for EGD  - daily CBC; transfuse as needed  - call GI with questions    Sam, [id number]

## 2018-10-11 NOTE — PROGRESS NOTE ADULT - PROBLEM SELECTOR PROBLEM 10
Type 2 diabetes mellitus with complication, without long-term current use of insulin

## 2018-10-11 NOTE — CONSULT NOTE ADULT - REASON FOR ADMISSION
syncope on Eliquis, epistaxis

## 2018-10-11 NOTE — PROGRESS NOTE ADULT - ATTENDING COMMENTS
worsening anemia - this can be due to a combination of anemia of chronic illness and recent epistaxis, her reported dark stool can also potentially be from residual epistaxis - however, she is on AC, and need to consider GI bleeding - repeat h/h noted, will check occult blood and have GI eval

## 2018-10-11 NOTE — PROGRESS NOTE ADULT - PROBLEM SELECTOR PROBLEM 9
Tachycardia-bradycardia syndrome

## 2018-10-11 NOTE — PROGRESS NOTE ADULT - PROBLEM SELECTOR PROBLEM 5
CLARI (acute kidney injury)

## 2018-10-11 NOTE — PROGRESS NOTE ADULT - PROBLEM SELECTOR PLAN 6
-ecchymosis of 4th and 5th L fingers, TTP, no obvious erythema, or dislocation  -hand x-ray reviewed without evidence of fracture
-ecchymosis of 4th and 5th L fingers, TTP, no obvious erythema, or dislocation  -hand x-ray reviewed without evidence of fracture
-ecchymosis of 4th and 5th L fingers, TTP, no obvious erythema, or dislocation  -hand xray reviewed without evidence of fracture
-ecchymosis of 4th and 5th L fingers, TTP, no obvious erythema, or dislocation  -hand x-ray reviewed without evidence of fracture

## 2018-10-11 NOTE — PROGRESS NOTE ADULT - SUBJECTIVE AND OBJECTIVE BOX
Patient is a 83y old  Female who presents with a chief complaint of syncope on Eliquis, epistaxis (10 Oct 2018 18:04)      SUBJECTIVE / OVERNIGHT EVENTS: this morning pt reported to nurse that yesterday she "had dark stool". nasal packing removed, no further bleed, has no DZ, LOC, no symptoms when getting up, ambulating with cane     ROS:  No HA/DZ  No Vision changes   No CP, SOB  No N/V/D  No Edema  No Rash  NO weakness, numbness    MEDICATIONS  (STANDING):  amLODIPine   Tablet 10 milliGRAM(s) Oral daily  apixaban 2.5 milliGRAM(s) Oral every 12 hours  AQUAPHOR (petrolatum Ointment) 1 Application(s) Topical at bedtime  aspirin enteric coated 81 milliGRAM(s) Oral daily  atorvastatin 40 milliGRAM(s) Oral at bedtime  dextrose 5%. 1000 milliLiter(s) (50 mL/Hr) IV Continuous <Continuous>  dextrose 50% Injectable 12.5 Gram(s) IV Push once  dextrose 50% Injectable 25 Gram(s) IV Push once  dextrose 50% Injectable 25 Gram(s) IV Push once  hydrALAZINE 25 milliGRAM(s) Oral two times a day  influenza   Vaccine 0.5 milliLiter(s) IntraMuscular once  insulin lispro (HumaLOG) corrective regimen sliding scale   SubCutaneous three times a day before meals  insulin lispro (HumaLOG) corrective regimen sliding scale   SubCutaneous at bedtime  isosorbide   mononitrate ER Tablet (IMDUR) 60 milliGRAM(s) Oral daily  metoprolol tartrate 75 milliGRAM(s) Oral two times a day  oxymetazoline 0.05% Nasal Spray 2 Spray(s) Left Nostril two times a day  sodium chloride 0.65% Nasal 2 Spray(s) Both Nostrils two times a day    MEDICATIONS  (PRN):  acetaminophen   Tablet .. 650 milliGRAM(s) Oral every 6 hours PRN Mild Pain (1 - 3), Moderate Pain (4 - 6)  dextrose 40% Gel 15 Gram(s) Oral once PRN Blood Glucose LESS THAN 70 milliGRAM(s)/deciliter  glucagon  Injectable 1 milliGRAM(s) IntraMuscular once PRN Glucose LESS THAN 70 milligrams/deciliter      T(C): 36.5 (10-11-18 @ 12:07)  HR: 60 (10-11-18 @ 12:07)  BP: 125/90 (10-11-18 @ 12:07)  RR: 18 (10-11-18 @ 12:07)  SpO2: 100% (10-11-18 @ 12:07)  CAPILLARY BLOOD GLUCOSE      POCT Blood Glucose.: 273 mg/dL (11 Oct 2018 11:55)  POCT Blood Glucose.: 177 mg/dL (11 Oct 2018 07:46)  POCT Blood Glucose.: 226 mg/dL (10 Oct 2018 21:41)  POCT Blood Glucose.: 313 mg/dL (10 Oct 2018 16:43)    I&O's Summary      PHYSICAL EXAM:  GENERAL: NAD, well-developed, AOx3  HEAD:  Atraumatic, Normocephalic  EYES: EOMI, PERRL, conjunctiva and sclera clear  NECK: Supple, No JVD  CHEST/LUNG: Clear to auscultation bilaterally  HEART: Regular rate and rhythm; No murmurs, rubs, or gallops, No Edema  ABDOMEN: Soft, Nontender, Nondistended; Bowel sounds present  EXTREMITIES:  2+ Peripheral Pulses, No clubbing, cyanosis  PSYCH: No SI/HI  NEUROLOGY: non-focal  SKIN: No rashes or lesions    LABS:                        7.9    12.32 )-----------( 170      ( 11 Oct 2018 11:31 )             25.0     10-11    137  |  102  |  91<H>  ----------------------------<  167<H>  4.5   |  19<L>  |  1.88<H>    Ca    9.3      11 Oct 2018 05:15  Mg     1.9     10-10                    RADIOLOGY & ADDITIONAL TESTS:    Imaging Personally Reviewed:    Consultant(s) Notes Reviewed:      Care Discussed with Consultants/Other Providers:

## 2018-10-11 NOTE — PROGRESS NOTE ADULT - PROBLEM SELECTOR PLAN 7
-PAF  -NSR on EKG currently  -ChadsVasc 6, on Eliquis 2.5 BID  Will hold PM dose of Eliquis given epistaxis  Will restart once H/H established stable   Continue home medication Metoprolol 200 mg with holding parameters    Risk Factor                                                        Points    [x] Congestive Heart Failure			1  [x] Hypertension					1  [x] Age = 75y					2  [ ] Age 65-74y					1  [x] Diabetes Mellitus				1  [ ] Stroke/TIA (ie, thromboembolic)		2  [ ] Vascular Disease (MI/PAD/aortic plaque)	1  [x] Sex Category (ie, female sex)			1    Total Score = 6
-PAF  -Paced chakaenradha  -ChadsVasc 6, on Eliquis 2.5 BID  - will reduce BB to 75 mg BID for now   - her fall was not mechanical / due to weakness and therefore if vagal symptoms are managed she should be low risk for fall again and will cw AC pending cards input     Risk Factor                                                        Points    [x] Congestive Heart Failure			1  [x] Hypertension					1  [x] Age = 75y					2  [ ] Age 65-74y					1  [x] Diabetes Mellitus				1  [ ] Stroke/TIA (ie, thromboembolic)		2  [ ] Vascular Disease (MI/PAD/aortic plaque)	1  [x] Sex Category (ie, female sex)			1    Total Score = 6
-PAF  -Paced currently  -ChadsVasc 6, on Eliquis 2.5 BID  - will reduce BB to 75 mg BID for now   - her fall was not mechanical / due to weakness and therefore if vagal symptoms are managed she should be low risk for fall again and will cw AC    Risk Factor                                                        Points    [x] Congestive Heart Failure			1  [x] Hypertension					1  [x] Age = 75y					2  [ ] Age 65-74y					1  [x] Diabetes Mellitus				1  [ ] Stroke/TIA (ie, thromboembolic)		2  [ ] Vascular Disease (MI/PAD/aortic plaque)	1  [x] Sex Category (ie, female sex)			1    Total Score = 6
-PAF  -Paced currently  -ChadsVasc 6, on Eliquis 2.5 BID  - will reduce BB to 75 mg BID for now   - her fall was not mechanical / due to weakness and therefore if vagal symptoms are managed she should be low risk for fall again and will cw AC    Risk Factor                                                        Points    [x] Congestive Heart Failure			1  [x] Hypertension					1  [x] Age = 75y					2  [ ] Age 65-74y					1  [x] Diabetes Mellitus				1  [ ] Stroke/TIA (ie, thromboembolic)		2  [ ] Vascular Disease (MI/PAD/aortic plaque)	1  [x] Sex Category (ie, female sex)			1    Total Score = 6

## 2018-10-11 NOTE — PROGRESS NOTE ADULT - PROBLEM SELECTOR PLAN 9
Hx of tachy-wellington syndrome  PPM ([employer])  EKG - Apaced, NSR, no ST changes  PPM interrogated, no events
Hx of tachy-wellington syndrome  PPM (meditronic)  EKG - Apaced, NSR, no ST changes  f/u EP recs for PPM interrogation

## 2018-10-11 NOTE — PROGRESS NOTE ADULT - PROBLEM SELECTOR PROBLEM 2
Closed fracture of nasal bone, initial encounter

## 2018-10-11 NOTE — PROGRESS NOTE ADULT - REASON FOR ADMISSION
syncope on Eliquis, epistaxis

## 2018-10-11 NOTE — PROGRESS NOTE ADULT - PROBLEM SELECTOR PLAN 2
-Nasal septum and R bone fx, with epistaxis  -Patient s/p R nostril nasal packing  - packing removed, DC ppx abx  - pt declining cosmetic sx at this time, out-pt follow up
-Nasal septum and R bone fx, with epistaxis  -Patient s/p R nostril nasal packing  -ENT consulted and recommended Keflex 250mg BID while packing is in place  and Rhino rocket - duration per ENT  -Plastic surgery recs appreciated - I dw pt, she is not interested on cosmetic sx - back on Eliquis as h/h stable and no need for transfusion
-Nasal septum and R bone fx, with epistaxis  -Patient s/p R nostril nasal packing  -ENT consulted and recommended Keflex 250mg BID while packing is in place  and Rhino rocket for 48-72 hours.  -Plastic surgery recs appreciated
-Nasal septum and R bone fx, with epistaxis  -Patient s/p R nostril nasal packing  -ENT consulted and recommended Keflex 250mg BID while packing is in place  and Rhino rocket - duration per ENT - will call and clarify today   -Plastic surgery recs appreciated - I dw pt, she is not interested on cosmetic sx - back on Eliquis as h/h stable and no need for transfusion

## 2018-10-11 NOTE — CONSULT NOTE ADULT - ATTENDING COMMENTS
Vasovagal syncope with trauma. Lifestyle modifications reviewed. Ok for d/c tele from my perspective.
I have seen and evaluated the patient with the GI Fellow and GI Team.  I agree with the findings, formulation and plan of care as documented in the resident / fellows note and edited where appropriate.

## 2018-10-11 NOTE — PROGRESS NOTE ADULT - PROBLEM SELECTOR PLAN 3
Epistaxis 2/2 trauma, s/p R nasal packing  F/u ENT recs  Currently not bleeding  Monitor H/H - hemoglobin stable
Epistaxis 2/2 trauma, s/p R nasal packing  F/u ENT recs  Currently not bleeding  Monitor H/H - hemoglobin stable
Epistaxis 2/2 trauma, s/p R nasal packing  packing removed, off abx, nasal sprays per ENT   Currently not bleeding
Epistaxis 2/2 trauma, s/p R nasal packing  F/u ENT recs  Currently not bleeding  Monitor H/H - hemoglobin stable

## 2018-10-11 NOTE — PROGRESS NOTE ADULT - PROBLEM SELECTOR PLAN 10
Hx of DM2, on oral medications  WeX5s=5.9%  no evidence of hypoglycemia but FS uncontrolled on NISS  iraida rosalind lopez on Januvia and Prandin

## 2018-10-11 NOTE — PROGRESS NOTE ADULT - PROBLEM SELECTOR PLAN 4
Patient with hyperkalemia likely 2/2 CLARI  Will encourage fluid intake given patient hypovolemic on exam and hold torsemide  -pt s/p albuterol, D50, Insulin 6 IV, no evidence of hyperkalemia seen on EKG  -Patient asymptomatic  - resolved
Patient with hyperkalemia likely 2/2 CLARI  Will encourage fluid intake given patient hypovolemic on exam and hold torsemide  -pt s/p albuterol, D50, Insulin 6 IV, no evidence of hyperkalemia seen on EKG  -Patient asymptomatic  - resolved
Patient with hyperkalemia likely 2/2 CLARI  Will encourage fluid intake given patient hypovolemic on exam and hold torsemide  -pt s/p albuterol, D50, Insulin 6 IV, no evidence of hyperkalemia seen on EKG  -Patient asymptomatic  -f/u BMP today
Patient with hyperkalemia likely 2/2 CLARI  Will encourage fluid intake given patient hypovolemic on exam and hold torsemide  -pt s/p albuterol, D50, Insulin 6 IV, no evidence of hyperkalemia seen on EKG  -Patient asymptomatic  - resolved

## 2018-10-11 NOTE — PROGRESS NOTE ADULT - PROBLEM SELECTOR PLAN 5
Patient with CLARI likely 2/2 epistaxis (hypovolemia)  patient hypovolemic on exam   Will hold losartan and Torsemide for now   Will restart as renal function improves  Avoid nephrotoxic agents  -will place on gentle hydration with NS @50cc/hr for 12hrs only
Patient with CLARI likely 2/2 epistaxis (hypovolemia), diuretic induced dehydration   patient now euvolemic   Will hold losartan and Torsemide for now until CLARI resolves fully, off IVF   Avoid nephrotoxic agents
resolving - Patient with CLARI likely 2/2 epistaxis (hypovolemia), diuretic induced dehydration   patient now euvolemic   will resume home diuretic, if renal fx remains stable, can resume ARB as out-pt
resolving - Patient with CLARI likely 2/2 epistaxis (hypovolemia), diuretic induced dehydration   patient now euvolemic   Will hold losartan and Torsemide for now until CLARI resolves fully, off IVF   Avoid nephrotoxic agents

## 2018-10-12 NOTE — DISCUSSION/SUMMARY
[Home] : patient was discharged to home [FreeTextEntry1] : 84 y/o F with a PMH significant for HTN, HLD, NIDDM2, HFrEF, tachybrady syndrome s/p PPM, A-fib on Eliquis, and uterine CA s/p hysterectomy and RT admitted from 10/7 - 10/11 for syncope and nasal fracture. Syncope was attributed to vagal episode. Seen by ENT, no surgical intervention needed. Seen by Endo, glimperide was stopped and Prandin started.

## 2018-10-13 ENCOUNTER — INPATIENT (INPATIENT)
Facility: HOSPITAL | Age: 83
LOS: 3 days | Discharge: HOME HEALTH | End: 2018-10-17
Attending: INTERNAL MEDICINE | Admitting: INTERNAL MEDICINE
Payer: MEDICARE

## 2018-10-13 VITALS
OXYGEN SATURATION: 90 % | RESPIRATION RATE: 18 BRPM | TEMPERATURE: 98 F | HEART RATE: 80 BPM | DIASTOLIC BLOOD PRESSURE: 41 MMHG | SYSTOLIC BLOOD PRESSURE: 141 MMHG

## 2018-10-13 DIAGNOSIS — J90 PLEURAL EFFUSION, NOT ELSEWHERE CLASSIFIED: ICD-10-CM

## 2018-10-13 PROBLEM — I50.22 CHRONIC SYSTOLIC (CONGESTIVE) HEART FAILURE: Chronic | Status: ACTIVE | Noted: 2018-10-07

## 2018-10-13 PROBLEM — I49.5 SICK SINUS SYNDROME: Chronic | Status: ACTIVE | Noted: 2018-10-07

## 2018-10-13 LAB
ALBUMIN SERPL ELPH-MCNC: 3.6 G/DL — SIGNIFICANT CHANGE UP (ref 3.3–5)
ALP SERPL-CCNC: 138 U/L — HIGH (ref 40–120)
ALT FLD-CCNC: 36 U/L — HIGH (ref 4–33)
APPEARANCE UR: CLEAR — SIGNIFICANT CHANGE UP
APTT BLD: 33.5 SEC — SIGNIFICANT CHANGE UP (ref 27.5–37.4)
AST SERPL-CCNC: 37 U/L — HIGH (ref 4–32)
B-OH-BUTYR SERPL-SCNC: 0.1 MMOL/L — SIGNIFICANT CHANGE UP (ref 0–0.4)
BACTERIA # UR AUTO: NEGATIVE — SIGNIFICANT CHANGE UP
BASE EXCESS BLDV CALC-SCNC: -6.1 MMOL/L — SIGNIFICANT CHANGE UP
BASE EXCESS BLDV CALC-SCNC: -7.4 MMOL/L — SIGNIFICANT CHANGE UP
BASOPHILS # BLD AUTO: 0.03 K/UL — SIGNIFICANT CHANGE UP (ref 0–0.2)
BASOPHILS NFR BLD AUTO: 0.2 % — SIGNIFICANT CHANGE UP (ref 0–2)
BILIRUB SERPL-MCNC: 0.6 MG/DL — SIGNIFICANT CHANGE UP (ref 0.2–1.2)
BILIRUB UR-MCNC: NEGATIVE — SIGNIFICANT CHANGE UP
BLOOD GAS VENOUS - CREATININE: 1.73 MG/DL — HIGH (ref 0.5–1.3)
BLOOD GAS VENOUS - CREATININE: 2.15 MG/DL — HIGH (ref 0.5–1.3)
BLOOD UR QL VISUAL: NEGATIVE — SIGNIFICANT CHANGE UP
BUN SERPL-MCNC: 80 MG/DL — HIGH (ref 7–23)
CALCIUM SERPL-MCNC: 10.1 MG/DL — SIGNIFICANT CHANGE UP (ref 8.4–10.5)
CHLORIDE BLDV-SCNC: 107 MMOL/L — SIGNIFICANT CHANGE UP (ref 96–108)
CHLORIDE BLDV-SCNC: 108 MMOL/L — SIGNIFICANT CHANGE UP (ref 96–108)
CHLORIDE SERPL-SCNC: 98 MMOL/L — SIGNIFICANT CHANGE UP (ref 98–107)
CK MB BLD-MCNC: 2.41 NG/ML — SIGNIFICANT CHANGE UP (ref 1–4.7)
CK MB BLD-MCNC: SIGNIFICANT CHANGE UP (ref 0–2.5)
CK SERPL-CCNC: 80 U/L — SIGNIFICANT CHANGE UP (ref 25–170)
CO2 SERPL-SCNC: 15 MMOL/L — LOW (ref 22–31)
COLOR SPEC: SIGNIFICANT CHANGE UP
CREAT SERPL-MCNC: 1.89 MG/DL — HIGH (ref 0.5–1.3)
EOSINOPHIL # BLD AUTO: 0 K/UL — SIGNIFICANT CHANGE UP (ref 0–0.5)
EOSINOPHIL NFR BLD AUTO: 0 % — SIGNIFICANT CHANGE UP (ref 0–6)
GAS PNL BLDV: 132 MMOL/L — LOW (ref 136–146)
GAS PNL BLDV: 133 MMOL/L — LOW (ref 136–146)
GLUCOSE BLDV-MCNC: 353 — HIGH (ref 70–99)
GLUCOSE BLDV-MCNC: 401 — HIGH (ref 70–99)
GLUCOSE SERPL-MCNC: 398 MG/DL — HIGH (ref 70–99)
GLUCOSE UR-MCNC: 200 — HIGH
HCO3 BLDV-SCNC: 18 MMOL/L — LOW (ref 20–27)
HCO3 BLDV-SCNC: 19 MMOL/L — LOW (ref 20–27)
HCT VFR BLD CALC: 24 % — LOW (ref 34.5–45)
HCT VFR BLDV CALC: 25.7 % — LOW (ref 34.5–45)
HCT VFR BLDV CALC: 25.9 % — LOW (ref 34.5–45)
HGB BLD-MCNC: 7.6 G/DL — LOW (ref 11.5–15.5)
HGB BLDV-MCNC: 8.2 G/DL — LOW (ref 11.5–15.5)
HGB BLDV-MCNC: 8.3 G/DL — LOW (ref 11.5–15.5)
HYALINE CASTS # UR AUTO: NEGATIVE — SIGNIFICANT CHANGE UP
IMM GRANULOCYTES # BLD AUTO: 0.11 # — SIGNIFICANT CHANGE UP
IMM GRANULOCYTES NFR BLD AUTO: 0.6 % — SIGNIFICANT CHANGE UP (ref 0–1.5)
INR BLD: 1.75 — HIGH (ref 0.88–1.17)
KETONES UR-MCNC: NEGATIVE — SIGNIFICANT CHANGE UP
LACTATE BLDV-MCNC: 3.8 MMOL/L — HIGH (ref 0.5–2)
LACTATE BLDV-MCNC: 5.4 MMOL/L — CRITICAL HIGH (ref 0.5–2)
LEUKOCYTE ESTERASE UR-ACNC: NEGATIVE — SIGNIFICANT CHANGE UP
LYMPHOCYTES # BLD AUTO: 0.85 K/UL — LOW (ref 1–3.3)
LYMPHOCYTES # BLD AUTO: 4.7 % — LOW (ref 13–44)
MCHC RBC-ENTMCNC: 31.4 PG — SIGNIFICANT CHANGE UP (ref 27–34)
MCHC RBC-ENTMCNC: 31.7 % — LOW (ref 32–36)
MCV RBC AUTO: 99.2 FL — SIGNIFICANT CHANGE UP (ref 80–100)
MONOCYTES # BLD AUTO: 1.38 K/UL — HIGH (ref 0–0.9)
MONOCYTES NFR BLD AUTO: 7.6 % — SIGNIFICANT CHANGE UP (ref 2–14)
NEUTROPHILS # BLD AUTO: 15.84 K/UL — HIGH (ref 1.8–7.4)
NEUTROPHILS NFR BLD AUTO: 86.9 % — HIGH (ref 43–77)
NITRITE UR-MCNC: NEGATIVE — SIGNIFICANT CHANGE UP
NRBC # FLD: 0 — SIGNIFICANT CHANGE UP
NT-PROBNP SERPL-SCNC: SIGNIFICANT CHANGE UP PG/ML
PCO2 BLDV: 29 MMHG — LOW (ref 41–51)
PCO2 BLDV: 33 MMHG — LOW (ref 41–51)
PH BLDV: 7.37 PH — SIGNIFICANT CHANGE UP (ref 7.32–7.43)
PH BLDV: 7.38 PH — SIGNIFICANT CHANGE UP (ref 7.32–7.43)
PH UR: 6 — SIGNIFICANT CHANGE UP (ref 5–8)
PLATELET # BLD AUTO: 197 K/UL — SIGNIFICANT CHANGE UP (ref 150–400)
PMV BLD: 10.9 FL — SIGNIFICANT CHANGE UP (ref 7–13)
PO2 BLDV: 24 MMHG — LOW (ref 35–40)
PO2 BLDV: < 24 MMHG — LOW (ref 35–40)
POTASSIUM BLDV-SCNC: 4.5 MMOL/L — SIGNIFICANT CHANGE UP (ref 3.4–4.5)
POTASSIUM BLDV-SCNC: 4.7 MMOL/L — HIGH (ref 3.4–4.5)
POTASSIUM SERPL-MCNC: 4.9 MMOL/L — SIGNIFICANT CHANGE UP (ref 3.5–5.3)
POTASSIUM SERPL-SCNC: 4.9 MMOL/L — SIGNIFICANT CHANGE UP (ref 3.5–5.3)
PROT SERPL-MCNC: 7.6 G/DL — SIGNIFICANT CHANGE UP (ref 6–8.3)
PROT UR-MCNC: 20 — SIGNIFICANT CHANGE UP
PROTHROM AB SERPL-ACNC: 19.7 SEC — HIGH (ref 9.8–13.1)
RBC # BLD: 2.42 M/UL — LOW (ref 3.8–5.2)
RBC # FLD: 15.3 % — HIGH (ref 10.3–14.5)
RBC CASTS # UR COMP ASSIST: SIGNIFICANT CHANGE UP (ref 0–?)
SAO2 % BLDV: 27.6 % — LOW (ref 60–85)
SAO2 % BLDV: 32.2 % — LOW (ref 60–85)
SODIUM SERPL-SCNC: 133 MMOL/L — LOW (ref 135–145)
SP GR SPEC: 1.01 — SIGNIFICANT CHANGE UP (ref 1–1.04)
SQUAMOUS # UR AUTO: SIGNIFICANT CHANGE UP
TROPONIN T, HIGH SENSITIVITY: 166 NG/L — CRITICAL HIGH (ref ?–14)
TROPONIN T, HIGH SENSITIVITY: 174 NG/L — CRITICAL HIGH (ref ?–14)
UROBILINOGEN FLD QL: NORMAL — SIGNIFICANT CHANGE UP
WBC # BLD: 18.21 K/UL — HIGH (ref 3.8–10.5)
WBC # FLD AUTO: 18.21 K/UL — HIGH (ref 3.8–10.5)
WBC UR QL: SIGNIFICANT CHANGE UP (ref 0–?)

## 2018-10-13 PROCEDURE — 71045 X-RAY EXAM CHEST 1 VIEW: CPT | Mod: 26

## 2018-10-13 RX ORDER — CEFTRIAXONE 500 MG/1
1 INJECTION, POWDER, FOR SOLUTION INTRAMUSCULAR; INTRAVENOUS ONCE
Qty: 0 | Refills: 0 | Status: DISCONTINUED | OUTPATIENT
Start: 2018-10-13 | End: 2018-10-13

## 2018-10-13 RX ORDER — HYDRALAZINE HCL 50 MG
25 TABLET ORAL ONCE
Qty: 0 | Refills: 0 | Status: COMPLETED | OUTPATIENT
Start: 2018-10-13 | End: 2018-10-13

## 2018-10-13 RX ORDER — PIPERACILLIN AND TAZOBACTAM 4; .5 G/20ML; G/20ML
3.38 INJECTION, POWDER, LYOPHILIZED, FOR SOLUTION INTRAVENOUS ONCE
Qty: 0 | Refills: 0 | Status: COMPLETED | OUTPATIENT
Start: 2018-10-13 | End: 2018-10-13

## 2018-10-13 RX ORDER — FUROSEMIDE 40 MG
40 TABLET ORAL ONCE
Qty: 0 | Refills: 0 | Status: COMPLETED | OUTPATIENT
Start: 2018-10-13 | End: 2018-10-13

## 2018-10-13 RX ORDER — INSULIN LISPRO 100/ML
8 VIAL (ML) SUBCUTANEOUS ONCE
Qty: 0 | Refills: 0 | Status: COMPLETED | OUTPATIENT
Start: 2018-10-13 | End: 2018-10-13

## 2018-10-13 RX ORDER — METOPROLOL TARTRATE 50 MG
75 TABLET ORAL ONCE
Qty: 0 | Refills: 0 | Status: COMPLETED | OUTPATIENT
Start: 2018-10-13 | End: 2018-10-13

## 2018-10-13 RX ORDER — SODIUM CHLORIDE 9 MG/ML
1000 INJECTION INTRAMUSCULAR; INTRAVENOUS; SUBCUTANEOUS ONCE
Qty: 0 | Refills: 0 | Status: COMPLETED | OUTPATIENT
Start: 2018-10-13 | End: 2018-10-13

## 2018-10-13 RX ORDER — ASPIRIN/CALCIUM CARB/MAGNESIUM 324 MG
162 TABLET ORAL ONCE
Qty: 0 | Refills: 0 | Status: COMPLETED | OUTPATIENT
Start: 2018-10-13 | End: 2018-10-13

## 2018-10-13 RX ADMIN — Medication 25 MILLIGRAM(S): at 23:41

## 2018-10-13 RX ADMIN — PIPERACILLIN AND TAZOBACTAM 200 GRAM(S): 4; .5 INJECTION, POWDER, LYOPHILIZED, FOR SOLUTION INTRAVENOUS at 23:15

## 2018-10-13 RX ADMIN — SODIUM CHLORIDE 1000 MILLILITER(S): 9 INJECTION INTRAMUSCULAR; INTRAVENOUS; SUBCUTANEOUS at 20:22

## 2018-10-13 RX ADMIN — Medication 40 MILLIGRAM(S): at 21:29

## 2018-10-13 RX ADMIN — Medication 75 MILLIGRAM(S): at 23:41

## 2018-10-13 RX ADMIN — Medication 8 UNIT(S): at 23:24

## 2018-10-13 RX ADMIN — Medication 162 MILLIGRAM(S): at 21:28

## 2018-10-13 RX ADMIN — Medication 40 MILLIGRAM(S): at 23:15

## 2018-10-13 RX ADMIN — SODIUM CHLORIDE 1000 MILLILITER(S): 9 INJECTION INTRAMUSCULAR; INTRAVENOUS; SUBCUTANEOUS at 19:27

## 2018-10-13 NOTE — ED ADULT NURSE NOTE - OBJECTIVE STATEMENT
Pt A+OX3.  States she awoke this am with right sided abd pain and believes she had a fever.  Denies N/V/D.  States she feels cold.  Eating poorly as per family.  Facial ecchymosis present from a previous fall 1 week ago.  Doesn't know why she fell.  Denies dysuria.  Labs obtained and sent as ordered.  #20g SL R AC placed.  EKG called.  CM placed.  Pt tachypneic.  O2 sat 90% RA.  HOB elevated.  O2 4L NC placed with O2 sat increasing to 96%.  MD notified of pt status.

## 2018-10-13 NOTE — ED PROVIDER NOTE - MEDICAL DECISION MAKING DETAILS
82yo F with PMH CHF, HLD, HTN, pacemaker placement, afib, DM2, uterine ca s/p hysterectomy presents with complaints of RUQ pain with SOB. CBC, CMP, CXR.

## 2018-10-13 NOTE — ED PROVIDER NOTE - CARE PLAN
Principal Discharge DX:	Pleural effusion  Secondary Diagnosis:	Fall Principal Discharge DX:	Pleural effusion  Secondary Diagnosis:	Fall  Secondary Diagnosis:	CHF exacerbation

## 2018-10-13 NOTE — ED PROVIDER NOTE - PROGRESS NOTE DETAILS
Brandon ARCINIEGA: bedside US, pt with pleural effusion on R lung base. CXR with some pulmonary edema, given HF, pt likely in CHF exacerbation with elevated trop 2/2 demand ischemia. EKG unchanged from prior visit. will continue to trend trops. pt continues to deny cp. will admit for chf exacerbation

## 2018-10-13 NOTE — ED ADULT NURSE NOTE - NSIMPLEMENTINTERV_GEN_ALL_ED
Implemented All Universal Safety Interventions:  [place] to call system. Call bell, personal items and telephone within reach. Instruct patient to call for assistance. Room bathroom lighting operational. Non-slip footwear when patient is off stretcher. Physically safe environment: no spills, clutter or unnecessary equipment. Stretcher in lowest position, wheels locked, appropriate side rails in place.

## 2018-10-13 NOTE — ED ADULT TRIAGE NOTE - CHIEF COMPLAINT QUOTE
pt was dc/ed from here thursday / today c/o sob pain to rt side and fever/ pt has ecchymosis from last admissions fall  pale at triage pt on Eliquis  pt has pacemaker

## 2018-10-13 NOTE — ED ADULT NURSE REASSESSMENT NOTE - CONDITION
pt taken to bathroom with wheelchair. became very dyspneic, tahcpneic, audible crackles. placed back on o2 at 4l vuz nc, cardiac monitor. mar notified. vs in chart.

## 2018-10-13 NOTE — ED PROVIDER NOTE - ATTENDING CONTRIBUTION TO CARE
I performed a face-to-face evaluation of the patient and performed a history and physical examination. I agree with the history and physical examination.    Fell last week. Discharged a few days ago. Returns w/ R-sided pain and SOB and hypoxia. EDBUS w/ R pleural effusion. On Eliquis. Concern for bloody pleural effusion vs. CHF. Labs, CXR, admit.

## 2018-10-13 NOTE — ED ADULT NURSE REASSESSMENT NOTE - CONDITION
pt became dyspneic after walking brdoerick the bathroom.  mar aware. placed back on monitor and o2.  vs in chart. given meds as ordered. seen by mar. pt less dyspniec and tachycardic. reports feeling better.  verbal report given to drea gutierrez. bed dirty. and being notified. houskeeping superviosr already notified as per floor.

## 2018-10-13 NOTE — ED PROVIDER NOTE - PHYSICAL EXAMINATION
BSS Chest: +effusion RLQ, unclear if bloody s/p fall 2/2 eliquis use or pleural fluid 2/2 CHF  Abd: tender to palpation on RU lateral aspect  MSK: no CVA tenderness b/l

## 2018-10-13 NOTE — ED PROVIDER NOTE - OBJECTIVE STATEMENT
82yo F with PMH HFrEF, HLD, HTN, PPM 2/2 tachybrady syndrome, afib (on Eliquis, last took this AM), DM2, uterine ca s/p hysterectomy presents with complaints of RUQ pain with SOB. 84yo F with PMH CHF, HLD, HTN, pacemaker placement, afib, DM2, uterine ca s/p hysterectomy presents with complaints of RUQ pain with SOB. Pt was previously admitted s/p mechanical fall on face and was discharged 10/11. Pain started suddenly last night (10/12). Pt explains that the pain is 7/10 at rest and "worse" with movement. She additionally has shortness of breath and has occasionally brought up blood in phlegm with coughing. She denies any nausea, vomiting, shortness of breath, increased frequency or burning with urination. She last took her Eliquis this morning.

## 2018-10-13 NOTE — ED PROVIDER NOTE - PSH
Cardiac Pacemaker (ICD9 V45.01)  [employer] (2003 - for tacybrady syndrome @ [facility])  History of Hysterectomy (ICD9 V88.01)    Status Post Total Knee Replacement  LISA, 6 months apart 2010

## 2018-10-14 DIAGNOSIS — I48.91 UNSPECIFIED ATRIAL FIBRILLATION: ICD-10-CM

## 2018-10-14 DIAGNOSIS — E11.9 TYPE 2 DIABETES MELLITUS WITHOUT COMPLICATIONS: ICD-10-CM

## 2018-10-14 DIAGNOSIS — Z29.9 ENCOUNTER FOR PROPHYLACTIC MEASURES, UNSPECIFIED: ICD-10-CM

## 2018-10-14 DIAGNOSIS — I10 ESSENTIAL (PRIMARY) HYPERTENSION: ICD-10-CM

## 2018-10-14 DIAGNOSIS — D64.9 ANEMIA, UNSPECIFIED: ICD-10-CM

## 2018-10-14 DIAGNOSIS — D72.829 ELEVATED WHITE BLOOD CELL COUNT, UNSPECIFIED: ICD-10-CM

## 2018-10-14 DIAGNOSIS — I50.9 HEART FAILURE, UNSPECIFIED: ICD-10-CM

## 2018-10-14 DIAGNOSIS — E78.5 HYPERLIPIDEMIA, UNSPECIFIED: ICD-10-CM

## 2018-10-14 DIAGNOSIS — R65.10 SYSTEMIC INFLAMMATORY RESPONSE SYNDROME (SIRS) OF NON-INFECTIOUS ORIGIN WITHOUT ACUTE ORGAN DYSFUNCTION: ICD-10-CM

## 2018-10-14 LAB
ALBUMIN SERPL ELPH-MCNC: 3.4 G/DL — SIGNIFICANT CHANGE UP (ref 3.3–5)
ALP SERPL-CCNC: 126 U/L — HIGH (ref 40–120)
ALT FLD-CCNC: 35 U/L — HIGH (ref 4–33)
AST SERPL-CCNC: 33 U/L — HIGH (ref 4–32)
B PERT DNA SPEC QL NAA+PROBE: SIGNIFICANT CHANGE UP
BASE EXCESS BLDA CALC-SCNC: -5.7 MMOL/L — SIGNIFICANT CHANGE UP
BASE EXCESS BLDA CALC-SCNC: -6.7 MMOL/L — SIGNIFICANT CHANGE UP
BASE EXCESS BLDV CALC-SCNC: -2.5 MMOL/L — SIGNIFICANT CHANGE UP
BASOPHILS # BLD AUTO: 0.01 K/UL — SIGNIFICANT CHANGE UP (ref 0–0.2)
BASOPHILS NFR BLD AUTO: 0.1 % — SIGNIFICANT CHANGE UP (ref 0–2)
BILIRUB SERPL-MCNC: 0.9 MG/DL — SIGNIFICANT CHANGE UP (ref 0.2–1.2)
BLD GP AB SCN SERPL QL: NEGATIVE — SIGNIFICANT CHANGE UP
BLOOD GAS VENOUS - CREATININE: 1.92 MG/DL — HIGH (ref 0.5–1.3)
BUN SERPL-MCNC: 77 MG/DL — HIGH (ref 7–23)
BUN SERPL-MCNC: 83 MG/DL — HIGH (ref 7–23)
C PNEUM DNA SPEC QL NAA+PROBE: NOT DETECTED — SIGNIFICANT CHANGE UP
CALCIUM SERPL-MCNC: 10.2 MG/DL — SIGNIFICANT CHANGE UP (ref 8.4–10.5)
CALCIUM SERPL-MCNC: 9.6 MG/DL — SIGNIFICANT CHANGE UP (ref 8.4–10.5)
CHLORIDE BLDA-SCNC: 107 MMOL/L — SIGNIFICANT CHANGE UP (ref 96–108)
CHLORIDE BLDA-SCNC: 107 MMOL/L — SIGNIFICANT CHANGE UP (ref 96–108)
CHLORIDE BLDV-SCNC: 106 MMOL/L — SIGNIFICANT CHANGE UP (ref 96–108)
CHLORIDE SERPL-SCNC: 100 MMOL/L — SIGNIFICANT CHANGE UP (ref 98–107)
CHLORIDE SERPL-SCNC: 103 MMOL/L — SIGNIFICANT CHANGE UP (ref 98–107)
CHOLEST SERPL-MCNC: 82 MG/DL — LOW (ref 120–199)
CK MB BLD-MCNC: 10.05 NG/ML — HIGH (ref 1–4.7)
CK MB BLD-MCNC: 2.5 — SIGNIFICANT CHANGE UP (ref 0–2.5)
CK MB BLD-MCNC: 6.3 NG/ML — HIGH (ref 1–4.7)
CK MB BLD-MCNC: 6.37 NG/ML — HIGH (ref 1–4.7)
CK SERPL-CCNC: 187 U/L — HIGH (ref 25–170)
CK SERPL-CCNC: 396 U/L — HIGH (ref 25–170)
CK SERPL-CCNC: 400 U/L — HIGH (ref 25–170)
CO2 SERPL-SCNC: 16 MMOL/L — LOW (ref 22–31)
CO2 SERPL-SCNC: 19 MMOL/L — LOW (ref 22–31)
CREAT SERPL-MCNC: 1.88 MG/DL — HIGH (ref 0.5–1.3)
CREAT SERPL-MCNC: 2.01 MG/DL — HIGH (ref 0.5–1.3)
EOSINOPHIL # BLD AUTO: 0 K/UL — SIGNIFICANT CHANGE UP (ref 0–0.5)
EOSINOPHIL NFR BLD AUTO: 0 % — SIGNIFICANT CHANGE UP (ref 0–6)
FERRITIN SERPL-MCNC: 96.17 NG/ML — SIGNIFICANT CHANGE UP (ref 15–150)
FLUAV H1 2009 PAND RNA SPEC QL NAA+PROBE: NOT DETECTED — SIGNIFICANT CHANGE UP
FLUAV H1 RNA SPEC QL NAA+PROBE: NOT DETECTED — SIGNIFICANT CHANGE UP
FLUAV H3 RNA SPEC QL NAA+PROBE: NOT DETECTED — SIGNIFICANT CHANGE UP
FLUAV SUBTYP SPEC NAA+PROBE: SIGNIFICANT CHANGE UP
FLUBV RNA SPEC QL NAA+PROBE: NOT DETECTED — SIGNIFICANT CHANGE UP
GAS PNL BLDV: 134 MMOL/L — LOW (ref 136–146)
GLUCOSE BLDA-MCNC: 333 MG/DL — HIGH (ref 70–99)
GLUCOSE BLDA-MCNC: 348 MG/DL — HIGH (ref 70–99)
GLUCOSE BLDC GLUCOMTR-MCNC: 218 MG/DL — HIGH (ref 70–99)
GLUCOSE BLDC GLUCOMTR-MCNC: 294 MG/DL — HIGH (ref 70–99)
GLUCOSE BLDC GLUCOMTR-MCNC: 314 MG/DL — HIGH (ref 70–99)
GLUCOSE BLDC GLUCOMTR-MCNC: 375 MG/DL — HIGH (ref 70–99)
GLUCOSE BLDV-MCNC: 302 — HIGH (ref 70–99)
GLUCOSE SERPL-MCNC: 221 MG/DL — HIGH (ref 70–99)
GLUCOSE SERPL-MCNC: 342 MG/DL — HIGH (ref 70–99)
HADV DNA SPEC QL NAA+PROBE: NOT DETECTED — SIGNIFICANT CHANGE UP
HAPTOGLOB SERPL-MCNC: 173 MG/DL — SIGNIFICANT CHANGE UP (ref 34–200)
HCO3 BLDA-SCNC: 19 MMOL/L — LOW (ref 22–26)
HCO3 BLDA-SCNC: 20 MMOL/L — LOW (ref 22–26)
HCO3 BLDV-SCNC: 22 MMOL/L — SIGNIFICANT CHANGE UP (ref 20–27)
HCOV 229E RNA SPEC QL NAA+PROBE: NOT DETECTED — SIGNIFICANT CHANGE UP
HCOV HKU1 RNA SPEC QL NAA+PROBE: NOT DETECTED — SIGNIFICANT CHANGE UP
HCOV NL63 RNA SPEC QL NAA+PROBE: NOT DETECTED — SIGNIFICANT CHANGE UP
HCOV OC43 RNA SPEC QL NAA+PROBE: NOT DETECTED — SIGNIFICANT CHANGE UP
HCT VFR BLD CALC: 22.3 % — LOW (ref 34.5–45)
HCT VFR BLD CALC: 22.7 % — LOW (ref 34.5–45)
HCT VFR BLD CALC: 23.4 % — LOW (ref 34.5–45)
HCT VFR BLD CALC: 24 % — LOW (ref 34.5–45)
HCT VFR BLDA CALC: 20.5 % — CRITICAL LOW (ref 34.5–46.5)
HCT VFR BLDA CALC: 23.6 % — LOW (ref 34.5–46.5)
HCT VFR BLDV CALC: 23.4 % — LOW (ref 34.5–45)
HDLC SERPL-MCNC: 40 MG/DL — LOW (ref 45–65)
HGB BLD-MCNC: 7.2 G/DL — LOW (ref 11.5–15.5)
HGB BLD-MCNC: 7.4 G/DL — LOW (ref 11.5–15.5)
HGB BLD-MCNC: 7.5 G/DL — LOW (ref 11.5–15.5)
HGB BLD-MCNC: 7.7 G/DL — LOW (ref 11.5–15.5)
HGB BLDA-MCNC: 6.5 G/DL — CRITICAL LOW (ref 11.5–15.5)
HGB BLDA-MCNC: 7.6 G/DL — LOW (ref 11.5–15.5)
HGB BLDV-MCNC: 7.5 G/DL — LOW (ref 11.5–15.5)
HMPV RNA SPEC QL NAA+PROBE: NOT DETECTED — SIGNIFICANT CHANGE UP
HPIV1 RNA SPEC QL NAA+PROBE: NOT DETECTED — SIGNIFICANT CHANGE UP
HPIV2 RNA SPEC QL NAA+PROBE: NOT DETECTED — SIGNIFICANT CHANGE UP
HPIV3 RNA SPEC QL NAA+PROBE: NOT DETECTED — SIGNIFICANT CHANGE UP
HPIV4 RNA SPEC QL NAA+PROBE: NOT DETECTED — SIGNIFICANT CHANGE UP
IMM GRANULOCYTES # BLD AUTO: 0.09 # — SIGNIFICANT CHANGE UP
IMM GRANULOCYTES NFR BLD AUTO: 0.6 % — SIGNIFICANT CHANGE UP (ref 0–1.5)
IRON SATN MFR SERPL: 15 UG/DL — LOW (ref 30–160)
IRON SATN MFR SERPL: 262 UG/DL — SIGNIFICANT CHANGE UP (ref 140–530)
L PNEUMO AG UR QL: NEGATIVE — SIGNIFICANT CHANGE UP
LACTATE BLDA-SCNC: 2.4 MMOL/L — HIGH (ref 0.5–2)
LACTATE BLDA-SCNC: 3.2 MMOL/L — HIGH (ref 0.5–2)
LACTATE BLDV-MCNC: 1.6 MMOL/L — SIGNIFICANT CHANGE UP (ref 0.5–2)
LDH SERPL L TO P-CCNC: 265 U/L — HIGH (ref 135–225)
LIPID PNL WITH DIRECT LDL SERPL: 33 MG/DL — SIGNIFICANT CHANGE UP
LYMPHOCYTES # BLD AUTO: 1.65 K/UL — SIGNIFICANT CHANGE UP (ref 1–3.3)
LYMPHOCYTES # BLD AUTO: 10.1 % — LOW (ref 13–44)
M PNEUMO DNA SPEC QL NAA+PROBE: NOT DETECTED — SIGNIFICANT CHANGE UP
MAGNESIUM SERPL-MCNC: 1.7 MG/DL — SIGNIFICANT CHANGE UP (ref 1.6–2.6)
MCHC RBC-ENTMCNC: 31.1 PG — SIGNIFICANT CHANGE UP (ref 27–34)
MCHC RBC-ENTMCNC: 31.7 PG — SIGNIFICANT CHANGE UP (ref 27–34)
MCHC RBC-ENTMCNC: 31.7 PG — SIGNIFICANT CHANGE UP (ref 27–34)
MCHC RBC-ENTMCNC: 31.9 PG — SIGNIFICANT CHANGE UP (ref 27–34)
MCHC RBC-ENTMCNC: 32.1 % — SIGNIFICANT CHANGE UP (ref 32–36)
MCHC RBC-ENTMCNC: 32.1 % — SIGNIFICANT CHANGE UP (ref 32–36)
MCHC RBC-ENTMCNC: 32.3 % — SIGNIFICANT CHANGE UP (ref 32–36)
MCHC RBC-ENTMCNC: 32.6 % — SIGNIFICANT CHANGE UP (ref 32–36)
MCV RBC AUTO: 97.1 FL — SIGNIFICANT CHANGE UP (ref 80–100)
MCV RBC AUTO: 97.8 FL — SIGNIFICANT CHANGE UP (ref 80–100)
MCV RBC AUTO: 98.2 FL — SIGNIFICANT CHANGE UP (ref 80–100)
MCV RBC AUTO: 98.8 FL — SIGNIFICANT CHANGE UP (ref 80–100)
MONOCYTES # BLD AUTO: 0.98 K/UL — HIGH (ref 0–0.9)
MONOCYTES NFR BLD AUTO: 6 % — SIGNIFICANT CHANGE UP (ref 2–14)
NEUTROPHILS # BLD AUTO: 13.61 K/UL — HIGH (ref 1.8–7.4)
NEUTROPHILS NFR BLD AUTO: 83.2 % — HIGH (ref 43–77)
NRBC # FLD: 0 — SIGNIFICANT CHANGE UP
PCO2 BLDA: 25 MMHG — LOW (ref 32–48)
PCO2 BLDA: 26 MMHG — LOW (ref 32–48)
PCO2 BLDV: 36 MMHG — LOW (ref 41–51)
PH BLDA: 7.43 PH — SIGNIFICANT CHANGE UP (ref 7.35–7.45)
PH BLDA: 7.46 PH — HIGH (ref 7.35–7.45)
PH BLDV: 7.4 PH — SIGNIFICANT CHANGE UP (ref 7.32–7.43)
PHOSPHATE SERPL-MCNC: 2.7 MG/DL — SIGNIFICANT CHANGE UP (ref 2.5–4.5)
PLATELET # BLD AUTO: 176 K/UL — SIGNIFICANT CHANGE UP (ref 150–400)
PLATELET # BLD AUTO: 186 K/UL — SIGNIFICANT CHANGE UP (ref 150–400)
PLATELET # BLD AUTO: 190 K/UL — SIGNIFICANT CHANGE UP (ref 150–400)
PLATELET # BLD AUTO: 207 K/UL — SIGNIFICANT CHANGE UP (ref 150–400)
PMV BLD: 10.7 FL — SIGNIFICANT CHANGE UP (ref 7–13)
PMV BLD: 10.8 FL — SIGNIFICANT CHANGE UP (ref 7–13)
PMV BLD: 10.9 FL — SIGNIFICANT CHANGE UP (ref 7–13)
PMV BLD: 11 FL — SIGNIFICANT CHANGE UP (ref 7–13)
PO2 BLDA: 52 MMHG — LOW (ref 83–108)
PO2 BLDA: 94 MMHG — SIGNIFICANT CHANGE UP (ref 83–108)
PO2 BLDV: 28 MMHG — LOW (ref 35–40)
POTASSIUM BLDA-SCNC: 4.3 MMOL/L — SIGNIFICANT CHANGE UP (ref 3.4–4.5)
POTASSIUM BLDA-SCNC: 4.6 MMOL/L — HIGH (ref 3.4–4.5)
POTASSIUM BLDV-SCNC: 4.4 MMOL/L — SIGNIFICANT CHANGE UP (ref 3.4–4.5)
POTASSIUM SERPL-MCNC: 4.2 MMOL/L — SIGNIFICANT CHANGE UP (ref 3.5–5.3)
POTASSIUM SERPL-MCNC: 5.1 MMOL/L — SIGNIFICANT CHANGE UP (ref 3.5–5.3)
POTASSIUM SERPL-SCNC: 4.2 MMOL/L — SIGNIFICANT CHANGE UP (ref 3.5–5.3)
POTASSIUM SERPL-SCNC: 5.1 MMOL/L — SIGNIFICANT CHANGE UP (ref 3.5–5.3)
PROT SERPL-MCNC: 7.8 G/DL — SIGNIFICANT CHANGE UP (ref 6–8.3)
RBC # BLD: 2.27 M/UL — LOW (ref 3.8–5.2)
RBC # BLD: 2.32 M/UL — LOW (ref 3.8–5.2)
RBC # BLD: 2.41 M/UL — LOW (ref 3.8–5.2)
RBC # BLD: 2.43 M/UL — LOW (ref 3.8–5.2)
RBC # FLD: 15.2 % — HIGH (ref 10.3–14.5)
RBC # FLD: 15.4 % — HIGH (ref 10.3–14.5)
RBC # FLD: 15.5 % — HIGH (ref 10.3–14.5)
RBC # FLD: 15.5 % — HIGH (ref 10.3–14.5)
RETICS #: 114 K/UL — SIGNIFICANT CHANGE UP (ref 25–125)
RETICS/RBC NFR: 4.7 % — HIGH (ref 0.5–2.5)
RH IG SCN BLD-IMP: POSITIVE — SIGNIFICANT CHANGE UP
RSV RNA SPEC QL NAA+PROBE: NOT DETECTED — SIGNIFICANT CHANGE UP
RV+EV RNA SPEC QL NAA+PROBE: NOT DETECTED — SIGNIFICANT CHANGE UP
SAO2 % BLDA: 82.4 % — LOW (ref 95–99)
SAO2 % BLDA: 94.1 % — LOW (ref 95–99)
SAO2 % BLDV: 43 % — LOW (ref 60–85)
SODIUM BLDA-SCNC: 132 MMOL/L — LOW (ref 136–146)
SODIUM BLDA-SCNC: 132 MMOL/L — LOW (ref 136–146)
SODIUM SERPL-SCNC: 135 MMOL/L — SIGNIFICANT CHANGE UP (ref 135–145)
SODIUM SERPL-SCNC: 137 MMOL/L — SIGNIFICANT CHANGE UP (ref 135–145)
SPECIMEN SOURCE: SIGNIFICANT CHANGE UP
SPECIMEN SOURCE: SIGNIFICANT CHANGE UP
TRIGL SERPL-MCNC: 53 MG/DL — SIGNIFICANT CHANGE UP (ref 10–149)
TROPONIN T, HIGH SENSITIVITY: 187 NG/L — CRITICAL HIGH (ref ?–14)
TROPONIN T, HIGH SENSITIVITY: 192 NG/L — CRITICAL HIGH (ref ?–14)
TROPONIN T, HIGH SENSITIVITY: 206 NG/L — CRITICAL HIGH (ref ?–14)
UIBC SERPL-MCNC: 246.8 UG/DL — SIGNIFICANT CHANGE UP (ref 110–370)
WBC # BLD: 16.34 K/UL — HIGH (ref 3.8–10.5)
WBC # BLD: 16.44 K/UL — HIGH (ref 3.8–10.5)
WBC # BLD: 19.57 K/UL — HIGH (ref 3.8–10.5)
WBC # BLD: 21.57 K/UL — HIGH (ref 3.8–10.5)
WBC # FLD AUTO: 16.34 K/UL — HIGH (ref 3.8–10.5)
WBC # FLD AUTO: 16.44 K/UL — HIGH (ref 3.8–10.5)
WBC # FLD AUTO: 19.57 K/UL — HIGH (ref 3.8–10.5)
WBC # FLD AUTO: 21.57 K/UL — HIGH (ref 3.8–10.5)

## 2018-10-14 PROCEDURE — 76937 US GUIDE VASCULAR ACCESS: CPT | Mod: 26

## 2018-10-14 PROCEDURE — 99223 1ST HOSP IP/OBS HIGH 75: CPT | Mod: GC

## 2018-10-14 PROCEDURE — 36600 WITHDRAWAL OF ARTERIAL BLOOD: CPT

## 2018-10-14 PROCEDURE — 71045 X-RAY EXAM CHEST 1 VIEW: CPT | Mod: 26

## 2018-10-14 RX ORDER — AMLODIPINE BESYLATE 2.5 MG/1
10 TABLET ORAL DAILY
Qty: 0 | Refills: 0 | Status: DISCONTINUED | OUTPATIENT
Start: 2018-10-14 | End: 2018-10-17

## 2018-10-14 RX ORDER — METOPROLOL TARTRATE 50 MG
75 TABLET ORAL
Qty: 0 | Refills: 0 | Status: DISCONTINUED | OUTPATIENT
Start: 2018-10-14 | End: 2018-10-17

## 2018-10-14 RX ORDER — INSULIN LISPRO 100/ML
VIAL (ML) SUBCUTANEOUS
Qty: 0 | Refills: 0 | Status: DISCONTINUED | OUTPATIENT
Start: 2018-10-14 | End: 2018-10-17

## 2018-10-14 RX ORDER — FUROSEMIDE 40 MG
60 TABLET ORAL ONCE
Qty: 0 | Refills: 0 | Status: COMPLETED | OUTPATIENT
Start: 2018-10-14 | End: 2018-10-14

## 2018-10-14 RX ORDER — APIXABAN 2.5 MG/1
2.5 TABLET, FILM COATED ORAL EVERY 12 HOURS
Qty: 0 | Refills: 0 | Status: DISCONTINUED | OUTPATIENT
Start: 2018-10-14 | End: 2018-10-17

## 2018-10-14 RX ORDER — ATORVASTATIN CALCIUM 80 MG/1
40 TABLET, FILM COATED ORAL AT BEDTIME
Qty: 0 | Refills: 0 | Status: DISCONTINUED | OUTPATIENT
Start: 2018-10-14 | End: 2018-10-17

## 2018-10-14 RX ORDER — ACETAMINOPHEN 500 MG
650 TABLET ORAL EVERY 6 HOURS
Qty: 0 | Refills: 0 | Status: DISCONTINUED | OUTPATIENT
Start: 2018-10-14 | End: 2018-10-17

## 2018-10-14 RX ORDER — SITAGLIPTIN 50 MG/1
1 TABLET, FILM COATED ORAL
Qty: 0 | Refills: 0 | COMMUNITY

## 2018-10-14 RX ORDER — MAGNESIUM SULFATE 500 MG/ML
1 VIAL (ML) INJECTION ONCE
Qty: 0 | Refills: 0 | Status: COMPLETED | OUTPATIENT
Start: 2018-10-14 | End: 2018-10-14

## 2018-10-14 RX ORDER — INSULIN LISPRO 100/ML
VIAL (ML) SUBCUTANEOUS AT BEDTIME
Qty: 0 | Refills: 0 | Status: DISCONTINUED | OUTPATIENT
Start: 2018-10-14 | End: 2018-10-17

## 2018-10-14 RX ORDER — INSULIN GLARGINE 100 [IU]/ML
8 INJECTION, SOLUTION SUBCUTANEOUS AT BEDTIME
Qty: 0 | Refills: 0 | Status: DISCONTINUED | OUTPATIENT
Start: 2018-10-14 | End: 2018-10-15

## 2018-10-14 RX ORDER — FUROSEMIDE 40 MG
40 TABLET ORAL
Qty: 0 | Refills: 0 | Status: DISCONTINUED | OUTPATIENT
Start: 2018-10-14 | End: 2018-10-14

## 2018-10-14 RX ORDER — INSULIN GLARGINE 100 [IU]/ML
12 INJECTION, SOLUTION SUBCUTANEOUS AT BEDTIME
Qty: 0 | Refills: 0 | Status: DISCONTINUED | OUTPATIENT
Start: 2018-10-14 | End: 2018-10-14

## 2018-10-14 RX ORDER — IPRATROPIUM/ALBUTEROL SULFATE 18-103MCG
3 AEROSOL WITH ADAPTER (GRAM) INHALATION EVERY 6 HOURS
Qty: 0 | Refills: 0 | Status: DISCONTINUED | OUTPATIENT
Start: 2018-10-14 | End: 2018-10-14

## 2018-10-14 RX ORDER — PIPERACILLIN AND TAZOBACTAM 4; .5 G/20ML; G/20ML
3.38 INJECTION, POWDER, LYOPHILIZED, FOR SOLUTION INTRAVENOUS EVERY 12 HOURS
Qty: 0 | Refills: 0 | Status: DISCONTINUED | OUTPATIENT
Start: 2018-10-14 | End: 2018-10-15

## 2018-10-14 RX ORDER — IPRATROPIUM/ALBUTEROL SULFATE 18-103MCG
3 AEROSOL WITH ADAPTER (GRAM) INHALATION EVERY 6 HOURS
Qty: 0 | Refills: 0 | Status: DISCONTINUED | OUTPATIENT
Start: 2018-10-14 | End: 2018-10-17

## 2018-10-14 RX ORDER — DEXTROSE 50 % IN WATER 50 %
12.5 SYRINGE (ML) INTRAVENOUS ONCE
Qty: 0 | Refills: 0 | Status: DISCONTINUED | OUTPATIENT
Start: 2018-10-14 | End: 2018-10-17

## 2018-10-14 RX ORDER — LOSARTAN POTASSIUM 100 MG/1
100 TABLET, FILM COATED ORAL DAILY
Qty: 0 | Refills: 0 | Status: DISCONTINUED | OUTPATIENT
Start: 2018-10-14 | End: 2018-10-16

## 2018-10-14 RX ORDER — FUROSEMIDE 40 MG
60 TABLET ORAL
Qty: 0 | Refills: 0 | Status: DISCONTINUED | OUTPATIENT
Start: 2018-10-14 | End: 2018-10-17

## 2018-10-14 RX ORDER — PETROLATUM,WHITE
1 JELLY (GRAM) TOPICAL AT BEDTIME
Qty: 0 | Refills: 0 | Status: DISCONTINUED | OUTPATIENT
Start: 2018-10-14 | End: 2018-10-17

## 2018-10-14 RX ORDER — NITROGLYCERIN 6.5 MG
25 CAPSULE, EXTENDED RELEASE ORAL
Qty: 50 | Refills: 0 | Status: DISCONTINUED | OUTPATIENT
Start: 2018-10-14 | End: 2018-10-14

## 2018-10-14 RX ORDER — NITROGLYCERIN 6.5 MG
1 CAPSULE, EXTENDED RELEASE ORAL ONCE
Qty: 0 | Refills: 0 | Status: COMPLETED | OUTPATIENT
Start: 2018-10-14 | End: 2018-10-14

## 2018-10-14 RX ORDER — VANCOMYCIN HCL 1 G
750 VIAL (EA) INTRAVENOUS EVERY 24 HOURS
Qty: 0 | Refills: 0 | Status: DISCONTINUED | OUTPATIENT
Start: 2018-10-14 | End: 2018-10-15

## 2018-10-14 RX ORDER — ASPIRIN/CALCIUM CARB/MAGNESIUM 324 MG
81 TABLET ORAL DAILY
Qty: 0 | Refills: 0 | Status: DISCONTINUED | OUTPATIENT
Start: 2018-10-14 | End: 2018-10-17

## 2018-10-14 RX ORDER — ISOSORBIDE MONONITRATE 60 MG/1
60 TABLET, EXTENDED RELEASE ORAL DAILY
Qty: 0 | Refills: 0 | Status: DISCONTINUED | OUTPATIENT
Start: 2018-10-14 | End: 2018-10-17

## 2018-10-14 RX ORDER — DEXTROSE 50 % IN WATER 50 %
25 SYRINGE (ML) INTRAVENOUS ONCE
Qty: 0 | Refills: 0 | Status: DISCONTINUED | OUTPATIENT
Start: 2018-10-14 | End: 2018-10-17

## 2018-10-14 RX ORDER — NITROGLYCERIN 6.5 MG
1 CAPSULE, EXTENDED RELEASE ORAL ONCE
Qty: 0 | Refills: 0 | Status: DISCONTINUED | OUTPATIENT
Start: 2018-10-14 | End: 2018-10-14

## 2018-10-14 RX ORDER — PIPERACILLIN AND TAZOBACTAM 4; .5 G/20ML; G/20ML
3.38 INJECTION, POWDER, LYOPHILIZED, FOR SOLUTION INTRAVENOUS EVERY 8 HOURS
Qty: 0 | Refills: 0 | Status: DISCONTINUED | OUTPATIENT
Start: 2018-10-14 | End: 2018-10-14

## 2018-10-14 RX ORDER — GLUCAGON INJECTION, SOLUTION 0.5 MG/.1ML
1 INJECTION, SOLUTION SUBCUTANEOUS ONCE
Qty: 0 | Refills: 0 | Status: DISCONTINUED | OUTPATIENT
Start: 2018-10-14 | End: 2018-10-17

## 2018-10-14 RX ORDER — OXYMETAZOLINE HYDROCHLORIDE 0.5 MG/ML
2 SPRAY NASAL
Qty: 0 | Refills: 0 | Status: DISCONTINUED | OUTPATIENT
Start: 2018-10-14 | End: 2018-10-14

## 2018-10-14 RX ORDER — SODIUM CHLORIDE 9 MG/ML
1000 INJECTION, SOLUTION INTRAVENOUS
Qty: 0 | Refills: 0 | Status: DISCONTINUED | OUTPATIENT
Start: 2018-10-14 | End: 2018-10-17

## 2018-10-14 RX ORDER — HYDRALAZINE HCL 50 MG
25 TABLET ORAL
Qty: 0 | Refills: 0 | Status: DISCONTINUED | OUTPATIENT
Start: 2018-10-14 | End: 2018-10-17

## 2018-10-14 RX ORDER — AZITHROMYCIN 500 MG/1
500 TABLET, FILM COATED ORAL EVERY 24 HOURS
Qty: 0 | Refills: 0 | Status: DISCONTINUED | OUTPATIENT
Start: 2018-10-14 | End: 2018-10-15

## 2018-10-14 RX ORDER — DEXTROSE 50 % IN WATER 50 %
15 SYRINGE (ML) INTRAVENOUS ONCE
Qty: 0 | Refills: 0 | Status: DISCONTINUED | OUTPATIENT
Start: 2018-10-14 | End: 2018-10-17

## 2018-10-14 RX ORDER — SODIUM CHLORIDE 0.65 %
2 AEROSOL, SPRAY (ML) NASAL
Qty: 0 | Refills: 0 | Status: DISCONTINUED | OUTPATIENT
Start: 2018-10-14 | End: 2018-10-17

## 2018-10-14 RX ADMIN — Medication 60 MILLIGRAM(S): at 17:22

## 2018-10-14 RX ADMIN — AMLODIPINE BESYLATE 10 MILLIGRAM(S): 2.5 TABLET ORAL at 06:24

## 2018-10-14 RX ADMIN — Medication 75 MILLIGRAM(S): at 06:24

## 2018-10-14 RX ADMIN — Medication 3: at 11:32

## 2018-10-14 RX ADMIN — APIXABAN 2.5 MILLIGRAM(S): 2.5 TABLET, FILM COATED ORAL at 06:24

## 2018-10-14 RX ADMIN — Medication 1 INCH(S): at 03:30

## 2018-10-14 RX ADMIN — APIXABAN 2.5 MILLIGRAM(S): 2.5 TABLET, FILM COATED ORAL at 18:52

## 2018-10-14 RX ADMIN — Medication 250 MILLIGRAM(S): at 16:42

## 2018-10-14 RX ADMIN — Medication 2 SPRAY(S): at 17:23

## 2018-10-14 RX ADMIN — AZITHROMYCIN 250 MILLIGRAM(S): 500 TABLET, FILM COATED ORAL at 11:33

## 2018-10-14 RX ADMIN — Medication 2 SPRAY(S): at 06:33

## 2018-10-14 RX ADMIN — Medication 1 INCH(S): at 02:57

## 2018-10-14 RX ADMIN — Medication 25 MILLIGRAM(S): at 06:24

## 2018-10-14 RX ADMIN — Medication 100 GRAM(S): at 19:07

## 2018-10-14 RX ADMIN — ATORVASTATIN CALCIUM 40 MILLIGRAM(S): 80 TABLET, FILM COATED ORAL at 22:09

## 2018-10-14 RX ADMIN — Medication 4: at 08:46

## 2018-10-14 RX ADMIN — INSULIN GLARGINE 8 UNIT(S): 100 INJECTION, SOLUTION SUBCUTANEOUS at 22:09

## 2018-10-14 RX ADMIN — Medication 60 MILLIGRAM(S): at 02:22

## 2018-10-14 RX ADMIN — Medication 81 MILLIGRAM(S): at 18:52

## 2018-10-14 RX ADMIN — PIPERACILLIN AND TAZOBACTAM 25 GRAM(S): 4; .5 INJECTION, POWDER, LYOPHILIZED, FOR SOLUTION INTRAVENOUS at 19:08

## 2018-10-14 RX ADMIN — LOSARTAN POTASSIUM 100 MILLIGRAM(S): 100 TABLET, FILM COATED ORAL at 06:24

## 2018-10-14 RX ADMIN — Medication 3 MILLILITER(S): at 21:20

## 2018-10-14 RX ADMIN — Medication 25 MILLIGRAM(S): at 18:53

## 2018-10-14 RX ADMIN — Medication 3: at 22:09

## 2018-10-14 RX ADMIN — Medication 75 MILLIGRAM(S): at 18:53

## 2018-10-14 RX ADMIN — Medication 1 APPLICATION(S): at 23:25

## 2018-10-14 RX ADMIN — ISOSORBIDE MONONITRATE 60 MILLIGRAM(S): 60 TABLET, EXTENDED RELEASE ORAL at 18:53

## 2018-10-14 RX ADMIN — Medication 2: at 17:22

## 2018-10-14 NOTE — PROGRESS NOTE ADULT - PROBLEM SELECTOR PLAN 3
Patient without obvious signs of bleeding, will send retic count, iron studies, hemolytic workup  - FOBT  - Transfusion threshold Hb < 7.  - Trend CBC q6-q8h  - Active T&S, next 10/17  - Will get consent

## 2018-10-14 NOTE — H&P ADULT - NSHPREVIEWOFSYSTEMS_GEN_ALL_CORE
Constitutional: No fever, fatigue or weight loss.  Skin: No rash.  Eyes: No recent vision problems or eye pain.  ENT: No congestion, ear pain, or sore throat.  Endocrine: No thyroid problems.  Cardiovascular: No chest pain or palpation.  Respiratory: + shortness of breath. + cough. No congestion, or wheezing.  Gastrointestinal: No abdominal pain, nausea, vomiting, or diarrhea.  Genitourinary: No dysuria.  Musculoskeletal: No joint swelling.  Neurologic: No headache.

## 2018-10-14 NOTE — H&P ADULT - PROBLEM SELECTOR PLAN 1
Admit to telemetry.  CCU consulted. Awaiting recommendations.   Strict I and O, daily weights. Fluid restriction.   Flores catheter.   Patient received a total of IV lasix 140 mg.   Pending repeat labs.   f/u MD note Admit to telemetry.  CCU consulted. Awaiting recommendations.   Strict I and O, daily weights. Fluid restriction.   Flores catheter.   Patient received a total of IV lasix 140 mg.   Pending repeat labs.   Patient with increasing troponin. Defer to cardiology on whether starting heparin drip.   f/u MD note Admit to telemetry.  CCU consulted. Awaiting recommendations.   Strict I and O, daily weights. Fluid restriction.   Flores catheter.   Patient received a total of IV lasix 140 mg.   Pending repeat labs.   Patient with increasing troponin. Defer to cardiology on whether starting heparin drip.

## 2018-10-14 NOTE — PROGRESS NOTE ADULT - PROBLEM SELECTOR PLAN 4
Pt meets criteria w/ leukocytosis w/ neutrophilic predominance without bandemia, tachypnea, and hypoxemia.  - Will start empiric broad spectrum coverage w/ vanc, zosyn, and azithromycin pending cultures.  - CT chest non-con pending  - Vanc by level, next trough 10/17

## 2018-10-14 NOTE — H&P ADULT - NSHPLABSRESULTS_GEN_ALL_CORE
LABS:                        7.6    18.21 )-----------( 197      ( 13 Oct 2018 18:30 )             24.0     10    133<L>  |  98  |  80<H>  ----------------------------<  398<H>  4.9   |  15<L>  |  1.89<H>    Ca    10.1      13 Oct 2018 18:30    TPro  7.6  /  Alb  3.6  /  TBili  0.6  /  DBili  x   /  AST  37<H>  /  ALT  36<H>  /  AlkPhos  138<H>  1013    PT/INR - ( 13 Oct 2018 18:30 )   PT: 19.7 SEC;   INR: 1.75          PTT - ( 13 Oct 2018 18:30 )  PTT:33.5 SEC  Urinalysis Basic - ( 13 Oct 2018 22:30 )    Color: LIGHT YELLOW / Appearance: CLEAR / S.012 / pH: 6.0  Gluc: 200 / Ketone: NEGATIVE  / Bili: NEGATIVE / Urobili: NORMAL   Blood: NEGATIVE / Protein: 20 / Nitrite: NEGATIVE   Leuk Esterase: NEGATIVE / RBC: 0-2 / WBC 0-2   Sq Epi: OCC / Non Sq Epi: x / Bacteria: NEGATIVE      CAPILLARY BLOOD GLUCOSE      POCT Blood Glucose.: 372 mg/dL (13 Oct 2018 22:34)      ABG - ( 14 Oct 2018 02:20 )  pH, Arterial: 7.43  pH, Blood: x     /  pCO2: 26    /  pO2: 52    / HCO3: 19    / Base Excess: -6.7  /  SaO2: 82.4              Urinalysis Basic - ( 13 Oct 2018 22:30 )    Color: LIGHT YELLOW / Appearance: CLEAR / S.012 / pH: 6.0  Gluc: 200 / Ketone: NEGATIVE  / Bili: NEGATIVE / Urobili: NORMAL   Blood: NEGATIVE / Protein: 20 / Nitrite: NEGATIVE   Leuk Esterase: NEGATIVE / RBC: 0-2 / WBC 0-2   Sq Epi: OCC / Non Sq Epi: x / Bacteria: NEGATIVE    EKG shows a-paced 60 bpm

## 2018-10-14 NOTE — PROGRESS NOTE ADULT - PROBLEM SELECTOR PLAN 1
Echo 10/9 w/ mod-sev MR, mild AS, mod AR, mild-mod LV systolic dysfunction. Segmental hypokinesis. Potential ADHF in setting of sepsis and anemia.  - Strict I and O, sadler catheter, daily weights. Fluid restriction.   - Will c/w IV lasix 60BID  - Will repeat cardiac enzymes today given uptrend in CKMB

## 2018-10-14 NOTE — H&P ADULT - ATTENDING COMMENTS
84 yo f with acute decompensated heart failure possibly 2/2 underling respiratory infection. Troponin increasing. Will be transferred to CCU for nitro drip. CT chest when more stable. Heparin drip to be decided by CCU

## 2018-10-14 NOTE — H&P ADULT - PROBLEM SELECTOR PLAN 3
S/p Zosyn for possible HCAP.   Continue to monitor for signs of infection. S/p Zosyn for possible HCAP.   Consider CT chest when patient is more clinically stable for a further clarification.   Continue to monitor for signs of infection.

## 2018-10-14 NOTE — CHART NOTE - NSCHARTNOTEFT_GEN_A_CORE
Critically ill patient requiring ABG to determine respiratory status.  This was an emergent procedure.  Patients radial artery was palpated/ visualized with US and cleaned with alcohol pad.   23g x 3/4" x 12" butterfly needed was inserted into the left/right radial artery with pulsatile flash.  One attempt was performed.  3cc of blood was obtained from patient.  Gauze pad was placed over site, needle withdrawn and firm pressure was held until complete hemostasis was achieved and band-aid was applied.  Patient tolerated procedure well with no complications.        [facility] RPA C [id number] Pt being transferred to CDU, VSS, afebrile, no change in Pt status, pt neurologically intact.

## 2018-10-14 NOTE — H&P ADULT - HISTORY OF PRESENT ILLNESS
83F with PMHx of afib (on Eliquis), DM2 (on PO meds), HFrEF (moderate rEF), HLD, HTN, 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 Pt states she has been having shortness of breath with exertion and laying down flat. Pt states she has been coughing up phelgm with streaks of blood. Pt also states she has been having RUQ pain. 83F with PMHx of afib (on Eliquis), DM2 (on PO meds), HFrEF (moderate rEF), HLD, HTN, 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 History is limited due to patient's respiratory status. Pt states she has been having shortness of breath with exertion and laying down flat. Pt states she has been coughing up phlegm with streaks of blood. Pt also states she has been having RUQ pain. Pt was recently admitted for mechanical fall and discharge on October 11. Patient has a nasal fracture. Pt denies chest pain, dizziness, lightheadedness, palpitations or any other complaints at this time.     In the ED patient was given 1L bolus of fluid and found to be in CHF exacerbation. Lasix 40 mg IV was given. When patient went to the bathroom in the ER, patient developed acute respiratory distress with crackles. Pt was given hydralazine, metoprolol and another Lasix 40 mg IV. Pt's respiratory status improved. However, upon presentation to the floor, patient developed respiratory distress again. Lasix 60 mg IV , nitro paste was given. Pt started on BIPAP.  CCU is consulted.

## 2018-10-14 NOTE — PROGRESS NOTE ADULT - ASSESSMENT
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 Pt admitted to CCU for continuing BiPAP. Pt meets criteria for SIRS and will empirically cover for HCAP pending CT chest.

## 2018-10-14 NOTE — H&P ADULT - ASSESSMENT
83F with PMHx of afib (on Eliquis), DM2 (on PO meds), HFrEF (moderate rEF), HLD, HTN, 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 Admit to telemetry.

## 2018-10-14 NOTE — PROGRESS NOTE ADULT - SUBJECTIVE AND OBJECTIVE BOX
Eleazar Reyeshu [phone]  [phone]    Patient is a 83y old  Female who presents with a chief complaint of shortness of breath (14 Oct 2018 02:42)  Team(s): Team [employer];  CCU [facility]    INTERVAL HPI/OVERNIGHT EVENTS:  Afebrile since admission, reports feeling ok but tired with breathing off BiPAP. Satting 84% on 4L NC, 87% on 5L, and 91% on 10L NC.      MEDICATIONS  (STANDING):  amLODIPine   Tablet 10 milliGRAM(s) Oral daily  apixaban 2.5 milliGRAM(s) Oral every 12 hours  AQUAPHOR (petrolatum Ointment) 1 Application(s) Topical at bedtime  aspirin enteric coated 81 milliGRAM(s) Oral daily  atorvastatin 40 milliGRAM(s) Oral at bedtime  azithromycin  IVPB 500 milliGRAM(s) IV Intermittent every 24 hours  dextrose 5%. 1000 milliLiter(s) (50 mL/Hr) IV Continuous <Continuous>  dextrose 50% Injectable 12.5 Gram(s) IV Push once  dextrose 50% Injectable 25 Gram(s) IV Push once  dextrose 50% Injectable 25 Gram(s) IV Push once  furosemide   Injectable 60 milliGRAM(s) IV Push two times a day  hydrALAZINE 25 milliGRAM(s) Oral two times a day  insulin lispro (HumaLOG) corrective regimen sliding scale   SubCutaneous three times a day before meals  insulin lispro (HumaLOG) corrective regimen sliding scale   SubCutaneous at bedtime  isosorbide   mononitrate ER Tablet (IMDUR) 60 milliGRAM(s) Oral daily  losartan 100 milliGRAM(s) Oral daily  metoprolol tartrate 75 milliGRAM(s) Oral two times a day  piperacillin/tazobactam IVPB. 3.375 Gram(s) IV Intermittent every 12 hours  sodium chloride 0.65% Nasal 2 Spray(s) Both Nostrils two times a day  vancomycin  IVPB 750 milliGRAM(s) IV Intermittent every 24 hours    MEDICATIONS  (PRN):  acetaminophen   Tablet .. 650 milliGRAM(s) Oral every 6 hours PRN Mild Pain (1 - 3), Moderate Pain (4 - 6), Severe Pain (7 - 10)  dextrose 40% Gel 15 Gram(s) Oral once PRN Blood Glucose LESS THAN 70 milliGRAM(s)/deciliter  glucagon  Injectable 1 milliGRAM(s) IntraMuscular once PRN Glucose LESS THAN 70 milligrams/deciliter      Allergies:   Intolerances:     PAST MEDICAL & SURGICAL HISTORY:  Chronic systolic heart failure  Tachycardia-bradycardia syndrome  Hyperlipidemia  Uterine Cancer (ICD9 179): treated with hysterectomy and radiation therapy in   Osteoarthritis of Knee (ICD9 715.96): b/l knees  DM Type 2 (Diabetes Mellitus, Type 2) (ICD9 250.00)  HTN (Hypertension) (ICD9 401.9)  Status Post Total Knee Replacement: LISA, 6 months apart   Cardiac Pacemaker (ICD9 V45.01): [employer] ( - for tacybrady syndrome @ [facility])  History of Hysterectomy (ICD9 V88.01)      Vital Signs Last 24 Hrs  T(C): 36.4 (14 Oct 2018 08:00), Max: 37.6 (14 Oct 2018 05:40)  T(F): 97.5 (14 Oct 2018 08:00), Max: 99.6 (14 Oct 2018 05:40)  HR: 61 (14 Oct 2018 11:13) (57 - 107)  BP: 131/44 (14 Oct 2018 11:00) (106/70 - 157/58)  BP(mean): 68 (14 Oct 2018 11:00) (66 - 104)  RR: 37 (14 Oct 2018 11:00) (18 - 60)  SpO2: 95% (14 Oct 2018 11:13) (87% - 100%)      In's and Outs:   10-13-18 @ 07:01  -  10-14-18 @ 07:00  --------------------------------------------------------  IN: 100 mL / OUT: 950 mL / NET: -850 mL    10-14-18 @ 07:01  -  10-14-18 @ 11:35  --------------------------------------------------------  IN: 100 mL / OUT: 310 mL / NET: -210 mL      PHYSICAL EXAMINATION:  GENERAL: The patient is awake and alert in mod respiratory distress.   HEENT: NCAT. EOMI. Mucous membranes are dry.  NECK: Supple. No JVD.  LUNGS: [ ] Clear to auscultation b/l   [ ] Unlabored breathing   [ ] Wheezing  [x] Rales  [x] Rhonchi; Coarse breath sounds throughout all lung fields including b/l crackles.  HEART: [x] Normal  [ ] Irregular rhythm  [ ] Tachycardia  [ ] Bradycardia   [ ] Systolic murmur  [ ] Diastolic murmur  [ ] Gallop   ABDOMEN: [x] Normal  [ ] Hard  [ ] Tender  [ ] Distended [ ] Bowel sounds  GENITOURINARY: [ ] Normal  [ ] Incontinent   [ ] Oliguria/Anuria   [x] Flores  EXTREMITIES: [x] Normal  [ ] Cyanosis  [ ] Edema  NEUROLOGIC: [x] Grossly intact  [ ] Focal neurologic deficits  SKIN: [x] Normal  [ ] Rash   [ ] Ulcers  Musculoskeletal:  [x] Normal   [ ] Generalized weakness  [ ] Bedbound      LABS:                        7.2    19.57 )-----------( 186      ( 14 Oct 2018 09:25 )             22.3     Hgb Trend: 7.2<--, 7.4<--, 7.7<--, 7.6<--, 7.9<--  10-14    135  |  100  |  77<H>  ----------------------------<  342<H>  5.1   |  16<L>  |  1.88<H>    Ca    9.6      14 Oct 2018 03:50    TPro  7.8  /  Alb  3.4  /  TBili  0.9  /  DBili  x   /  AST  33<H>  /  ALT  35<H>  /  AlkPhos  126<H>  10-    Creatinine Trend: 1.88<--, 1.89<--, 1.88<--, 1.77<--, 1.98<--, 2.11<--    PT/INR - ( 13 Oct 2018 18:30 )   PT: 19.7 SEC;   INR: 1.75          PTT - ( 13 Oct 2018 18:30 )  PTT:33.5 SEC  Urinalysis Basic - ( 13 Oct 2018 22:30 )    Color: LIGHT YELLOW / Appearance: CLEAR / S.012 / pH: 6.0  Gluc: 200 / Ketone: NEGATIVE  / Bili: NEGATIVE / Urobili: NORMAL   Blood: NEGATIVE / Protein: 20 / Nitrite: NEGATIVE   Leuk Esterase: NEGATIVE / RBC: 0-2 / WBC 0-2   Sq Epi: OCC / Non Sq Epi: x / Bacteria: NEGATIVE      ABG - ( 14 Oct 2018 03:50 )  pH, Arterial: 7.46  pH, Blood: x     /  pCO2: 25    /  pO2: 94    / HCO3: 20    / Base Excess: -5.7  /  SaO2: 94.1          CARDIAC MARKERS ( 14 Oct 2018 09:25 )   u/L / CKMB 10.05 ng/mL / Troponin T x     / hsTrop 192 ng/L x      CARDIAC MARKERS ( 14 Oct 2018 03:50 )   u/L / CKMB 6.37 ng/mL / Troponin T x     / hsTrop 206 ng/L x      CARDIAC MARKERS ( 13 Oct 2018 20:20 )  CK x     / CKMB x     / Troponin T x     / hsTrop 166 ng/L x      CARDIAC MARKERS ( 13 Oct 2018 18:30 )  CK 80 u/L / CKMB 2.41 ng/mL / Troponin T x     / hsTrop 174 ng/L x            Serum Pro-Brain Natriuretic Peptide: 20949 pg/mL (10-13-18 @ 20:20)    Blood Gas Arterial - Lactate: 2.4 mmol/L (10-14-18 @ 03:50)  Blood Gas Arterial - Lactate: 3.2 mmol/L (10-14-18 @ 02:20)      CAPILLARY BLOOD GLUCOSE      POCT Blood Glucose.: 294 mg/dL (14 Oct 2018 11:25)  POCT Blood Glucose.: 314 mg/dL (14 Oct 2018 08:02)  POCT Blood Glucose.: 372 mg/dL (13 Oct 2018 22:34)      EKG:       MICROBIOLOGY:      Blood cultures:      RADIOLOGY & ADDITIONAL STUDIES: [ ] Reviewed by me      Orders Pending/Performed:   Legionella pneumophila Antigen, Urine (not performed)  Occult Blood, Feces (not performed)  Creatine Kinase, Serum (not performed)  CKMB (not performed)  Troponin T, High Sensitivity (not performed)  Complete Blood Count + Automated Diff (not performed)  Basic Metabolic Panel w/Mg & Inorg Phos (not performed)

## 2018-10-14 NOTE — PROCEDURE NOTE - NSPROCDETAILS_GEN_ALL_CORE
blood seen on insertion/location identified, draped/prepped, sterile technique used/dressing applied/flushes easily/secured in place/sterile technique, catheter placed/ultrasound utilization

## 2018-10-14 NOTE — H&P ADULT - NSHPPHYSICALEXAM_GEN_ALL_CORE
GENERAL APPEARANCE: Well developed, well nourished, alert and cooperative, and appears to be in acute respiratory distress.   HEAD: normocephalic.  EYES: PERRL, EOMI.   EARS: External auditory canals clear, hearing grossly intact.  NECK: Neck supple, non-tender without lymphadenopathy, masses or thyromegaly.  CARDIAC: Normal S1 and S2. No S3, S4 or murmurs. Rhythm is regular.   LUNGS: + audible crackles bilaterally. Auscultation without rales, rhonchi, wheezing or diminished breath sounds.  ABDOMEN: Positive bowel sounds. Soft, nondistended, nontender. No guarding or rebound. No masses.  EXTREMITIES: No significant deformity or joint abnormality. No edema. No calf tenderness. Peripheral pulses intact.   SKIN: Skin normal color, texture and turgor with no lesions or eruptions.  PSYCHIATRIC: The mental examination revealed the patient was oriented to person, place, and time. The patient was able to demonstrate good judgement and reason, without hallucinations, abnormal affect or abnormal behaviors during the examination. Patient is not suicidal.

## 2018-10-14 NOTE — CHART NOTE - NSCHARTNOTEFT_GEN_A_CORE
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 Patient c/o sudden  right flank pain at night on 10/12/18 while asleep. She also c/o  sob  with exertion and laying down flat yesterday afternoon. Pt states she has been coughing up phlegm with streaks of blood. Pt also states she has been having right flank pain. Pt was recently admitted for mechanical fall and discharge on . Patient has a nasal fracture seen by ENT last admission . Pt denies chest pain, dizziness, lightheadedness, palpitations or any other complaints at this time. She admit adherence to medication ,but reports taking salty food at time.  In ED initial vital sign 141/41,HR 80,RR 30,sat 90% RA ,97% on 4 L n/c .trop 174 -166,CK neg. chest Xray showed bibasilar atelectasis, for evaluation of effusion, She was given 1 liter fluid in Ed .She was found to be dyspneic in ED on exertion and was given lasix 40mg IVP x1 .She was admitted to tele .as per tele RN patient was received very dyspneic and pale requiring bipap. She was given lasix 60mg IVP and Flores and ccu consulted for bipap for CHF exacerbation    echo:< from: TTE with Doppler (w/Cont) (10.10.18 @ 14:06) >e. Moderate mitral regurgitation.2. Aortic valve leaflet morphology not well visualized. Peak transaortic valve gradient equals 33 mm Hg, mean transaortic valve gradient equals 18 mm Hg.  In the setting  of LV systolic dysfunction, these gradients may reflect significant underlying aortic stenosis.  However, unable to  accurately estimate the aortic valve area. Moderate aortic regurgitation.; grossly moderate segmental left ventricular systolic dysfunction.  Hypokinesis of the apex, mid to distal septum, and distal anterior wall.  Endocardial visualization enhanced with  intravenous injection of echo contrast (Definity).  No LV thrombus seen.5.  moderate pulmonary hypertension.  Consider INDU for further evaluation of the aortic valve,   nuclear ST:< from: Nuclear Stress Test-Pharmacologic (10.26.17 @ 12:20) >    Myocardial Perfusion SPECT results are abnormal.* Review of raw data shows: The study is of good technical  quality.* The left ventricle was normal in size. There are medium sized, severe defects in anteroseptal, apical walls that  are fixed, suggestive of infarct.* Post-stress gated wall motion analysis was performed(LVEF = 42 %;LVEDV = 103 ml.), revealing moderate over all hypokinesis. There was apical and anteroseptal akinesis. The remaining segments contracted well.***Compared with Nuclear/Stress test of 2016, thecurrent findings are new, suggesting interval infarction      Allergies: No Known Allergies          PAST MEDICAL & SURGICAL HISTORY:  Chronic systolic heart failure  Tachycardia-bradycardia syndrome  Hyperlipidemia  Uterine Cancer (ICD9 179): treated with hysterectomy and radiation therapy in   Osteoarthritis of Knee (ICD9 715.96): b/l knees  DM Type 2 (Diabetes Mellitus, Type 2) (ICD9 250.00)  HTN (Hypertension) (ICD9 401.9)  Status Post Total Knee Replacement: LISA, 6 months apart   Cardiac 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 ( - for tacybrady syndrome @ [facility])  History of Hysterectomy (ICD9 V88.01)      MEDICATIONS  (STANDING):    Home meds :    Aspirin Enteric Coated 81 mg oral delayed release tablet  -- 1 tab(s) by mouth once a day  -- Indication: For Prophylaxis     acetaminophen 325 mg oral tablet  -- 2 tab(s) by mouth every 6 hours, As needed, Mild Pain (1 - 3), Moderate Pain (4 - 6)  -- Indication: For Pain    losartan 100 mg oral tablet  -- 1 tab(s) by mouth once a day  -- Indication: For Htn    isosorbide mononitrate 60 mg oral tablet, extended release  -- 1 tab(s) by mouth once a day  -- Indication: For Htn    Eliquis 2.5 mg oral tablet  -- 1 tab(s) by mouth 2 times a day  -- Indication: For Afib    Januvia 50 mg oral tablet  -- 1 tab(s) by mouth once a day  -- Indication: For diabetes mellitus     atorvastatin 40 mg oral tablet  -- 1 tab(s) by mouth once a day (at bedtime)  -- Indication: For Hyperlipidemia     metoprolol tartrate 75 mg oral tablet  -- 1 tab(s) by mouth 2 times a day  hold for sbp <100 HR <60  -- Indication: For Htn     amLODIPine 10 mg oral tablet  -- 1 tab(s) by mouth once a day  -- Indication: For Htn     petrolatum topical ointment  -- 1 application on skin once a day (at bedtime)  -- Indication: For Epistaxis    torsemide 20 mg oral tablet  -- 2 tab(s) by mouth 2 times a day  -- Indication: For Chronic systolic heart failure    oxymetazoline 0.05% nasal spray  -- 2 spray(s) into nose 2 times a day   -- Indication: For Epistaxis    sodium chloride 0.65% nasal spray  -- 2 spray(s) into nose 2 times a day   -- Indication: For Epistaxis    amLODIPine   Tablet 10 milliGRAM(s) Oral daily  apixaban 2.5 milliGRAM(s) Oral every 12 hours  AQUAPHOR (petrolatum Ointment) 1 Application(s) Topical at bedtime  aspirin enteric coated 81 milliGRAM(s) Oral daily  atorvastatin 40 milliGRAM(s) Oral at bedtime  hydrALAZINE 25 milliGRAM(s) Oral two times a day  insulin lispro (HumaLOG) corrective regimen sliding scale   SubCutaneous three times a day before meals  insulin lispro (HumaLOG) corrective regimen sliding scale   SubCutaneous at bedtime  isosorbide   mononitrate ER Tablet (IMDUR) 60 milliGRAM(s) Oral daily  losartan 100 milliGRAM(s) Oral daily  metoprolol tartrate 75 milliGRAM(s) Oral two times a day  nitroglycerin    2% Ointment 1 Inch(s) Transdermal once  nitroglycerin  Infusion 25 MICROgram(s)/Min (7.5 mL/Hr) IV Continuous <Continuous>  sodium chloride 0.65% Nasal 2 Spray(s) Both Nostrils two times a day    MEDICATIONS  (PRN):  acetaminophen   Tablet .. 650 milliGRAM(s) Oral every 6 hours PRN Mild Pain (1 - 3), Moderate Pain (4 - 6), Severe Pain (7 - 10)  dextrose 40% Gel 15 Gram(s) Oral once PRN Blood Glucose LESS THAN 70 milliGRAM(s)/deciliter  glucagon  Injectable 1 milliGRAM(s) IntraMuscular once PRN Glucose LESS THAN 70 milligrams/deciliter      Drug Dosing Weight  Height (cm): 157.48 (14 Oct 2018 02:49)  Weight (kg): 65.3 (14 Oct 2018 02:49)  BMI (kg/m2): 26.3 (14 Oct 2018 02:49)  BSA (m2): 1.66 (14 Oct 2018 02:49)    FAMILY HISTORY:  No pertinent family history in first degree relatives      SOCIAL HISTORY: Pt denies smoking, alcohol or drug use, lives with daughter       REVIEW OF SYSTEMS:    CONSTITUTIONAL: No fevers, No chills, No fatigue, No weight gain  EYES: No vision changes   ENT: No congestion, + nose pain No ear pain, No sore throat.  NECK: No pain, No stiffness  RESPIRATORY: + shortness of breath, + cough, No wheezing, No hemoptysis  CARDIOVASCULAR: No chest pain. No palpitations, + KRAUS, + orthopnea,  No pleuritic pain  GASTROINTESTINAL: No abdominal pain, No nausea, No vomiting, No hematemesis, No diarrhea No constipation. No melena  GENITOURINARY: No dysuria, No frequency, No incontinence, No hematuria  NEUROLOGICAL: No dizziness, No lightheadedness, No syncope, No LOC, No headache, No numbness or weakness  EXTREMITIES: No Edema, No joint pain, No joint swelling.  PSYCHIATRIC: No anxiety, No depression  SKIN: No diaphoresis. No itching, No rashes, No pressure ulcers    .    PHYSICAL EXAM:    Appearance:  respiratory  distress  HEENT:   Normal oral mucosa, PERRL, EOMI  Cardiovascular: Regular rate and rhythm, Normal S1 S2, No JVD, No murmurs, No edema  Respiratory: diffuse expiratory  crackles b/l  Gastrointestinal:  Soft, Non-tender, + BS, obese,+ right flank pain  Neurologic: Non-focal, A&Ox3  Skin: Warm and dry, No rashes, + facial  ecchymoses, +pale  Extremities: No clubbing, cyanosis or edema  Vascular: Peripheral pulses palpable 2+ bilaterally  Psychiatry: Mood & affect appropriate  		      ICU Vital Signs Last 24 Hrs  T(C): 36.6 (14 Oct 2018 01:56), Max: 37.2 (14 Oct 2018 00:55)  T(F): 97.9 (14 Oct 2018 01:56), Max: 98.9 (14 Oct 2018 00:55)  HR: 60 (14 Oct 2018 03:37) (60 - 107)  BP: 141/61 (14 Oct 2018 03:37) (116/84 - 157/58)  RR: 44 (14 Oct 2018 03:37) (18 - 60)  SpO2: 100% (14 Oct 2018 03:37) (88% - 100%)      ABG - ( 14 Oct 2018 02:20 )  pH, Arterial: 7.43  pH, Blood: x     /  pCO2: 26    /  pO2: 52    / HCO3: 19    / Base Excess: -6.7  /  SaO2: 82.4                LABS:  CBC Full  -  ( 14 Oct 2018 03:50 )  WBC Count : 21.57 K/uL  Hemoglobin : 7.7 g/dL  Hematocrit : 24.0 %  Platelet Count - Automated : 207 K/uL  Mean Cell Volume : 98.8 fL  Mean Cell Hemoglobin : 31.7 pg  Mean Cell Hemoglobin Concentration : 32.1 %  Auto Neutrophil # : x  Auto Lymphocyte # : x  Auto Monocyte # : x  Auto Eosinophil # : x  Auto Basophil # : x  Auto Neutrophil % : x  Auto Lymphocyte % : x  Auto Monocyte % : x  Auto Eosinophil % : x  Auto Basophil % : x    10-13    133<L>  |  98  |  80<H>  ----------------------------<  398<H>  4.9   |  15<L>  |  1.89<H>    Ca    10.1      13 Oct 2018 18:30    TPro  7.6  /  Alb  3.6  /  TBili  0.6  /  DBili  x   /  AST  37<H>  /  ALT  36<H>  /  AlkPhos  138<H>  10-13    CARDIAC MARKERS ( 13 Oct 2018 18:30 )  x     / x     / 80 u/L / 2.41 ng/mL / x          CAPILLARY BLOOD GLUCOSE      POCT Blood Glucose.: 372 mg/dL (13 Oct 2018 22:34)    PT/INR - ( 13 Oct 2018 18:30 )   PT: 19.7 SEC;   INR: 1.75          PTT - ( 13 Oct 2018 18:30 )  PTT:33.5 SEC  Urinalysis Basic - ( 13 Oct 2018 22:30 )    Color: LIGHT YELLOW / Appearance: CLEAR / S.012 / pH: 6.0  Gluc: 200 / Ketone: NEGATIVE  / Bili: NEGATIVE / Urobili: NORMAL   Blood: NEGATIVE / Protein: 20 / Nitrite: NEGATIVE   Leuk Esterase: NEGATIVE / RBC: 0-2 / WBC 0-2   Sq Epi: OCC / Non Sq Epi: x / Bacteria: NEGATIVE        EKG:Apaced ,pvc    ECHO:< from: TTE with Doppler (w/Cont) (10.10.18 @ 14:06) >    Mitral annular calcification, otherwise normal mitral  valve. Moderate mitral regurgitation.  2. Aortic valve leaflet morphology not well visualized.  Peak transaortic valve gradient equals 33 mm Hg, mean  transaortic valve gradient equals 18 mm Hg.  In the setting  of LV systolic dysfunction, these gradients may reflect  significant underlying aortic stenosis.  However, unable to  accurately estimate the aortic valve area. Moderate aortic  regurgitation.  3. Endocardium not well visualized; grossly moderate  segmental left ventricular systolic dysfunction.  Hypokinesis of the apex, mid to distal septum, and distal  anterior wall.  Endocardial visualization enhanced with  intravenous injection of echo contrast (Definity).  No LV  thrombus seen.  4. The right ventricle is not well visualized; grossly  normal right ventricular systolic function.  A device wire  is noted in the right heart.  5. Estimated right ventricular systolic pressure equals 60  mm Hg, assuming right atrial pressure equals 10 mm Hg,  consistent with moderate pulmonary hypertension.  Consider INDU for further evaluation of the aortic valve, if  clinically indicated.    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 Patient c/o sudden  right flank pain at night on 10/12/18 while asleep. She also c/o  sob  with exertion and laying down flat yesterday afternoon. Pt states she has been coughing up phlegm with streaks of blood. Pt also states she has been having right flank pain. Pt was recently admitted for mechanical fall and discharge on . Patient has a nasal fracture seen by ENT last admission . Pt denies chest pain, dizziness, lightheadedness, palpitations or any other complaints at this time. She admit adherence to medication ,but reports taking salty food at time.  In ED initial vital sign 141/41,HR 80,RR 30,sat 90% RA ,97% on 4 L n/c .trop 174 -166,CK neg. chest Xray showed bibasilar atelectasis, for evaluation of effusion, She was given 1 liter fluid in Ed .She was found to be dyspneic in ED on exertion and was given lasix 40mg IVP x1     .She was admitted to tele .as per tele RN patient was received very dyspneic and pale requiring bipap. She was given lasix 60mg IVP and Flores and ccu consulted for bipap for CHF exacerbation.trop 206,WBC 21.57,h/h 7.2    echo:< from: TTE with Doppler (w/Cont) (10.10.18 @ 14:06) >e. Moderate mitral regurgitation.2. Aortic valve leaflet morphology not well visualized. Peak transaortic valve gradient equals 33 mm Hg, mean transaortic valve gradient equals 18 mm Hg.  In the setting  of LV systolic dysfunction, these gradients may reflect significant underlying aortic stenosis.  However, unable to  accurately estimate the aortic valve area. Moderate aortic regurgitation.; grossly moderate segmental left ventricular systolic dysfunction.  Hypokinesis of the apex, mid to distal septum, and distal anterior wall.  Endocardial visualization enhanced with  intravenous injection of echo contrast (Definity).  No LV thrombus seen.5.  moderate pulmonary hypertension.  Consider INDU for further evaluation of the aortic valve,   nuclear ST:< from: Nuclear Stress Test-Pharmacologic (10.26.17 @ 12:20) >    Myocardial Perfusion SPECT results are abnormal.* Review of raw data shows: The study is of good technical  quality.* The left ventricle was normal in size. There are medium sized, severe defects in anteroseptal, apical walls that  are fixed, suggestive of infarct.* Post-stress gated wall motion analysis was performed(LVEF = 42 %;LVEDV = 103 ml.), revealing moderate over all hypokinesis. There was apical and anteroseptal akinesis. The remaining segments contracted well.***Compared with Nuclear/Stress test of 2016, the current findings are new, suggesting interval infarction      Allergies: No Known Allergies          PAST MEDICAL & SURGICAL HISTORY:  Chronic systolic heart failure  Tachycardia-bradycardia syndrome  Hyperlipidemia  Uterine Cancer (ICD9 179): treated with hysterectomy and radiation therapy in   Osteoarthritis of Knee (ICD9 715.96): b/l knees  DM Type 2 (Diabetes Mellitus, Type 2) (ICD9 250.00)  HTN (Hypertension) (ICD9 401.9)  Status Post Total Knee Replacement: LISA, 6 months apart   Cardiac Pacemaker (ICD9 V45.01): [employer] ( - for tacybrady syndrome @ [facility])  History of Hysterectomy (ICD9 V88.01)      MEDICATIONS  (STANDING):    Home meds :    Aspirin Enteric Coated 81 mg oral delayed release tablet  -- 1 tab(s) by mouth once a day  -- Indication: For Prophylaxis     acetaminophen 325 mg oral tablet  -- 2 tab(s) by mouth every 6 hours, As needed, Mild Pain (1 - 3), Moderate Pain (4 - 6)  -- Indication: For Pain    losartan 100 mg oral tablet  -- 1 tab(s) by mouth once a day  -- Indication: For Htn    isosorbide mononitrate 60 mg oral tablet, extended release  -- 1 tab(s) by mouth once a day  -- Indication: For Htn    Eliquis 2.5 mg oral tablet  -- 1 tab(s) by mouth 2 times a day  -- Indication: For Afib    Januvia 50 mg oral tablet  -- 1 tab(s) by mouth once a day  -- Indication: For diabetes mellitus     atorvastatin 40 mg oral tablet  -- 1 tab(s) by mouth once a day (at bedtime)  -- Indication: For Hyperlipidemia     metoprolol tartrate 75 mg oral tablet  -- 1 tab(s) by mouth 2 times a day  hold for sbp <100 HR <60  -- Indication: For Htn     amLODIPine 10 mg oral tablet  -- 1 tab(s) by mouth once a day  -- Indication: For Htn     petrolatum topical ointment  -- 1 application on skin once a day (at bedtime)  -- Indication: For Epistaxis    torsemide 20 mg oral tablet  -- 2 tab(s) by mouth 2 times a day  -- Indication: For Chronic systolic heart failure    oxymetazoline 0.05% nasal spray  -- 2 spray(s) into nose 2 times a day   -- Indication: For Epistaxis    sodium chloride 0.65% nasal spray  -- 2 spray(s) into nose 2 times a day   -- Indication: For Epistaxis    amLODIPine   Tablet 10 milliGRAM(s) Oral daily  apixaban 2.5 milliGRAM(s) Oral every 12 hours  AQUAPHOR (petrolatum Ointment) 1 Application(s) Topical at bedtime  aspirin enteric coated 81 milliGRAM(s) Oral daily  atorvastatin 40 milliGRAM(s) Oral at bedtime  hydrALAZINE 25 milliGRAM(s) Oral two times a day  insulin lispro (HumaLOG) corrective regimen sliding scale   SubCutaneous three times a day before meals  insulin lispro (HumaLOG) corrective regimen sliding scale   SubCutaneous at bedtime  isosorbide   mononitrate ER Tablet (IMDUR) 60 milliGRAM(s) Oral daily  losartan 100 milliGRAM(s) Oral daily  metoprolol tartrate 75 milliGRAM(s) Oral two times a day  nitroglycerin    2% Ointment 1 Inch(s) Transdermal once  nitroglycerin  Infusion 25 MICROgram(s)/Min (7.5 mL/Hr) IV Continuous <Continuous>  sodium chloride 0.65% Nasal 2 Spray(s) Both Nostrils two times a day    MEDICATIONS  (PRN):  acetaminophen   Tablet .. 650 milliGRAM(s) Oral every 6 hours PRN Mild Pain (1 - 3), Moderate Pain (4 - 6), Severe Pain (7 - 10)  dextrose 40% Gel 15 Gram(s) Oral once PRN Blood Glucose LESS THAN 70 milliGRAM(s)/deciliter  glucagon  Injectable 1 milliGRAM(s) IntraMuscular once PRN Glucose LESS THAN 70 milligrams/deciliter      Drug Dosing Weight  Height (cm): 157.48 (14 Oct 2018 02:49)  Weight (kg): 65.3 (14 Oct 2018 02:49)  BMI (kg/m2): 26.3 (14 Oct 2018 02:49)  BSA (m2): 1.66 (14 Oct 2018 02:49)    FAMILY HISTORY:  No pertinent family history in first degree relatives      SOCIAL HISTORY: Pt denies smoking, alcohol or drug use, lives with daughter       REVIEW OF SYSTEMS:    CONSTITUTIONAL: No fevers, No chills, No fatigue, No weight gain  EYES: No vision changes   ENT: No congestion, + nose pain No ear pain, No sore throat.  NECK: No pain, No stiffness  RESPIRATORY: + shortness of breath, + cough, No wheezing, No hemoptysis  CARDIOVASCULAR: No chest pain. No palpitations, + KRAUS, + orthopnea,  No pleuritic pain  GASTROINTESTINAL: No abdominal pain, No nausea, No vomiting, No hematemesis, No diarrhea No constipation. No melena  GENITOURINARY: No dysuria, No frequency, No incontinence, No hematuria  NEUROLOGICAL: No dizziness, No lightheadedness, No syncope, No LOC, No headache, No numbness or weakness  EXTREMITIES: No Edema, No joint pain, No joint swelling.  PSYCHIATRIC: No anxiety, No depression  SKIN: No diaphoresis. No itching, No rashes, No pressure ulcers    .    PHYSICAL EXAM:    Appearance:  respiratory  distress  HEENT:   Normal oral mucosa, PERRL, EOMI  Cardiovascular: Regular rate and rhythm, Normal S1 S2, No JVD, No murmurs, No edema  Respiratory: diffuse expiratory  crackles b/l  Gastrointestinal:  Soft, Non-tender, + BS, obese,+ right flank pain  Neurologic: Non-focal, A&Ox3  Skin: Warm and dry, No rashes, + facial  ecchymoses, +pale  Extremities: No clubbing, cyanosis or edema  Vascular: Peripheral pulses palpable 2+ bilaterally  Psychiatry: Mood & affect appropriate  		      ICU Vital Signs Last 24 Hrs  T(C): 36.6 (14 Oct 2018 01:56), Max: 37.2 (14 Oct 2018 00:55)  T(F): 97.9 (14 Oct 2018 01:56), Max: 98.9 (14 Oct 2018 00:55)  HR: 60 (14 Oct 2018 03:37) (60 - 107)  BP: 141/61 (14 Oct 2018 03:37) (116/84 - 157/58)  RR: 44 (14 Oct 2018 03:37) (18 - 60)  SpO2: 100% (14 Oct 2018 03:37) (88% - 100%)      ABG - ( 14 Oct 2018 02:20 )  pH, Arterial: 7.43  pH, Blood: x     /  pCO2: 26    /  pO2: 52    / HCO3: 19    / Base Excess: -6.7  /  SaO2: 82.4                LABS:  CBC Full  -  ( 14 Oct 2018 03:50 )  WBC Count : 21.57 K/uL  Hemoglobin : 7.7 g/dL  Hematocrit : 24.0 %  Platelet Count - Automated : 207 K/uL  Mean Cell Volume : 98.8 fL  Mean Cell Hemoglobin : 31.7 pg  Mean Cell Hemoglobin Concentration : 32.1 %  Auto Neutrophil # : x  Auto Lymphocyte # : x  Auto Monocyte # : x  Auto Eosinophil # : x  Auto Basophil # : x  Auto Neutrophil % : x  Auto Lymphocyte % : x  Auto Monocyte % : x  Auto Eosinophil % : x  Auto Basophil % : x    10-13    133<L>  |  98  |  80<H>  ----------------------------<  398<H>  4.9   |  15<L>  |  1.89<H>    Ca    10.1      13 Oct 2018 18:30    TPro  7.6  /  Alb  3.6  /  TBili  0.6  /  DBili  x   /  AST  37<H>  /  ALT  36<H>  /  AlkPhos  138<H>  10-13    CARDIAC MARKERS ( 13 Oct 2018 18:30 )  x     / x     / 80 u/L / 2.41 ng/mL / x          CAPILLARY BLOOD GLUCOSE      POCT Blood Glucose.: 372 mg/dL (13 Oct 2018 22:34)    PT/INR - ( 13 Oct 2018 18:30 )   PT: 19.7 SEC;   INR: 1.75          PTT - ( 13 Oct 2018 18:30 )  PTT:33.5 SEC  Urinalysis Basic - ( 13 Oct 2018 22:30 )    Color: LIGHT YELLOW / Appearance: CLEAR / S.012 / pH: 6.0  Gluc: 200 / Ketone: NEGATIVE  / Bili: NEGATIVE / Urobili: NORMAL   Blood: NEGATIVE / Protein: 20 / Nitrite: NEGATIVE   Leuk Esterase: NEGATIVE / RBC: 0-2 / WBC 0-2   Sq Epi: OCC / Non Sq Epi: x / Bacteria: NEGATIVE        EKG:Apaced ,pvc    ECHO:< from: TTE with Doppler (w/Cont) (10.10.18 @ 14:06) >    Mitral annular calcification, otherwise normal mitral  valve. Moderate mitral regurgitation.  2. Aortic valve leaflet morphology not well visualized.  Peak transaortic valve gradient equals 33 mm Hg, mean  transaortic valve gradient equals 18 mm Hg.  In the setting  of LV systolic dysfunction, these gradients may reflect  significant underlying aortic stenosis.  However, unable to  accurately estimate the aortic valve area. Moderate aortic  regurgitation.  3. Endocardium not well visualized; grossly moderate  segmental left ventricular systolic dysfunction.  Hypokinesis of the apex, mid to distal septum, and distal  anterior wall.  Endocardial visualization enhanced with  intravenous injection of echo contrast (Definity).  No LV  thrombus seen.  4. The right ventricle is not well visualized; grossly  normal right ventricular systolic function.  A device wire  is noted in the right heart.  5. Estimated right ventricular systolic pressure equals 60  mm Hg, assuming right atrial pressure equals 10 mm Hg,  consistent with moderate pulmonary hypertension.  Consider INDU for further evaluation of the aortic valve, if  clinically indicated.    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  Plan: transfer to CCU  Strict I and O, daily weights. Fluid restriction.   Flores catheter.   lasix 60mg bid      Problem/Plan - 3:  Problem: +trop/CK  Plan: no chest pain ,EKG shows AV pacing ,PVC  elevated trop likley demand  will continue to trend for now      Problem/Plan - 2:  ·  Problem: Anemia.  Plan: Continue to trend hemoglobin.   baseline Hb 7-8g/dl  Patient without obvious signs of bleeding.     Problem/Plan - 4:  ·  Problem: Leukocytosis.  Plan: S/p Zosyn for possible HCAP in ED  WBC uptrend ing  21.57,no fever or chills  Consider CT chest when patient is more clinically stable for a further clarification.   Continue to monitor for signs of infection.    Problem/Plan - 5:  ·  Problem: Hyperlipidemia.  Plan: Check lipid  Continue with current medications.   Low salt, low cholesterol diet.     Problem/Plan - 6:  ·  Problem: Hypertension.  Plan: Routine blood pressure check.  Continue with current  home medications.   Low salt,low cholesterol, DASH diet.     Problem/Plan - 6:  Problem: Diabetes mellitus. Plan: Routine blood glucose check.   Stop PO medications and start sliding scale.   Check hemoglobin a1c.   Low carb diet.    Problem/Plan - 7:  ·  Problem: Need for prophylactic measure.  Plan: Pt is on eliquis.         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 Patient c/o sudden  right flank pain at night on 10/12/18 while asleep. She also c/o  sob  with exertion and laying down flat yesterday afternoon. Pt states she has been coughing up phlegm with streaks of blood. Pt also states she has been having right flank pain. Pt was recently admitted for mechanical fall and discharge on . Patient has a nasal fracture seen by ENT last admission . Pt denies chest pain, dizziness, lightheadedness, palpitations or any other complaints at this time. She admit adherence to medication ,but reports taking salty food at time.  In ED initial vital sign 141/41,HR 80,RR 30,sat 90% RA ,97% on 4 L n/c .trop 174 -166,CK neg. chest Xray showed bibasilar atelectasis, for evaluation of effusion, She was given 1 liter fluid in Ed .She was found to be dyspneic in ED on exertion and was given lasix 40mg IVP x1     .She was admitted to tele .as per tele RN patient was received very dyspneic and pale requiring bipap. She was given lasix 60mg IVP and Flores and ccu consulted for bipap for CHF exacerbation. trop 206,WBC 21.57,h/h 7.2/24,ABG : showed hypoxia  Currently she is diuresing well. She received recievd total of 140mg lasix    echo:< from: TTE with Doppler (w/Cont) (10.10.18 @ 14:06) >e. Moderate mitral regurgitation.2. Aortic valve leaflet morphology not well visualized. Peak transaortic valve gradient equals 33 mm Hg, mean transaortic valve gradient equals 18 mm Hg.  In the setting  of LV systolic dysfunction, these gradients may reflect significant underlying aortic stenosis.  However, unable to  accurately estimate the aortic valve area. Moderate aortic regurgitation.; grossly moderate segmental left ventricular systolic dysfunction.  Hypokinesis of the apex, mid to distal septum, and distal anterior wall.  Endocardial visualization enhanced with  intravenous injection of echo contrast (Definity).  No LV thrombus seen.5.  moderate pulmonary hypertension.  Consider INDU for further evaluation of the aortic valve,   nuclear ST:< from: Nuclear Stress Test-Pharmacologic (10.26.17 @ 12:20) >    Myocardial Perfusion SPECT results are abnormal.* Review of raw data shows: The study is of good technical  quality.* The left ventricle was normal in size. There are medium sized, severe defects in anteroseptal, apical walls that  are fixed, suggestive of infarct.* Post-stress gated wall motion analysis was performed(LVEF = 42 %;LVEDV = 103 ml.), revealing moderate over all hypokinesis. There was apical and anteroseptal akinesis. 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 ( - for tacybrady syndrome @ [facility])  History of Hysterectomy (ICD9 V88.01)      MEDICATIONS  (STANDING):    Home meds :    Aspirin Enteric Coated 81 mg oral delayed release tablet  -- 1 tab(s) by mouth once a day  -- Indication: For Prophylaxis     acetaminophen 325 mg oral tablet  -- 2 tab(s) by mouth every 6 hours, As needed, Mild Pain (1 - 3), Moderate Pain (4 - 6)  -- Indication: For Pain    losartan 100 mg oral tablet  -- 1 tab(s) by mouth once a day  -- Indication: For Htn    isosorbide mononitrate 60 mg oral tablet, extended release  -- 1 tab(s) by mouth once a day  -- Indication: For Htn    Eliquis 2.5 mg oral tablet  -- 1 tab(s) by mouth 2 times a day  -- Indication: For Afib    Januvia 50 mg oral tablet  -- 1 tab(s) by mouth once a day  -- Indication: For diabetes mellitus     atorvastatin 40 mg oral tablet  -- 1 tab(s) by mouth once a day (at bedtime)  -- Indication: For Hyperlipidemia     metoprolol tartrate 75 mg oral tablet  -- 1 tab(s) by mouth 2 times a day  hold for sbp <100 HR <60  -- Indication: For Htn     amLODIPine 10 mg oral tablet  -- 1 tab(s) by mouth once a day  -- Indication: For Htn     petrolatum topical ointment  -- 1 application on skin once a day (at bedtime)  -- Indication: For Epistaxis    torsemide 20 mg oral tablet  -- 2 tab(s) by mouth 2 times a day  -- Indication: For Chronic systolic heart failure    oxymetazoline 0.05% nasal spray  -- 2 spray(s) into nose 2 times a day   -- Indication: For Epistaxis    sodium chloride 0.65% nasal spray  -- 2 spray(s) into nose 2 times a day   -- Indication: For Epistaxis    amLODIPine   Tablet 10 milliGRAM(s) Oral daily  apixaban 2.5 milliGRAM(s) Oral every 12 hours  AQUAPHOR (petrolatum Ointment) 1 Application(s) Topical at bedtime  aspirin enteric coated 81 milliGRAM(s) Oral daily  atorvastatin 40 milliGRAM(s) Oral at bedtime  hydrALAZINE 25 milliGRAM(s) Oral two times a day  insulin lispro (HumaLOG) corrective regimen sliding scale   SubCutaneous three times a day before meals  insulin lispro (HumaLOG) corrective regimen sliding scale   SubCutaneous at bedtime  isosorbide   mononitrate ER Tablet (IMDUR) 60 milliGRAM(s) Oral daily  losartan 100 milliGRAM(s) Oral daily  metoprolol tartrate 75 milliGRAM(s) Oral two times a day  nitroglycerin    2% Ointment 1 Inch(s) Transdermal once  nitroglycerin  Infusion 25 MICROgram(s)/Min (7.5 mL/Hr) IV Continuous <Continuous>  sodium chloride 0.65% Nasal 2 Spray(s) Both Nostrils two times a day    MEDICATIONS  (PRN):  acetaminophen   Tablet .. 650 milliGRAM(s) Oral every 6 hours PRN Mild Pain (1 - 3), Moderate Pain (4 - 6), Severe Pain (7 - 10)  dextrose 40% Gel 15 Gram(s) Oral once PRN Blood Glucose LESS THAN 70 milliGRAM(s)/deciliter  glucagon  Injectable 1 milliGRAM(s) IntraMuscular once PRN Glucose LESS THAN 70 milligrams/deciliter      Drug Dosing Weight  Height (cm): 157.48 (14 Oct 2018 02:49)  Weight (kg): 65.3 (14 Oct 2018 02:49)  BMI (kg/m2): 26.3 (14 Oct 2018 02:49)  BSA (m2): 1.66 (14 Oct 2018 02:49)    FAMILY HISTORY:  No pertinent family history in first degree relatives      SOCIAL HISTORY: Pt denies smoking, alcohol or drug use, lives with daughter       REVIEW OF SYSTEMS:    CONSTITUTIONAL: No fevers, No chills, No fatigue, No weight gain  EYES: No vision changes   ENT: No congestion, + nose pain No ear pain, No sore throat.  NECK: No pain, No stiffness  RESPIRATORY: + shortness of breath, + cough, No wheezing, No hemoptysis  CARDIOVASCULAR: No chest pain. No palpitations, + KRAUS, + orthopnea,  No pleuritic pain  GASTROINTESTINAL: No abdominal pain, No nausea, No vomiting, No hematemesis, No diarrhea No constipation. No melena  GENITOURINARY: No dysuria, No frequency, No incontinence, No hematuria  NEUROLOGICAL: No dizziness, No lightheadedness, No syncope, No LOC, No headache, No numbness or weakness  EXTREMITIES: No Edema, No joint pain, No joint swelling.  PSYCHIATRIC: No anxiety, No depression  SKIN: No diaphoresis. No itching, No rashes, No pressure ulcers    .    PHYSICAL EXAM:    Appearance:  respiratory  distress  HEENT:   Normal oral mucosa, PERRL, EOMI  Cardiovascular: Regular rate and rhythm, Normal S1 S2, No JVD, No murmurs, No edema  Respiratory: diffuse expiratory  crackles b/l  Gastrointestinal:  Soft, Non-tender, + BS, obese,+ right flank pain  Neurologic: Non-focal, A&Ox3  Skin: Warm and dry, No rashes, + facial  ecchymoses, +pale  Extremities: No clubbing, cyanosis or edema  Vascular: Peripheral pulses palpable 2+ bilaterally  Psychiatry: Mood & affect appropriate  		      ICU Vital Signs Last 24 Hrs  T(C): 36.6 (14 Oct 2018 01:56), Max: 37.2 (14 Oct 2018 00:55)  T(F): 97.9 (14 Oct 2018 01:56), Max: 98.9 (14 Oct 2018 00:55)  HR: 60 (14 Oct 2018 03:37) (60 - 107)  BP: 141/61 (14 Oct 2018 03:37) (116/84 - 157/58)  RR: 44 (14 Oct 2018 03:37) (18 - 60)  SpO2: 100% (14 Oct 2018 03:37) (88% - 100%)      ABG - ( 14 Oct 2018 02:20 )  pH, Arterial: 7.43  pH, Blood: x     /  pCO2: 26    /  pO2: 52    / HCO3: 19    / Base Excess: -6.7  /  SaO2: 82.4                LABS:  CBC Full  -  ( 14 Oct 2018 03:50 )  WBC Count : 21.57 K/uL  Hemoglobin : 7.7 g/dL  Hematocrit : 24.0 %  Platelet Count - Automated : 207 K/uL  Mean Cell Volume : 98.8 fL  Mean Cell Hemoglobin : 31.7 pg  Mean Cell Hemoglobin Concentration : 32.1 %  Auto Neutrophil # : x  Auto Lymphocyte # : x  Auto Monocyte # : x  Auto Eosinophil # : x  Auto Basophil # : x  Auto Neutrophil % : x  Auto Lymphocyte % : x  Auto Monocyte % : x  Auto Eosinophil % : x  Auto Basophil % : x    10-13    133<L>  |  98  |  80<H>  ----------------------------<  398<H>  4.9   |  15<L>  |  1.89<H>    Ca    10.1      13 Oct 2018 18:30    TPro  7.6  /  Alb  3.6  /  TBili  0.6  /  DBili  x   /  AST  37<H>  /  ALT  36<H>  /  AlkPhos  138<H>  10-13    CARDIAC MARKERS ( 13 Oct 2018 18:30 )  x     / x     / 80 u/L / 2.41 ng/mL / x          CAPILLARY BLOOD GLUCOSE      POCT Blood Glucose.: 372 mg/dL (13 Oct 2018 22:34)    PT/INR - ( 13 Oct 2018 18:30 )   PT: 19.7 SEC;   INR: 1.75          PTT - ( 13 Oct 2018 18:30 )  PTT:33.5 SEC  Urinalysis Basic - ( 13 Oct 2018 22:30 )    Color: LIGHT YELLOW / Appearance: CLEAR / S.012 / pH: 6.0  Gluc: 200 / Ketone: NEGATIVE  / Bili: NEGATIVE / Urobili: NORMAL   Blood: NEGATIVE / Protein: 20 / Nitrite: NEGATIVE   Leuk Esterase: NEGATIVE / RBC: 0-2 / WBC 0-2   Sq Epi: OCC / Non Sq Epi: x / Bacteria: NEGATIVE        EKG:Apaced ,pvc          RADIOLOGY STUDIES    CXR: < from: Xray Chest 1 View AP/PA (10.13.18 @ 19:59) >    INTERPRETATION:  bibasilar atelectasis, for evaluation of effusion, recommend upright/lateral Xray              ASSESSMENT: Patient is a 83y old  Female  afib (on Eliquis), DM2 (on PO meds), HFrEF (moderate rEF), HLD, HTN, 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  Plan: transfer to CCU  Strict I and O, daily weights. Fluid restriction.   Flores catheter.   lasix 60mg bid      Problem/Plan - 3:  Problem: +trop/CK  Plan: no chest pain ,EKG shows AV pacing ,PVC  elevated trop likley demand  will continue to trend for now      Problem/Plan - 2:  ·  Problem: Anemia.  Plan: Continue to trend hemoglobin.   baseline Hb 7-8g/dl  Patient without obvious signs of bleeding.     Problem/Plan - 4:  ·  Problem: Leukocytosis.  Plan: S/p Zosyn for possible HCAP in ED  WBC uptrend ing  21.57,no fever or chills  Consider CT chest when patient is more clinically stable for a further clarification.   Continue to monitor for signs of infection.    Problem/Plan - 5:  ·  Problem: Hyperlipidemia.  Plan: Check lipid  Continue with current medications.   Low salt, low cholesterol diet.     Problem/Plan - 6:  ·  Problem: Hypertension.  Plan: Routine blood pressure check.  Continue with current  home medications.   Low salt,low cholesterol, DASH diet.     Problem/Plan - 6:  Problem: Diabetes mellitus. Plan: Routine blood glucose check.   Stop PO medications and start sliding scale.   Check hemoglobin a1c.   Low carb diet.    Problem/Plan - 7:  ·  Problem: Need for prophylactic measure.  Plan: Pt is on eliquis.         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 Patient c/o sudden  right flank pain at night on 10/12/18 while asleep. She also c/o  sob  with exertion and laying down flat yesterday afternoon. Pt states she has been coughing up phlegm with streaks of blood. Pt also states she has been having right flank pain. Pt was recently admitted for mechanical fall and discharge on . Patient has a nasal fracture seen by ENT last admission . Pt denies chest pain, dizziness, lightheadedness, palpitations or any other complaints at this time. She admit adherence to medication ,but reports taking salty food at time.  In ED initial vital sign 141/41,HR 80,RR 30,sat 90% RA ,97% on 4 L n/c .trop 174 -166,CK neg. chest Xray showed bibasilar atelectasis, for evaluation of effusion, She was given 1 liter fluid in Ed .She was found to be dyspneic in ED on exertion and was given lasix 40mg IVP x1     .She was admitted to tele .as per tele RN patient was received very dyspneic and pale requiring bipap. She was given lasix 60mg IVP and [facility] and ccu consulted for bipap for CHF exacerbation. trop 206,WBC 21.57,h/h 7.2/24,ABG : showed hypoxia,pro BNP [id number]  Currently she is diuresing well. She received recievd total of 140mg lasix    echo:< from: TTE with Doppler (w/Cont) (10.10.18 @ 14:06) >e. Moderate mitral regurgitation.2. Aortic valve leaflet morphology not well visualized. Peak transaortic valve gradient equals 33 mm Hg, mean transaortic valve gradient equals 18 mm Hg.  In the setting  of LV systolic dysfunction, these gradients may reflect significant underlying aortic stenosis.  However, unable to  accurately estimate the aortic valve area. Moderate aortic regurgitation.; grossly moderate segmental left ventricular systolic dysfunction.  Hypokinesis of the apex, mid to distal septum, and distal anterior wall.  Endocardial visualization enhanced with  intravenous injection of echo contrast (Definity).  No LV thrombus seen.5.  moderate pulmonary hypertension.  Consider INDU for further evaluation of the aortic valve,   nuclear ST:< from: Nuclear Stress Test-Pharmacologic (10.26.17 @ 12:20) >    Myocardial Perfusion SPECT results are abnormal.* Review of raw data shows: The study is of good technical  quality.* The left ventricle was normal in size. There are medium sized, severe defects in anteroseptal, apical walls that  are fixed, suggestive of infarct.* Post-stress gated wall motion analysis was performed(LVEF = 42 %;LVEDV = 103 ml.), revealing moderate over all hypokinesis. There was apical and anteroseptal akinesis. 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 ( - for tacybrady syndrome @ [facility])  History of Hysterectomy (ICD9 V88.01)      MEDICATIONS  (STANDING):    Home meds :    Aspirin Enteric Coated 81 mg oral delayed release tablet  -- 1 tab(s) by mouth once a day  -- Indication: For Prophylaxis     acetaminophen 325 mg oral tablet  -- 2 tab(s) by mouth every 6 hours, As needed, Mild Pain (1 - 3), Moderate Pain (4 - 6)  -- Indication: For Pain    losartan 100 mg oral tablet  -- 1 tab(s) by mouth once a day  -- Indication: For Htn    isosorbide mononitrate 60 mg oral tablet, extended release  -- 1 tab(s) by mouth once a day  -- Indication: For Htn    Eliquis 2.5 mg oral tablet  -- 1 tab(s) by mouth 2 times a day  -- Indication: For Afib    Januvia 50 mg oral tablet  -- 1 tab(s) by mouth once a day  -- Indication: For diabetes mellitus     atorvastatin 40 mg oral tablet  -- 1 tab(s) by mouth once a day (at bedtime)  -- Indication: For Hyperlipidemia     metoprolol tartrate 75 mg oral tablet  -- 1 tab(s) by mouth 2 times a day  hold for sbp <100 HR <60  -- Indication: For Htn     amLODIPine 10 mg oral tablet  -- 1 tab(s) by mouth once a day  -- Indication: For Htn     petrolatum topical ointment  -- 1 application on skin once a day (at bedtime)  -- Indication: For Epistaxis    torsemide 20 mg oral tablet  -- 2 tab(s) by mouth 2 times a day  -- Indication: For Chronic systolic heart failure    oxymetazoline 0.05% nasal spray  -- 2 spray(s) into nose 2 times a day   -- Indication: For Epistaxis    sodium chloride 0.65% nasal spray  -- 2 spray(s) into nose 2 times a day   -- Indication: For Epistaxis    amLODIPine   Tablet 10 milliGRAM(s) Oral daily  apixaban 2.5 milliGRAM(s) Oral every 12 hours  AQUAPHOR (petrolatum Ointment) 1 Application(s) Topical at bedtime  aspirin enteric coated 81 milliGRAM(s) Oral daily  atorvastatin 40 milliGRAM(s) Oral at bedtime  hydrALAZINE 25 milliGRAM(s) Oral two times a day  insulin lispro (HumaLOG) corrective regimen sliding scale   SubCutaneous three times a day before meals  insulin lispro (HumaLOG) corrective regimen sliding scale   SubCutaneous at bedtime  isosorbide   mononitrate ER Tablet (IMDUR) 60 milliGRAM(s) Oral daily  losartan 100 milliGRAM(s) Oral daily  metoprolol tartrate 75 milliGRAM(s) Oral two times a day  nitroglycerin    2% Ointment 1 Inch(s) Transdermal once  nitroglycerin  Infusion 25 MICROgram(s)/Min (7.5 mL/Hr) IV Continuous <Continuous>  sodium chloride 0.65% Nasal 2 Spray(s) Both Nostrils two times a day    MEDICATIONS  (PRN):  acetaminophen   Tablet .. 650 milliGRAM(s) Oral every 6 hours PRN Mild Pain (1 - 3), Moderate Pain (4 - 6), Severe Pain (7 - 10)  dextrose 40% Gel 15 Gram(s) Oral once PRN Blood Glucose LESS THAN 70 milliGRAM(s)/deciliter  glucagon  Injectable 1 milliGRAM(s) IntraMuscular once PRN Glucose LESS THAN 70 milligrams/deciliter      Drug Dosing Weight  Height (cm): 157.48 (14 Oct 2018 02:49)  Weight (kg): 65.3 (14 Oct 2018 02:49)  BMI (kg/m2): 26.3 (14 Oct 2018 02:49)  BSA (m2): 1.66 (14 Oct 2018 02:49)    FAMILY HISTORY:  No pertinent family history in first degree relatives      SOCIAL HISTORY: Pt denies smoking, alcohol or drug use, lives with daughter       REVIEW OF SYSTEMS:    CONSTITUTIONAL: No fevers, No chills, No fatigue, No weight gain  EYES: No vision changes   ENT: No congestion, + nose pain No ear pain, No sore throat.  NECK: No pain, No stiffness  RESPIRATORY: + shortness of breath, + cough, No wheezing, No hemoptysis  CARDIOVASCULAR: No chest pain. No palpitations, + KRAUS, + orthopnea,  No pleuritic pain  GASTROINTESTINAL: No abdominal pain, No nausea, No vomiting, No hematemesis, No diarrhea No constipation. No melena  GENITOURINARY: No dysuria, No frequency, No incontinence, No hematuria  NEUROLOGICAL: No dizziness, No lightheadedness, No syncope, No LOC, No headache, No numbness or weakness  EXTREMITIES: No Edema, No joint pain, No joint swelling.  PSYCHIATRIC: No anxiety, No depression  SKIN: No diaphoresis. No itching, No rashes, No pressure ulcers    .    PHYSICAL EXAM:    Appearance:  respiratory  distress  HEENT:   Normal oral mucosa, PERRL, EOMI  Cardiovascular: Regular rate and rhythm, Normal S1 S2, No JVD, No murmurs, No edema  Respiratory: diffuse expiratory  crackles b/l  Gastrointestinal:  Soft, Non-tender, + BS, obese,+ right flank pain  Neurologic: Non-focal, A&Ox3  Skin: Warm and dry, No rashes, + facial  ecchymoses, +pale  Extremities: No clubbing, cyanosis or edema  Vascular: Peripheral pulses palpable 2+ bilaterally  Psychiatry: Mood & affect appropriate  		      ICU Vital Signs Last 24 Hrs  T(C): 36.6 (14 Oct 2018 01:56), Max: 37.2 (14 Oct 2018 00:55)  T(F): 97.9 (14 Oct 2018 01:56), Max: 98.9 (14 Oct 2018 00:55)  HR: 60 (14 Oct 2018 03:37) (60 - 107)  BP: 141/61 (14 Oct 2018 03:37) (116/84 - 157/58)  RR: 44 (14 Oct 2018 03:37) (18 - 60)  SpO2: 100% (14 Oct 2018 03:37) (88% - 100%)      ABG - ( 14 Oct 2018 02:20 )  pH, Arterial: 7.43  pH, Blood: x     /  pCO2: 26    /  pO2: 52    / HCO3: 19    / Base Excess: -6.7  /  SaO2: 82.4                LABS:  CBC Full  -  ( 14 Oct 2018 03:50 )  WBC Count : 21.57 K/uL  Hemoglobin : 7.7 g/dL  Hematocrit : 24.0 %  Platelet Count - Automated : 207 K/uL  Mean Cell Volume : 98.8 fL  Mean Cell Hemoglobin : 31.7 pg  Mean Cell Hemoglobin Concentration : 32.1 %  Auto Neutrophil # : x  Auto Lymphocyte # : x  Auto Monocyte # : x  Auto Eosinophil # : x  Auto Basophil # : x  Auto Neutrophil % : x  Auto Lymphocyte % : x  Auto Monocyte % : x  Auto Eosinophil % : x  Auto Basophil % : x    10-13    133<L>  |  98  |  80<H>  ----------------------------<  398<H>  4.9   |  15<L>  |  1.89<H>    Ca    10.1      13 Oct 2018 18:30    TPro  7.6  /  Alb  3.6  /  TBili  0.6  /  DBili  x   /  AST  37<H>  /  ALT  36<H>  /  AlkPhos  138<H>  10-13    CARDIAC MARKERS ( 13 Oct 2018 18:30 )  x     / x     / 80 u/L / 2.41 ng/mL / x          CAPILLARY BLOOD GLUCOSE      POCT Blood Glucose.: 372 mg/dL (13 Oct 2018 22:34)    PT/INR - ( 13 Oct 2018 18:30 )   PT: 19.7 SEC;   INR: 1.75          PTT - ( 13 Oct 2018 18:30 )  PTT:33.5 SEC  Urinalysis Basic - ( 13 Oct 2018 22:30 )    Color: LIGHT YELLOW / Appearance: CLEAR / S.012 / pH: 6.0  Gluc: 200 / Ketone: NEGATIVE  / Bili: NEGATIVE / Urobili: NORMAL   Blood: NEGATIVE / Protein: 20 / Nitrite: NEGATIVE   Leuk Esterase: NEGATIVE / RBC: 0-2 / WBC 0-2   Sq Epi: OCC / Non Sq Epi: x / Bacteria: NEGATIVE        EKG:Apaced ,pvc          RADIOLOGY STUDIES    CXR: < from: Xray Chest 1 View AP/PA (10.13.18 @ 19:59) >    INTERPRETATION:  bibasilar atelectasis, for evaluation of effusion, recommend upright/lateral Xray              ASSESSMENT: Patient is a 83y old  Female  afib (on Eliquis), DM2 (on PO meds), HFrEF (moderate rEF), HLD, HTN, 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  Plan: transfer to CCU  Strict I and O, daily weights. Fluid restriction.   Flores catheter.   lasix 60mg bid      Problem/Plan - 3:  Problem: +trop/CK  Plan: no chest pain ,EKG shows AV pacing ,PVC  elevated trop likley demand  will continue to trend for now      Problem/Plan - 2:  ·  Problem: Anemia.  Plan: Continue to trend hemoglobin.   baseline Hb 7-8g/dl  Patient without obvious signs of bleeding.     Problem/Plan - 4:  ·  Problem: Leukocytosis.  Plan: S/p Zosyn for possible HCAP in ED  WBC uptrend ing  21.57,no fever or chills  Consider CT chest when patient is more clinically stable for a further clarification.   Continue to monitor for signs of infection.    Problem/Plan - 5:  ·  Problem: Hyperlipidemia.  Plan: Check lipid  Continue with current medications.   Low salt, low cholesterol diet.     Problem/Plan - 6:  ·  Problem: Hypertension.  Plan: Routine blood pressure check.  Continue with current  home medications.   Low salt,low cholesterol, DASH diet.     Problem/Plan - 6:  Problem: Diabetes mellitus. Plan: Routine blood glucose check.   Stop PO medications and start sliding scale.   Check hemoglobin a1c.   Low carb diet.    Problem/Plan - 7:  ·  Problem: Need for prophylactic measure.  Plan: Pt is on eliquis.         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 Patient c/o sudden  right flank pain at night on 10/12/18 while asleep. She also c/o  sob  with exertion and laying down flat yesterday afternoon. Pt states she has been coughing up phlegm with streaks of blood. Pt also states she has been having right flank pain. Pt was recently admitted for mechanical fall and discharge on . Patient has a nasal fracture seen by ENT last admission . Pt denies chest pain, dizziness, lightheadedness, palpitations or any other complaints at this time. She admit adherence to medication ,but reports taking salty food at time.  In ED initial vital sign 141/41,HR 80,RR 30,sat 90% RA ,97% on 4 L n/c .trop 174 -166,CK neg. chest Xray showed bibasilar atelectasis, for evaluation of effusion, She was given 1 liter fluid in Ed .She was found to be dyspneic in ED on exertion and was given lasix 40mg IVP x1     .She was admitted to tele .as per tele RN patient was received very dyspneic and pale requiring bipap. She was given lasix 60mg IVP and [facility] and ccu consulted for bipap for CHF exacerbation. trop 206,WBC 21.57,h/h 7.2/24,ABG : showed hypoxia,pro BNP [id number]  Currently she is diuresing well. She received recievd total of 140mg lasix    echo:< from: TTE with Doppler (w/Cont) (10.10.18 @ 14:06) >e. Moderate mitral regurgitation.2. Aortic valve leaflet morphology not well visualized. Peak transaortic valve gradient equals 33 mm Hg, mean transaortic valve gradient equals 18 mm Hg.  In the setting  of LV systolic dysfunction, these gradients may reflect significant underlying aortic stenosis.  However, unable to  accurately estimate the aortic valve area. Moderate aortic regurgitation.; grossly moderate segmental left ventricular systolic dysfunction.  Hypokinesis of the apex, mid to distal septum, and distal anterior wall.  Endocardial visualization enhanced with  intravenous injection of echo contrast (Definity).  No LV thrombus seen.5.  moderate pulmonary hypertension.  Consider INDU for further evaluation of the aortic valve,   nuclear ST:< from: Nuclear Stress Test-Pharmacologic (10.26.17 @ 12:20) >    Myocardial Perfusion SPECT results are abnormal.* Review of raw data shows: The study is of good technical  quality.* The left ventricle was normal in size. There are medium sized, severe defects in anteroseptal, apical walls that  are fixed, suggestive of infarct.* Post-stress gated wall motion analysis was performed(LVEF = 42 %;LVEDV = 103 ml.), revealing moderate over all hypokinesis. There was apical and anteroseptal akinesis. 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 ( - for tacybrady syndrome @ [facility])  History of Hysterectomy (ICD9 V88.01)      MEDICATIONS  (STANDING):    Home meds :    Aspirin Enteric Coated 81 mg oral delayed release tablet  -- 1 tab(s) by mouth once a day  -- Indication: For Prophylaxis     acetaminophen 325 mg oral tablet  -- 2 tab(s) by mouth every 6 hours, As needed, Mild Pain (1 - 3), Moderate Pain (4 - 6)  -- Indication: For Pain    losartan 100 mg oral tablet  -- 1 tab(s) by mouth once a day  -- Indication: For Htn    isosorbide mononitrate 60 mg oral tablet, extended release  -- 1 tab(s) by mouth once a day  -- Indication: For Htn    Eliquis 2.5 mg oral tablet  -- 1 tab(s) by mouth 2 times a day  -- Indication: For Afib    Januvia 50 mg oral tablet  -- 1 tab(s) by mouth once a day  -- Indication: For diabetes mellitus     atorvastatin 40 mg oral tablet  -- 1 tab(s) by mouth once a day (at bedtime)  -- Indication: For Hyperlipidemia     metoprolol tartrate 75 mg oral tablet  -- 1 tab(s) by mouth 2 times a day  hold for sbp <100 HR <60  -- Indication: For Htn     amLODIPine 10 mg oral tablet  -- 1 tab(s) by mouth once a day  -- Indication: For Htn     petrolatum topical ointment  -- 1 application on skin once a day (at bedtime)  -- Indication: For Epistaxis    torsemide 20 mg oral tablet  -- 2 tab(s) by mouth 2 times a day  -- Indication: For Chronic systolic heart failure    oxymetazoline 0.05% nasal spray  -- 2 spray(s) into nose 2 times a day   -- Indication: For Epistaxis    sodium chloride 0.65% nasal spray  -- 2 spray(s) into nose 2 times a day   -- Indication: For Epistaxis    amLODIPine   Tablet 10 milliGRAM(s) Oral daily  apixaban 2.5 milliGRAM(s) Oral every 12 hours  AQUAPHOR (petrolatum Ointment) 1 Application(s) Topical at bedtime  aspirin enteric coated 81 milliGRAM(s) Oral daily  atorvastatin 40 milliGRAM(s) Oral at bedtime  hydrALAZINE 25 milliGRAM(s) Oral two times a day  insulin lispro (HumaLOG) corrective regimen sliding scale   SubCutaneous three times a day before meals  insulin lispro (HumaLOG) corrective regimen sliding scale   SubCutaneous at bedtime  isosorbide   mononitrate ER Tablet (IMDUR) 60 milliGRAM(s) Oral daily  losartan 100 milliGRAM(s) Oral daily  metoprolol tartrate 75 milliGRAM(s) Oral two times a day  nitroglycerin    2% Ointment 1 Inch(s) Transdermal once  nitroglycerin  Infusion 25 MICROgram(s)/Min (7.5 mL/Hr) IV Continuous <Continuous>  sodium chloride 0.65% Nasal 2 Spray(s) Both Nostrils two times a day    MEDICATIONS  (PRN):  acetaminophen   Tablet .. 650 milliGRAM(s) Oral every 6 hours PRN Mild Pain (1 - 3), Moderate Pain (4 - 6), Severe Pain (7 - 10)  dextrose 40% Gel 15 Gram(s) Oral once PRN Blood Glucose LESS THAN 70 milliGRAM(s)/deciliter  glucagon  Injectable 1 milliGRAM(s) IntraMuscular once PRN Glucose LESS THAN 70 milligrams/deciliter      Drug Dosing Weight  Height (cm): 157.48 (14 Oct 2018 02:49)  Weight (kg): 65.3 (14 Oct 2018 02:49)  BMI (kg/m2): 26.3 (14 Oct 2018 02:49)  BSA (m2): 1.66 (14 Oct 2018 02:49)    FAMILY HISTORY:  No pertinent family history in first degree relatives      SOCIAL HISTORY: Pt denies smoking, alcohol or drug use, lives with daughter       REVIEW OF SYSTEMS:    CONSTITUTIONAL: No fevers, No chills, No fatigue, No weight gain  EYES: No vision changes   ENT: No congestion, + nose pain No ear pain, No sore throat.  NECK: No pain, No stiffness  RESPIRATORY: + shortness of breath, + cough, No wheezing, No hemoptysis  CARDIOVASCULAR: No chest pain. No palpitations, + KRAUS, + orthopnea,  No pleuritic pain  GASTROINTESTINAL: No abdominal pain, No nausea, No vomiting, No hematemesis, No diarrhea No constipation. No melena  GENITOURINARY: No dysuria, No frequency, No incontinence, No hematuria  NEUROLOGICAL: No dizziness, No lightheadedness, No syncope, No LOC, No headache, No numbness or weakness  EXTREMITIES: No Edema, No joint pain, No joint swelling.  PSYCHIATRIC: No anxiety, No depression  SKIN: No diaphoresis. No itching, No rashes, No pressure ulcers    .    PHYSICAL EXAM:    Appearance:  respiratory  distress  HEENT:   Normal oral mucosa, PERRL, EOMI  Cardiovascular: Regular rate and rhythm, Normal S1 S2, No JVD, No murmurs, No edema  Respiratory: diffuse expiratory  crackles b/l  Gastrointestinal:  Soft, Non-tender, + BS, obese,+ right flank pain  Neurologic: Non-focal, A&Ox3  Skin: Warm and dry, No rashes, + facial  ecchymoses, +pale  Extremities: No clubbing, cyanosis or edema  Vascular: Peripheral pulses palpable 2+ bilaterally  Psychiatry: Mood & affect appropriate  		      ICU Vital Signs Last 24 Hrs  T(C): 36.6 (14 Oct 2018 01:56), Max: 37.2 (14 Oct 2018 00:55)  T(F): 97.9 (14 Oct 2018 01:56), Max: 98.9 (14 Oct 2018 00:55)  HR: 60 (14 Oct 2018 03:37) (60 - 107)  BP: 141/61 (14 Oct 2018 03:37) (116/84 - 157/58)  RR: 44 (14 Oct 2018 03:37) (18 - 60)  SpO2: 100% (14 Oct 2018 03:37) (88% - 100%)      ABG - ( 14 Oct 2018 02:20 )  pH, Arterial: 7.43  pH, Blood: x     /  pCO2: 26    /  pO2: 52    / HCO3: 19    / Base Excess: -6.7  /  SaO2: 82.4                LABS:  CBC Full  -  ( 14 Oct 2018 03:50 )  WBC Count : 21.57 K/uL  Hemoglobin : 7.7 g/dL  Hematocrit : 24.0 %  Platelet Count - Automated : 207 K/uL  Mean Cell Volume : 98.8 fL  Mean Cell Hemoglobin : 31.7 pg  Mean Cell Hemoglobin Concentration : 32.1 %  Auto Neutrophil # : x  Auto Lymphocyte # : x  Auto Monocyte # : x  Auto Eosinophil # : x  Auto Basophil # : x  Auto Neutrophil % : x  Auto Lymphocyte % : x  Auto Monocyte % : x  Auto Eosinophil % : x  Auto Basophil % : x    10-13    133<L>  |  98  |  80<H>  ----------------------------<  398<H>  4.9   |  15<L>  |  1.89<H>    Ca    10.1      13 Oct 2018 18:30    TPro  7.6  /  Alb  3.6  /  TBili  0.6  /  DBili  x   /  AST  37<H>  /  ALT  36<H>  /  AlkPhos  138<H>  10-13    CARDIAC MARKERS ( 13 Oct 2018 18:30 )  x     / x     / 80 u/L / 2.41 ng/mL / x          CAPILLARY BLOOD GLUCOSE      POCT Blood Glucose.: 372 mg/dL (13 Oct 2018 22:34)    PT/INR - ( 13 Oct 2018 18:30 )   PT: 19.7 SEC;   INR: 1.75          PTT - ( 13 Oct 2018 18:30 )  PTT:33.5 SEC  Urinalysis Basic - ( 13 Oct 2018 22:30 )    Color: LIGHT YELLOW / Appearance: CLEAR / S.012 / pH: 6.0  Gluc: 200 / Ketone: NEGATIVE  / Bili: NEGATIVE / Urobili: NORMAL   Blood: NEGATIVE / Protein: 20 / Nitrite: NEGATIVE   Leuk Esterase: NEGATIVE / RBC: 0-2 / WBC 0-2   Sq Epi: OCC / Non Sq Epi: x / Bacteria: NEGATIVE        EKG:Apaced ,pvc          RADIOLOGY STUDIES    CXR: < from: Xray Chest 1 View AP/PA (10.13.18 @ 19:59) >    INTERPRETATION:  bibasilar atelectasis, for evaluation of effusion, recommend upright/lateral Xray              ASSESSMENT: Patient is a 83y old  Female  afib (on Eliquis), DM2 (on PO meds), HFrEF (moderate rEF), HLD, HTN, 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  Plan: transfer to CCU  Strict I and O, daily weights. Fluid restriction.   Flores catheter.   lasix 60mg bid      Problem/Plan - 3:  Problem: +trop/CK  Plan: no chest pain ,EKG shows AV pacing ,PVC  elevated trop likley demand  will continue to trend for now      Problem/Plan - 2:  ·  Problem: Anemia.  Plan: Continue to trend hemoglobin.   baseline Hb 7-8g/dl  Patient without obvious signs of bleeding.     Problem/Plan - 4:  ·  Problem: Leukocytosis.  Plan: S/p Zosyn for possible HCAP in ED  WBC uptrend ing  21.57,no fever or chills  Consider CT chest when patient is more clinically stable for a further clarification.   Continue to monitor for signs of infection.    Problem/Plan - 5:  ·  Problem: Hyperlipidemia.  Plan: Check lipid  Continue with current medications.   Low salt, low cholesterol diet.     Problem/Plan - 6:  ·  Problem: Hypertension.  Plan: Routine blood pressure check.  Continue with current  home medications.   Low salt,low cholesterol, DASH diet.     Problem/Plan - 6:  Problem: Diabetes mellitus. Plan: Routine blood glucose check.   Stop PO medications and start sliding scale.   Check hemoglobin a1c.   Low carb diet.    Problem/Plan - 7:  ·  Problem: Need for prophylactic measure.  Plan: Pt is on eliquis.         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 Patient c/o sudden  right flank pain at night on 10/12/18 while asleep. She also c/o  sob  with exertion and laying down flat yesterday afternoon. Pt states she has been coughing up phlegm with streaks of blood. Pt also states she has been having right flank pain. Pt was recently admitted for mechanical fall and discharge on . Patient has a nasal fracture seen by ENT last admission . Pt denies chest pain, dizziness, lightheadedness, palpitations or any other complaints at this time. She admit adherence to medication ,but reports taking salty food at time.  In ED initial vital sign 141/41,HR 80,RR 30,sat 90% RA ,97% on 4 L n/c .trop 174 -166,CK neg. chest Xray showed bibasilar atelectasis, for evaluation of effusion, She was given 1 liter fluid in Ed .She was found to be dyspneic in ED on exertion and was given lasix 40mg IVP x1     .She was admitted to tele .as per tele RN patient was received very dyspneic and pale requiring bipap. She was given lasix 60mg IVP and [facility] and ccu consulted for bipap for CHF exacerbation. trop 206,WBC 21.57,h/h 7.2/24,ABG : showed hypoxia,pro BNP [id number]  Currently she is diuresing well. She received recievd total of 140mg lasix    echo:< from: TTE with Doppler (w/Cont) (10.10.18 @ 14:06) >e. Moderate mitral regurgitation.2. Aortic valve leaflet morphology not well visualized. Peak transaortic valve gradient equals 33 mm Hg, mean transaortic valve gradient equals 18 mm Hg.  In the setting  of LV systolic dysfunction, these gradients may reflect significant underlying aortic stenosis.  However, unable to  accurately estimate the aortic valve area. Moderate aortic regurgitation.; grossly moderate segmental left ventricular systolic dysfunction.  Hypokinesis of the apex, mid to distal septum, and distal anterior wall.  Endocardial visualization enhanced with  intravenous injection of echo contrast (Definity).  No LV thrombus seen.5.  moderate pulmonary hypertension.  Consider INDU for further evaluation of the aortic valve,   nuclear ST:< from: Nuclear Stress Test-Pharmacologic (10.26.17 @ 12:20) >    Myocardial Perfusion SPECT results are abnormal.* Review of raw data shows: The study is of good technical  quality.* The left ventricle was normal in size. There are medium sized, severe defects in anteroseptal, apical walls that  are fixed, suggestive of infarct.* Post-stress gated wall motion analysis was performed(LVEF = 42 %;LVEDV = 103 ml.), revealing moderate over all hypokinesis. There was apical and anteroseptal akinesis. The remaining segments contracted well.***Compared with Nuclear/Stress test of 2016, the current findings are new, suggesting interval infarction  Left cath 2009 : non obstructive      Allergies: No Known Allergies          PAST MEDICAL & SURGICAL HISTORY:  Chronic systolic heart failure  Tachycardia-bradycardia syndrome  Hyperlipidemia  Uterine Cancer (ICD9 179): treated with hysterectomy and radiation therapy in   Osteoarthritis of Knee (ICD9 715.96): b/l knees  DM Type 2 (Diabetes Mellitus, Type 2) (ICD9 250.00)  HTN (Hypertension) (ICD9 401.9)  Status Post Total Knee Replacement: LISA, 6 months apart   Cardiac 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 ( - for tacybrady syndrome @ [facility])  History of Hysterectomy (ICD9 V88.01)      MEDICATIONS  (STANDING):    Home meds :    Aspirin Enteric Coated 81 mg oral delayed release tablet  -- 1 tab(s) by mouth once a day  -- Indication: For Prophylaxis     acetaminophen 325 mg oral tablet  -- 2 tab(s) by mouth every 6 hours, As needed, Mild Pain (1 - 3), Moderate Pain (4 - 6)  -- Indication: For Pain    losartan 100 mg oral tablet  -- 1 tab(s) by mouth once a day  -- Indication: For Htn    isosorbide mononitrate 60 mg oral tablet, extended release  -- 1 tab(s) by mouth once a day  -- Indication: For Htn    Eliquis 2.5 mg oral tablet  -- 1 tab(s) by mouth 2 times a day  -- Indication: For Afib    Januvia 50 mg oral tablet  -- 1 tab(s) by mouth once a day  -- Indication: For diabetes mellitus     atorvastatin 40 mg oral tablet  -- 1 tab(s) by mouth once a day (at bedtime)  -- Indication: For Hyperlipidemia     metoprolol tartrate 75 mg oral tablet  -- 1 tab(s) by mouth 2 times a day  hold for sbp <100 HR <60  -- Indication: For Htn     amLODIPine 10 mg oral tablet  -- 1 tab(s) by mouth once a day  -- Indication: For Htn     petrolatum topical ointment  -- 1 application on skin once a day (at bedtime)  -- Indication: For Epistaxis    torsemide 20 mg oral tablet  -- 2 tab(s) by mouth 2 times a day  -- Indication: For Chronic systolic heart failure    oxymetazoline 0.05% nasal spray  -- 2 spray(s) into nose 2 times a day   -- Indication: For Epistaxis    sodium chloride 0.65% nasal spray  -- 2 spray(s) into nose 2 times a day   -- Indication: For Epistaxis    amLODIPine   Tablet 10 milliGRAM(s) Oral daily  apixaban 2.5 milliGRAM(s) Oral every 12 hours  AQUAPHOR (petrolatum Ointment) 1 Application(s) Topical at bedtime  aspirin enteric coated 81 milliGRAM(s) Oral daily  atorvastatin 40 milliGRAM(s) Oral at bedtime  hydrALAZINE 25 milliGRAM(s) Oral two times a day  insulin lispro (HumaLOG) corrective regimen sliding scale   SubCutaneous three times a day before meals  insulin lispro (HumaLOG) corrective regimen sliding scale   SubCutaneous at bedtime  isosorbide   mononitrate ER Tablet (IMDUR) 60 milliGRAM(s) Oral daily  losartan 100 milliGRAM(s) Oral daily  metoprolol tartrate 75 milliGRAM(s) Oral two times a day  nitroglycerin    2% Ointment 1 Inch(s) Transdermal once  nitroglycerin  Infusion 25 MICROgram(s)/Min (7.5 mL/Hr) IV Continuous <Continuous>  sodium chloride 0.65% Nasal 2 Spray(s) Both Nostrils two times a day    MEDICATIONS  (PRN):  acetaminophen   Tablet .. 650 milliGRAM(s) Oral every 6 hours PRN Mild Pain (1 - 3), Moderate Pain (4 - 6), Severe Pain (7 - 10)  dextrose 40% Gel 15 Gram(s) Oral once PRN Blood Glucose LESS THAN 70 milliGRAM(s)/deciliter  glucagon  Injectable 1 milliGRAM(s) IntraMuscular once PRN Glucose LESS THAN 70 milligrams/deciliter      Drug Dosing Weight  Height (cm): 157.48 (14 Oct 2018 02:49)  Weight (kg): 65.3 (14 Oct 2018 02:49)  BMI (kg/m2): 26.3 (14 Oct 2018 02:49)  BSA (m2): 1.66 (14 Oct 2018 02:49)    FAMILY HISTORY:  No pertinent family history in first degree relatives      SOCIAL HISTORY: Pt denies smoking, alcohol or drug use, lives with daughter       REVIEW OF SYSTEMS:    CONSTITUTIONAL: No fevers, No chills, No fatigue, No weight gain  EYES: No vision changes   ENT: No congestion, + nose pain No ear pain, No sore throat.  NECK: No pain, No stiffness  RESPIRATORY: + shortness of breath, + cough, No wheezing, No hemoptysis  CARDIOVASCULAR: No chest pain. No palpitations, + KRAUS, + orthopnea,  No pleuritic pain  GASTROINTESTINAL: No abdominal pain, No nausea, No vomiting, No hematemesis, No diarrhea No constipation. No melena  GENITOURINARY: No dysuria, No frequency, No incontinence, No hematuria  NEUROLOGICAL: No dizziness, No lightheadedness, No syncope, No LOC, No headache, No numbness or weakness  EXTREMITIES: No Edema, No joint pain, No joint swelling.  PSYCHIATRIC: No anxiety, No depression  SKIN: No diaphoresis. No itching, No rashes, No pressure ulcers    .    PHYSICAL EXAM:    Appearance:  respiratory  distress  HEENT:   Normal oral mucosa, PERRL, EOMI  Cardiovascular: Regular rate and rhythm, Normal S1 S2, No JVD, No murmurs, No edema  Respiratory: diffuse expiratory  crackles b/l  Gastrointestinal:  Soft, Non-tender, + BS, obese,+ right flank pain  Neurologic: Non-focal, A&Ox3  Skin: Warm and dry, No rashes, + facial  ecchymoses, +pale  Extremities: No clubbing, cyanosis or edema  Vascular: Peripheral pulses palpable 2+ bilaterally  Psychiatry: Mood & affect appropriate  		      ICU Vital Signs Last 24 Hrs  T(C): 36.6 (14 Oct 2018 01:56), Max: 37.2 (14 Oct 2018 00:55)  T(F): 97.9 (14 Oct 2018 01:56), Max: 98.9 (14 Oct 2018 00:55)  HR: 60 (14 Oct 2018 03:37) (60 - 107)  BP: 141/61 (14 Oct 2018 03:37) (116/84 - 157/58)  RR: 44 (14 Oct 2018 03:37) (18 - 60)  SpO2: 100% (14 Oct 2018 03:37) (88% - 100%)      ABG - ( 14 Oct 2018 02:20 )  pH, Arterial: 7.43  pH, Blood: x     /  pCO2: 26    /  pO2: 52    / HCO3: 19    / Base Excess: -6.7  /  SaO2: 82.4                LABS:  CBC Full  -  ( 14 Oct 2018 03:50 )  WBC Count : 21.57 K/uL  Hemoglobin : 7.7 g/dL  Hematocrit : 24.0 %  Platelet Count - Automated : 207 K/uL  Mean Cell Volume : 98.8 fL  Mean Cell Hemoglobin : 31.7 pg  Mean Cell Hemoglobin Concentration : 32.1 %  Auto Neutrophil # : x  Auto Lymphocyte # : x  Auto Monocyte # : x  Auto Eosinophil # : x  Auto Basophil # : x  Auto Neutrophil % : x  Auto Lymphocyte % : x  Auto Monocyte % : x  Auto Eosinophil % : x  Auto Basophil % : x    10-13    133<L>  |  98  |  80<H>  ----------------------------<  398<H>  4.9   |  15<L>  |  1.89<H>    Ca    10.1      13 Oct 2018 18:30    TPro  7.6  /  Alb  3.6  /  TBili  0.6  /  DBili  x   /  AST  37<H>  /  ALT  36<H>  /  AlkPhos  138<H>  10-13    CARDIAC MARKERS ( 13 Oct 2018 18:30 )  x     / x     / 80 u/L / 2.41 ng/mL / x          CAPILLARY BLOOD GLUCOSE      POCT Blood Glucose.: 372 mg/dL (13 Oct 2018 22:34)    PT/INR - ( 13 Oct 2018 18:30 )   PT: 19.7 SEC;   INR: 1.75          PTT - ( 13 Oct 2018 18:30 )  PTT:33.5 SEC  Urinalysis Basic - ( 13 Oct 2018 22:30 )    Color: LIGHT YELLOW / Appearance: CLEAR / S.012 / pH: 6.0  Gluc: 200 / Ketone: NEGATIVE  / Bili: NEGATIVE / Urobili: NORMAL   Blood: NEGATIVE / Protein: 20 / Nitrite: NEGATIVE   Leuk Esterase: NEGATIVE / RBC: 0-2 / WBC 0-2   Sq Epi: OCC / Non Sq Epi: x / Bacteria: NEGATIVE        EKG:Apaced ,pvc          RADIOLOGY STUDIES    CXR: < from: Xray Chest 1 View AP/PA (10.13.18 @ 19:59) >    INTERPRETATION:  bibasilar atelectasis, for evaluation of effusion, recommend upright/lateral Xray              ASSESSMENT: Patient is a 83y old  Female  afib (on Eliquis), DM2 (on PO meds), HFrEF (moderate rEF), HLD, HTN, 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  Plan: transfer to CCU  Strict I and O, daily weights. Fluid restriction.   Flores catheter.   lasix 60mg bid      Problem/Plan - 3:  Problem: +trop/CK  Plan: no chest pain ,EKG shows AV pacing ,PVC  elevated trop likley demand  will continue to trend for now      Problem/Plan - 2:  ·  Problem: Anemia.  Plan: Continue to trend hemoglobin.   baseline Hb 7-8g/dl  Patient without obvious signs of bleeding.     Problem/Plan - 4:  ·  Problem: Leukocytosis.  Plan: S/p Zosyn for possible HCAP in ED  WBC uptrend ing  21.57,no fever or chills  Consider CT chest when patient is more clinically stable for a further clarification.   Continue to monitor for signs of infection.    Problem/Plan - 5:  ·  Problem: Hyperlipidemia.  Plan: Check lipid  Continue with current medications.   Low salt, low cholesterol diet.     Problem/Plan - 6:  ·  Problem: Hypertension.  Plan: Routine blood pressure check.  Continue with current  home medications.   Low salt,low cholesterol, DASH diet.     Problem/Plan - 6:  Problem: Diabetes mellitus. Plan: Routine blood glucose check.   Stop PO medications and start sliding scale.   Check hemoglobin a1c.   Low carb diet.    Problem/Plan - 7:  ·  Problem: Need for prophylactic measure.  Plan: Pt is on eliquis.         Case discussed with Cardiology fellow

## 2018-10-15 ENCOUNTER — TRANSCRIPTION ENCOUNTER (OUTPATIENT)
Age: 83
End: 2018-10-15

## 2018-10-15 LAB
BACTERIA UR CULT: SIGNIFICANT CHANGE UP
BASE EXCESS BLDV CALC-SCNC: -1.9 MMOL/L — SIGNIFICANT CHANGE UP
BUN SERPL-MCNC: 87 MG/DL — HIGH (ref 7–23)
CALCIUM SERPL-MCNC: 9.9 MG/DL — SIGNIFICANT CHANGE UP (ref 8.4–10.5)
CHLORIDE SERPL-SCNC: 101 MMOL/L — SIGNIFICANT CHANGE UP (ref 98–107)
CO2 SERPL-SCNC: 18 MMOL/L — LOW (ref 22–31)
CREAT SERPL-MCNC: 2.23 MG/DL — HIGH (ref 0.5–1.3)
GAS PNL BLDV: 133 MMOL/L — LOW (ref 136–146)
GLUCOSE BLDC GLUCOMTR-MCNC: 197 MG/DL — HIGH (ref 70–99)
GLUCOSE BLDC GLUCOMTR-MCNC: 221 MG/DL — HIGH (ref 70–99)
GLUCOSE BLDC GLUCOMTR-MCNC: 256 MG/DL — HIGH (ref 70–99)
GLUCOSE BLDC GLUCOMTR-MCNC: 388 MG/DL — HIGH (ref 70–99)
GLUCOSE BLDV-MCNC: 215 — HIGH (ref 70–99)
GLUCOSE SERPL-MCNC: 222 MG/DL — HIGH (ref 70–99)
HCO3 BLDV-SCNC: 23 MMOL/L — SIGNIFICANT CHANGE UP (ref 20–27)
HCT VFR BLD CALC: 19.6 % — CRITICAL LOW (ref 34.5–45)
HCT VFR BLD CALC: 23.9 % — LOW (ref 34.5–45)
HCT VFR BLDV CALC: 20.9 % — CRITICAL LOW (ref 34.5–45)
HGB BLD-MCNC: 6.4 G/DL — CRITICAL LOW (ref 11.5–15.5)
HGB BLD-MCNC: 7.8 G/DL — LOW (ref 11.5–15.5)
HGB BLDV-MCNC: 6.7 G/DL — CRITICAL LOW (ref 11.5–15.5)
MAGNESIUM SERPL-MCNC: 2.2 MG/DL — SIGNIFICANT CHANGE UP (ref 1.6–2.6)
MCHC RBC-ENTMCNC: 30.5 PG — SIGNIFICANT CHANGE UP (ref 27–34)
MCHC RBC-ENTMCNC: 32 PG — SIGNIFICANT CHANGE UP (ref 27–34)
MCHC RBC-ENTMCNC: 32.6 % — SIGNIFICANT CHANGE UP (ref 32–36)
MCHC RBC-ENTMCNC: 32.7 % — SIGNIFICANT CHANGE UP (ref 32–36)
MCV RBC AUTO: 93.4 FL — SIGNIFICANT CHANGE UP (ref 80–100)
MCV RBC AUTO: 98 FL — SIGNIFICANT CHANGE UP (ref 80–100)
NRBC # FLD: 0 — SIGNIFICANT CHANGE UP
NRBC # FLD: 0 — SIGNIFICANT CHANGE UP
PCO2 BLDV: 35 MMHG — LOW (ref 41–51)
PH BLDV: 7.42 PH — SIGNIFICANT CHANGE UP (ref 7.32–7.43)
PHOSPHATE SERPL-MCNC: 3.5 MG/DL — SIGNIFICANT CHANGE UP (ref 2.5–4.5)
PLATELET # BLD AUTO: 179 K/UL — SIGNIFICANT CHANGE UP (ref 150–400)
PLATELET # BLD AUTO: 183 K/UL — SIGNIFICANT CHANGE UP (ref 150–400)
PMV BLD: 11 FL — SIGNIFICANT CHANGE UP (ref 7–13)
PMV BLD: 11.3 FL — SIGNIFICANT CHANGE UP (ref 7–13)
PO2 BLDV: 35 MMHG — SIGNIFICANT CHANGE UP (ref 35–40)
POTASSIUM BLDV-SCNC: 3.7 MMOL/L — SIGNIFICANT CHANGE UP (ref 3.4–4.5)
POTASSIUM SERPL-MCNC: 4.1 MMOL/L — SIGNIFICANT CHANGE UP (ref 3.5–5.3)
POTASSIUM SERPL-SCNC: 4.1 MMOL/L — SIGNIFICANT CHANGE UP (ref 3.5–5.3)
RBC # BLD: 2 M/UL — LOW (ref 3.8–5.2)
RBC # BLD: 2.56 M/UL — LOW (ref 3.8–5.2)
RBC # FLD: 15.9 % — HIGH (ref 10.3–14.5)
RBC # FLD: 18.1 % — HIGH (ref 10.3–14.5)
SAO2 % BLDV: 60.2 % — SIGNIFICANT CHANGE UP (ref 60–85)
SODIUM SERPL-SCNC: 137 MMOL/L — SIGNIFICANT CHANGE UP (ref 135–145)
SPECIMEN SOURCE: SIGNIFICANT CHANGE UP
TSH SERPL-MCNC: 1.43 UIU/ML — SIGNIFICANT CHANGE UP (ref 0.27–4.2)
VANCOMYCIN TROUGH SERPL-MCNC: 8.1 UG/ML — LOW (ref 10–20)
WBC # BLD: 15.71 K/UL — HIGH (ref 3.8–10.5)
WBC # BLD: 15.71 K/UL — HIGH (ref 3.8–10.5)
WBC # FLD AUTO: 15.71 K/UL — HIGH (ref 3.8–10.5)
WBC # FLD AUTO: 15.71 K/UL — HIGH (ref 3.8–10.5)

## 2018-10-15 PROCEDURE — 71250 CT THORAX DX C-: CPT | Mod: 26

## 2018-10-15 PROCEDURE — 99233 SBSQ HOSP IP/OBS HIGH 50: CPT

## 2018-10-15 RX ORDER — INSULIN GLARGINE 100 [IU]/ML
14 INJECTION, SOLUTION SUBCUTANEOUS AT BEDTIME
Qty: 0 | Refills: 0 | Status: DISCONTINUED | OUTPATIENT
Start: 2018-10-15 | End: 2018-10-17

## 2018-10-15 RX ORDER — PANTOPRAZOLE SODIUM 20 MG/1
40 TABLET, DELAYED RELEASE ORAL
Qty: 0 | Refills: 0 | Status: DISCONTINUED | OUTPATIENT
Start: 2018-10-15 | End: 2018-10-17

## 2018-10-15 RX ORDER — CHLORHEXIDINE GLUCONATE 213 G/1000ML
1 SOLUTION TOPICAL
Qty: 0 | Refills: 0 | Status: DISCONTINUED | OUTPATIENT
Start: 2018-10-15 | End: 2018-10-17

## 2018-10-15 RX ADMIN — Medication 1 APPLICATION(S): at 22:53

## 2018-10-15 RX ADMIN — CHLORHEXIDINE GLUCONATE 1 APPLICATION(S): 213 SOLUTION TOPICAL at 14:31

## 2018-10-15 RX ADMIN — Medication 3 MILLILITER(S): at 10:21

## 2018-10-15 RX ADMIN — LOSARTAN POTASSIUM 100 MILLIGRAM(S): 100 TABLET, FILM COATED ORAL at 11:17

## 2018-10-15 RX ADMIN — INSULIN GLARGINE 14 UNIT(S): 100 INJECTION, SOLUTION SUBCUTANEOUS at 22:54

## 2018-10-15 RX ADMIN — ATORVASTATIN CALCIUM 40 MILLIGRAM(S): 80 TABLET, FILM COATED ORAL at 22:54

## 2018-10-15 RX ADMIN — AMLODIPINE BESYLATE 10 MILLIGRAM(S): 2.5 TABLET ORAL at 14:31

## 2018-10-15 RX ADMIN — Medication 5: at 12:33

## 2018-10-15 RX ADMIN — ISOSORBIDE MONONITRATE 60 MILLIGRAM(S): 60 TABLET, EXTENDED RELEASE ORAL at 12:33

## 2018-10-15 RX ADMIN — Medication 3 MILLILITER(S): at 04:33

## 2018-10-15 RX ADMIN — Medication 3 MILLILITER(S): at 16:13

## 2018-10-15 RX ADMIN — Medication 75 MILLIGRAM(S): at 22:53

## 2018-10-15 RX ADMIN — Medication 60 MILLIGRAM(S): at 05:10

## 2018-10-15 RX ADMIN — PANTOPRAZOLE SODIUM 40 MILLIGRAM(S): 20 TABLET, DELAYED RELEASE ORAL at 18:01

## 2018-10-15 RX ADMIN — Medication 25 MILLIGRAM(S): at 18:04

## 2018-10-15 RX ADMIN — APIXABAN 2.5 MILLIGRAM(S): 2.5 TABLET, FILM COATED ORAL at 18:02

## 2018-10-15 RX ADMIN — APIXABAN 2.5 MILLIGRAM(S): 2.5 TABLET, FILM COATED ORAL at 05:08

## 2018-10-15 RX ADMIN — PIPERACILLIN AND TAZOBACTAM 25 GRAM(S): 4; .5 INJECTION, POWDER, LYOPHILIZED, FOR SOLUTION INTRAVENOUS at 05:10

## 2018-10-15 RX ADMIN — Medication 3: at 18:02

## 2018-10-15 RX ADMIN — Medication 2 SPRAY(S): at 05:10

## 2018-10-15 RX ADMIN — Medication 75 MILLIGRAM(S): at 05:09

## 2018-10-15 RX ADMIN — Medication 60 MILLIGRAM(S): at 18:02

## 2018-10-15 RX ADMIN — Medication 3 MILLILITER(S): at 21:55

## 2018-10-15 RX ADMIN — Medication 2 SPRAY(S): at 18:04

## 2018-10-15 RX ADMIN — Medication 81 MILLIGRAM(S): at 11:17

## 2018-10-15 RX ADMIN — Medication 2: at 10:43

## 2018-10-15 NOTE — DISCHARGE NOTE ADULT - CARE PLAN
Principal Discharge DX:	CHF exacerbation  Goal:	Decreased hospitalizations, better symptom control  Assessment and plan of treatment:	You came in with a likely heart failure exacerbation for which you were diuresed and treated with BiPAP. You improved with these along with antibiotics  Secondary Diagnosis:	SIRS (systemic inflammatory response syndrome)  Assessment and plan of treatment:	You met criteria suggestive of an infection for which you had a CT scan.  Secondary Diagnosis:	Atrial fibrillation  Assessment and plan of treatment:	You had atrial fibrillation for which you were on a blood thinner  Secondary Diagnosis:	Diabetes mellitus  Assessment and plan of treatment:	Your fingersticks were high which was treated with lantus, but your A1c suggested well glucose control. Please continue to follow up with your doctor for glucose monitoring  Secondary Diagnosis:	Anemia  Assessment and plan of treatment:	Your hemoglobin was low when you were in the hospital for which you received 1 unit of packed red blood cells. Principal Discharge DX:	CHF exacerbation  Goal:	Decreased hospitalizations, better symptom control  Assessment and plan of treatment:	You came in with a likely heart failure exacerbation for which you were diuresed and treated with BiPAP. You improved with these along with antibiotics  FU DR MCDONNELL 1 to 2 week in cardiology clinic  Secondary Diagnosis:	SIRS (systemic inflammatory response syndrome)  Assessment and plan of treatment:	You met criteria suggestive of an infection for which you had a CT scan.  Secondary Diagnosis:	Atrial fibrillation  Assessment and plan of treatment:	You had atrial fibrillation for which you were on a blood thinner  Secondary Diagnosis:	Diabetes mellitus  Assessment and plan of treatment:	Your fingersticks were high which was treated with lantus, but your A1c suggested well glucose control. Please continue to follow up with your doctor for glucose monitoring  Secondary Diagnosis:	Anemia  Assessment and plan of treatment:	Your hemoglobin was low when you were in the hospital for which you received 1 unit of packed red blood cells.

## 2018-10-15 NOTE — DISCHARGE NOTE ADULT - PLAN OF CARE
Decreased hospitalizations, better symptom control You came in with a likely heart failure exacerbation for which you were diuresed and treated with BiPAP. You improved with these along with antibiotics You met criteria suggestive of an infection for which you had a CT scan. You had atrial fibrillation for which you were on a blood thinner Your fingersticks were high which was treated with lantus, but your A1c suggested well glucose control. Please continue to follow up with your doctor for glucose monitoring Your hemoglobin was low when you were in the hospital for which you received 1 unit of packed red blood cells. You came in with a likely heart failure exacerbation for which you were diuresed and treated with BiPAP. You improved with these along with antibiotics  FU DR MCDONNELL 1 to 2 week in cardiology clinic

## 2018-10-15 NOTE — PROGRESS NOTE ADULT - SUBJECTIVE AND OBJECTIVE BOX
Eleazar Reyeshu [phone]    INTERVAL HPI/OVERNIGHT EVENTS:  Afebrile, saturating well on BiPAP, intermittently off BiPAP, desat to high 80s-low 90%. This AM reports feeling significantly better. Pt given 1u PRBC for Hb of 6.4 this AM. Anemia workup consistent with acute blood loss anemia. FOBT pending. FS remain elevated in 200s, will increase lantus to 12u QHS pending FS later in the day. C/w Lasix BID. Will continue with vanc/zosyn till CT chest, d/c azithromycin given neg legionella.      MEDICATIONS  (STANDING):  ALBUTerol/ipratropium for Nebulization 3 milliLiter(s) Nebulizer every 6 hours  amLODIPine   Tablet 10 milliGRAM(s) Oral daily  apixaban 2.5 milliGRAM(s) Oral every 12 hours  AQUAPHOR (petrolatum Ointment) 1 Application(s) Topical at bedtime  aspirin enteric coated 81 milliGRAM(s) Oral daily  atorvastatin 40 milliGRAM(s) Oral at bedtime  dextrose 5%. 1000 milliLiter(s) (50 mL/Hr) IV Continuous <Continuous>  dextrose 50% Injectable 12.5 Gram(s) IV Push once  dextrose 50% Injectable 25 Gram(s) IV Push once  dextrose 50% Injectable 25 Gram(s) IV Push once  furosemide   Injectable 60 milliGRAM(s) IV Push two times a day  hydrALAZINE 25 milliGRAM(s) Oral two times a day  insulin glargine Injectable (LANTUS) 8 Unit(s) SubCutaneous at bedtime  insulin lispro (HumaLOG) corrective regimen sliding scale   SubCutaneous three times a day before meals  insulin lispro (HumaLOG) corrective regimen sliding scale   SubCutaneous at bedtime  isosorbide   mononitrate ER Tablet (IMDUR) 60 milliGRAM(s) Oral daily  losartan 100 milliGRAM(s) Oral daily  metoprolol tartrate 75 milliGRAM(s) Oral two times a day  piperacillin/tazobactam IVPB. 3.375 Gram(s) IV Intermittent every 12 hours  sodium chloride 0.65% Nasal 2 Spray(s) Both Nostrils two times a day  vancomycin  IVPB 750 milliGRAM(s) IV Intermittent every 24 hours    MEDICATIONS  (PRN):  acetaminophen   Tablet .. 650 milliGRAM(s) Oral every 6 hours PRN Mild Pain (1 - 3), Moderate Pain (4 - 6), Severe Pain (7 - 10)  dextrose 40% Gel 15 Gram(s) Oral once PRN Blood Glucose LESS THAN 70 milliGRAM(s)/deciliter  glucagon  Injectable 1 milliGRAM(s) IntraMuscular once PRN Glucose LESS THAN 70 milligrams/deciliter      Allergies:   Intolerances:     PAST MEDICAL & SURGICAL HISTORY:  Chronic systolic heart failure  Tachycardia-bradycardia syndrome  Hyperlipidemia  Uterine Cancer (ICD9 179): treated with hysterectomy and radiation therapy in   Osteoarthritis of Knee (ICD9 715.96): b/l knees  DM Type 2 (Diabetes Mellitus, Type 2) (ICD9 250.00)  HTN (Hypertension) (ICD9 401.9)  Status Post Total Knee Replacement: LISA, 6 months apart   Cardiac Pacemaker (ICD9 V45.01): [place] ( - for tacybrady syndrome @ [facility])  History of Hysterectomy (ICD9 V88.01)      Vital Signs Last 24 Hrs  T(C): 36.4 (15 Oct 2018 12:00), Max: 37.6 (14 Oct 2018 16:13)  T(F): 97.6 (15 Oct 2018 12:00), Max: 99.6 (14 Oct 2018 16:13)  HR: 65 (15 Oct 2018 11:00) (59 - 93)  BP: 114/38 (15 Oct 2018 11:00) (94/35 - 138/47)  BP(mean): 56 (15 Oct 2018 11:00) (50 - 101)  RR: 23 (15 Oct 2018 11:00) (16 - 36)  SpO2: 93% (15 Oct 2018 11:00) (87% - 100%)      In's and Outs:   10-14-18 @ 07:01  -  10-15-18 @ 07:00  --------------------------------------------------------  IN: 765 mL / OUT: 1475 mL / NET: -710 mL    10-15-18 @ 07:01  -  10-15-18 @ 12:14  --------------------------------------------------------  IN: 355 mL / OUT: 375 mL / NET: -20 mL        PHYSICAL EXAMINATION:  GENERAL: The patient is awake and alert in no apparent distress.   HEENT: NCAT. EOMI. Mucous membranes are dry. Ecchymosis overlying maxilla b/l.  NECK: Supple. No JVD.  LUNGS: [ ] Clear to auscultation b/l   [x] Unlabored breathing   [ ] Wheezing  [x] Rales  [x] Rhonchi; to mid lung field b/l, significantly improved compared to prior  HEART: [x] Normal  [ ] Irregular rhythm  [ ] Tachycardia  [ ] Bradycardia   [ ] Systolic murmur  [ ] Diastolic murmur  [ ] Gallop   ABDOMEN: [x] Normal  [ ] Hard  [ ] Tender  [ ] Distended [ ] Bowel sounds  GENITOURINARY: [ ] Normal  [ ] Incontinent   [ ] Oliguria/Anuria   [x] Flores  EXTREMITIES: [x] Normal  [ ] Cyanosis  [ ] Edema  NEUROLOGIC: [x] Grossly intact  [ ] Focal neurologic deficits  SKIN: [x] Normal  [ ] Rash   [ ] Ulcers  Musculoskeletal:  [x] Normal   [ ] Generalized weakness  [ ] Bedbound      LABS:                        6.4    15.71 )-----------( 183      ( 15 Oct 2018 03:40 )             19.6     Hgb Trend: 6.4<--, 7.5<--, 7.2<--, 7.4<--, 7.7<--  10-15    137  |  101  |  87<H>  ----------------------------<  222<H>  4.1   |  18<L>  |  2.23<H>    Ca    9.9      15 Oct 2018 03:40  Phos  3.5     10-15  Mg     2.2     10-15    TPro  7.8  /  Alb  3.4  /  TBili  0.9  /  DBili  x   /  AST  33<H>  /  ALT  35<H>  /  AlkPhos  126<H>  10-14    Creatinine Trend: 2.23<--, 2.01<--, 1.88<--, 1.89<--, 1.88<--, 1.77<--  PT/INR - ( 13 Oct 2018 18:30 )   PT: 19.7 SEC;   INR: 1.75          PTT - ( 13 Oct 2018 18:30 )  PTT:33.5 SEC  Urinalysis Basic - ( 13 Oct 2018 22:30 )    Color: LIGHT YELLOW / Appearance: CLEAR / S.012 / pH: 6.0  Gluc: 200 / Ketone: NEGATIVE  / Bili: NEGATIVE / Urobili: NORMAL   Blood: NEGATIVE / Protein: 20 / Nitrite: NEGATIVE   Leuk Esterase: NEGATIVE / RBC: 0-2 / WBC 0-2   Sq Epi: OCC / Non Sq Epi: x / Bacteria: NEGATIVE      ABG - ( 14 Oct 2018 03:50 )  pH, Arterial: 7.46  pH, Blood: x     /  pCO2: 25    /  pO2: 94    / HCO3: 20    / Base Excess: -5.7  /  SaO2: 94.1        CARDIAC MARKERS ( 14 Oct 2018 15:33 )  x     / x     / 396 u/L / 6.30 ng/mL / x      CARDIAC MARKERS ( 14 Oct 2018 09:25 )  x     / x     / 400 u/L / 10.05 ng/mL / x      CARDIAC MARKERS ( 14 Oct 2018 03:50 )  x     / x     / 187 u/L / 6.37 ng/mL / x      CARDIAC MARKERS ( 13 Oct 2018 18:30 )  x     / x     / 80 u/L / 2.41 ng/mL / x          Serum Pro-Brain Natriuretic Peptide: 67703 pg/mL (10-13-18 @ 20:20)    CAPILLARY BLOOD GLUCOSE  POCT Blood Glucose.: 221 mg/dL (15 Oct 2018 08:02)  POCT Blood Glucose.: 375 mg/dL (14 Oct 2018 21:46)  POCT Blood Glucose.: 218 mg/dL (14 Oct 2018 17:10)      EKG:     Ventricular Rate 76 BPM  P-R Interval 176 ms  QRS Duration 118 ms  Q-T Interval 422 ms  QTC Calculation(Bezet) 474 ms  Diagnosis Line Normal sinus rhythm  Left anterior fascicular block  Left ventricular hypertrophy with QRS widening and repolarization abnormality  Anteroseptal infarct , age undetermined  Abnormal ECG (10-13-18 @ 19:44)      MICROBIOLOGY:    Culture - Urine (collected 10-13-18 @ 23:09)  Source: URINE MIDSTREAM  Final Report (10-15-18 @ 08:47):    NO GROWTH AT 24 HOURS        Blood cultures:     (collected 10-13-18 @ 19:54)  Source: BLOOD VENOUS  Preliminary Report (10-14-18 @ 19:54):    NO ORGANISMS ISOLATED    NO ORGANISMS ISOLATED AT 24 HOURS     (collected 10-13-18 @ 19:54)  Source: BLOOD PERIPHERAL  Preliminary Report (10-14-18 @ 19:54):    NO ORGANISMS ISOLATED    NO ORGANISMS ISOLATED AT 24 HOURS        RADIOLOGY & ADDITIONAL STUDIES: [ ] Reviewed by me

## 2018-10-15 NOTE — DISCHARGE NOTE ADULT - CARE PROVIDER_API CALL
Yolande Montalvo), Cardiovascular Disease  [facility]  [address] [place] [address] [address] [place] [id number]  Phone: [phone]  Fax: [phone]

## 2018-10-15 NOTE — PROGRESS NOTE ADULT - PROBLEM SELECTOR PLAN 7
HbA1c 6.9.  Routine blood glucose check.   Will hold oral hypoglycemics  Low carb diet HbA1c 6.9.  Routine blood glucose check.   Will hold oral hypoglycemics  Low carb diet  Lantus 8u 10/14, will do 14u tonight

## 2018-10-15 NOTE — DISCHARGE NOTE ADULT - HOSPITAL COURSE
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 Reports orthopnea and dyspnea on exertion along with a productive cough which occasionally has streaks of blood in the sputum. The pt also reports R flank pain. Of note, the pt was recently admitted for a mechanical fall (discharged 10/11/18) where she was found to have a nasal fracture and her Hb dropped from 10 to 7.9 attributed to epistaxis. Pt denies chest pain, dizziness, lightheadedness, palpitations or any other complaints on admission.   In the ED patient was given 1L bolus of fluid for a lactate of 5.4, WBC of 18.2 w/ neutrophilic predominance, tachypnea, and SpO2 < 88%. Later pt was found to be in CHF exacerbation. Lasix 40 mg IV was given. When patient went to the bathroom in the ER, patient developed acute respiratory distress with crackles. Pt was given hydralazine, metoprolol and another Lasix 40 mg IV. Pt's respiratory status improved. However, upon presentation to the floor, patient developed respiratory distress again. Lasix 60 mg IV, nitro paste was given. Pt started on BIPAP and transferred to the CCU.  In the CCU, the pt was started on vanc/zosyn/azithro on 10/14 for HCAP coverage. Azithromycin and vancomycin were discontinued on 10/15 in the setting of negative urine legionella and blood cultures negative. The pt had an echo which found Mod MR. AV not visualized. Likely underlying aortic stenosis. Mod AR, grossly mod segmental LV systolic dysfunction. Hypokinesis of the apex, mid to distal septum, and distal anterior wall, RV not well visualized; grossly normal RV systolic function.  A device wire is noted in the right heart. Estimated RV systolic pressure equals 60mm Hg, assuming RAP is 10mmHg, mod pulm HTN.   The pt's CXR was consistent with pulmonary edema but the concern for a multilobar pneumonia remained and the pt had a CT scan of her chest. 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 Reports orthopnea and dyspnea on exertion along with a productive cough which occasionally has streaks of blood in the sputum. The pt also reports R flank pain. Of note, the pt was recently admitted for a mechanical fall (discharged 10/11/18) where she was found to have a nasal fracture and her Hb dropped from 10 to 7.9 attributed to epistaxis. Pt denies chest pain, dizziness, lightheadedness, palpitations or any other complaints on admission.   In the ED patient was given 1L bolus of fluid for a lactate of 5.4, WBC of 18.2 w/ neutrophilic predominance, tachypnea, and SpO2 < 88%. Later pt was found to be in CHF exacerbation. Lasix 40 mg IV was given. When patient went to the bathroom in the ER, patient developed acute respiratory distress with crackles. Pt was given hydralazine, metoprolol and another Lasix 40 mg IV. Pt's respiratory status improved. However, upon presentation to the floor, patient developed respiratory distress again. Lasix 60 mg IV, nitro paste was given. Pt started on BIPAP and transferred to the CCU.  In the CCU, the pt was started on vanc/zosyn/azithro on 10/14 for HCAP coverage. Azithromycin and vancomycin were discontinued on 10/15 in the setting of negative urine legionella and blood cultures negative. The pt had an echo which found Mod MR. AV not visualized. Likely underlying aortic stenosis. Mod AR, grossly mod segmental LV systolic dysfunction. Hypokinesis of the apex, mid to distal septum, and distal anterior wall, RV not well visualized; grossly normal RV systolic function.  A device wire is noted in the right heart. Estimated RV systolic pressure equals 60mm Hg, assuming RAP is 10mmHg, mod pulm HTN.   The pt's CXR was consistent with pulmonary edema but the concern for a multilobar pneumonia remained and the pt had a CT scan of her chest negative for pneumonia. 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 Reports orthopnea and dyspnea on exertion along with a productive cough which occasionally has streaks of blood in the sputum. The pt also reports R flank pain. Of note, the pt was recently admitted for a mechanical fall (discharged 10/11/18) where she was found to have a nasal fracture and her Hb dropped from 10 to 7.9 attributed to epistaxis. Pt denies chest pain, dizziness, lightheadedness, palpitations or any other complaints on admission.   In the ED patient was given 1L bolus of fluid for a lactate of 5.4, WBC of 18.2 w/ neutrophilic predominance, tachypnea, and SpO2 < 88%. Later pt was found to be in CHF exacerbation. Lasix 40 mg IV was given. When patient went to the bathroom in the ER, patient developed acute respiratory distress with crackles. Pt was given hydralazine, metoprolol and another Lasix 40 mg IV. Pt's respiratory status improved. However, upon presentation to the floor, patient developed respiratory distress again. Lasix 60 mg IV, nitro paste was given. Pt started on BIPAP and transferred to the CCU.  In the CCU, the pt was started on vanc/zosyn/azithro on 10/14 for HCAP coverage. Azithromycin and vancomycin were discontinued on 10/15 in the setting of negative urine legionella and blood cultures negative. The pt had an echo which found Mod MR. AV not visualized. Likely underlying aortic stenosis. Mod AR, grossly mod segmental LV systolic dysfunction. Hypokinesis of the apex, mid to distal septum, and distal anterior wall, RV not well visualized; grossly normal RV systolic function.  A device wire is noted in the right heart. Estimated RV systolic pressure equals 60mm Hg, assuming RAP is 10mmHg, mod pulm HTN.   The pt's CXR was consistent with pulmonary edema but the concern for a multilobar pneumonia remained and the pt had a CT scan of her chest negative for pneumonia. Antibiotic d/diane.continue to diureses.Of bipap now   Pt does not want INDU . Pt stable for d/c home today to FU in [facility] clinic 1 to 2 weeks

## 2018-10-15 NOTE — DISCHARGE NOTE ADULT - PATIENT PORTAL LINK FT
You can access the [facility] Patient Portal, offered by [facility], by registering with the following website: [email or web address]

## 2018-10-15 NOTE — PROGRESS NOTE ADULT - PROBLEM SELECTOR PLAN 4
Pt meets criteria w/ leukocytosis w/ neutrophilic predominance without bandemia, tachypnea, and hypoxemia.  - Will start empiric broad spectrum coverage w/ vanc, zosyn pending cultures till tonight  - CT chest non-con pending  - Vanc by level, next trough 10/17

## 2018-10-15 NOTE — PROGRESS NOTE ADULT - PROBLEM SELECTOR PLAN 3
Patient without obvious signs of bleeding, high retic c/w acute blood loss anemia. No signs of LILO or ACD or hemolysis. Given 1U PRBC 10/15 AM.  - FOBT  - Transfusion threshold Hb < 7.  - Trend CBC q6-q8h  - Active T&S, next 10/17

## 2018-10-15 NOTE — DISCHARGE NOTE ADULT - MEDICATION SUMMARY - MEDICATIONS TO TAKE
I will START or STAY ON the medications listed below when I get home from the hospital:    Aspirin Enteric Coated 81 mg oral delayed release tablet  -- 1 tab(s) by mouth once a day  -- Indication: For Preventative    acetaminophen 325 mg oral tablet  -- 2 tab(s) by mouth every 6 hours, As needed, Mild Pain (1 - 3), Moderate Pain (4 - 6)  -- Indication: For Pain    isosorbide mononitrate 60 mg oral tablet, extended release  -- 1 tab(s) by mouth once a day  -- Indication: For CHF exacerbation    Eliquis 2.5 mg oral tablet  -- 1 tab(s) by mouth 2 times a day  -- Indication: For Atrial fibrillation    Januvia 50 mg oral tablet  -- 1 tab(s) by mouth once a day  -- Indication: For Diabetes mellitus    atorvastatin 40 mg oral tablet  -- 1 tab(s) by mouth once a day (at bedtime)  -- Indication: For Hyperlipidemia    metoprolol tartrate 75 mg oral tablet  -- 1 tab(s) by mouth 2 times a day  hold for sbp <100 HR <60  -- Indication: For Hypertension    amLODIPine 10 mg oral tablet  -- 1 tab(s) by mouth once a day  -- Indication: For Hypertension    petrolatum topical ointment  -- 1 application on skin once a day (at bedtime)  -- Indication: For rash    torsemide 20 mg oral tablet  -- 2 tab(s) by mouth 2 times a day  -- Indication: For CHF exacerbation    oxymetazoline 0.05% nasal spray  -- 2 spray(s) into nose 2 times a day   -- Indication: For Allergies    sodium chloride 0.65% nasal spray  -- 2 spray(s) into nose 2 times a day   -- Indication: For Allergies    hydrALAZINE 25 mg oral tablet  -- 1 tab(s) by mouth 2 times a day  -- Indication: For Hypertension

## 2018-10-16 DIAGNOSIS — N17.9 ACUTE KIDNEY FAILURE, UNSPECIFIED: ICD-10-CM

## 2018-10-16 LAB
ALBUMIN SERPL ELPH-MCNC: 2.6 G/DL — LOW (ref 3.3–5)
ALP SERPL-CCNC: 101 U/L — SIGNIFICANT CHANGE UP (ref 40–120)
ALT FLD-CCNC: 33 U/L — SIGNIFICANT CHANGE UP (ref 4–33)
AST SERPL-CCNC: 42 U/L — HIGH (ref 4–32)
BILIRUB SERPL-MCNC: 0.7 MG/DL — SIGNIFICANT CHANGE UP (ref 0.2–1.2)
BUN SERPL-MCNC: 99 MG/DL — HIGH (ref 7–23)
CALCIUM SERPL-MCNC: 9.7 MG/DL — SIGNIFICANT CHANGE UP (ref 8.4–10.5)
CHLORIDE SERPL-SCNC: 100 MMOL/L — SIGNIFICANT CHANGE UP (ref 98–107)
CO2 SERPL-SCNC: 19 MMOL/L — LOW (ref 22–31)
CREAT SERPL-MCNC: 2.48 MG/DL — HIGH (ref 0.5–1.3)
GLUCOSE BLDC GLUCOMTR-MCNC: 126 MG/DL — HIGH (ref 70–99)
GLUCOSE BLDC GLUCOMTR-MCNC: 231 MG/DL — HIGH (ref 70–99)
GLUCOSE BLDC GLUCOMTR-MCNC: 240 MG/DL — HIGH (ref 70–99)
GLUCOSE BLDC GLUCOMTR-MCNC: 301 MG/DL — HIGH (ref 70–99)
GLUCOSE SERPL-MCNC: 129 MG/DL — HIGH (ref 70–99)
HCT VFR BLD CALC: 23.2 % — LOW (ref 34.5–45)
HGB BLD-MCNC: 7.7 G/DL — LOW (ref 11.5–15.5)
MAGNESIUM SERPL-MCNC: 2.1 MG/DL — SIGNIFICANT CHANGE UP (ref 1.6–2.6)
MCHC RBC-ENTMCNC: 30.6 PG — SIGNIFICANT CHANGE UP (ref 27–34)
MCHC RBC-ENTMCNC: 33.2 % — SIGNIFICANT CHANGE UP (ref 32–36)
MCV RBC AUTO: 92.1 FL — SIGNIFICANT CHANGE UP (ref 80–100)
NRBC # FLD: 0 — SIGNIFICANT CHANGE UP
OB PNL STL: NEGATIVE — SIGNIFICANT CHANGE UP
PHOSPHATE SERPL-MCNC: 4.1 MG/DL — SIGNIFICANT CHANGE UP (ref 2.5–4.5)
PLATELET # BLD AUTO: 171 K/UL — SIGNIFICANT CHANGE UP (ref 150–400)
PMV BLD: 10.9 FL — SIGNIFICANT CHANGE UP (ref 7–13)
POTASSIUM SERPL-MCNC: 3.7 MMOL/L — SIGNIFICANT CHANGE UP (ref 3.5–5.3)
POTASSIUM SERPL-SCNC: 3.7 MMOL/L — SIGNIFICANT CHANGE UP (ref 3.5–5.3)
PROT SERPL-MCNC: 6.5 G/DL — SIGNIFICANT CHANGE UP (ref 6–8.3)
RBC # BLD: 2.52 M/UL — LOW (ref 3.8–5.2)
RBC # FLD: 18.4 % — HIGH (ref 10.3–14.5)
SODIUM SERPL-SCNC: 136 MMOL/L — SIGNIFICANT CHANGE UP (ref 135–145)
WBC # BLD: 12.67 K/UL — HIGH (ref 3.8–10.5)
WBC # FLD AUTO: 12.67 K/UL — HIGH (ref 3.8–10.5)

## 2018-10-16 PROCEDURE — 99233 SBSQ HOSP IP/OBS HIGH 50: CPT

## 2018-10-16 RX ORDER — POTASSIUM CHLORIDE 20 MEQ
40 PACKET (EA) ORAL ONCE
Qty: 0 | Refills: 0 | Status: COMPLETED | OUTPATIENT
Start: 2018-10-16 | End: 2018-10-16

## 2018-10-16 RX ADMIN — ATORVASTATIN CALCIUM 40 MILLIGRAM(S): 80 TABLET, FILM COATED ORAL at 22:37

## 2018-10-16 RX ADMIN — Medication 75 MILLIGRAM(S): at 17:38

## 2018-10-16 RX ADMIN — Medication 60 MILLIGRAM(S): at 17:38

## 2018-10-16 RX ADMIN — Medication 81 MILLIGRAM(S): at 11:33

## 2018-10-16 RX ADMIN — ISOSORBIDE MONONITRATE 60 MILLIGRAM(S): 60 TABLET, EXTENDED RELEASE ORAL at 11:33

## 2018-10-16 RX ADMIN — APIXABAN 2.5 MILLIGRAM(S): 2.5 TABLET, FILM COATED ORAL at 06:41

## 2018-10-16 RX ADMIN — Medication 1 APPLICATION(S): at 22:37

## 2018-10-16 RX ADMIN — Medication 2: at 11:34

## 2018-10-16 RX ADMIN — Medication 3 MILLILITER(S): at 10:24

## 2018-10-16 RX ADMIN — Medication 2 SPRAY(S): at 17:39

## 2018-10-16 RX ADMIN — CHLORHEXIDINE GLUCONATE 1 APPLICATION(S): 213 SOLUTION TOPICAL at 11:33

## 2018-10-16 RX ADMIN — INSULIN GLARGINE 14 UNIT(S): 100 INJECTION, SOLUTION SUBCUTANEOUS at 22:37

## 2018-10-16 RX ADMIN — PANTOPRAZOLE SODIUM 40 MILLIGRAM(S): 20 TABLET, DELAYED RELEASE ORAL at 06:40

## 2018-10-16 RX ADMIN — Medication 75 MILLIGRAM(S): at 08:23

## 2018-10-16 RX ADMIN — Medication 3 MILLILITER(S): at 21:54

## 2018-10-16 RX ADMIN — Medication 40 MILLIEQUIVALENT(S): at 08:23

## 2018-10-16 RX ADMIN — Medication 25 MILLIGRAM(S): at 08:23

## 2018-10-16 RX ADMIN — Medication 3 MILLILITER(S): at 16:09

## 2018-10-16 RX ADMIN — Medication 2 SPRAY(S): at 06:41

## 2018-10-16 RX ADMIN — Medication 25 MILLIGRAM(S): at 17:38

## 2018-10-16 RX ADMIN — APIXABAN 2.5 MILLIGRAM(S): 2.5 TABLET, FILM COATED ORAL at 17:37

## 2018-10-16 RX ADMIN — AMLODIPINE BESYLATE 10 MILLIGRAM(S): 2.5 TABLET ORAL at 11:34

## 2018-10-16 RX ADMIN — Medication 60 MILLIGRAM(S): at 05:01

## 2018-10-16 RX ADMIN — Medication 4: at 17:38

## 2018-10-16 NOTE — PROGRESS NOTE ADULT - PROBLEM SELECTOR PLAN 5
Pt meets criteria w/ leukocytosis w/ neutrophilic predominance without bandemia, tachypnea, and hypoxemia.  - Likely in setting of ADHF. Will observe off antibiotics

## 2018-10-16 NOTE — PROGRESS NOTE ADULT - ASSESSMENT
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 Pt admitted to CCU for continuing BiPAP. Pt meets criteria for SIRS but in setting of negative CT chest, abx discontinued.

## 2018-10-16 NOTE — PHYSICAL THERAPY INITIAL EVALUATION ADULT - ADDITIONAL COMMENTS
Pt reports that she lives in a private house with daughter with ~3STE; (+)bilateral Handrails; bedroom/bathroom on first floor. Prior to hospital admission pt was ambulating independently using single axis cane. Pt does required assistance with showering and dressing that she gets from her daughter. Pt's daughter is her home health aide. Pt denies any recent falls other than the fall from Tuesday 10/02/2018.    Pt left comfortable in chair, NAD, all lines intact, all precautions maintained,  and RN aware of PT evaluation.

## 2018-10-16 NOTE — PHYSICAL THERAPY INITIAL EVALUATION ADULT - MANUAL MUSCLE TESTING RESULTS, REHAB EVAL
grossly assessed due to/cardiac precautions; Bilateral Shoulders 3-/5, bilateral Elbows/hands/wrist at least 3+/5, bilateral LE 3+/5 grossly assessed due to/cardiac precautions; Bilateral Shoulders 3-/5, bilateral Elbows//wrist at least 3+/5, bilateral LE 3+/5, Right Hand 3+/5, Left Hand 3-/5

## 2018-10-16 NOTE — PROGRESS NOTE ADULT - SUBJECTIVE AND OBJECTIVE BOX
[facility] [phone]      INTERVAL HPI/OVERNIGHT EVENTS:  Afebrile, saturating well overnight. No complaints this AM, including at groin site.    MEDICATIONS  (STANDING):  ALBUTerol/ipratropium for Nebulization 3 milliLiter(s) Nebulizer every 6 hours  amLODIPine   Tablet 10 milliGRAM(s) Oral daily  apixaban 2.5 milliGRAM(s) Oral every 12 hours  AQUAPHOR (petrolatum Ointment) 1 Application(s) Topical at bedtime  aspirin enteric coated 81 milliGRAM(s) Oral daily  atorvastatin 40 milliGRAM(s) Oral at bedtime  chlorhexidine 4% Liquid 1 Application(s) Topical <User Schedule>  dextrose 5%. 1000 milliLiter(s) (50 mL/Hr) IV Continuous <Continuous>  dextrose 50% Injectable 12.5 Gram(s) IV Push once  dextrose 50% Injectable 25 Gram(s) IV Push once  dextrose 50% Injectable 25 Gram(s) IV Push once  furosemide   Injectable 60 milliGRAM(s) IV Push two times a day  hydrALAZINE 25 milliGRAM(s) Oral two times a day  insulin glargine Injectable (LANTUS) 14 Unit(s) SubCutaneous at bedtime  insulin lispro (HumaLOG) corrective regimen sliding scale   SubCutaneous three times a day before meals  insulin lispro (HumaLOG) corrective regimen sliding scale   SubCutaneous at bedtime  isosorbide   mononitrate ER Tablet (IMDUR) 60 milliGRAM(s) Oral daily  losartan 100 milliGRAM(s) Oral daily  metoprolol tartrate 75 milliGRAM(s) Oral two times a day  pantoprazole    Tablet 40 milliGRAM(s) Oral before breakfast  sodium chloride 0.65% Nasal 2 Spray(s) Both Nostrils two times a day    MEDICATIONS  (PRN):  acetaminophen   Tablet .. 650 milliGRAM(s) Oral every 6 hours PRN Mild Pain (1 - 3), Moderate Pain (4 - 6), Severe Pain (7 - 10)  dextrose 40% Gel 15 Gram(s) Oral once PRN Blood Glucose LESS THAN 70 milliGRAM(s)/deciliter  glucagon  Injectable 1 milliGRAM(s) IntraMuscular once PRN Glucose LESS THAN 70 milligrams/deciliter      Allergies:   Intolerances:     PAST MEDICAL & SURGICAL HISTORY:  Chronic systolic heart failure  Tachycardia-bradycardia syndrome  Hyperlipidemia  Uterine Cancer (ICD9 179): treated with hysterectomy and radiation therapy in 2008  Osteoarthritis of Knee (ICD9 715.96): b/l knees  DM Type 2 (Diabetes Mellitus, Type 2) (ICD9 250.00)  HTN (Hypertension) (ICD9 401.9)  Status Post Total Knee Replacement: LISA, 6 months apart 2010  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 @ [facility])  History of Hysterectomy (ICD9 V88.01)      Vital Signs Last 24 Hrs  T(C): 36.9 (16 Oct 2018 08:00), Max: 37 (16 Oct 2018 04:00)  T(F): 98.4 (16 Oct 2018 08:00), Max: 98.6 (16 Oct 2018 04:00)  HR: 62 (16 Oct 2018 08:00) (59 - 79)  BP: 112/32 (16 Oct 2018 08:00) (95/30 - 135/41)  BP(mean): 52 (16 Oct 2018 08:00) (47 - 63)  RR: 21 (16 Oct 2018 08:00) (17 - 29)  SpO2: 95% (16 Oct 2018 08:00) (92% - 100%)      In's and Outs:   10-15-18 @ 07:01  -  10-16-18 @ 07:00  --------------------------------------------------------  IN: 1095 mL / OUT: 1280 mL / NET: -185 mL    10-16-18 @ 07:01  -  10-16-18 @ 08:36  --------------------------------------------------------  IN: 0 mL / OUT: 25 mL / NET: -25 mL      PHYSICAL EXAMINATION:  GENERAL: The patient is awake and alert in no apparent distress.   HEENT: NCAT. EOMI. Mucous membranes are moist.  NECK: Supple. No JVD.  LUNGS: [x] Clear to auscultation b/l   [x] Unlabored breathing   [ ] Wheezing  [ ] Rales  [ ] Rhonchi  HEART: [x] Normal  [ ] Irregular rhythm  [ ] Tachycardia  [ ] Bradycardia   [ ] Systolic murmur  [ ] Diastolic murmur  [ ] Gallop   ABDOMEN: [x] Normal  [ ] Hard  [ ] Tender  [ ] Distended [ ] Bowel sounds  GENITOURINARY: [ ] Normal  [ ] Incontinent   [ ] Oliguria/Anuria   [ ] Flores  EXTREMITIES: [x] Normal  [ ] Cyanosis  [ ] Edema  NEUROLOGIC: [x] Grossly intact  [ ] Focal neurologic deficits  SKIN: [x] Normal  [ ] Rash   [ ] Ulcers  Musculoskeletal:  [x] Normal   [ ] Generalized weakness  [ ] Bedbound      LABS:                        7.7    12.67 )-----------( 171      ( 16 Oct 2018 05:15 )             23.2     Hgb Trend: 7.7<--, 7.8<--, 6.4<--, 7.5<--, 7.2<--  10-16    136  |  100  |  99<H>  ----------------------------<  129<H>  3.7   |  19<L>  |  2.48<H>    Ca    9.7      16 Oct 2018 05:15  Phos  4.1     10-16  Mg     2.1     10-16    TPro  6.5  /  Alb  2.6<L>  /  TBili  0.7  /  DBili  x   /  AST  42<H>  /  ALT  33  /  AlkPhos  101  10-16    Creatinine Trend: 2.48<--, 2.23<--, 2.01<--, 1.88<--, 1.89<--, 1.88<--      CARDIAC MARKERS ( 14 Oct 2018 15:33 )  x     / x     / 396 u/L / 6.30 ng/mL / x      CARDIAC MARKERS ( 14 Oct 2018 09:25 )  x     / x     / 400 u/L / 10.05 ng/mL / x          Serum Pro-Brain Natriuretic Peptide: 45156 pg/mL (10-13-18 @ 20:20)      CAPILLARY BLOOD GLUCOSE  POCT Blood Glucose.: 197 mg/dL (15 Oct 2018 22:15)  POCT Blood Glucose.: 256 mg/dL (15 Oct 2018 18:00)  POCT Blood Glucose.: 388 mg/dL (15 Oct 2018 12:28)      EKG:   Ventricular Rate 76 BPM  QRS Duration 118 ms  QTC Calculation(Bezet) 474 ms  Diagnosis Line Normal sinus rhythm  Left anterior fascicular block  Left ventricular hypertrophy with QRS widening and repolarization abnormality  Anteroseptal infarct , age undetermined  Abnormal ECG (10-13-18 @ 19:44)      MICROBIOLOGY:      Blood cultures:     (collected 10-13-18 @ 19:54)  Source: BLOOD VENOUS  Preliminary Report (10-15-18 @ 19:54):    NO ORGANISMS ISOLATED    NO ORGANISMS ISOLATED AT 48 HRS.     (collected 10-13-18 @ 19:54)  Source: BLOOD PERIPHERAL  Preliminary Report (10-15-18 @ 19:54):    NO ORGANISMS ISOLATED    NO ORGANISMS ISOLATED AT 48 HRS.        RADIOLOGY & ADDITIONAL STUDIES: [ ] Reviewed by me  CT Chest No Cont:   IMPRESSION:   Findings as above representing pulmonary edema.   (10-15-18 @ 15:11)

## 2018-10-16 NOTE — PHYSICAL THERAPY INITIAL EVALUATION ADULT - ACTIVE RANGE OF MOTION EXAMINATION, REHAB EVAL
Right shoulder flexion ~60 degrees, right elbow/hand/wrist WFL, Left shoulder flexion ~40 degrees (2/2 weakness and OA), left elbow/wrist WFL, decreased left hand MCP/DIP/PIP flexion 2/2 to swelling/bilateral  lower extremity Active ROM was WFL (within functional limits)

## 2018-10-16 NOTE — PROGRESS NOTE ADULT - PROBLEM SELECTOR PLAN 1
Echo 10/9 w/ mod-sev MR, mild AS, mod AR, mild-mod LV systolic dysfunction. Segmental hypokinesis. Potential ADHF in setting of anemia.  - Strict I and O, daily weights. Fluid restriction.   - D/c sadler  - Will c/w IV lasix 60BID, CLARI  - Significantly improved from prior  - CT chest consistent with edema

## 2018-10-16 NOTE — PHYSICAL THERAPY INITIAL EVALUATION ADULT - GENERAL OBSERVATIONS, REHAB EVAL
Pt encountered Pt encountered in semisupine position, no distress, AxOx4, with +IV, +tele, +sadler, and +O2 through nasal cannula

## 2018-10-16 NOTE — PROGRESS NOTE ADULT - PROBLEM SELECTOR PLAN 8
HbA1c 6.9.  Routine blood glucose check.   Will hold oral hypoglycemics  Low carb diet  Lantus 8u 10/14, will do 14u tonight, AM FS well controlled on 14U.

## 2018-10-16 NOTE — DIETITIAN INITIAL EVALUATION ADULT. - PERTINENT MEDS FT
MEDICATIONS  (STANDING):  ALBUTerol/ipratropium for Nebulization 3 milliLiter(s) Nebulizer every 6 hours  amLODIPine   Tablet 10 milliGRAM(s) Oral daily  apixaban 2.5 milliGRAM(s) Oral every 12 hours  AQUAPHOR (petrolatum Ointment) 1 Application(s) Topical at bedtime  aspirin enteric coated 81 milliGRAM(s) Oral daily  atorvastatin 40 milliGRAM(s) Oral at bedtime  chlorhexidine 4% Liquid 1 Application(s) Topical <User Schedule>  dextrose 5%. 1000 milliLiter(s) (50 mL/Hr) IV Continuous <Continuous>  dextrose 50% Injectable 12.5 Gram(s) IV Push once  dextrose 50% Injectable 25 Gram(s) IV Push once  dextrose 50% Injectable 25 Gram(s) IV Push once  furosemide   Injectable 60 milliGRAM(s) IV Push two times a day  hydrALAZINE 25 milliGRAM(s) Oral two times a day  insulin glargine Injectable (LANTUS) 14 Unit(s) SubCutaneous at bedtime  insulin lispro (HumaLOG) corrective regimen sliding scale   SubCutaneous three times a day before meals  insulin lispro (HumaLOG) corrective regimen sliding scale   SubCutaneous at bedtime  isosorbide   mononitrate ER Tablet (IMDUR) 60 milliGRAM(s) Oral daily  metoprolol tartrate 75 milliGRAM(s) Oral two times a day  pantoprazole    Tablet 40 milliGRAM(s) Oral before breakfast  sodium chloride 0.65% Nasal 2 Spray(s) Both Nostrils two times a day  MEDICATIONS  (PRN):  acetaminophen   Tablet .. 650 milliGRAM(s) Oral every 6 hours PRN Mild Pain (1 - 3), Moderate Pain (4 - 6), Severe Pain (7 - 10)  dextrose 40% Gel 15 Gram(s) Oral once PRN Blood Glucose LESS THAN 70 milliGRAM(s)/deciliter  glucagon  Injectable 1 milliGRAM(s) IntraMuscular once PRN Glucose LESS THAN 70 milligrams/deciliter

## 2018-10-16 NOTE — DIETITIAN INITIAL EVALUATION ADULT. - PERTINENT LABORATORY DATA
10-16 Na136 mmol/L Glu 129 mg/dL<H> K+ 3.7 mmol/L Cr  2.48 mg/dL<H> BUN 99 mg/dL<H> 10-16 Phos 4.1 mg/dL 10-16 Alb 2.6 g/dL<L> 10-08 [id number] 6.9 %<H> 10-14 Chol 82 mg/dL<L> LDL 33 mg/dL HDL 40 mg/dL<L> Trig 53 mg/dL

## 2018-10-16 NOTE — PROGRESS NOTE ADULT - PROBLEM SELECTOR PLAN 2
Cr uptrending 2.48 today  - Will c/w lasix 60 BID  - Hold losartan in setting of hypotension and CLARI  - Cont to trend BUN/Cr and UO

## 2018-10-16 NOTE — PHYSICAL THERAPY INITIAL EVALUATION ADULT - PATIENT PROFILE REVIEW, REHAB EVAL
No formal Activity order in the computer; spoke with ED RN Chen prior to PT evaluation--> Pt OK for PT consult/OOB activity/yes

## 2018-10-16 NOTE — PHYSICAL THERAPY INITIAL EVALUATION ADULT - DIAGNOSIS, PT EVAL
Pt admitted for SOB/CHF exacerbation; pt presents with decreased strength, decreased balance, and decreased aerobic capacity/endurance.

## 2018-10-16 NOTE — DIETITIAN INITIAL EVALUATION ADULT. - OTHER INFO
Nutrition consult received for RD. 82y/o Female with medical history significant for T2DM, HLD, HTN, was admitted for CHF exacerbation c/b pulmonary edema requiring BiPAP. Patient reports good appetite and po intake at this time. Patient denies any nausea/vomiting/diarrhea/constipation or difficulty chewing and swallowing. +BM today. Therapeutic diet restrictions reviewed. No further questions or concerns voiced at this time.

## 2018-10-16 NOTE — DIETITIAN INITIAL EVALUATION ADULT. - PROBLEM SELECTOR PLAN 1
Admit to telemetry.  CCU consulted. Awaiting recommendations.   Strict I and O, daily weights. Fluid restriction.   Flores catheter.   Patient received a total of IV lasix 140 mg.   Pending repeat labs.   Patient with increasing troponin. Defer to cardiology on whether starting heparin drip.

## 2018-10-16 NOTE — DIETITIAN INITIAL EVALUATION ADULT. - ENERGY NEEDS
Current weight 140.6lbs (10/14/18) Ht 62" BMI 25.7kg/m2  IBW: 110lbs (+/-10%) %IBW: 128%  No edema or pressure ulcers per flowsheet.

## 2018-10-16 NOTE — PROGRESS NOTE ADULT - PROBLEM SELECTOR PLAN 4
Patient without obvious signs of bleeding, high retic c/w acute blood loss anemia. No signs of LILO or ACD or hemolysis. Given 1U PRBC 10/15 AM.  - FOBT  - Transfusion threshold Hb < 7.  - Trend CBC BID  - Active T&S, next 10/17

## 2018-10-16 NOTE — DIETITIAN INITIAL EVALUATION ADULT. - PROBLEM SELECTOR PLAN 3
S/p Zosyn for possible HCAP.   Consider CT chest when patient is more clinically stable for a further clarification.   Continue to monitor for signs of infection.

## 2018-10-17 VITALS
HEART RATE: 60 BPM | SYSTOLIC BLOOD PRESSURE: 115 MMHG | OXYGEN SATURATION: 100 % | RESPIRATION RATE: 16 BRPM | DIASTOLIC BLOOD PRESSURE: 68 MMHG

## 2018-10-17 LAB
BLD GP AB SCN SERPL QL: NEGATIVE — SIGNIFICANT CHANGE UP
BUN SERPL-MCNC: 109 MG/DL — HIGH (ref 7–23)
CALCIUM SERPL-MCNC: 9.6 MG/DL — SIGNIFICANT CHANGE UP (ref 8.4–10.5)
CHLORIDE SERPL-SCNC: 100 MMOL/L — SIGNIFICANT CHANGE UP (ref 98–107)
CO2 SERPL-SCNC: 18 MMOL/L — LOW (ref 22–31)
CREAT SERPL-MCNC: 2.31 MG/DL — HIGH (ref 0.5–1.3)
GLUCOSE BLDC GLUCOMTR-MCNC: 140 MG/DL — HIGH (ref 70–99)
GLUCOSE BLDC GLUCOMTR-MCNC: 279 MG/DL — HIGH (ref 70–99)
GLUCOSE BLDC GLUCOMTR-MCNC: 391 MG/DL — HIGH (ref 70–99)
GLUCOSE SERPL-MCNC: 139 MG/DL — HIGH (ref 70–99)
HCT VFR BLD CALC: 23 % — LOW (ref 34.5–45)
HGB BLD-MCNC: 7.5 G/DL — LOW (ref 11.5–15.5)
MAGNESIUM SERPL-MCNC: 2.1 MG/DL — SIGNIFICANT CHANGE UP (ref 1.6–2.6)
MCHC RBC-ENTMCNC: 30 PG — SIGNIFICANT CHANGE UP (ref 27–34)
MCHC RBC-ENTMCNC: 32.6 % — SIGNIFICANT CHANGE UP (ref 32–36)
MCV RBC AUTO: 92 FL — SIGNIFICANT CHANGE UP (ref 80–100)
NRBC # FLD: 0 — SIGNIFICANT CHANGE UP
PHOSPHATE SERPL-MCNC: 4.1 MG/DL — SIGNIFICANT CHANGE UP (ref 2.5–4.5)
PLATELET # BLD AUTO: 188 K/UL — SIGNIFICANT CHANGE UP (ref 150–400)
PMV BLD: 11.2 FL — SIGNIFICANT CHANGE UP (ref 7–13)
POTASSIUM SERPL-MCNC: 4.2 MMOL/L — SIGNIFICANT CHANGE UP (ref 3.5–5.3)
POTASSIUM SERPL-SCNC: 4.2 MMOL/L — SIGNIFICANT CHANGE UP (ref 3.5–5.3)
RBC # BLD: 2.5 M/UL — LOW (ref 3.8–5.2)
RBC # FLD: 17.7 % — HIGH (ref 10.3–14.5)
RH IG SCN BLD-IMP: POSITIVE — SIGNIFICANT CHANGE UP
SODIUM SERPL-SCNC: 136 MMOL/L — SIGNIFICANT CHANGE UP (ref 135–145)
WBC # BLD: 10.29 K/UL — SIGNIFICANT CHANGE UP (ref 3.8–10.5)
WBC # FLD AUTO: 10.29 K/UL — SIGNIFICANT CHANGE UP (ref 3.8–10.5)

## 2018-10-17 PROCEDURE — 99239 HOSP IP/OBS DSCHRG MGMT >30: CPT

## 2018-10-17 RX ADMIN — Medication 2 SPRAY(S): at 06:08

## 2018-10-17 RX ADMIN — APIXABAN 2.5 MILLIGRAM(S): 2.5 TABLET, FILM COATED ORAL at 06:07

## 2018-10-17 RX ADMIN — Medication 3 MILLILITER(S): at 16:08

## 2018-10-17 RX ADMIN — Medication 75 MILLIGRAM(S): at 06:08

## 2018-10-17 RX ADMIN — Medication 25 MILLIGRAM(S): at 06:07

## 2018-10-17 RX ADMIN — CHLORHEXIDINE GLUCONATE 1 APPLICATION(S): 213 SOLUTION TOPICAL at 12:36

## 2018-10-17 RX ADMIN — Medication 3: at 12:37

## 2018-10-17 RX ADMIN — AMLODIPINE BESYLATE 10 MILLIGRAM(S): 2.5 TABLET ORAL at 06:06

## 2018-10-17 RX ADMIN — Medication 81 MILLIGRAM(S): at 12:37

## 2018-10-17 RX ADMIN — Medication 3 MILLILITER(S): at 09:52

## 2018-10-17 RX ADMIN — ISOSORBIDE MONONITRATE 60 MILLIGRAM(S): 60 TABLET, EXTENDED RELEASE ORAL at 12:37

## 2018-10-17 RX ADMIN — PANTOPRAZOLE SODIUM 40 MILLIGRAM(S): 20 TABLET, DELAYED RELEASE ORAL at 06:07

## 2018-10-17 RX ADMIN — Medication 60 MILLIGRAM(S): at 06:07

## 2018-10-17 NOTE — PROGRESS NOTE ADULT - PROBLEM SELECTOR PLAN 1
Echo 10/9 w/ mod-sev MR, mild AS, mod AR, mild-mod LV systolic dysfunction. Segmental hypokinesis. Potential ADHF in setting of anemia.  - Strict I and O, daily weights. Fluid restriction.   - D/c sadler  - Will c/w IV lasix 60BID, CLARI  - Significantly improved from prior  - CT chest consistent with edema  - INDU to evaluate for AS?

## 2018-10-17 NOTE — CHART NOTE - NSCHARTNOTEFT_GEN_A_CORE
PA NOTE   Voicemail left with ROBINSON Gonzalez for [facility] refferal to be made in AM . Pt's family aware that services not to resume until 10/18/18. Pt to get home PT .  Cecille Harris

## 2018-10-17 NOTE — PROGRESS NOTE ADULT - ASSESSMENT
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 Pt admitted to CCU for continuing BiPAP. Pt meets criteria for SIRS but in setting of negative CT chest, abx discontinued.      Plan: INDU?

## 2018-10-17 NOTE — PROGRESS NOTE ADULT - SUBJECTIVE AND OBJECTIVE BOX
Tele: A pacing     Overnight: no complaints     MEDICATIONS  (STANDING):  ALBUTerol/ipratropium for Nebulization 3 milliLiter(s) Nebulizer every 6 hours  amLODIPine   Tablet 10 milliGRAM(s) Oral daily  apixaban 2.5 milliGRAM(s) Oral every 12 hours  AQUAPHOR (petrolatum Ointment) 1 Application(s) Topical at bedtime  aspirin enteric coated 81 milliGRAM(s) Oral daily  atorvastatin 40 milliGRAM(s) Oral at bedtime  furosemide   Injectable 60 milliGRAM(s) IV Push two times a day  hydrALAZINE 25 milliGRAM(s) Oral two times a day  insulin glargine Injectable (LANTUS) 14 Unit(s) SubCutaneous at bedtime  insulin lispro (HumaLOG) corrective regimen sliding scale   SubCutaneous three times a day before meals  insulin lispro (HumaLOG) corrective regimen sliding scale   SubCutaneous at bedtime  isosorbide   mononitrate ER Tablet (IMDUR) 60 milliGRAM(s) Oral daily  metoprolol tartrate 75 milliGRAM(s) Oral two times a day  pantoprazole    Tablet 40 milliGRAM(s) Oral before breakfast  sodium chloride 0.65% Nasal 2 Spray(s) Both Nostrils two times a day    MEDICATIONS  (PRN):  acetaminophen   Tablet .. 650 milliGRAM(s) Oral every 6 hours PRN Mild Pain (1 - 3), Moderate Pain (4 - 6), Severe Pain (7 - 10)  dextrose 40% Gel 15 Gram(s) Oral once PRN Blood Glucose LESS THAN 70 milliGRAM(s)/deciliter  glucagon  Injectable 1 milliGRAM(s) IntraMuscular once PRN Glucose LESS THAN 70 milligrams/deciliter          Vital Signs Last 24 Hrs  T(C): 36.8 (17 Oct 2018 06:00), Max: 36.9 (16 Oct 2018 12:00)  T(F): 98.3 (17 Oct 2018 06:00), Max: 98.4 (16 Oct 2018 12:00)  HR: 60 (17 Oct 2018 07:15) (60 - 62)  BP: 117/45 (17 Oct 2018 06:00) (98/40 - 128/44)  BP(mean): 64 (16 Oct 2018 23:00) (54 - 94)  RR: 18 (17 Oct 2018 06:00) (18 - 22)  SpO2: 97% (17 Oct 2018 07:15) (95% - 100%)  I&O's Detail    16 Oct 2018 07:01  -  17 Oct 2018 07:00  --------------------------------------------------------  IN:    Oral Fluid: 600 mL  Total IN: 600 mL    OUT:    Indwelling Catheter - Urethral: 325 mL    Voided: 750 mL  Total OUT: 1075 mL    Total NET: -475 mL      Physical exam:   Gen- NAD  Resp- Crackles at the base   CV- S1S2 RRR  ABD- +BSx4 ND/NT   EXT- No edema   Neuro- A&Ox3                             7.5    10.29 )-----------( 188      ( 17 Oct 2018 06:55 )             23.0       17 Oct 2018 05:58    136    |  100    |  109<H>  ----------------------------<  139<H>  4.2     |  18<L>  |  2.31<H>  16 Oct 2018 05:15    136    |  100    |  99<H>  ----------------------------<  129<H>  3.7     |  19<L>  |  2.48<H>    Ca    9.6        17 Oct 2018 05:58  Ca    9.7        16 Oct 2018 05:15  Phos  4.1       17 Oct 2018 05:58  Phos  4.1       16 Oct 2018 05:15  Mg     2.1       17 Oct 2018 05:58  Mg     2.1       16 Oct 2018 05:15    TPro  6.5    /  Alb  2.6<L>  /  TBili  0.7    /  DBili  x      /  AST  42<H>  /  ALT  33     /  AlkPhos  101    16 Oct 2018 05:15

## 2018-10-17 NOTE — PROGRESS NOTE ADULT - ATTENDING COMMENTS
Rody White is an 83 year old woman with history of afib  with [id number] VASc score 6 points(on Eliquis), DM2, HFrEF, HLD, HTN, PPM due to tachybrady syndrome and uterine CA s/p hysterectomy with RT. She now presented to the ED for acute shortness of breath requiring BiPAP.  She had been discharged 10/11/18 after evaluation for syncope. Chest X-ray on 10/14/18 noted enlarged cardiac silhouette with left chest wall cardiac pacemaker containing intact leads. There was mild accentuation of the left upper lung pulmonary vessels suggesting mild asymmetric pulmonary vascular congestion.There was linear atelectasis versus scar at the right base. No pleural effusion or pneumothorax was seen. Serum pro BNP was 36533 pg/mL on 10/13/18. WBC 18.21 thous/cu mm on 10/13/18. TTE done 10/10/18 noted mitral annular calcification, otherwise normal mitral valve  with moderate mitral regurgitation. The aortic valve was not well visualized. Peak transaortic valve gradient was33 mm Hg, mean transaortic valve gradient  18 mm Hg.  Inthe setting of LV systolic dysfunction, these gradients may reflect significant underlying aortic stenosis.  However, aortic valve area could not be adequately estimated. There was moderate aortic regurgitation. The left atrium was normal in size. There was moderate segmental left ventricular systolic dysfunction, with hypokinesis of the apex, mid to distal septum, and distal anterior wall. No LV thrombus was seen. The right ventricle was grossly normal in function. There was moderate tricuspid regurgitation. The pericardium appeared normal, with no pericardial effusion. Estimated right ventricular systolic pressure was 60 mm Hg, consistent with moderate pulmonary hypertension. Current management is respiratory support and treatment for acute on chronic systolic congestive heart failure with possible superimposed community acquired pneumonia. Standing regimen: amlodipine (Norvasc) 10 mg daily, apixaban (Eliquis) 2.5 mg every 12 hours, EC aspirin 81 mg daily, atorvastatin (Lipitor) 40 mg daily at bedtime, azithromycin  500 mG IV every 24 hours, furosemide (Lasix) 60 mg IV Push twice daily, hydralazine 25 mg twice daily, insulin lispro (HumaLOG) corrective regimen sliding scale subcutaneously three times a day before meals and at bedtime, isosorbide   mononitrate ER (Imdur) 60 mg daily, losartan 100 mg daily, metoprolol tartrate 75 mg twice daily, piperacillin/tazobactam (Zosyn) 3.375 Grams IV every 12 hours and vancomycin  750 mg IV every 24 hours.
Patient seen and examined  Agree with above assessment and plan  Patient for CT chest
Patient seen and examined  Agree with above assessment and plan  Given syncope and low gradient possible significant AS, will schedule INDU  NPO after midnight
Patient seen and examined. Agree with above assessment and plan. Patient is an 83 year old woman with acute on chronic systolic heart failure, moderate to severe MR, possible AS with anemia requiring transfusion, atrial fibrillation with RVR  1. Acute on chronic systolic heart failure-  Continue Lasix 60 mg IV BID  On Metoprolol, Hydralazine and Imdur  Losartan on hold  ? AS may require INDU to better assess  2. Anemia- sp transfusion yesterday  H/H now stable  FOBT pending  Will reeval GI to assess for reasons of anemia

## 2018-10-17 NOTE — PROGRESS NOTE ADULT - PROBLEM SELECTOR PLAN 2
Cr down trending 2.3 today  - Will c/w lasix 60 BID  - Hold losartan in setting of hypotension and CLARI  - Cont to trend BUN/Cr and UO

## 2018-10-18 LAB
BACTERIA BLD CULT: SIGNIFICANT CHANGE UP
BACTERIA BLD CULT: SIGNIFICANT CHANGE UP

## 2018-10-18 NOTE — DISCUSSION/SUMMARY
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 Reports orthopnea and dyspnea on exertion along with a productive cough which occasionally has streaks of blood in the sputum. The pt also reports R flank pain. Of note, the pt was recently admitted for a mechanical fall (discharged 10/11/18) where she was found to have a nasal fracture and her Hb dropped from 10 to 7.9 attributed to epistaxis. Pt denies chest pain, dizziness, lightheadedness, palpitations or any other complaints on admission. \par In the ED patient was given 1L bolus of fluid for a lactate of 5.4, WBC of 18.2 w/ neutrophilic predominance, tachypnea, and SpO2 < 88%. Later pt was found to be in CHF exacerbation. Lasix 40 mg IV was given. When patient went to the bathroom in the ER, patient developed acute respiratory distress with crackles. Pt was given hydralazine, metoprolol and another Lasix 40 mg IV. Pt's respiratory status improved. However, upon presentation to the floor, patient developed respiratory distress again. Lasix 60 mg IV, nitro paste was given. Pt started on BIPAP and transferred to the CCU.\par In the CCU, the pt was started on vanc/zosyn/azithro on 10/14 for HCAP coverage. Azithromycin and vancomycin were discontinued on 10/15 in the setting of negative urine legionella and blood cultures negative. The pt had an echo which found Mod MR. AV not visualized. Likely underlying aortic stenosis. Mod AR, grossly mod segmental LV systolic dysfunction. Hypokinesis of the apex, mid to distal septum, and distal anterior wall, RV not well visualized; grossly normal RV systolic function.  A device wire is noted in the right heart. Estimated RV systolic pressure equals 60mm Hg, assuming RAP is 10mmHg, mod pulm HTN. \par The pt's CXR was consistent with pulmonary edema but the concern for a multilobar pneumonia remained and the pt had a CT scan of her chest negative for pneumonia. Antibiotic d/diane.continue to diureses.Off bipap now \par Pt does not want INDU . Pt stable for d/c home today to FU in cards clinic 1 to 2 weeks."\par \par Called the patients daughter: Plan to follow up with Cards on November 1st. Will try to get patient scheduled for follow up with our clinic in the next week. Will task . Per daughter, patient is sleeping but doing much better.

## 2018-10-23 RX ORDER — METOPROLOL TARTRATE 50 MG
1.5 TABLET ORAL
Qty: 90 | Refills: 0
Start: 2018-10-23 | End: 2018-11-21

## 2018-10-26 ENCOUNTER — OTHER (OUTPATIENT)
Age: 83
End: 2018-10-26

## 2018-10-31 ENCOUNTER — LABORATORY RESULT (OUTPATIENT)
Age: 83
End: 2018-10-31

## 2018-10-31 ENCOUNTER — APPOINTMENT (OUTPATIENT)
Dept: INTERNAL MEDICINE | Facility: HOSPITAL | Age: 83
End: 2018-10-31
Payer: MEDICARE

## 2018-10-31 ENCOUNTER — OUTPATIENT (OUTPATIENT)
Dept: OUTPATIENT SERVICES | Facility: HOSPITAL | Age: 83
LOS: 1 days | End: 2018-10-31

## 2018-10-31 VITALS
HEART RATE: 60 BPM | DIASTOLIC BLOOD PRESSURE: 72 MMHG | WEIGHT: 144.25 LBS | BODY MASS INDEX: 26.54 KG/M2 | SYSTOLIC BLOOD PRESSURE: 114 MMHG | HEIGHT: 62 IN

## 2018-10-31 LAB
ALBUMIN SERPL ELPH-MCNC: 3.4 G/DL — SIGNIFICANT CHANGE UP (ref 3.3–5)
ALP SERPL-CCNC: 178 U/L — HIGH (ref 40–120)
ALT FLD-CCNC: 24 U/L — SIGNIFICANT CHANGE UP (ref 4–33)
AST SERPL-CCNC: 39 U/L — HIGH (ref 4–32)
BASOPHILS # BLD AUTO: 0.04 K/UL — SIGNIFICANT CHANGE UP (ref 0–0.2)
BASOPHILS NFR BLD AUTO: 0.4 % — SIGNIFICANT CHANGE UP (ref 0–2)
BILIRUB SERPL-MCNC: 0.5 MG/DL — SIGNIFICANT CHANGE UP (ref 0.2–1.2)
BUN SERPL-MCNC: 45 MG/DL — HIGH (ref 7–23)
CALCIUM SERPL-MCNC: 10.7 MG/DL — HIGH (ref 8.4–10.5)
CHLORIDE SERPL-SCNC: 100 MMOL/L — SIGNIFICANT CHANGE UP (ref 98–107)
CO2 SERPL-SCNC: 15 MMOL/L — LOW (ref 22–31)
CREAT SERPL-MCNC: 1.45 MG/DL — HIGH (ref 0.5–1.3)
EOSINOPHIL # BLD AUTO: 0.2 K/UL — SIGNIFICANT CHANGE UP (ref 0–0.5)
EOSINOPHIL NFR BLD AUTO: 2 % — SIGNIFICANT CHANGE UP (ref 0–6)
GLUCOSE SERPL-MCNC: 166 MG/DL — HIGH (ref 70–99)
HCT VFR BLD CALC: 31.8 % — LOW (ref 34.5–45)
HGB BLD-MCNC: 9.5 G/DL — LOW (ref 11.5–15.5)
IMM GRANULOCYTES # BLD AUTO: 0.03 # — SIGNIFICANT CHANGE UP
IMM GRANULOCYTES NFR BLD AUTO: 0.3 % — SIGNIFICANT CHANGE UP (ref 0–1.5)
LYMPHOCYTES # BLD AUTO: 1.86 K/UL — SIGNIFICANT CHANGE UP (ref 1–3.3)
LYMPHOCYTES # BLD AUTO: 18.5 % — SIGNIFICANT CHANGE UP (ref 13–44)
MCHC RBC-ENTMCNC: 29.9 % — LOW (ref 32–36)
MCHC RBC-ENTMCNC: 30.2 PG — SIGNIFICANT CHANGE UP (ref 27–34)
MCV RBC AUTO: 101 FL — HIGH (ref 80–100)
MONOCYTES # BLD AUTO: 1 K/UL — HIGH (ref 0–0.9)
MONOCYTES NFR BLD AUTO: 10 % — SIGNIFICANT CHANGE UP (ref 2–14)
NEUTROPHILS # BLD AUTO: 6.9 K/UL — SIGNIFICANT CHANGE UP (ref 1.8–7.4)
NEUTROPHILS NFR BLD AUTO: 68.8 % — SIGNIFICANT CHANGE UP (ref 43–77)
NRBC # FLD: 0 — SIGNIFICANT CHANGE UP
NT-PROBNP SERPL-SCNC: 5545 PG/ML — SIGNIFICANT CHANGE UP
PLATELET # BLD AUTO: 226 K/UL — SIGNIFICANT CHANGE UP (ref 150–400)
PMV BLD: 10.7 FL — SIGNIFICANT CHANGE UP (ref 7–13)
POTASSIUM SERPL-MCNC: 6.2 MMOL/L — CRITICAL HIGH (ref 3.5–5.3)
POTASSIUM SERPL-SCNC: 6.2 MMOL/L — CRITICAL HIGH (ref 3.5–5.3)
PROT SERPL-MCNC: 8.7 G/DL — HIGH (ref 6–8.3)
RBC # BLD: 3.15 M/UL — LOW (ref 3.8–5.2)
RBC # FLD: 17 % — HIGH (ref 10.3–14.5)
SODIUM SERPL-SCNC: 135 MMOL/L — SIGNIFICANT CHANGE UP (ref 135–145)
WBC # BLD: 10.03 K/UL — SIGNIFICANT CHANGE UP (ref 3.8–10.5)
WBC # FLD AUTO: 10.03 K/UL — SIGNIFICANT CHANGE UP (ref 3.8–10.5)

## 2018-10-31 PROCEDURE — 99214 OFFICE O/P EST MOD 30 MIN: CPT

## 2018-10-31 RX ORDER — LOSARTAN POTASSIUM 100 MG/1
100 TABLET, FILM COATED ORAL DAILY
Qty: 30 | Refills: 0 | Status: DISCONTINUED | COMMUNITY
Start: 2018-08-02 | End: 2018-10-31

## 2018-10-31 NOTE — ASSESSMENT
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, Uterine CA s/p hysterectomy and RT presenting for follow up after hospitalization for ADHF \par \par # HFrEF\par - TTE on 10/10/18 showing moderately reduced segmental LV systolic funtion\par - patient reporting good respiratory status since d/c, and states her weight is at baseling\par - currently on Torsemide 40mg BID, continue\par - will d/c Spironolactone due to renal function\par - counseled patient on low sal fluid restricted diet\par - RTC in 5 weeks\par \par # T2DM\par - patient with T2DM, A1C of 6.9 on 10/10/18\par - 4mg Glimepiride held on d/c due to renal function\par - reports that her FS has been in the mid 200s since d/c\par - will restart Glimepiride at a lower dose of 1mg qd \par - given that her renal function is worsened compared to her most recent recorded Cr in 2017, will renally adjust her home Januvia to 25mg qd\par - RTC in 5 weeks\par \par D/W Dr. Butt\par

## 2018-10-31 NOTE — PHYSICAL EXAM
[No Acute Distress] : no acute distress [Well Nourished] : well nourished [Well Developed] : well developed [PERRL] : pupils equal round and reactive to light [EOMI] : extraocular movements intact [No JVD] : no jugular venous distention [Supple] : supple [Normal Rate] : normal rate  [Regular Rhythm] : with a regular rhythm [Normal S1, S2] : normal S1 and S2 [Soft] : abdomen soft [Non Tender] : non-tender [Non-distended] : non-distended [No Masses] : no abdominal mass palpated [No HSM] : no HSM [Normal Bowel Sounds] : normal bowel sounds [No Spinal Tenderness] : no spinal tenderness [Grossly Normal Strength/Tone] : grossly normal strength/tone [No Rash] : no rash [Coordination Grossly Intact] : coordination grossly intact [No Focal Deficits] : no focal deficits [Normal Affect] : the affect was normal [Normal Insight/Judgement] : insight and judgment were intact [de-identified] : mild crakcles in the Right lung base  [de-identified] : 2/6 systolic ejection murmur loudest in the right upper sternal border  [de-identified] : 1+ b/l LE pitting edema

## 2018-10-31 NOTE — HISTORY OF PRESENT ILLNESS
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 Reports orthopnea and dyspnea on exertion along with a productive cough which occasionally has streaks of blood in the sputum. The pt also reports R flank pain. Of note, the pt was recently admitted for a mechanical fall (discharged 10/11/18) where she was found to have a nasal fracture and her Hb dropped from 10 to 7.9 attributed to epistaxis. Pt denies chest pain, dizziness, lightheadedness, palpitations or any other complaints on admission. \par In the ED patient was given 1L bolus of fluid for a lactate of 5.4, WBC of 18.2 w/ neutrophilic predominance, tachypnea, and SpO2 < 88%. Later pt was found to be in CHF exacerbation. Lasix 40 mg IV was given. When patient went to the bathroom in the ER, patient developed acute respiratory distress with crackles. Pt was given hydralazine, metoprolol and another Lasix 40 mg IV. Pt's respiratory status improved. However, upon presentation to the floor, patient developed respiratory distress again. Lasix 60 mg IV, nitro paste was given. Pt started on BIPAP and transferred to the CCU.\par In the CCU, the pt was started on vanc/zosyn/azithro on 10/14 for HCAP coverage. Azithromycin and vancomycin were discontinued on 10/15 in the setting of negative urine legionella and blood cultures negative. The pt had an echo which found Mod MR. AV not visualized. Likely underlying aortic stenosis. Mod AR, grossly mod segmental LV systolic dysfunction. Hypokinesis of the apex, mid to distal septum, and distal anterior wall, RV not well visualized; grossly normal RV systolic function. A device wire is noted in the right heart. Estimated RV systolic pressure equals 60mm Hg, assuming RAP is 10mmHg, mod pulm HTN. \par The pt's CXR was consistent with pulmonary edema but the concern for a multilobar pneumonia remained and the pt had a CT scan of her chest negative for pneumonia. Antibiotic d/diane.continue to diureses.Off bipap now

## 2018-11-01 ENCOUNTER — APPOINTMENT (OUTPATIENT)
Dept: CARDIOLOGY | Facility: HOSPITAL | Age: 83
End: 2018-11-01

## 2018-11-01 ENCOUNTER — LABORATORY RESULT (OUTPATIENT)
Age: 83
End: 2018-11-01

## 2018-11-01 ENCOUNTER — OUTPATIENT (OUTPATIENT)
Dept: OUTPATIENT SERVICES | Facility: HOSPITAL | Age: 83
LOS: 1 days | End: 2018-11-01

## 2018-11-01 ENCOUNTER — APPOINTMENT (OUTPATIENT)
Dept: ELECTROPHYSIOLOGY | Facility: CLINIC | Age: 83
End: 2018-11-01
Payer: MEDICARE

## 2018-11-01 VITALS — DIASTOLIC BLOOD PRESSURE: 48 MMHG | HEART RATE: 61 BPM | SYSTOLIC BLOOD PRESSURE: 112 MMHG

## 2018-11-01 VITALS
SYSTOLIC BLOOD PRESSURE: 123 MMHG | DIASTOLIC BLOOD PRESSURE: 73 MMHG | RESPIRATION RATE: 14 BRPM | HEART RATE: 64 BPM | BODY MASS INDEX: 26.7 KG/M2 | OXYGEN SATURATION: 100 % | WEIGHT: 146 LBS

## 2018-11-01 DIAGNOSIS — I48.0 PAROXYSMAL ATRIAL FIBRILLATION: ICD-10-CM

## 2018-11-01 DIAGNOSIS — I50.22 CHRONIC SYSTOLIC (CONGESTIVE) HEART FAILURE: ICD-10-CM

## 2018-11-01 LAB
POTASSIUM SERPL-MCNC: 5.7 MMOL/L — HIGH (ref 3.5–5.3)
POTASSIUM SERPL-SCNC: 5.7 MMOL/L — HIGH (ref 3.5–5.3)

## 2018-11-01 PROCEDURE — 93280 PM DEVICE PROGR EVAL DUAL: CPT

## 2018-11-02 DIAGNOSIS — E11.9 TYPE 2 DIABETES MELLITUS WITHOUT COMPLICATIONS: ICD-10-CM

## 2018-11-02 DIAGNOSIS — I50.22 CHRONIC SYSTOLIC (CONGESTIVE) HEART FAILURE: ICD-10-CM

## 2018-11-02 LAB — GLUCOSE BLDC GLUCOMTR-MCNC: 208

## 2018-12-05 ENCOUNTER — APPOINTMENT (OUTPATIENT)
Dept: INTERNAL MEDICINE | Facility: HOSPITAL | Age: 83
End: 2018-12-05

## 2018-12-17 ENCOUNTER — APPOINTMENT (OUTPATIENT)
Dept: INTERNAL MEDICINE | Facility: HOSPITAL | Age: 83
End: 2018-12-17
Payer: MEDICARE

## 2018-12-17 ENCOUNTER — OUTPATIENT (OUTPATIENT)
Dept: OUTPATIENT SERVICES | Facility: HOSPITAL | Age: 83
LOS: 1 days | End: 2018-12-17

## 2018-12-17 ENCOUNTER — RESULT CHARGE (OUTPATIENT)
Age: 83
End: 2018-12-17

## 2018-12-17 ENCOUNTER — LABORATORY RESULT (OUTPATIENT)
Age: 83
End: 2018-12-17

## 2018-12-17 VITALS — DIASTOLIC BLOOD PRESSURE: 76 MMHG | HEART RATE: 72 BPM | SYSTOLIC BLOOD PRESSURE: 136 MMHG

## 2018-12-17 VITALS — WEIGHT: 143 LBS | HEIGHT: 62 IN | BODY MASS INDEX: 26.31 KG/M2

## 2018-12-17 DIAGNOSIS — E78.5 HYPERLIPIDEMIA, UNSPECIFIED: ICD-10-CM

## 2018-12-17 DIAGNOSIS — E11.65 TYPE 2 DIABETES MELLITUS WITH HYPERGLYCEMIA: ICD-10-CM

## 2018-12-17 LAB
BUN SERPL-MCNC: 66 MG/DL — HIGH (ref 7–23)
CALCIUM SERPL-MCNC: 11 MG/DL — HIGH (ref 8.4–10.5)
CHLORIDE SERPL-SCNC: 97 MMOL/L — LOW (ref 98–107)
CO2 SERPL-SCNC: 22 MMOL/L — SIGNIFICANT CHANGE UP (ref 22–31)
CREAT SERPL-MCNC: 2.08 MG/DL — HIGH (ref 0.5–1.3)
GLUCOSE SERPL-MCNC: 216 MG/DL — HIGH (ref 70–99)
POTASSIUM SERPL-MCNC: 4.5 MMOL/L — SIGNIFICANT CHANGE UP (ref 3.5–5.3)
POTASSIUM SERPL-SCNC: 4.5 MMOL/L — SIGNIFICANT CHANGE UP (ref 3.5–5.3)
SODIUM SERPL-SCNC: 138 MMOL/L — SIGNIFICANT CHANGE UP (ref 135–145)

## 2018-12-17 PROCEDURE — 99213 OFFICE O/P EST LOW 20 MIN: CPT | Mod: GE

## 2018-12-18 LAB
CREAT UR-MCNC: 90 MG/DL — SIGNIFICANT CHANGE UP
GLUCOSE BLDC GLUCOMTR-MCNC: 242
MICROALBUMIN UR-MCNC: 8.7 MG/DL — SIGNIFICANT CHANGE UP
MICROALBUMIN/CREAT UR-RTO: 97 MG/G — HIGH (ref 0–30)

## 2018-12-21 NOTE — HISTORY OF PRESENT ILLNESS
[FreeTextEntry1] : follow-up [de-identified] : This is an 82 y/o F with a PMH significant for HTN, HLD, NIDDM2, HFrEF, tachybrady syndrome s/p PPM, A-fib on Eliquis, and uterine CA s/p hysterectomy and RT, who presents to the clinic for routine follow up. Of note, patient was recently discharged from [facility] in October, and had been admitted for a HF exacerbation. She was admitted to the CCU, diuresed, and was discharged home. She also had a nasal bone fracture managed non-operatively. She states she is now doing well w/ no issues. She checks her FSG daily in the AM, and they range 90-140s. She has been checking her weight daily, and it does not fluctuate more than 1-2 pounds. She follows a low salt diet, and is taking her medications w/o missing any doses. Denies cough, dyspnea, CP.

## 2018-12-21 NOTE — REVIEW OF SYSTEMS
[Fever] : no fever [Chills] : no chills [Fatigue] : no fatigue [Chest Pain] : no chest pain [Palpitations] : no palpitations [Lower Ext Edema] : no lower extremity edema [Shortness Of Breath] : no shortness of breath [Wheezing] : no wheezing [Cough] : no cough [Dyspnea on Exertion] : no dyspnea on exertion [Abdominal Pain] : no abdominal pain [Nausea] : no nausea [Constipation] : no constipation [Diarrhea] : diarrhea [Vomiting] : no vomiting [Dysuria] : no dysuria [Joint Pain] : no joint pain [Joint Stiffness] : no joint stiffness [Headache] : no headache

## 2018-12-21 NOTE — ASSESSMENT
[FreeTextEntry1] : 82 y/o F with a PMH significant for HTN, HLD, NIDDM2, HFrEF, tachybrady syndrome s/p PPM, A-fib on Eliquis, and uterine CA s/p hysterectomy and RT, who presents to the clinic for routine follow up. \par \par #HFrEF\par - euvolemic on exam today\par - c/w torsemide 40 mg BID, metoprolol 75 mg BID, ASA 81 mg daily, lipitor 20 mg daily, imdur 30 mg ER\par - daily weights\par \par #A-fib\par - CHADSVASc score 6, on eliquis 2.5 mg BID\par - c/w metoprolol tartrate 75 mg BID\par \par #HTN\par - BP wnl at this time\par - c/w amlodipine 10 mg daily, isosorbide mononitrate ER 30 mg daily, hydralazine 25 mg BID\par \par #NIDDM2\par - A1c 6.9% from 10/2018; recommended exercise and diet modifications along with medication adherence\par - c/w januvia 25 mg daily and glimepiride 1 mg daily\par - urine microalbumin\par - recheck BMP for hyperK\par - ophtho referral\par \par #HCM\par - UTD with pneumovax and flu shot\par - Tdap given 10/2018\par - colonoscopy in 2015; will need repeat most likely 2020 given findings\par \par d/w Dr. Chino\par rtc in 6 months\par Celio Maynard MD\par PGY-2, [employer]

## 2018-12-21 NOTE — END OF VISIT
[] : Resident [FreeTextEntry3] : 83F with h/o HFrEF, PPM, T2DM, uterine ca s/p tx, recent hospitalization for CHF exacerbation now doing well, checking daily weights here for follow-up. Ophtho referral given today for T2DM screening, will also check urine microalbumin. Wll also repeat BMP today to ensure resolution of hyperkalemia noted on prior labs. Agree with remainder of plan as documented by Dr. Maynard.

## 2018-12-27 DIAGNOSIS — I48.91 UNSPECIFIED ATRIAL FIBRILLATION: ICD-10-CM

## 2018-12-27 DIAGNOSIS — I50.22 CHRONIC SYSTOLIC (CONGESTIVE) HEART FAILURE: ICD-10-CM

## 2018-12-27 DIAGNOSIS — E11.9 TYPE 2 DIABETES MELLITUS WITHOUT COMPLICATIONS: ICD-10-CM

## 2018-12-27 DIAGNOSIS — E78.5 HYPERLIPIDEMIA, UNSPECIFIED: ICD-10-CM

## 2018-12-27 DIAGNOSIS — E87.5 HYPERKALEMIA: ICD-10-CM

## 2018-12-27 DIAGNOSIS — Z00.00 ENCOUNTER FOR GENERAL ADULT MEDICAL EXAMINATION WITHOUT ABNORMAL FINDINGS: ICD-10-CM

## 2019-01-09 DIAGNOSIS — R26.81 UNSTEADINESS ON FEET: ICD-10-CM

## 2019-02-12 ENCOUNTER — OTHER (OUTPATIENT)
Age: 84
End: 2019-02-12

## 2019-02-13 ENCOUNTER — OTHER (OUTPATIENT)
Age: 84
End: 2019-02-13

## 2019-03-21 ENCOUNTER — RX RENEWAL (OUTPATIENT)
Age: 84
End: 2019-03-21

## 2019-03-21 ENCOUNTER — APPOINTMENT (OUTPATIENT)
Dept: ELECTROPHYSIOLOGY | Facility: CLINIC | Age: 84
End: 2019-03-21
Payer: MEDICARE

## 2019-03-21 ENCOUNTER — OTHER (OUTPATIENT)
Age: 84
End: 2019-03-21

## 2019-03-21 ENCOUNTER — APPOINTMENT (OUTPATIENT)
Dept: CARDIOLOGY | Facility: HOSPITAL | Age: 84
End: 2019-03-21

## 2019-03-21 VITALS
HEART RATE: 70 BPM | BODY MASS INDEX: 26.68 KG/M2 | HEIGHT: 62 IN | OXYGEN SATURATION: 100 % | DIASTOLIC BLOOD PRESSURE: 74 MMHG | SYSTOLIC BLOOD PRESSURE: 159 MMHG | WEIGHT: 145 LBS | RESPIRATION RATE: 14 BRPM

## 2019-03-21 DIAGNOSIS — I49.5 SICK SINUS SYNDROME: ICD-10-CM

## 2019-03-21 PROCEDURE — 93280 PM DEVICE PROGR EVAL DUAL: CPT

## 2019-03-22 NOTE — PHYSICAL EXAM
[General Appearance - Well Developed] : well developed [General Appearance - Well Nourished] : well nourished [] : no respiratory distress [Respiration, Rhythm And Depth] : normal respiratory rhythm and effort [Auscultation Breath Sounds / Voice Sounds] : lungs were clear to auscultation bilaterally [Heart Rate And Rhythm] : heart rate and rhythm were normal [Heart Sounds] : normal S1 and S2 [Murmurs] : no murmurs present [Bowel Sounds] : normal bowel sounds [Abdomen Soft] : soft [Abnormal Walk] : normal gait [Skin Color & Pigmentation] : normal skin color and pigmentation [Skin Turgor] : normal skin turgor [Oriented To Time, Place, And Person] : oriented to person, place, and time [Affect] : the affect was normal [Mood] : the mood was normal [FreeTextEntry1] : with cane

## 2019-03-22 NOTE — REVIEW OF SYSTEMS
[Fever] : no fever [Chills] : no chills [Earache] : no earache [Shortness Of Breath] : no shortness of breath [Dyspnea on exertion] : not dyspnea during exertion [Chest  Pressure] : no chest pressure [Chest Pain] : no chest pain [Palpitations] : no palpitations [Cough] : no cough [Abdominal Pain] : no abdominal pain [Heartburn] : no heartburn [Change In The Stool] : no change in stool [Dysuria] : no dysuria [Skin: A Rash] : no rash: [Dizziness] : no dizziness

## 2019-03-22 NOTE — DISCUSSION/SUMMARY
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 \par \par Heart failure with moderate segmental wall motion abnormality likely 2/2 ICM. Currently pt euvolemic. Functional Class III, Stage C \par - cont torsemide 40 bid , cont metoprolol 75 mg bid, will need to transition to succinate at next visit \par - pt educated on daily weights and to notify provider of changes > 2lb per day or 3 lbs in 3 days or if symptoms develop \par - cont hydral and ISMN \par \par Syncope. Could be 2/2 AS.\par - TTE could not evalute AV so was recommended to have INDU however patient and family declined to undergo this procedure\par \par Chronic Afib w/ tachy-wellington syndrome s/p PPM- CHADSVAsc scores 6- PPM interrogation today with noted afib \par - she was evaluated by EP for potential watchman given h/o GIB and started on eliquis 2.5 bid \par - cont monitor \par - cont metoprolol\par \par HTN\par - cont amlodipine, metoprolol, hydral, and ISMN \par - cont advised low salt diet and weight loss. \par \par HLD\par - cont atorvastatin 20mg \par \par RTC in 6 months  \par D/w Dr. King

## 2019-03-22 NOTE — HISTORY OF PRESENT ILLNESS
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 \par \par Since our last visit she was admitted to [facility] in 10/2018 for HF exacerbation and syncope. She was diuresed with IV medications. A INDU was recommended because TTE could not evaluate for AS, especially since she presented with syncope. She was discharged on torsemide 40 po bid, hydralazine, and ISMN. Since discharge she has been doing well. States she is ambulating with her cane without issue. She is tolerating eliquis 2.5 bid. She has not noticed increased LE edema, CP, sob, KRAUS, PND, palpitations. She denies spontaneous bruising or bleeding. \par  \par in 10/2017 she had an echo and stress which showed overall EF of 43% with moderate segmental left ventricular systolic dysfunction. The apex, distal inferior, mid to distal septal walls are hypokinetic and consistent with infarct. \par \par She currently doing well, denying symptoms of chest pain, SOB, palpitations. She reports no decrease in exercise tolerance. She also denies orthopnea and bendopnea. \par \par TTE 08/2018: Moderate mitral regurgitation. Peak transaortic valve gradient equals 33 mm Hg, mean transaortic valve gradient equals 18 mm Hg.  In the setting of LV systolic dysfunction, these gradients may reflect significant underlying aortic stenosis.  However, unable to accurately estimate the aortic valve area. Moderate aortic regurgitation. Grossly moderate segmental left ventricular systolic dysfunction.\par Hypokinesis of the apex, mid to distal septum, and distal anterior wall.  Endocardial visualization enhanced with intravenous injection of echo contrast (Definity).  Grossly normal right ventricular systolic function.  A device wire is noted in the right heart. Estimated right ventricular systolic pressure equals 60 mm Hg, assuming right atrial pressure equals 10 mm Hg, consistent with moderate pulmonary hypertension.\par \par NST 10/2017:  There are medium sized, severe defects in anteroseptal, apical walls that are fixed, suggestive of infarct.The left ventricle was normal in size. Post-stress gated wall motion analysis was performed (LVEF = 42 %;LVEDV = 103 ml.), revealing moderate overall hypokinesis. There was apical and anteroseptal akinesis. The remaining segments contracted well.

## 2019-05-21 ENCOUNTER — OTHER (OUTPATIENT)
Age: 84
End: 2019-05-21

## 2019-06-12 ENCOUNTER — OUTPATIENT (OUTPATIENT)
Dept: OUTPATIENT SERVICES | Facility: HOSPITAL | Age: 84
LOS: 1 days | End: 2019-06-12

## 2019-06-12 ENCOUNTER — LABORATORY RESULT (OUTPATIENT)
Age: 84
End: 2019-06-12

## 2019-06-12 ENCOUNTER — APPOINTMENT (OUTPATIENT)
Dept: INTERNAL MEDICINE | Facility: CLINIC | Age: 84
End: 2019-06-12
Payer: MEDICARE

## 2019-06-12 VITALS — HEIGHT: 62 IN | OXYGEN SATURATION: 97 % | WEIGHT: 144 LBS | BODY MASS INDEX: 26.5 KG/M2 | HEART RATE: 62 BPM

## 2019-06-12 VITALS — DIASTOLIC BLOOD PRESSURE: 76 MMHG | SYSTOLIC BLOOD PRESSURE: 132 MMHG | HEART RATE: 68 BPM

## 2019-06-12 DIAGNOSIS — E83.52 HYPERCALCEMIA: ICD-10-CM

## 2019-06-12 DIAGNOSIS — I48.91 UNSPECIFIED ATRIAL FIBRILLATION: ICD-10-CM

## 2019-06-12 DIAGNOSIS — I10 ESSENTIAL (PRIMARY) HYPERTENSION: ICD-10-CM

## 2019-06-12 DIAGNOSIS — I50.20 UNSPECIFIED SYSTOLIC (CONGESTIVE) HEART FAILURE: ICD-10-CM

## 2019-06-12 DIAGNOSIS — E11.9 TYPE 2 DIABETES MELLITUS W/OUT COMPLICATIONS: ICD-10-CM

## 2019-06-12 DIAGNOSIS — Z00.00 ENCOUNTER FOR GENERAL ADULT MEDICAL EXAMINATION W/OUT ABNORMAL FINDINGS: ICD-10-CM

## 2019-06-12 DIAGNOSIS — E87.5 HYPERKALEMIA: ICD-10-CM

## 2019-06-12 PROCEDURE — 99213 OFFICE O/P EST LOW 20 MIN: CPT | Mod: GE

## 2019-06-12 RX ORDER — TORSEMIDE 20 MG/1
20 TABLET ORAL
Qty: 360 | Refills: 1 | Status: DISCONTINUED | COMMUNITY
Start: 2018-07-11 | End: 2019-06-12

## 2019-06-12 RX ORDER — METOPROLOL TARTRATE 50 MG/1
50 TABLET, FILM COATED ORAL
Qty: 270 | Refills: 1 | Status: DISCONTINUED | COMMUNITY
Start: 2018-10-31 | End: 2019-06-12

## 2019-06-12 NOTE — REVIEW OF SYSTEMS
[Joint Pain] : joint pain [Fever] : no fever [Chills] : no chills [Chest Pain] : no chest pain [Lower Ext Edema] : no lower extremity edema [Orthopnea] : no orthopnea [Shortness Of Breath] : no shortness of breath [Wheezing] : no wheezing [Cough] : no cough [Dyspnea on Exertion] : no dyspnea on exertion [Abdominal Pain] : no abdominal pain [Nausea] : no nausea [Constipation] : no constipation [Diarrhea] : diarrhea [Vomiting] : no vomiting [Dysuria] : no dysuria [Hematuria] : no hematuria [Incontinence] : no incontinence [Headache] : no headache [Dizziness] : no dizziness [FreeTextEntry9] : +left hip pain

## 2019-06-12 NOTE — ASSESSMENT
[FreeTextEntry1] : 85 y/o F with a PMH significant for HTN, HLD, NIDDM2, HFrEF, tachybrady syndrome s/p PPM, A-fib on Eliquis, and uterine CA s/p hysterectomy and RT, who presents to the clinic for routine follow up. \par \par #Hypercalcemia and CKD3\par - patient had increasing SCr over course of last year\par - paralleling this rise, she had hypercalcemia rising to peak 11\par - her PTH was previously checked and elevated to 147\par - will check CMP (to eval for albumin); if abnormal, will f/u w/ vit. D level\par \par #HFrEF\par - euvolemic on exam today\par - c/w furosemide 80 mg BID, ASA 81 mg daily, lipitor 20 mg daily, imdur 30 mg ER\par - transitioned from metoprolol tartrate 75 mg BID to succinate 150 mg daily\par - daily weights\par \par #A-fib\par - CHADSVASc score 6, on eliquis 2.5 mg BID\par - initiate metoprolol succinate 150 mg daily as above\par \par #HTN\par - BP wnl at this time\par - c/w amlodipine 10 mg daily, isosorbide mononitrate ER 30 mg daily, hydralazine 25 mg BID\par \par #NIDDM2\par - A1c 7.7% today; recommended exercise and diet modifications along with medication adherence\par - c/w januvia 25 mg daily and glimepiride 1 mg daily\par - urine microalbumin today\par \par #HCM\par - UTD with pneumovax and flu shot\par - Tdap given 10/2018\par - colonoscopy in 2015; will need repeat most likely 2020 given findings\par \par d/w Dr. Sousa\par rtc in 6 months\par Celio Maynard MD\par PGY-2, [employer] [facility]

## 2019-06-12 NOTE — PHYSICAL EXAM
[No Acute Distress] : no acute distress [PERRL] : pupils equal round and reactive to light [Well Nourished] : well nourished [Normal Sclera/Conjunctiva] : normal sclera/conjunctiva [EOMI] : extraocular movements intact [No JVD] : no jugular venous distention [No Lymphadenopathy] : no lymphadenopathy [No Respiratory Distress] : no respiratory distress  [Clear to Auscultation] : lungs were clear to auscultation bilaterally [Normal Rate] : normal rate  [Normal S1, S2] : normal S1 and S2 [Regular Rhythm] : with a regular rhythm [Soft] : abdomen soft [Pedal Pulses Present] : the pedal pulses are present [No Edema] : there was no peripheral edema [Non-distended] : non-distended [Non Tender] : non-tender [No HSM] : no HSM [No CVA Tenderness] : no CVA  tenderness [No Spinal Tenderness] : no spinal tenderness [No Joint Swelling] : no joint swelling [Grossly Normal Strength/Tone] : grossly normal strength/tone [No Rash] : no rash [No Focal Deficits] : no focal deficits [Coordination Grossly Intact] : coordination grossly intact [Normal Affect] : the affect was normal [Alert and Oriented x3] : oriented to person, place, and time [Normal Mood] : the mood was normal [de-identified] : +faint systolic ejection murmur at RUSB

## 2019-06-12 NOTE — HISTORY OF PRESENT ILLNESS
[Family Member] : family member [FreeTextEntry1] : follow up of chronic medical problems as listed in active problem list [de-identified] : This is an 83 y/o F with a PMH significant for HTN, HLD, NIDDM2, HFrEF, tachybrady syndrome s/p PPM, A-fib on Eliquis, and uterine CA s/p hysterectomy and RT, who presents to the clinic for routine follow up. She states that she has been doing well since her last visit, but does endorse mild left hip pain that she controls w/ tylenol and intermittent motrin. She also states that her glucometer has stopped working for the past few days and just won't given her a readout anymore. However, the FSG have ranged  w/ most values in the 130s in the AM. She has been weighing herself daily, and has maintained her weight in the 144 range w/ oscillation of only one pound. Her daughter accompanies her and states that she has been helping her mother with combing her hair, walking her to the bathroom at night 2-3 times. However, she states her mother has been doing otherwise well w/ no complaints. She denies cough, dyspnea, CP, LE swelling.

## 2019-06-13 DIAGNOSIS — N18.3 CHRONIC KIDNEY DISEASE, STAGE 3 (MODERATE): ICD-10-CM

## 2019-06-13 DIAGNOSIS — I48.91 UNSPECIFIED ATRIAL FIBRILLATION: ICD-10-CM

## 2019-06-13 DIAGNOSIS — E87.5 HYPERKALEMIA: ICD-10-CM

## 2019-06-13 DIAGNOSIS — I50.22 CHRONIC SYSTOLIC (CONGESTIVE) HEART FAILURE: ICD-10-CM

## 2019-06-13 DIAGNOSIS — I50.20 UNSPECIFIED SYSTOLIC (CONGESTIVE) HEART FAILURE: ICD-10-CM

## 2019-06-13 DIAGNOSIS — I10 ESSENTIAL (PRIMARY) HYPERTENSION: ICD-10-CM

## 2019-06-13 DIAGNOSIS — E83.52 HYPERCALCEMIA: ICD-10-CM

## 2019-06-13 DIAGNOSIS — E11.9 TYPE 2 DIABETES MELLITUS WITHOUT COMPLICATIONS: ICD-10-CM

## 2019-06-13 LAB
CREAT UR-MCNC: 37 MG/DL — SIGNIFICANT CHANGE UP
MICROALBUMIN UR-MCNC: 5.4 MG/DL — SIGNIFICANT CHANGE UP
MICROALBUMIN/CREAT UR-RTO: 148 MG/G — HIGH (ref 0–30)

## 2019-06-20 LAB
GLUCOSE BLDC GLUCOMTR-MCNC: 155
HBA1C MFR BLD HPLC: 7.7

## 2019-07-07 ENCOUNTER — INPATIENT (INPATIENT)
Facility: HOSPITAL | Age: 84
LOS: 8 days | Discharge: HOME HEALTH | End: 2019-07-16
Attending: INTERNAL MEDICINE | Admitting: INTERNAL MEDICINE
Payer: MEDICARE

## 2019-07-07 VITALS
RESPIRATION RATE: 19 BRPM | DIASTOLIC BLOOD PRESSURE: 52 MMHG | SYSTOLIC BLOOD PRESSURE: 180 MMHG | OXYGEN SATURATION: 90 % | HEART RATE: 61 BPM | TEMPERATURE: 99 F

## 2019-07-07 DIAGNOSIS — K62.5 HEMORRHAGE OF ANUS AND RECTUM: ICD-10-CM

## 2019-07-07 DIAGNOSIS — R19.7 DIARRHEA, UNSPECIFIED: ICD-10-CM

## 2019-07-07 DIAGNOSIS — I10 ESSENTIAL (PRIMARY) HYPERTENSION: ICD-10-CM

## 2019-07-07 DIAGNOSIS — Z29.9 ENCOUNTER FOR PROPHYLACTIC MEASURES, UNSPECIFIED: ICD-10-CM

## 2019-07-07 DIAGNOSIS — I48.91 UNSPECIFIED ATRIAL FIBRILLATION: ICD-10-CM

## 2019-07-07 DIAGNOSIS — J18.1 LOBAR PNEUMONIA, UNSPECIFIED ORGANISM: ICD-10-CM

## 2019-07-07 DIAGNOSIS — E78.5 HYPERLIPIDEMIA, UNSPECIFIED: ICD-10-CM

## 2019-07-07 DIAGNOSIS — I50.9 HEART FAILURE, UNSPECIFIED: ICD-10-CM

## 2019-07-07 DIAGNOSIS — E11.8 TYPE 2 DIABETES MELLITUS WITH UNSPECIFIED COMPLICATIONS: ICD-10-CM

## 2019-07-07 DIAGNOSIS — I50.23 ACUTE ON CHRONIC SYSTOLIC (CONGESTIVE) HEART FAILURE: ICD-10-CM

## 2019-07-07 LAB
ALBUMIN SERPL ELPH-MCNC: 3.4 G/DL — SIGNIFICANT CHANGE UP (ref 3.3–5)
ALP SERPL-CCNC: 189 U/L — HIGH (ref 40–120)
ALT FLD-CCNC: 49 U/L — HIGH (ref 4–33)
ANION GAP SERPL CALC-SCNC: 18 MMO/L — HIGH (ref 7–14)
APPEARANCE UR: CLEAR — SIGNIFICANT CHANGE UP
APTT BLD: 35.4 SEC — SIGNIFICANT CHANGE UP (ref 27.5–36.3)
AST SERPL-CCNC: 68 U/L — HIGH (ref 4–32)
B PERT DNA SPEC QL NAA+PROBE: NOT DETECTED — SIGNIFICANT CHANGE UP
BACTERIA # UR AUTO: NEGATIVE — SIGNIFICANT CHANGE UP
BASE EXCESS BLDV CALC-SCNC: -1.1 MMOL/L — SIGNIFICANT CHANGE UP
BASE EXCESS BLDV CALC-SCNC: 0.9 MMOL/L — SIGNIFICANT CHANGE UP
BASOPHILS # BLD AUTO: 0.05 K/UL — SIGNIFICANT CHANGE UP (ref 0–0.2)
BASOPHILS NFR BLD AUTO: 0.3 % — SIGNIFICANT CHANGE UP (ref 0–2)
BILIRUB SERPL-MCNC: 0.6 MG/DL — SIGNIFICANT CHANGE UP (ref 0.2–1.2)
BILIRUB UR-MCNC: NEGATIVE — SIGNIFICANT CHANGE UP
BLOOD GAS VENOUS - CREATININE: 1.31 MG/DL — HIGH (ref 0.5–1.3)
BLOOD GAS VENOUS - CREATININE: 1.49 MG/DL — HIGH (ref 0.5–1.3)
BLOOD GAS VENOUS - FIO2: 100 — SIGNIFICANT CHANGE UP
BLOOD GAS VENOUS - FIO2: 21 — SIGNIFICANT CHANGE UP
BLOOD UR QL VISUAL: HIGH
BUN SERPL-MCNC: 61 MG/DL — HIGH (ref 7–23)
C PNEUM DNA SPEC QL NAA+PROBE: NOT DETECTED — SIGNIFICANT CHANGE UP
CALCIUM SERPL-MCNC: 10.8 MG/DL — HIGH (ref 8.4–10.5)
CHLORIDE BLDV-SCNC: 102 MMOL/L — SIGNIFICANT CHANGE UP (ref 96–108)
CHLORIDE BLDV-SCNC: 103 MMOL/L — SIGNIFICANT CHANGE UP (ref 96–108)
CHLORIDE SERPL-SCNC: 97 MMOL/L — LOW (ref 98–107)
CO2 SERPL-SCNC: 20 MMOL/L — LOW (ref 22–31)
COLOR SPEC: YELLOW — SIGNIFICANT CHANGE UP
CREAT SERPL-MCNC: 1.37 MG/DL — HIGH (ref 0.5–1.3)
EOSINOPHIL # BLD AUTO: 0.06 K/UL — SIGNIFICANT CHANGE UP (ref 0–0.5)
EOSINOPHIL NFR BLD AUTO: 0.3 % — SIGNIFICANT CHANGE UP (ref 0–6)
FLUAV H1 2009 PAND RNA SPEC QL NAA+PROBE: NOT DETECTED — SIGNIFICANT CHANGE UP
FLUAV H1 RNA SPEC QL NAA+PROBE: NOT DETECTED — SIGNIFICANT CHANGE UP
FLUAV H3 RNA SPEC QL NAA+PROBE: NOT DETECTED — SIGNIFICANT CHANGE UP
FLUAV SUBTYP SPEC NAA+PROBE: NOT DETECTED — SIGNIFICANT CHANGE UP
FLUBV RNA SPEC QL NAA+PROBE: NOT DETECTED — SIGNIFICANT CHANGE UP
GAS PNL BLDV: 129 MMOL/L — LOW (ref 136–146)
GAS PNL BLDV: 133 MMOL/L — LOW (ref 136–146)
GLUCOSE BLDC GLUCOMTR-MCNC: 166 MG/DL — HIGH (ref 70–99)
GLUCOSE BLDV-MCNC: 319 MG/DL — HIGH (ref 70–99)
GLUCOSE BLDV-MCNC: 334 MG/DL — HIGH (ref 70–99)
GLUCOSE SERPL-MCNC: 341 MG/DL — HIGH (ref 70–99)
GLUCOSE UR-MCNC: 70 — SIGNIFICANT CHANGE UP
HADV DNA SPEC QL NAA+PROBE: NOT DETECTED — SIGNIFICANT CHANGE UP
HCO3 BLDV-SCNC: 23 MMOL/L — SIGNIFICANT CHANGE UP (ref 20–27)
HCO3 BLDV-SCNC: 24 MMOL/L — SIGNIFICANT CHANGE UP (ref 20–27)
HCOV PNL SPEC NAA+PROBE: SIGNIFICANT CHANGE UP
HCT VFR BLD CALC: 32.7 % — LOW (ref 34.5–45)
HCT VFR BLDV CALC: 30.8 % — LOW (ref 34.5–45)
HCT VFR BLDV CALC: 31.4 % — LOW (ref 34.5–45)
HGB BLD-MCNC: 9.9 G/DL — LOW (ref 11.5–15.5)
HGB BLDV-MCNC: 10.2 G/DL — LOW (ref 11.5–15.5)
HGB BLDV-MCNC: 9.9 G/DL — LOW (ref 11.5–15.5)
HMPV RNA SPEC QL NAA+PROBE: NOT DETECTED — SIGNIFICANT CHANGE UP
HPIV1 RNA SPEC QL NAA+PROBE: NOT DETECTED — SIGNIFICANT CHANGE UP
HPIV2 RNA SPEC QL NAA+PROBE: NOT DETECTED — SIGNIFICANT CHANGE UP
HPIV3 RNA SPEC QL NAA+PROBE: NOT DETECTED — SIGNIFICANT CHANGE UP
HPIV4 RNA SPEC QL NAA+PROBE: NOT DETECTED — SIGNIFICANT CHANGE UP
HYALINE CASTS # UR AUTO: NEGATIVE — SIGNIFICANT CHANGE UP
IMM GRANULOCYTES NFR BLD AUTO: 0.6 % — SIGNIFICANT CHANGE UP (ref 0–1.5)
INR BLD: 1.7 — HIGH (ref 0.88–1.17)
KETONES UR-MCNC: NEGATIVE — SIGNIFICANT CHANGE UP
LACTATE BLDV-MCNC: 3.2 MMOL/L — HIGH (ref 0.5–2)
LACTATE BLDV-MCNC: 3.4 MMOL/L — HIGH (ref 0.5–2)
LEUKOCYTE ESTERASE UR-ACNC: NEGATIVE — SIGNIFICANT CHANGE UP
LYMPHOCYTES # BLD AUTO: 1.19 K/UL — SIGNIFICANT CHANGE UP (ref 1–3.3)
LYMPHOCYTES # BLD AUTO: 6.6 % — LOW (ref 13–44)
MAGNESIUM SERPL-MCNC: 2 MG/DL — SIGNIFICANT CHANGE UP (ref 1.6–2.6)
MCHC RBC-ENTMCNC: 26.8 PG — LOW (ref 27–34)
MCHC RBC-ENTMCNC: 30.3 % — LOW (ref 32–36)
MCV RBC AUTO: 88.6 FL — SIGNIFICANT CHANGE UP (ref 80–100)
MONOCYTES # BLD AUTO: 1.32 K/UL — HIGH (ref 0–0.9)
MONOCYTES NFR BLD AUTO: 7.3 % — SIGNIFICANT CHANGE UP (ref 2–14)
NEUTROPHILS # BLD AUTO: 15.33 K/UL — HIGH (ref 1.8–7.4)
NEUTROPHILS NFR BLD AUTO: 84.9 % — HIGH (ref 43–77)
NITRITE UR-MCNC: NEGATIVE — SIGNIFICANT CHANGE UP
NRBC # FLD: 0 K/UL — SIGNIFICANT CHANGE UP (ref 0–0)
NT-PROBNP SERPL-SCNC: SIGNIFICANT CHANGE UP PG/ML
OB PNL STL: POSITIVE — SIGNIFICANT CHANGE UP
PCO2 BLDV: 42 MMHG — SIGNIFICANT CHANGE UP (ref 41–51)
PCO2 BLDV: 43 MMHG — SIGNIFICANT CHANGE UP (ref 41–51)
PH BLDV: 7.37 PH — SIGNIFICANT CHANGE UP (ref 7.32–7.43)
PH BLDV: 7.39 PH — SIGNIFICANT CHANGE UP (ref 7.32–7.43)
PH UR: 6 — SIGNIFICANT CHANGE UP (ref 5–8)
PHOSPHATE SERPL-MCNC: 2.9 MG/DL — SIGNIFICANT CHANGE UP (ref 2.5–4.5)
PLATELET # BLD AUTO: 268 K/UL — SIGNIFICANT CHANGE UP (ref 150–400)
PMV BLD: 9.8 FL — SIGNIFICANT CHANGE UP (ref 7–13)
PO2 BLDV: 29 MMHG — LOW (ref 35–40)
PO2 BLDV: 46 MMHG — HIGH (ref 35–40)
POTASSIUM BLDV-SCNC: 4.1 MMOL/L — SIGNIFICANT CHANGE UP (ref 3.4–4.5)
POTASSIUM BLDV-SCNC: 4.7 MMOL/L — HIGH (ref 3.4–4.5)
POTASSIUM SERPL-MCNC: 4.5 MMOL/L — SIGNIFICANT CHANGE UP (ref 3.5–5.3)
POTASSIUM SERPL-SCNC: 4.5 MMOL/L — SIGNIFICANT CHANGE UP (ref 3.5–5.3)
PROT SERPL-MCNC: 8.4 G/DL — HIGH (ref 6–8.3)
PROT UR-MCNC: 200 — HIGH
PROTHROM AB SERPL-ACNC: 19.2 SEC — HIGH (ref 9.8–13.1)
RBC # BLD: 3.69 M/UL — LOW (ref 3.8–5.2)
RBC # FLD: 17.9 % — HIGH (ref 10.3–14.5)
RBC CASTS # UR COMP ASSIST: SIGNIFICANT CHANGE UP (ref 0–?)
RSV RNA SPEC QL NAA+PROBE: NOT DETECTED — SIGNIFICANT CHANGE UP
RV+EV RNA SPEC QL NAA+PROBE: NOT DETECTED — SIGNIFICANT CHANGE UP
SAO2 % BLDV: 45.8 % — LOW (ref 60–85)
SAO2 % BLDV: 75.6 % — SIGNIFICANT CHANGE UP (ref 60–85)
SODIUM SERPL-SCNC: 135 MMOL/L — SIGNIFICANT CHANGE UP (ref 135–145)
SP GR SPEC: 1.01 — SIGNIFICANT CHANGE UP (ref 1–1.04)
SQUAMOUS # UR AUTO: SIGNIFICANT CHANGE UP
TROPONIN T, HIGH SENSITIVITY: 50 NG/L — SIGNIFICANT CHANGE UP (ref ?–14)
TROPONIN T, HIGH SENSITIVITY: 52 NG/L — HIGH (ref ?–14)
UROBILINOGEN FLD QL: NORMAL — SIGNIFICANT CHANGE UP
WBC # BLD: 18.05 K/UL — HIGH (ref 3.8–10.5)
WBC # FLD AUTO: 18.05 K/UL — HIGH (ref 3.8–10.5)
WBC UR QL: SIGNIFICANT CHANGE UP (ref 0–?)

## 2019-07-07 PROCEDURE — 71045 X-RAY EXAM CHEST 1 VIEW: CPT | Mod: 26

## 2019-07-07 PROCEDURE — 99223 1ST HOSP IP/OBS HIGH 75: CPT | Mod: GC

## 2019-07-07 PROCEDURE — 74174 CTA ABD&PLVS W/CONTRAST: CPT | Mod: 26

## 2019-07-07 RX ORDER — FUROSEMIDE 40 MG
80 TABLET ORAL
Refills: 0 | Status: DISCONTINUED | OUTPATIENT
Start: 2019-07-07 | End: 2019-07-09

## 2019-07-07 RX ORDER — METRONIDAZOLE 500 MG
500 TABLET ORAL ONCE
Refills: 0 | Status: COMPLETED | OUTPATIENT
Start: 2019-07-07 | End: 2019-07-07

## 2019-07-07 RX ORDER — FUROSEMIDE 40 MG
80 TABLET ORAL ONCE
Refills: 0 | Status: COMPLETED | OUTPATIENT
Start: 2019-07-07 | End: 2019-07-07

## 2019-07-07 RX ORDER — AMLODIPINE BESYLATE 2.5 MG/1
10 TABLET ORAL DAILY
Refills: 0 | Status: DISCONTINUED | OUTPATIENT
Start: 2019-07-07 | End: 2019-07-11

## 2019-07-07 RX ORDER — HEPARIN SODIUM 5000 [USP'U]/ML
5000 INJECTION INTRAVENOUS; SUBCUTANEOUS EVERY 8 HOURS
Refills: 0 | Status: DISCONTINUED | OUTPATIENT
Start: 2019-07-07 | End: 2019-07-07

## 2019-07-07 RX ORDER — ATORVASTATIN CALCIUM 80 MG/1
40 TABLET, FILM COATED ORAL AT BEDTIME
Refills: 0 | Status: DISCONTINUED | OUTPATIENT
Start: 2019-07-07 | End: 2019-07-16

## 2019-07-07 RX ORDER — APIXABAN 2.5 MG/1
2.5 TABLET, FILM COATED ORAL
Refills: 0 | Status: DISCONTINUED | OUTPATIENT
Start: 2019-07-07 | End: 2019-07-11

## 2019-07-07 RX ORDER — SODIUM CHLORIDE 9 MG/ML
1000 INJECTION, SOLUTION INTRAVENOUS
Refills: 0 | Status: DISCONTINUED | OUTPATIENT
Start: 2019-07-07 | End: 2019-07-16

## 2019-07-07 RX ORDER — ISOSORBIDE MONONITRATE 60 MG/1
60 TABLET, EXTENDED RELEASE ORAL DAILY
Refills: 0 | Status: DISCONTINUED | OUTPATIENT
Start: 2019-07-07 | End: 2019-07-16

## 2019-07-07 RX ORDER — DEXTROSE 50 % IN WATER 50 %
25 SYRINGE (ML) INTRAVENOUS ONCE
Refills: 0 | Status: DISCONTINUED | OUTPATIENT
Start: 2019-07-07 | End: 2019-07-16

## 2019-07-07 RX ORDER — ASPIRIN/CALCIUM CARB/MAGNESIUM 324 MG
81 TABLET ORAL DAILY
Refills: 0 | Status: DISCONTINUED | OUTPATIENT
Start: 2019-07-07 | End: 2019-07-15

## 2019-07-07 RX ORDER — HYDRALAZINE HCL 50 MG
25 TABLET ORAL
Refills: 0 | Status: DISCONTINUED | OUTPATIENT
Start: 2019-07-07 | End: 2019-07-11

## 2019-07-07 RX ORDER — DEXTROSE 50 % IN WATER 50 %
12.5 SYRINGE (ML) INTRAVENOUS ONCE
Refills: 0 | Status: DISCONTINUED | OUTPATIENT
Start: 2019-07-07 | End: 2019-07-16

## 2019-07-07 RX ORDER — DEXTROSE 50 % IN WATER 50 %
15 SYRINGE (ML) INTRAVENOUS ONCE
Refills: 0 | Status: DISCONTINUED | OUTPATIENT
Start: 2019-07-07 | End: 2019-07-16

## 2019-07-07 RX ORDER — AZITHROMYCIN 500 MG/1
500 TABLET, FILM COATED ORAL EVERY 24 HOURS
Refills: 0 | Status: DISCONTINUED | OUTPATIENT
Start: 2019-07-08 | End: 2019-07-16

## 2019-07-07 RX ORDER — CEFEPIME 1 G/1
1000 INJECTION, POWDER, FOR SOLUTION INTRAMUSCULAR; INTRAVENOUS ONCE
Refills: 0 | Status: COMPLETED | OUTPATIENT
Start: 2019-07-07 | End: 2019-07-07

## 2019-07-07 RX ORDER — VANCOMYCIN HCL 1 G
1000 VIAL (EA) INTRAVENOUS ONCE
Refills: 0 | Status: COMPLETED | OUTPATIENT
Start: 2019-07-07 | End: 2019-07-07

## 2019-07-07 RX ORDER — GLUCAGON INJECTION, SOLUTION 0.5 MG/.1ML
1 INJECTION, SOLUTION SUBCUTANEOUS ONCE
Refills: 0 | Status: DISCONTINUED | OUTPATIENT
Start: 2019-07-07 | End: 2019-07-16

## 2019-07-07 RX ORDER — INSULIN LISPRO 100/ML
VIAL (ML) SUBCUTANEOUS AT BEDTIME
Refills: 0 | Status: DISCONTINUED | OUTPATIENT
Start: 2019-07-07 | End: 2019-07-16

## 2019-07-07 RX ORDER — METOPROLOL TARTRATE 50 MG
75 TABLET ORAL DAILY
Refills: 0 | Status: DISCONTINUED | OUTPATIENT
Start: 2019-07-07 | End: 2019-07-16

## 2019-07-07 RX ORDER — INSULIN LISPRO 100/ML
VIAL (ML) SUBCUTANEOUS
Refills: 0 | Status: DISCONTINUED | OUTPATIENT
Start: 2019-07-07 | End: 2019-07-16

## 2019-07-07 RX ADMIN — APIXABAN 2.5 MILLIGRAM(S): 2.5 TABLET, FILM COATED ORAL at 23:57

## 2019-07-07 RX ADMIN — Medication 500 MILLIGRAM(S): at 11:12

## 2019-07-07 RX ADMIN — Medication 250 MILLIGRAM(S): at 10:15

## 2019-07-07 RX ADMIN — Medication 80 MILLIGRAM(S): at 14:17

## 2019-07-07 RX ADMIN — CEFEPIME 100 MILLIGRAM(S): 1 INJECTION, POWDER, FOR SOLUTION INTRAMUSCULAR; INTRAVENOUS at 09:20

## 2019-07-07 RX ADMIN — Medication 80 MILLIGRAM(S): at 09:20

## 2019-07-07 RX ADMIN — Medication 25 MILLIGRAM(S): at 23:57

## 2019-07-07 RX ADMIN — ATORVASTATIN CALCIUM 40 MILLIGRAM(S): 80 TABLET, FILM COATED ORAL at 23:57

## 2019-07-07 NOTE — ED PROVIDER NOTE - ATTENDING CONTRIBUTION TO CARE
chf htn dm hx of uterine ca s/p hysterectomy afib on eliquis sob, orthopnea since last night recently changed from torsemide to lasix 80bid no cp, cough fever +chills last night, n, diarrhea x 2.  no drugs tobacco etoh    on exam speaking full sentences  crackles in all lung fields  s1s2 rrr   no abd ttp  aaox3 non focal  no lower ext edema    suspect chf exacerbation  not requring supple o2 qat this time.   ekg unchanged from prior  labs, cxr, diuresis admit

## 2019-07-07 NOTE — ED PROVIDER NOTE - CLINICAL SUMMARY MEDICAL DECISION MAKING FREE TEXT BOX
Sara Morrison MD PGY-2 85 yo F PMH CHF, HTN, DM, uterine CA 2008, pacemaker, afib on eliquis p/w SOB since yesterday PM. +orthopnea in setting of recent change in diuretics. No hx COPD. Likely CHF exacerbation vs PNA vs other viral URI. Will get labs, xray, diuresis as indicated, likely admit

## 2019-07-07 NOTE — H&P ADULT - PROBLEM SELECTOR PLAN 1
Suspect pt's dyspnea is related to CHF exacerbation, given change in diuretics followed by progressive symptoms over weeks, lack of infectious symptoms signs, and good response to IV diuresis in ED. Will continue IV lasix and monitor strict I/O. Follow BMP daily. Will ask RT to trial pt off BiPAP and transition to NC. Suspect pt's dyspnea is related to CHF exacerbation, given change in diuretics followed by progressive symptoms over weeks, and good response to IV diuresis in ED. Will continue IV lasix and monitor strict I/O. Follow BMP daily. Will ask RT to trial pt off BiPAP and transition to NC.

## 2019-07-07 NOTE — ED ADULT NURSE REASSESSMENT NOTE - NS ED NURSE REASSESS COMMENT FT1
Report received from night RN. Pt AxOx3. Pt complaining of shortness of breath at this time. Pt tachypenic on 3L NC. IVL clear and patent. Will continue to monitor.

## 2019-07-07 NOTE — ED PROVIDER NOTE - NS ED ROS FT
REVIEW OF SYSTEMS:  General: +chills  HEENT: no headache, no vision changes, no nasal congestion/discharge, no throat pain   Cardiac: no chest pain  Respiratory: + cough, + shortness of breath  Gastrointestinal: no abdominal pain, no nausea, no vomiting, + diarrhea, no melena, no hematochezia   Genitourinary: no hematuria, no dysuria, no urinary frequency, no urinary hesitancy   Extremities: no extremity swelling  Neuro: no focal weakness, no numbness/tingling of the extremities, no decreased sensation  -Sara Morrison PGY-1

## 2019-07-07 NOTE — ED ADULT NURSE NOTE - INTERVENTIONS DEFINITIONS
Instruct patient to call for assistance/Provide visual clues: red socks/Call bell, personal items and telephone within reach

## 2019-07-07 NOTE — H&P ADULT - PROBLEM SELECTOR PLAN 7
IMPROVE = 2 (age + immobility). Pt therapeutically anticoagulated on apixaban. Monitor FSG and cover with correctional lispro.

## 2019-07-07 NOTE — ED ADULT NURSE REASSESSMENT NOTE - NS ED NURSE REASSESS COMMENT FT1
Pt placed on bipap  at this time. pt tachypneic and O2 saturation was 85% on 3L NC. MD Soriano brought to bedside, will continue to monitor.

## 2019-07-07 NOTE — ED ADULT NURSE REASSESSMENT NOTE - NS ED NURSE REASSESS COMMENT FT1
Received report from KRISTAL Jj, pt A&OX4. Pt currently on Bipap and tolerating well, oxygen saturation noted at 98%. Pt has no complaints at this time. Will continue to monitor.

## 2019-07-07 NOTE — H&P ADULT - PROBLEM SELECTOR PROBLEM 7
Need for prophylactic measure Type 2 diabetes mellitus with complication, unspecified whether long term insulin use

## 2019-07-07 NOTE — H&P ADULT - PROBLEM SELECTOR PLAN 2
Suspect not a clinically significant amount of bleeding given quick resolution, stable hgb, and prior similar episode on apixaban in the context of reassuring colonoscopy. Monitor CBC daily. If further BRBPR, consider d/c'ing apixaban. Despite relative lack of infectious symptoms, CT demonstrates bibasilar consolidations. In the context of leukocytosis and left shift, favor empiric pneumonia treatment at least until blood cultures are finalized. No recent hospitalizations or obvious immune compromise (Hgb A1c has been at/near goal in recent months). Will cover with ceftriaxone + azithromycin.

## 2019-07-07 NOTE — ED PROVIDER NOTE - PHYSICAL EXAMINATION
PHYSICAL EXAM:   General: moderate respiratory distress  HEENT: NC/AT  Cardiovascular: regular rate, paced and regular rhythm,   Respiratory: moderate resp distress, diffuse wheezing and rales, on 3L NC  Abdominal: soft, +mild ttp epigastric and LLQ, +increased bloating from baseline, +distended, no rebound, guarding or rigidity,   Extremities: no LE edema b/l.   Neuro: Alert and oriented x3. Nonfocal neurologic exam  Psychiatric: appropriate mood and affect.   Skin: appropriate color, warm, dry.   -Sara Morrison PGY-1

## 2019-07-07 NOTE — H&P ADULT - PROBLEM SELECTOR PROBLEM 5
Type 2 diabetes mellitus with complication, unspecified whether long term insulin use Atrial fibrillation, unspecified type

## 2019-07-07 NOTE — H&P ADULT - ASSESSMENT
84F with HTN, HLD, DM2, chronic systolic HF, prior uterine cancer s/p hysterectomy who presents with dyspnea since last night, concerning for acute on chronic systolic HF. Also p/w brief loose stools, now resolved, c/b BRBPR, now also resolved. 84F with HTN, HLD, DM2, chronic systolic HF, afib on apixaban, prior uterine cancer s/p hysterectomy who presents with dyspnea since last night, concerning for acute on chronic systolic HF. Also p/w brief loose stools, now resolved, c/b BRBPR, now also resolved.

## 2019-07-07 NOTE — ED PROVIDER NOTE - CARE PLAN
Principal Discharge DX:	Congestive heart failure (CHF)  Secondary Diagnosis:	GIB (gastrointestinal bleeding)

## 2019-07-07 NOTE — H&P ADULT - PROBLEM SELECTOR PLAN 3
History does not suggest a clear precipitant, but diarrhea has been relatively small volume. If pt does have additional stool in-house, will collect and send GI PCR to evaluate common infectious causes of diarrhea. Suspect not a clinically significant amount of bleeding given quick resolution, stable hgb, and prior similar episode on apixaban in the context of reassuring colonoscopy. Monitor CBC daily. If further BRBPR, consider d/c'ing apixaban.

## 2019-07-07 NOTE — ED PROVIDER NOTE - OBJECTIVE STATEMENT
Sara Morrison MD PGY-2 83 yo F PMH CHF, HTN, DM, uterine CA 2008, pacemaker, afib on eliquis p/w SOB since yesterday PM. +orthopnea. +feels like prior HF. Not home 02. No chest pain, +diarrhea x 3 (took 2 imodium), +nausea. No worsening LE edema. 1 month ago changed Torsemide 20 mg 2 tabs 2x/day to Furosemide 80 mg BID,     Cardiologist: Dr. Bernal?  Medications: Glimepiride, hydralazine 25  mg BID, Januvia, metoprolol 50 mg, furosemide, amlodipine, ASA, isosorbide, statin, eliquis Sara Morrison MD PGY-2 83 yo F PMH CHF, HTN, DM, uterine CA 2008, pacemaker, afib on eliquis p/w SOB since yesterday PM. +orthopnea. +feels like prior HF. Not home 02. No chest pain, +diarrhea x 3 (took 2 imodium), +nausea. No worsening LE edema. 1 month ago changed Torsemide 20 mg 2 tabs 2x/day to Furosemide 80 mg BID. Nonsmoker, no COPD hx. +chills, +mild cough    Cardiologist: Dr. Bernal?  Medications: Glimepiride, hydralazine 25  mg BID, Januvia, metoprolol 50 mg, furosemide, amlodipine, ASA, isosorbide, statin, eliquis

## 2019-07-07 NOTE — ED PROVIDER NOTE - PROGRESS NOTE DETAILS
Gregory Bar DO: patient resp/ hemodynamics stable. wbc 18, la 3.4. suspicion still that patient has chf exacerbation but will give abx to cover for multifocal pna. given chf likely will not give ivf for concerns of worsening chf CHANDRA GILL, MD: Patient returned from CT in mild resp distress with O2 sat 85% on 3L NC.  Patient diffusely rhonchorous on exam.  Lasix 80 IV ordered, O2 increased to 5L and bipap ordered. CHANDRA GILL, MD: BiPap initiated.

## 2019-07-07 NOTE — H&P ADULT - PROBLEM SELECTOR PLAN 5
Monitor FSG and cover with correctional lispro. Rate-controlled currently. S/p ppm. Continue apixaban as above.

## 2019-07-07 NOTE — H&P ADULT - HISTORY OF PRESENT ILLNESS
84F with HTN, HLD, DM2, chronic systolic HF, prior uterine cancer s/p hysterectomy who presents with dyspnea since last night. Pt noticed SOB while ambulating from bed to bathroom. She had had some progressive KRAUS in the preceding few weeks, but last night was acutely much worse. No chest pain, no palpitations, no lightheadedness, no cough. Pt attributes dyspnea to change in diuretics 1 month ago from torsemide to furosemide.     In addition, pt notes 3 episodes of loose stools last night, with small amount of bright red blood. She reports similar prior episodes of BRBPR on apixaban and states she had a colonoscopy ~2 years ago that was unremarkable. No further stool or BRBPR today. No abd pain, no N/V. Pt reports feeling hungry. She denies sick contacts, spoiled/undercooked food exposures, travel, or recent abx.    Pt placed on BiPAP in ED and given IV lasix. Now reports large UOP and significant improvement in breathing. 84F with HTN, HLD, DM2, chronic systolic HF, afib on apixaban, prior uterine cancer s/p hysterectomy who presents with dyspnea since last night. Pt noticed SOB while ambulating from bed to bathroom. She had had some progressive KRAUS in the preceding few weeks, but last night was acutely much worse. No chest pain, no palpitations, no lightheadedness, no cough. Pt attributes dyspnea to change in diuretics 1 month ago from torsemide to furosemide.     In addition, pt notes 3 episodes of loose stools last night, with small amount of bright red blood. She reports similar prior episodes of BRBPR on apixaban and states she had a colonoscopy ~2 years ago that was unremarkable. No further stool or BRBPR today. No abd pain, no N/V. Pt reports feeling hungry. She denies sick contacts, spoiled/undercooked food exposures, travel, or recent abx.    Pt placed on BiPAP in ED and given IV lasix. Now reports large UOP and significant improvement in breathing.

## 2019-07-07 NOTE — H&P ADULT - NSICDXPASTSURGICALHX_GEN_ALL_CORE_FT
PAST SURGICAL HISTORY:  Cardiac Pacemaker (ICD9 V45.01) Medtronic (2003 - for tacybrady syndrome @ [facility])    History of Hysterectomy (ICD9 V88.01)     Status Post Total Knee Replacement LISA, 6 months apart 2010

## 2019-07-07 NOTE — H&P ADULT - NSICDXPASTMEDICALHX_GEN_ALL_CORE_FT
PAST MEDICAL HISTORY:  Chronic systolic heart failure     DM Type 2 (Diabetes Mellitus, Type 2) (ICD9 250.00)     HTN (Hypertension) (ICD9 401.9)     Hyperlipidemia     Osteoarthritis of Knee (ICD9 715.96) b/l knees    Tachycardia-bradycardia syndrome     Uterine Cancer (ICD9 179) treated with hysterectomy and radiation therapy in 2008

## 2019-07-07 NOTE — H&P ADULT - PROBLEM SELECTOR PLAN 6
Continue statin. Pt missed her usual morning blood pressure meds, and accordingly has been hypertensive in the ED. This may be exacerbating pulmonary edema. Will resume home regimen, changing diuretics to IV lasix as above.

## 2019-07-07 NOTE — ED ADULT NURSE NOTE - OBJECTIVE STATEMENT
Preston RN : Pt. received in room 20 ,A&Ox4  w/ hx. CHF , c/o SOB this morning . Pt. arrived to room tachypneic , appears SOB sating at Preston RN : Pt. received in room 20 ,A&Ox4  w/ hx. CHF , c/o SOB this morning . Pt. arrived to room tachypneic , appears SOB sating at 95%  w/ 3L nasal cannula. Pt. Vitals as noted. IV placed on right ac , labs drawn and sent . As per daughter at bedside , pt. had no c/o SOB last night. Pt. in room appears uncomfortable w/ auditory wheezing. MD at bedside assessing patient.

## 2019-07-07 NOTE — H&P ADULT - PROBLEM SELECTOR PLAN 4
Pt missed her usual morning blood pressure meds, and accordingly has been hypertensive in the ED. This may be exacerbating pulmonary edema. Will resume home regimen, changing diuretics to IV lasix as above. History does not suggest a clear precipitant, but diarrhea has been relatively small volume. If pt does have additional stool in-house, will collect and send GI PCR to evaluate common infectious causes of diarrhea.

## 2019-07-07 NOTE — ED ADULT NURSE REASSESSMENT NOTE - NS ED NURSE REASSESS COMMENT FT1
Pt desating on 2L NC down to 88%. Pt tachypneic at this time. ADS called and made aware. Pt to be placed back on bipap. Will continue to monitor.

## 2019-07-07 NOTE — ED ADULT NURSE NOTE - ED STAT RN HANDOFF DETAILS
endorsed to drea briscoe. pt a*o x3, hob elevated. 4l nc in place and tolerating well. bipap at bedside as standby. no respiraotry distress noted. no c/o pain safety maintained

## 2019-07-08 LAB
ANION GAP SERPL CALC-SCNC: 15 MMO/L — HIGH (ref 7–14)
APTT BLD: 38.8 SEC — HIGH (ref 27.5–36.3)
BACTERIA UR CULT: SIGNIFICANT CHANGE UP
BLD GP AB SCN SERPL QL: NEGATIVE — SIGNIFICANT CHANGE UP
BUN SERPL-MCNC: 77 MG/DL — HIGH (ref 7–23)
CALCIUM SERPL-MCNC: 11.1 MG/DL — HIGH (ref 8.4–10.5)
CHLORIDE SERPL-SCNC: 97 MMOL/L — LOW (ref 98–107)
CO2 SERPL-SCNC: 23 MMOL/L — SIGNIFICANT CHANGE UP (ref 22–31)
CREAT SERPL-MCNC: 3 MG/DL — HIGH (ref 0.5–1.3)
GLUCOSE BLDC GLUCOMTR-MCNC: 196 MG/DL — HIGH (ref 70–99)
GLUCOSE BLDC GLUCOMTR-MCNC: 199 MG/DL — HIGH (ref 70–99)
GLUCOSE BLDC GLUCOMTR-MCNC: 224 MG/DL — HIGH (ref 70–99)
GLUCOSE BLDC GLUCOMTR-MCNC: 226 MG/DL — HIGH (ref 70–99)
GLUCOSE BLDC GLUCOMTR-MCNC: 248 MG/DL — HIGH (ref 70–99)
GLUCOSE SERPL-MCNC: 200 MG/DL — HIGH (ref 70–99)
HCT VFR BLD CALC: 30.6 % — LOW (ref 34.5–45)
HGB BLD-MCNC: 9.5 G/DL — LOW (ref 11.5–15.5)
INR BLD: 2.08 — HIGH (ref 0.88–1.17)
MAGNESIUM SERPL-MCNC: 2.2 MG/DL — SIGNIFICANT CHANGE UP (ref 1.6–2.6)
MCHC RBC-ENTMCNC: 26.8 PG — LOW (ref 27–34)
MCHC RBC-ENTMCNC: 31 % — LOW (ref 32–36)
MCV RBC AUTO: 86.4 FL — SIGNIFICANT CHANGE UP (ref 80–100)
NRBC # FLD: 0 K/UL — SIGNIFICANT CHANGE UP (ref 0–0)
PLATELET # BLD AUTO: 238 K/UL — SIGNIFICANT CHANGE UP (ref 150–400)
PMV BLD: 10.1 FL — SIGNIFICANT CHANGE UP (ref 7–13)
POTASSIUM SERPL-MCNC: 4.1 MMOL/L — SIGNIFICANT CHANGE UP (ref 3.5–5.3)
POTASSIUM SERPL-SCNC: 4.1 MMOL/L — SIGNIFICANT CHANGE UP (ref 3.5–5.3)
PROTHROM AB SERPL-ACNC: 24.2 SEC — HIGH (ref 9.8–13.1)
RBC # BLD: 3.54 M/UL — LOW (ref 3.8–5.2)
RBC # FLD: 18.3 % — HIGH (ref 10.3–14.5)
RH IG SCN BLD-IMP: POSITIVE — SIGNIFICANT CHANGE UP
SODIUM SERPL-SCNC: 135 MMOL/L — SIGNIFICANT CHANGE UP (ref 135–145)
SPECIMEN SOURCE: SIGNIFICANT CHANGE UP
WBC # BLD: 12.82 K/UL — HIGH (ref 3.8–10.5)
WBC # FLD AUTO: 12.82 K/UL — HIGH (ref 3.8–10.5)

## 2019-07-08 PROCEDURE — 99223 1ST HOSP IP/OBS HIGH 75: CPT

## 2019-07-08 PROCEDURE — 99233 SBSQ HOSP IP/OBS HIGH 50: CPT

## 2019-07-08 RX ORDER — CEFTRIAXONE 500 MG/1
1000 INJECTION, POWDER, FOR SOLUTION INTRAMUSCULAR; INTRAVENOUS EVERY 24 HOURS
Refills: 0 | Status: DISCONTINUED | OUTPATIENT
Start: 2019-07-08 | End: 2019-07-16

## 2019-07-08 RX ADMIN — AZITHROMYCIN 250 MILLIGRAM(S): 500 TABLET, FILM COATED ORAL at 18:41

## 2019-07-08 RX ADMIN — AMLODIPINE BESYLATE 10 MILLIGRAM(S): 2.5 TABLET ORAL at 06:49

## 2019-07-08 RX ADMIN — Medication 2: at 18:09

## 2019-07-08 RX ADMIN — APIXABAN 2.5 MILLIGRAM(S): 2.5 TABLET, FILM COATED ORAL at 18:02

## 2019-07-08 RX ADMIN — Medication 81 MILLIGRAM(S): at 12:52

## 2019-07-08 RX ADMIN — Medication 25 MILLIGRAM(S): at 18:03

## 2019-07-08 RX ADMIN — ATORVASTATIN CALCIUM 40 MILLIGRAM(S): 80 TABLET, FILM COATED ORAL at 22:17

## 2019-07-08 RX ADMIN — APIXABAN 2.5 MILLIGRAM(S): 2.5 TABLET, FILM COATED ORAL at 06:49

## 2019-07-08 RX ADMIN — CEFTRIAXONE 100 MILLIGRAM(S): 500 INJECTION, POWDER, FOR SOLUTION INTRAMUSCULAR; INTRAVENOUS at 12:52

## 2019-07-08 RX ADMIN — Medication 4: at 11:24

## 2019-07-08 RX ADMIN — Medication 80 MILLIGRAM(S): at 18:02

## 2019-07-08 RX ADMIN — Medication 80 MILLIGRAM(S): at 06:49

## 2019-07-08 RX ADMIN — ISOSORBIDE MONONITRATE 60 MILLIGRAM(S): 60 TABLET, EXTENDED RELEASE ORAL at 12:52

## 2019-07-08 RX ADMIN — Medication 25 MILLIGRAM(S): at 06:49

## 2019-07-08 RX ADMIN — Medication 75 MILLIGRAM(S): at 06:49

## 2019-07-08 NOTE — PROGRESS NOTE ADULT - PROBLEM SELECTOR PLAN 1
Suspect pt's dyspnea is related to CHF exacerbation, given change in diuretics followed by progressive symptoms over weeks, and good response to IV diuresis. Will continue IV Lasix and monitor strict I/O. Follow BMP daily. Now off BiPAP, tolerating well. Prior TTE in 2018 reviewed, possible critical AS which makes diuresis challenging and likely contributing to symptoms. No TTE done. will have cards eval on diuresis and valve management

## 2019-07-08 NOTE — PHYSICAL THERAPY INITIAL EVALUATION ADULT - PERTINENT HX OF CURRENT PROBLEM, REHAB EVAL
84 F with HTN, HLD, DM2, chronic systolic HF, afib on apixaban, prior uterine cancer s/p hysterectomy who presents with dyspnea concerning for acute on chronic systolic HF. Also presents with brief loose stools, now resolved, c/b BRBPR, now also resolved.

## 2019-07-08 NOTE — CONSULT NOTE ADULT - SUBJECTIVE AND OBJECTIVE BOX
Author: BOB Morales, cardiology fellow   All cardiology service information may be found 24/7 at [email or web address], password: [email or web address]    CHIEF COMPLAINT:    HISTORY OF PRESENT ILLNESS:      Allergies    No Known Allergies    Intolerances    	    MEDICATIONS:  amLODIPine   Tablet 10 milliGRAM(s) Oral daily  apixaban 2.5 milliGRAM(s) Oral two times a day  aspirin enteric coated 81 milliGRAM(s) Oral daily  furosemide   Injectable 80 milliGRAM(s) IV Push two times a day  hydrALAZINE 25 milliGRAM(s) Oral two times a day  isosorbide   mononitrate ER Tablet (IMDUR) 60 milliGRAM(s) Oral daily  metoprolol succinate ER 75 milliGRAM(s) Oral daily  azithromycin  IVPB 500 milliGRAM(s) IV Intermittent every 24 hours  cefTRIAXone   IVPB 1000 milliGRAM(s) IV Intermittent every 24 hours  atorvastatin 40 milliGRAM(s) Oral at bedtime  dextrose 40% Gel 15 Gram(s) Oral once PRN  dextrose 50% Injectable 12.5 Gram(s) IV Push once  dextrose 50% Injectable 25 Gram(s) IV Push once  dextrose 50% Injectable 25 Gram(s) IV Push once  glucagon  Injectable 1 milliGRAM(s) IntraMuscular once PRN  insulin lispro (HumaLOG) corrective regimen sliding scale   SubCutaneous three times a day before meals  insulin lispro (HumaLOG) corrective regimen sliding scale   SubCutaneous at bedtime    dextrose 5%. 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  Skin: no rashes.  Ophthalmologic: no blurred vision, no loss of vision. 	  ENT: no sore throat, rhinorrhea, sinus congestion.  Respiratory: no SOB, cough or wheeze.  Gastrointestinal:  no N/V/D, no melena/hematemesis/hematochezia.  Genitourinary: no dysuria/hesitancy or hematuria.  Musculoskeletal: no myalgias or arthralgias.  Neurological: no changes in vision or hearing, no lightheadedness/dizziness, no syncope/near syncope	  Psychiatric: no unusual stress/anxiety.   Hematology/Lymphatics: no unusual bleeding, bruising and no lymphadenopathy.  Endocrine: no unusual thirst.   All others negative except as stated above and in HPI.    PHYSICAL EXAM:  T(C): 36.7 (07-08-19 @ 12:51), Max: 37.7 (07-07-19 @ 18:16)  HR: 60 (07-08-19 @ 12:51) (60 - 88)  BP: 149/41 (07-08-19 @ 12:51) (148/59 - 185/85)  RR: 17 (07-08-19 @ 12:51) (17 - 28)  SpO2: 95% (07-08-19 @ 12:51) (92% - 100%)  Wt(kg): --  I&O's Summary      Appearance: no acute distress  HEENT:   Normal oral mucosa, PERRL, EOMI	  Lymphatic: No lymphadenopathy  Cardiovascular: RRR, Normal S1 S2, No JVD, No murmurs, No edema  Respiratory: Lungs clear to auscultation	  Psychiatry: A & O x 3, Mood & affect appropriate  Gastrointestinal:  Soft, Non-tender, + BS	  Skin: No rashes, No ecchymoses, No cyanosis	  Neurologic: Non-focal  Extremities: Normal range of motion, No clubbing, cyanosis or edema  Vascular: Peripheral pulses palpable 2+ bilaterally      LABS:	 	    CBC Full  -  ( 07 Jul 2019 07:18 )  WBC Count : 18.05 K/uL  Hemoglobin : 9.9 g/dL  Hematocrit : 32.7 %  Platelet Count - Automated : 268 K/uL  Mean Cell Volume : 88.6 fL  Mean Cell Hemoglobin : 26.8 pg  Mean Cell Hemoglobin Concentration : 30.3 %  Auto Neutrophil # : 15.33 K/uL  Auto Lymphocyte # : 1.19 K/uL  Auto Monocyte # : 1.32 K/uL  Auto Eosinophil # : 0.06 K/uL  Auto Basophil # : 0.05 K/uL  Auto Neutrophil % : 84.9 %  Auto Lymphocyte % : 6.6 %  Auto Monocyte % : 7.3 %  Auto Eosinophil % : 0.3 %  Auto Basophil % : 0.3 %    07-07    135  |  97<L>  |  61<H>  ----------------------------<  341<H>  4.5   |  20<L>  |  1.37<H>    Ca    10.8<H>      07 Jul 2019 07:18  Phos  2.9     07-07  Mg     2.0     07-07    TPro  8.4<H>  /  Alb  3.4  /  TBili  0.6  /  DBili  x   /  AST  68<H>  /  ALT  49<H>  /  AlkPhos  189<H>  07-07      proBNP:   Lipid Profile:   HgA1c:   TSH:       CARDIAC MARKERS:            TELEMETRY: 	    EKG:  	  RADIOLOGY:    PREVIOUS DIAGNOSTIC TESTING:    Echocardiogram  < from: TTE with Doppler (w/Cont) (10.10.18 @ 14:06) >  CONCLUSIONS:  1. Mitral annular calcification, otherwise normal mitral  valve. Moderate mitral regurgitation.  2. Aortic valve leaflet morphology not well visualized.  Peak transaortic valve gradient equals 33 mm Hg, mean  transaortic valve gradient equals 18 mm Hg.  In the setting  of LV systolic dysfunction, these gradients may reflect  significant underlying aortic stenosis.  However, unable to  accurately estimate the aortic valve area. Moderate aortic  regurgitation.  3. Endocardium not well visualized; grossly moderate  segmental left ventricular systolic dysfunction.  Hypokinesis of the apex, mid to distal septum, and distal  anterior wall.  Endocardial visualization enhanced with  intravenous injection of echo contrast (Definity).  No LV  thrombus seen.  4. The right ventricle is not well visualized; grossly  normal right ventricular systolic function.  A device wire  is noted in the right heart.  5. Estimated right ventricular systolic pressure equals 60  mm Hg, assuming right atrial pressure equals 10 mm Hg,  consistent with moderate pulmonary hypertension.  Consider INDU for further evaluation of the aortic valve, if  clinically indicated.    < end of copied text >    Catheterization  < from: Cardiac Cath Lab (11.23.09 @ 08:24) >  Ventricles: EF estimated by contrast ventriculography was 60 %.  Coronary vessels: The coronary circulation is right dominant.  LM:      LM: Angiography showed minor luminal irregularities with no flow  limiting lesions.  LAD:      LAD: Angiography showed minor luminal irregularities with no flow  limiting lesions.  CX:      Circumflex: Angiography showed minor luminal irregularities with  no flow limiting lesions.  RCA:      RCA: Angiography showed minor luminal irregularities with no flow  limiting lesions.  Complications: There were no complications.    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 Admitted for CHF exacerbation and initially required BiPAP. Cardiology consulted for management of HF.     She has had worsening KRAUS the past few weeks. She thinks it was because her diuretic dose was changed in clinic on 6/4/19. She denies chest pain, palpitations, lightheadedness/dizziness.     Cardiologist: Dr. Luis Mccarthy    Allergies  No Known Allergies    	    MEDICATIONS:  amLODIPine   Tablet 10 milliGRAM(s) Oral daily  apixaban 2.5 milliGRAM(s) Oral two times a day  aspirin enteric coated 81 milliGRAM(s) Oral daily  furosemide   Injectable 80 milliGRAM(s) IV Push two times a day  hydrALAZINE 25 milliGRAM(s) Oral two times a day  isosorbide   mononitrate ER Tablet (IMDUR) 60 milliGRAM(s) Oral daily  metoprolol succinate ER 75 milliGRAM(s) Oral daily  azithromycin  IVPB 500 milliGRAM(s) IV Intermittent every 24 hours  cefTRIAXone   IVPB 1000 milliGRAM(s) IV Intermittent every 24 hours  atorvastatin 40 milliGRAM(s) Oral at bedtime  dextrose 40% Gel 15 Gram(s) Oral once PRN  dextrose 50% Injectable 12.5 Gram(s) IV Push once  dextrose 50% Injectable 25 Gram(s) IV Push once  dextrose 50% Injectable 25 Gram(s) IV Push once  glucagon  Injectable 1 milliGRAM(s) IntraMuscular once PRN  insulin lispro (HumaLOG) corrective regimen sliding scale   SubCutaneous three times a day before meals  insulin lispro (HumaLOG) corrective regimen sliding scale   SubCutaneous at bedtime    dextrose 5%. 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  Skin: no rashes.  Ophthalmologic: no blurred vision, no loss of vision. 	  ENT: no sore throat, rhinorrhea, sinus congestion.  Respiratory: no SOB, cough or wheeze.  Gastrointestinal:  no N/V/D, no melena/hematemesis/hematochezia.  Genitourinary: no dysuria/hesitancy or hematuria.  Musculoskeletal: no myalgias or arthralgias.  Neurological: no changes in vision or hearing, no lightheadedness/dizziness, no syncope/near syncope	  Psychiatric: no unusual stress/anxiety.   Hematology/Lymphatics: no unusual bleeding, bruising and no lymphadenopathy.  Endocrine: no unusual thirst.   All others negative except as stated above and in HPI.    PHYSICAL EXAM:  T(C): 36.7 (07-08-19 @ 12:51), Max: 37.7 (07-07-19 @ 18:16)  HR: 60 (07-08-19 @ 12:51) (60 - 88)  BP: 149/41 (07-08-19 @ 12:51) (148/59 - 185/85)  RR: 17 (07-08-19 @ 12:51) (17 - 28)  SpO2: 95% (07-08-19 @ 12:51) (92% - 100%)  Wt(kg): --  I&O's Summary      Appearance: no acute distress  HEENT:   Normal oral mucosa, PERRL, EOMI	  Lymphatic: No lymphadenopathy  Cardiovascular: RRR, Normal S1 S2, No JVD, No murmurs,  trace edema  Respiratory: diminished in bases  Psychiatry: A & O x 3, Mood & affect appropriate  Gastrointestinal:  Soft, Non-tender, + BS	  Skin: No rashes, No ecchymoses, No cyanosis	  Neurologic: Non-focal  Extremities: Normal range of motion, No clubbing, cyanosis   Vascular: Peripheral pulses palpable 2+ bilaterally      LABS:	 	    CBC Full  -  ( 07 Jul 2019 07:18 )  WBC Count : 18.05 K/uL  Hemoglobin : 9.9 g/dL  Hematocrit : 32.7 %  Platelet Count - Automated : 268 K/uL  Mean Cell Volume : 88.6 fL  Mean Cell Hemoglobin : 26.8 pg  Mean Cell Hemoglobin Concentration : 30.3 %  Auto Neutrophil # : 15.33 K/uL  Auto Lymphocyte # : 1.19 K/uL  Auto Monocyte # : 1.32 K/uL  Auto Eosinophil # : 0.06 K/uL  Auto Basophil # : 0.05 K/uL  Auto Neutrophil % : 84.9 %  Auto Lymphocyte % : 6.6 %  Auto Monocyte % : 7.3 %  Auto Eosinophil % : 0.3 %  Auto Basophil % : 0.3 %    07-07    135  |  97<L>  |  61<H>  ----------------------------<  341<H>  4.5   |  20<L>  |  1.37<H>    Ca    10.8<H>      07 Jul 2019 07:18  Phos  2.9     07-07  Mg     2.0     07-07    TPro  8.4<H>  /  Alb  3.4  /  TBili  0.6  /  DBili  x   /  AST  68<H>  /  ALT  49<H>  /  AlkPhos  189<H>  07-07      proBNP:   Lipid Profile:   HgA1c:   TSH:       CARDIAC MARKERS:  HS trop 50/52          TELEMETRY: a- paced   EKG:  paced  RADIOLOGY:    PREVIOUS DIAGNOSTIC TESTING:    Echocardiogram  < from: TTE with Doppler (w/Cont) (10.10.18 @ 14:06) >  CONCLUSIONS:  1. Mitral annular calcification, otherwise normal mitral  valve. Moderate mitral regurgitation.  2. Aortic valve leaflet morphology not well visualized.  Peak transaortic valve gradient equals 33 mm Hg, mean  transaortic valve gradient equals 18 mm Hg.  In the setting  of LV systolic dysfunction, these gradients may reflect  significant underlying aortic stenosis.  However, unable to  accurately estimate the aortic valve area. Moderate aortic  regurgitation.  3. Endocardium not well visualized; grossly moderate  segmental left ventricular systolic dysfunction.  Hypokinesis of the apex, mid to distal septum, and distal  anterior wall.  Endocardial visualization enhanced with  intravenous injection of echo contrast (Definity).  No LV  thrombus seen.  4. The right ventricle is not well visualized; grossly  normal right ventricular systolic function.  A device wire  is noted in the right heart.  5. Estimated right ventricular systolic pressure equals 60  mm Hg, assuming right atrial pressure equals 10 mm Hg,  consistent with moderate pulmonary hypertension.  Consider INDU for further evaluation of the aortic valve, if  clinically indicated.    < end of copied text >    Catheterization  < from: Cardiac Cath Lab (11.23.09 @ 08:24) >  Ventricles: EF estimated by contrast ventriculography was 60 %.  Coronary vessels: The coronary circulation is right dominant.  LM:      LM: Angiography showed minor luminal irregularities with no flow  limiting lesions.  LAD:      LAD: Angiography showed minor luminal irregularities with no flow  limiting lesions.  CX:      Circumflex: Angiography showed minor luminal irregularities with  no flow limiting lesions.  RCA:      RCA: Angiography showed minor luminal irregularities with no flow  limiting lesions.  Complications: There were no complications.    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 Admitted for CHF exacerbation and initially required BiPAP. Cardiology consulted for management of HF.     #HFrEF  - Continue hydralazine, Imdur, metoprolol  - Continue IV Lasix  - Daily standing weights, strict I/Os  - Monitor BMP, replete K and Mg PRN    #Aortic stenosis  - Patient declines INDU    #AFib  - Continue apixaban and BB    #HTN  - Continue amlodipine    BOB Morales  Cardiology Fellow   [id number]  All cardiology service information can be found 24/7 at [email or web address], password: [email or web address]

## 2019-07-08 NOTE — PROGRESS NOTE ADULT - PROBLEM SELECTOR PLAN 2
Despite relative lack of infectious symptoms, CT demonstrates bibasilar consolidations. In the context of leukocytosis and left shift, favor CAP treatment. No recent hospitalizations or obvious immune compromise (Hgb A1c has been at/near goal in recent months). Will continue with ceftriaxone + azithromycin. BCx remain NGTD  anticipate 7 day tx with transition to oral Levaquin in discharge

## 2019-07-08 NOTE — ED ADULT NURSE REASSESSMENT NOTE - NS ED NURSE REASSESS COMMENT FT1
Pt vital signs reassessed as noted. Pt on 4L NC and tolerating well, oxygen saturation at 100%. Pt resting comfortably at this time. Will continue to monitor.

## 2019-07-08 NOTE — PROGRESS NOTE ADULT - SUBJECTIVE AND OBJECTIVE BOX
Patient is a 84y old  Female who presents with a chief complaint of dyspnea (2019 16:42)      SUBJECTIVE / OVERNIGHT EVENTS: no further diarrhea no further bleed. endorsing some cough - SOB and KRAUS much improved not requiring BiPAP anymore     ROS:  No HA/DZ  No Vision changes   No CP  No N/V/D  No Rash  NO weakness, numbness    MEDICATIONS  (STANDING):  amLODIPine   Tablet 10 milliGRAM(s) Oral daily  apixaban 2.5 milliGRAM(s) Oral two times a day  aspirin enteric coated 81 milliGRAM(s) Oral daily  atorvastatin 40 milliGRAM(s) Oral at bedtime  azithromycin  IVPB 500 milliGRAM(s) IV Intermittent every 24 hours  cefTRIAXone   IVPB 1000 milliGRAM(s) IV Intermittent every 24 hours  dextrose 5%. 1000 milliLiter(s) (50 mL/Hr) IV Continuous <Continuous>  dextrose 50% Injectable 12.5 Gram(s) IV Push once  dextrose 50% Injectable 25 Gram(s) IV Push once  dextrose 50% Injectable 25 Gram(s) IV Push once  furosemide   Injectable 80 milliGRAM(s) IV Push two times a day  hydrALAZINE 25 milliGRAM(s) Oral two times a day  insulin lispro (HumaLOG) corrective regimen sliding scale   SubCutaneous three times a day before meals  insulin lispro (HumaLOG) corrective regimen sliding scale   SubCutaneous at bedtime  isosorbide   mononitrate ER Tablet (IMDUR) 60 milliGRAM(s) Oral daily  metoprolol succinate ER 75 milliGRAM(s) Oral daily    MEDICATIONS  (PRN):  dextrose 40% Gel 15 Gram(s) Oral once PRN Blood Glucose LESS THAN 70 milliGRAM(s)/deciliter  glucagon  Injectable 1 milliGRAM(s) IntraMuscular once PRN Glucose LESS THAN 70 milligrams/deciliter      T(C): 36.7 (19 @ 12:51)  HR: 88 (19 @ 06:48)  BP: 149/41 (19 @ 12:51)  RR: 17 (19 @ 12:51)  SpO2: 95% (19 @ 12:51)  CAPILLARY BLOOD GLUCOSE      POCT Blood Glucose.: 226 mg/dL (2019 12:53)  POCT Blood Glucose.: 224 mg/dL (2019 11:08)  POCT Blood Glucose.: 199 mg/dL (2019 08:52)  POCT Blood Glucose.: 166 mg/dL (2019 23:42)    I&O's Summary      PHYSICAL EXAM:  GENERAL: NAD, well-developed, AOx3  HEAD:  Atraumatic, Normocephalic  EYES: EOMI, PERRL, conjunctiva and sclera clear  NECK: Supple, No JVD  CHEST/LUNG: Basilar rhonchi B/L   HEART: Regular rate and rhythm; No murmurs, rubs, or gallops, +1 Edema  ABDOMEN: Soft, Nontender, Nondistended; Bowel sounds present  EXTREMITIES:  2+ Peripheral Pulses, No clubbing, cyanosis  PSYCH: No SI/HI  NEUROLOGY: non-focal  SKIN: No rashes or lesions    LABS:                        9.9    18.05 )-----------( 268      ( 2019 07:18 )             32.7     07-07    135  |  97<L>  |  61<H>  ----------------------------<  341<H>  4.5   |  20<L>  |  1.37<H>    Ca    10.8<H>      2019 07:18  Phos  2.9     -  Mg     2.0         TPro  8.4<H>  /  Alb  3.4  /  TBili  0.6  /  DBili  x   /  AST  68<H>  /  ALT  49<H>  /  AlkPhos  189<H>  07    PT/INR - ( 2019 07:18 )   PT: 19.2 SEC;   INR: 1.70          PTT - ( 2019 07:18 )  PTT:35.4 SEC      Urinalysis Basic - ( 2019 11:08 )    Color: YELLOW / Appearance: CLEAR / S.013 / pH: 6.0  Gluc: 70 / Ketone: NEGATIVE  / Bili: NEGATIVE / Urobili: NORMAL   Blood: MODERATE / Protein: 200 / Nitrite: NEGATIVE   Leuk Esterase: NEGATIVE / RBC: 3-5 / WBC 0-2   Sq Epi: OCC / Non Sq Epi: x / Bacteria: NEGATIVE            RADIOLOGY & ADDITIONAL TESTS:    Imaging Personally Reviewed:    Consultant(s) Notes Reviewed:      Care Discussed with Consultants/Other Providers:

## 2019-07-08 NOTE — PHYSICAL THERAPY INITIAL EVALUATION ADULT - ADDITIONAL COMMENTS
lives with daughter in a home with bedroom on the 1st floor; patient uses a Single [employer] Cane at baseline

## 2019-07-08 NOTE — ED ADULT NURSE REASSESSMENT NOTE - NS ED NURSE REASSESS COMMENT FT1
as per ads sow, pt ok to be weaned off bipap as toelrated. pt on 4l nc hob elevated and toelrating well. nad noted. will monitor

## 2019-07-08 NOTE — PROGRESS NOTE ADULT - ASSESSMENT
84 F with HTN, HLD, DM2, chronic systolic HF, afib on apixaban, prior uterine cancer s/p hysterectomy who presents with dyspnea concerning for acute on chronic systolic HF. Also p/w brief loose stools, now resolved, c/b BRBPR, now also resolved.

## 2019-07-08 NOTE — CONSULT NOTE ADULT - ATTENDING COMMENTS
personally saw and examined patient today  pt with known hx of LV systolic dysfunction, now presents with acute symptomatic systolic heart failure in setting of changes to her diuretic regimen over past few weeks as outpatient  pt initially presented in respiratory failure requiring bipap, now off bipap, s/p aggressive diuresis and feeling better  cont lasix 80 IV BID for today, will re-assess diuretic regimen daily as patient approaches euvolemia  check electrolytes and replete as required. monitor on tele      on reent echo 10/18, also with mod AS, pt refused INDU at thattime for further w/u of AS, and now again states that she does not want to undergo a INDU

## 2019-07-09 DIAGNOSIS — N17.9 ACUTE KIDNEY FAILURE, UNSPECIFIED: ICD-10-CM

## 2019-07-09 LAB
ANION GAP SERPL CALC-SCNC: 18 MMO/L — HIGH (ref 7–14)
BUN SERPL-MCNC: 85 MG/DL — HIGH (ref 7–23)
CALCIUM SERPL-MCNC: 10.8 MG/DL — HIGH (ref 8.4–10.5)
CHLORIDE SERPL-SCNC: 96 MMOL/L — LOW (ref 98–107)
CO2 SERPL-SCNC: 20 MMOL/L — LOW (ref 22–31)
CREAT SERPL-MCNC: 3.79 MG/DL — HIGH (ref 0.5–1.3)
GLUCOSE BLDC GLUCOMTR-MCNC: 217 MG/DL — HIGH (ref 70–99)
GLUCOSE BLDC GLUCOMTR-MCNC: 232 MG/DL — HIGH (ref 70–99)
GLUCOSE BLDC GLUCOMTR-MCNC: 250 MG/DL — HIGH (ref 70–99)
GLUCOSE BLDC GLUCOMTR-MCNC: 421 MG/DL — HIGH (ref 70–99)
GLUCOSE BLDC GLUCOMTR-MCNC: 422 MG/DL — HIGH (ref 70–99)
GLUCOSE SERPL-MCNC: 160 MG/DL — HIGH (ref 70–99)
HCT VFR BLD CALC: 30.2 % — LOW (ref 34.5–45)
HGB BLD-MCNC: 9.6 G/DL — LOW (ref 11.5–15.5)
MAGNESIUM SERPL-MCNC: 2.1 MG/DL — SIGNIFICANT CHANGE UP (ref 1.6–2.6)
MCHC RBC-ENTMCNC: 27.4 PG — SIGNIFICANT CHANGE UP (ref 27–34)
MCHC RBC-ENTMCNC: 31.8 % — LOW (ref 32–36)
MCV RBC AUTO: 86 FL — SIGNIFICANT CHANGE UP (ref 80–100)
NRBC # FLD: 0 K/UL — SIGNIFICANT CHANGE UP (ref 0–0)
PLATELET # BLD AUTO: 230 K/UL — SIGNIFICANT CHANGE UP (ref 150–400)
PMV BLD: 10.4 FL — SIGNIFICANT CHANGE UP (ref 7–13)
POTASSIUM SERPL-MCNC: 4.2 MMOL/L — SIGNIFICANT CHANGE UP (ref 3.5–5.3)
POTASSIUM SERPL-SCNC: 4.2 MMOL/L — SIGNIFICANT CHANGE UP (ref 3.5–5.3)
RBC # BLD: 3.51 M/UL — LOW (ref 3.8–5.2)
RBC # FLD: 18.2 % — HIGH (ref 10.3–14.5)
SODIUM SERPL-SCNC: 134 MMOL/L — LOW (ref 135–145)
WBC # BLD: 13.6 K/UL — HIGH (ref 3.8–10.5)
WBC # FLD AUTO: 13.6 K/UL — HIGH (ref 3.8–10.5)

## 2019-07-09 PROCEDURE — 93306 TTE W/DOPPLER COMPLETE: CPT | Mod: 26

## 2019-07-09 PROCEDURE — 99233 SBSQ HOSP IP/OBS HIGH 50: CPT

## 2019-07-09 RX ORDER — SODIUM CHLORIDE 9 MG/ML
3 INJECTION INTRAMUSCULAR; INTRAVENOUS; SUBCUTANEOUS EVERY 8 HOURS
Refills: 0 | Status: DISCONTINUED | OUTPATIENT
Start: 2019-07-09 | End: 2019-07-16

## 2019-07-09 RX ADMIN — Medication 4: at 17:40

## 2019-07-09 RX ADMIN — APIXABAN 2.5 MILLIGRAM(S): 2.5 TABLET, FILM COATED ORAL at 05:41

## 2019-07-09 RX ADMIN — ATORVASTATIN CALCIUM 40 MILLIGRAM(S): 80 TABLET, FILM COATED ORAL at 21:38

## 2019-07-09 RX ADMIN — AMLODIPINE BESYLATE 10 MILLIGRAM(S): 2.5 TABLET ORAL at 05:41

## 2019-07-09 RX ADMIN — ISOSORBIDE MONONITRATE 60 MILLIGRAM(S): 60 TABLET, EXTENDED RELEASE ORAL at 12:15

## 2019-07-09 RX ADMIN — SODIUM CHLORIDE 3 MILLILITER(S): 9 INJECTION INTRAMUSCULAR; INTRAVENOUS; SUBCUTANEOUS at 21:38

## 2019-07-09 RX ADMIN — CEFTRIAXONE 100 MILLIGRAM(S): 500 INJECTION, POWDER, FOR SOLUTION INTRAMUSCULAR; INTRAVENOUS at 12:21

## 2019-07-09 RX ADMIN — Medication 25 MILLIGRAM(S): at 05:41

## 2019-07-09 RX ADMIN — APIXABAN 2.5 MILLIGRAM(S): 2.5 TABLET, FILM COATED ORAL at 17:41

## 2019-07-09 RX ADMIN — Medication 81 MILLIGRAM(S): at 12:15

## 2019-07-09 RX ADMIN — Medication 75 MILLIGRAM(S): at 05:41

## 2019-07-09 RX ADMIN — SODIUM CHLORIDE 3 MILLILITER(S): 9 INJECTION INTRAMUSCULAR; INTRAVENOUS; SUBCUTANEOUS at 14:36

## 2019-07-09 RX ADMIN — Medication 12: at 13:07

## 2019-07-09 RX ADMIN — Medication 25 MILLIGRAM(S): at 17:41

## 2019-07-09 RX ADMIN — AZITHROMYCIN 250 MILLIGRAM(S): 500 TABLET, FILM COATED ORAL at 19:02

## 2019-07-09 RX ADMIN — Medication 4: at 08:49

## 2019-07-09 RX ADMIN — Medication 80 MILLIGRAM(S): at 05:41

## 2019-07-09 RX ADMIN — SODIUM CHLORIDE 3 MILLILITER(S): 9 INJECTION INTRAMUSCULAR; INTRAVENOUS; SUBCUTANEOUS at 05:46

## 2019-07-09 NOTE — PROGRESS NOTE ADULT - ASSESSMENT
84 F with HTN, HLD, DM2, chronic systolic HF, afib on apixaban, prior uterine cancer s/p hysterectomy who presents with dyspnea concerning for acute on chronic systolic HF. Also p/w brief loose stools, now resolved, c/b BRBPR, now also resolved, but course c/b CLARI

## 2019-07-09 NOTE — CONSULT NOTE ADULT - PROBLEM SELECTOR RECOMMENDATION 9
Patient with multifactorial CLARI on CKD in the setting of CHF exacerbation and IV diuresis. Exact duration of CKD unknown, likely secondary to longstanding HTN and DM. On admission, sCr was 1.37 (7/7/2019) and has steadily increased to 3.0 yesterday, and now is 3.8 today (7/9/2019). Miguel HAILE reviewed, patient has had multiple AKIs in the past 12 years since 2007. Baseline sCR appears to be around 1.88-2.2. Recommend US Kidney to rule out obstructive causes. Would get spot urine TP/CR ratio. Monitor UOP and daily weights. Avoid volume depletion, NSAIDs, ARB/ACE-I. Dose medications as per eGFR. Patient with non-olgiuric CLARI on CKD in the setting of CHF exacerbation. Exact duration of CKD unknown, likely secondary to longstanding HTN and DM. On admission, sCr was 1.37 (7/7/2019) and has steadily increased to 3.0 yesterday, and now is 3.8 today (7/9/2019). Miguel HAILE reviewed, patient has had multiple AKIs in the past 12 years since 2007. Last Scr prior to admission was 2.08 on 12/17/2018. Recommend US Kidney to rule out obstructive causes. CLARI likely hemodynamically related in the setting of CHF exacerbation. IV diuresis as per Cardiology. Monitor UOP and daily weights. Avoid volume depletion, NSAIDs, ARB/ACE-I. Dose medications as per eGFR.

## 2019-07-09 NOTE — PROGRESS NOTE ADULT - SUBJECTIVE AND OBJECTIVE BOX
Patient seen and examined at bedside.    Overnight Events:       REVIEW OF SYSTEMS:  Constitutional:     No fevers, chills, weight loss, weight gain  HEENT:                  No dry eyes, nasal congestion, postnasal drip  CV:                         No chest pain, palpitations, orthopnea, PND  Resp:                     No cough, SOB, dyspnea, wheezing, sputum  GI:                          No nausea, vomiting, abdominal pain, diarrhea, constipation  :                        No dysuria, nocturia, hematuria, increased urinary frequency  Musculoskeletal: No back pain, myalgias, arthralgias   Skin:                       No rash, pruritus, ecchymoses  Neurological:        No headache, dizziness, syncope, weakness, numbness  Psychiatric:           No anxiety, depression   Endocrine:            No hot/cold intolerance, polydipsia  Heme/Lymph:      No bleeding, easy bruising  Allergic/Immune: No itchy eyes, rhinorrhea, hives angioedema      Current Meds:  amLODIPine   Tablet 10 milliGRAM(s) Oral daily  apixaban 2.5 milliGRAM(s) Oral two times a day  aspirin enteric coated 81 milliGRAM(s) Oral daily  atorvastatin 40 milliGRAM(s) Oral at bedtime  azithromycin  IVPB 500 milliGRAM(s) IV Intermittent every 24 hours  cefTRIAXone   IVPB 1000 milliGRAM(s) IV Intermittent every 24 hours  dextrose 40% Gel 15 Gram(s) Oral once PRN  dextrose 5%. 1000 milliLiter(s) IV Continuous <Continuous>  dextrose 50% Injectable 12.5 Gram(s) IV Push once  dextrose 50% Injectable 25 Gram(s) IV Push once  dextrose 50% Injectable 25 Gram(s) IV Push once  furosemide   Injectable 80 milliGRAM(s) IV Push two times a day  glucagon  Injectable 1 milliGRAM(s) IntraMuscular once PRN  hydrALAZINE 25 milliGRAM(s) Oral two times a day  insulin lispro (HumaLOG) corrective regimen sliding scale   SubCutaneous three times a day before meals  insulin lispro (HumaLOG) corrective regimen sliding scale   SubCutaneous at bedtime  isosorbide   mononitrate ER Tablet (IMDUR) 60 milliGRAM(s) Oral daily  metoprolol succinate ER 75 milliGRAM(s) Oral daily  sodium chloride 0.9% lock flush 3 milliLiter(s) IV Push every 8 hours      PAST MEDICAL & SURGICAL HISTORY:  Chronic systolic heart failure  Tachycardia-bradycardia syndrome  Hyperlipidemia  Uterine Cancer (ICD9 179): treated with hysterectomy and radiation therapy in 2008  Osteoarthritis of Knee (ICD9 715.96): b/l knees  DM Type 2 (Diabetes Mellitus, Type 2) (ICD9 250.00)  HTN (Hypertension) (ICD9 401.9)  Status Post Total Knee Replacement: LISA, 6 months apart 2010  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 @ [facility])  History of Hysterectomy (ICD9 V88.01)      Vitals:  T(F): 98.3 (07-09), Max: 98.8 (07-08)  HR: 68 (07-09) (60 - 85)  BP: 131/55 (07-09) (131/55 - 150/50)  RR: 18 (07-09)  SpO2: 97% (07-09)  I&O's Summary    08 Jul 2019 07:01  -  09 Jul 2019 07:00  --------------------------------------------------------  IN: 220 mL / OUT: 500 mL / NET: -280 mL        Physical Exam:  Appearance: No acute distress; well appearing  Eyes: PERRL, EOMI, pink conjunctiva  HENT: Normal oral mucosa  Cardiovascular: RRR, S1, S2, no murmurs, rubs, or gallops; no edema; no JVD  Respiratory: Clear to auscultation bilaterally  Gastrointestinal: soft, non-tender, non-distended with normal bowel sounds  Musculoskeletal: No clubbing; no joint deformity   Neurologic: Non-focal  Lymphatic: No lymphadenopathy  Psychiatry: AAOx3, mood & affect appropriate  Skin: No rashes, ecchymoses, or cyanosis                          9.5    12.82 )-----------( 238      ( 08 Jul 2019 17:30 )             30.6     07-08    135  |  97<L>  |  77<H>  ----------------------------<  200<H>  4.1   |  23  |  3.00<H>    Ca    11.1<H>      08 Jul 2019 17:30  Mg     2.2     07-08      PT/INR - ( 08 Jul 2019 17:30 )   PT: 24.2 SEC;   INR: 2.08          PTT - ( 08 Jul 2019 17:30 )  PTT:38.8 SEC      Serum Pro-Brain Natriuretic Peptide: [id number] pg/mL (07-07 @ 07:18)        PREVIOUS DIAGNOSTIC TESTING:    Echocardiogram  < from: TTE with Doppler (w/Cont) (10.10.18 @ 14:06) >  CONCLUSIONS:  1. Mitral annular calcification, otherwise normal mitral  valve. Moderate mitral regurgitation.  2. Aortic valve leaflet morphology not well visualized.  Peak transaortic valve gradient equals 33 mm Hg, mean  transaortic valve gradient equals 18 mm Hg.  In the setting  of LV systolic dysfunction, these gradients may reflect  significant underlying aortic stenosis.  However, unable to  accurately estimate the aortic valve area. Moderate aortic  regurgitation.  3. Endocardium not well visualized; grossly moderate  segmental left ventricular systolic dysfunction.  Hypokinesis of the apex, mid to distal septum, and distal  anterior wall.  Endocardial visualization enhanced with  intravenous injection of echo contrast (Definity).  No LV  thrombus seen.  4. The right ventricle is not well visualized; grossly  normal right ventricular systolic function.  A device wire  is noted in the right heart.  5. Estimated right ventricular systolic pressure equals 60  mm Hg, assuming right atrial pressure equals 10 mm Hg,  consistent with moderate pulmonary hypertension.  Consider INDU for further evaluation of the aortic valve, if  clinically indicated.    < end of copied text >    Catheterization  < from: Cardiac Cath Lab (11.23.09 @ 08:24) >  Ventricles: EF estimated by contrast ventriculography was 60 %.  Coronary vessels: The coronary circulation is right dominant.  LM:      LM: Angiography showed minor luminal irregularities with no flow  limiting lesions.  LAD:      LAD: Angiography showed minor luminal irregularities with no flow  limiting lesions.  CX:      Circumflex: Angiography showed minor luminal irregularities with  no flow limiting lesions.  RCA:      RCA: Angiography showed minor luminal irregularities with no flow  limiting lesions.  Complications: There were no complications.    < end of copied text >    Interpretation of Telemetry: Patient seen and examined at bedside.    Overnight Events: Patient's breathing is more distressed this morning. She denies chest pain, palpitations, LE edema.       REVIEW OF SYSTEMS:  Constitutional:     No fevers, chills, weight loss, weight gain  HEENT:                  No dry eyes, nasal congestion, postnasal drip  CV:                         No chest pain, palpitations, orthopnea, PND  Resp:                     see above  GI:                          No nausea, vomiting, abdominal pain, diarrhea, constipation  :                        No dysuria, nocturia, hematuria, increased urinary frequency  Musculoskeletal: No back pain, myalgias, arthralgias   Skin:                       No rash, pruritus, ecchymoses  Neurological:        No headache, dizziness, syncope, weakness, numbness  Psychiatric:           No anxiety, depression   Endocrine:            No hot/cold intolerance, polydipsia  Heme/Lymph:      No bleeding, easy bruising  Allergic/Immune: No itchy eyes, rhinorrhea, hives angioedema      Current Meds:  amLODIPine   Tablet 10 milliGRAM(s) Oral daily  apixaban 2.5 milliGRAM(s) Oral two times a day  aspirin enteric coated 81 milliGRAM(s) Oral daily  atorvastatin 40 milliGRAM(s) Oral at bedtime  azithromycin  IVPB 500 milliGRAM(s) IV Intermittent every 24 hours  cefTRIAXone   IVPB 1000 milliGRAM(s) IV Intermittent every 24 hours  dextrose 40% Gel 15 Gram(s) Oral once PRN  dextrose 5%. 1000 milliLiter(s) IV Continuous <Continuous>  dextrose 50% Injectable 12.5 Gram(s) IV Push once  dextrose 50% Injectable 25 Gram(s) IV Push once  dextrose 50% Injectable 25 Gram(s) IV Push once  furosemide   Injectable 80 milliGRAM(s) IV Push two times a day  glucagon  Injectable 1 milliGRAM(s) IntraMuscular once PRN  hydrALAZINE 25 milliGRAM(s) Oral two times a day  insulin lispro (HumaLOG) corrective regimen sliding scale   SubCutaneous three times a day before meals  insulin lispro (HumaLOG) corrective regimen sliding scale   SubCutaneous at bedtime  isosorbide   mononitrate ER Tablet (IMDUR) 60 milliGRAM(s) Oral daily  metoprolol succinate ER 75 milliGRAM(s) Oral daily  sodium chloride 0.9% lock flush 3 milliLiter(s) IV Push every 8 hours      PAST MEDICAL & SURGICAL HISTORY:  Chronic systolic heart failure  Tachycardia-bradycardia syndrome  Hyperlipidemia  Uterine Cancer (ICD9 179): treated with hysterectomy and radiation therapy in 2008  Osteoarthritis of Knee (ICD9 715.96): b/l knees  DM Type 2 (Diabetes Mellitus, Type 2) (ICD9 250.00)  HTN (Hypertension) (ICD9 401.9)  Status Post Total Knee Replacement: LISA, 6 months apart 2010  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 @ [facility])  History of Hysterectomy (ICD9 V88.01)      Vitals:  T(F): 98.3 (07-09), Max: 98.8 (07-08)  HR: 68 (07-09) (60 - 85)  BP: 131/55 (07-09) (131/55 - 150/50)  RR: 18 (07-09)  SpO2: 97% (07-09)  I&O's Summary    08 Jul 2019 07:01  -  09 Jul 2019 07:00  --------------------------------------------------------  IN: 220 mL / OUT: 500 mL / NET: -280 mL        Physical Exam:  Appearance: No acute distress  Eyes: PERRL, EOMI, pink conjunctiva  HENT: Normal oral mucosa  Cardiovascular: RRR, S1, S2, no murmurs, rubs, or gallops; no edema   Respiratory: tachypneic with speaking, diminished in bases  Gastrointestinal: soft, non-tender, non-distended with normal bowel sounds  Musculoskeletal: No clubbing; no joint deformity   Neurologic: Non-focal  Lymphatic: No lymphadenopathy  Psychiatry: AAOx3, mood & affect appropriate  Skin: No rashes, ecchymoses, or cyanosis                          9.5    12.82 )-----------( 238      ( 08 Jul 2019 17:30 )             30.6     07-08    135  |  97<L>  |  77<H>  ----------------------------<  200<H>  4.1   |  23  |  3.00<H>    Ca    11.1<H>      08 Jul 2019 17:30  Mg     2.2     07-08      PT/INR - ( 08 Jul 2019 17:30 )   PT: 24.2 SEC;   INR: 2.08          PTT - ( 08 Jul 2019 17:30 )  PTT:38.8 SEC      Serum Pro-Brain Natriuretic Peptide: 47094 pg/mL (07-07 @ 07:18)        PREVIOUS DIAGNOSTIC TESTING:    Echocardiogram  < from: TTE with Doppler (w/Cont) (10.10.18 @ 14:06) >  CONCLUSIONS:  1. Mitral annular calcification, otherwise normal mitral  valve. Moderate mitral regurgitation.  2. Aortic valve leaflet morphology not well visualized.  Peak transaortic valve gradient equals 33 mm Hg, mean  transaortic valve gradient equals 18 mm Hg.  In the setting  of LV systolic dysfunction, these gradients may reflect  significant underlying aortic stenosis.  However, unable to  accurately estimate the aortic valve area. Moderate aortic  regurgitation.  3. Endocardium not well visualized; grossly moderate  segmental left ventricular systolic dysfunction.  Hypokinesis of the apex, mid to distal septum, and distal  anterior wall.  Endocardial visualization enhanced with  intravenous injection of echo contrast (Definity).  No LV  thrombus seen.  4. The right ventricle is not well visualized; grossly  normal right ventricular systolic function.  A device wire  is noted in the right heart.  5. Estimated right ventricular systolic pressure equals 60  mm Hg, assuming right atrial pressure equals 10 mm Hg,  consistent with moderate pulmonary hypertension.  Consider INDU for further evaluation of the aortic valve, if  clinically indicated.    < end of copied text >    Catheterization  < from: Cardiac Cath Lab (11.23.09 @ 08:24) >  Ventricles: EF estimated by contrast ventriculography was 60 %.  Coronary vessels: The coronary circulation is right dominant.  LM:      LM: Angiography showed minor luminal irregularities with no flow  limiting lesions.  LAD:      LAD: Angiography showed minor luminal irregularities with no flow  limiting lesions.  CX:      Circumflex: Angiography showed minor luminal irregularities with  no flow limiting lesions.  RCA:      RCA: Angiography showed minor luminal irregularities with no flow  limiting lesions.  Complications: There were no complications.    < end of copied text >    Interpretation of Telemetry: paced 60s

## 2019-07-09 NOTE — CONSULT NOTE ADULT - ASSESSMENT
Patient with multifactorial CLARI on CKD Patient with non oliguric CLARI on CKD in the setting of CHF exacerbation. Patient with non-oliguric CLARI on CKD in the setting of CHF exacerbation.

## 2019-07-09 NOTE — PROGRESS NOTE ADULT - ASSESSMENT
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 Admitted for CHF exacerbation and initially required BiPAP. Cardiology consulted for management of HF.     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 Admitted for CHF exacerbation and initially required BiPAP. Cardiology consulted for management of HF.     #HFrEF  - Continue hydralazine, Imdur, metoprolol  - Daily standing weights, strict I/Os  - Monitor BMP daily, replete K and Mg PRN  - Lasix on hold given CLARI, appreciate renal recs    #Aortic stenosis  - Patient declined INDU in the past, but now wishes us to readdress it with her son, Derian Echavarria ([phone])    #AFib  - Continue apixaban and BB    #HTN  - Continue amlodipine      BOB Morales  Cardiology Fellow   [id number]  All cardiology service information can be found 24/7 at [email or web address], password: [email or web address]

## 2019-07-09 NOTE — CONSULT NOTE ADULT - ATTENDING COMMENTS
Pt. with worsening CLARI in setting of HF. Pt. seen and examined Pt. with worsening CLARI in setting of HF. Pt. seen and examined today. Pt. feels better but gives history of SOB on exertion. Pt. noted to have decreased urine output in last 24 hours. Scr increased to 5.26. Will need to consider HD if CLARI continues to worsen. Monitor BP, labs and urine output.

## 2019-07-09 NOTE — PROGRESS NOTE ADULT - SUBJECTIVE AND OBJECTIVE BOX
Patient is a 84y old  Female who presents with a chief complaint of dyspnea (09 Jul 2019 07:18)      SUBJECTIVE / OVERNIGHT EVENTS: states SOB has improved, states urinating wo problems     ROS:  No HA/DZ  No Vision changes   No CP  No N/V/D  No Edema  No Rash  NO weakness, numbness    MEDICATIONS  (STANDING):  amLODIPine   Tablet 10 milliGRAM(s) Oral daily  apixaban 2.5 milliGRAM(s) Oral two times a day  aspirin enteric coated 81 milliGRAM(s) Oral daily  atorvastatin 40 milliGRAM(s) Oral at bedtime  azithromycin  IVPB 500 milliGRAM(s) IV Intermittent every 24 hours  cefTRIAXone   IVPB 1000 milliGRAM(s) IV Intermittent every 24 hours  dextrose 5%. 1000 milliLiter(s) (50 mL/Hr) IV Continuous <Continuous>  dextrose 50% Injectable 12.5 Gram(s) IV Push once  dextrose 50% Injectable 25 Gram(s) IV Push once  dextrose 50% Injectable 25 Gram(s) IV Push once  hydrALAZINE 25 milliGRAM(s) Oral two times a day  insulin lispro (HumaLOG) corrective regimen sliding scale   SubCutaneous three times a day before meals  insulin lispro (HumaLOG) corrective regimen sliding scale   SubCutaneous at bedtime  isosorbide   mononitrate ER Tablet (IMDUR) 60 milliGRAM(s) Oral daily  metoprolol succinate ER 75 milliGRAM(s) Oral daily  sodium chloride 0.9% lock flush 3 milliLiter(s) IV Push every 8 hours    MEDICATIONS  (PRN):  dextrose 40% Gel 15 Gram(s) Oral once PRN Blood Glucose LESS THAN 70 milliGRAM(s)/deciliter  glucagon  Injectable 1 milliGRAM(s) IntraMuscular once PRN Glucose LESS THAN 70 milligrams/deciliter      T(C): 36.8 (07-09-19 @ 05:37)  HR: 60 (07-09-19 @ 07:48)  BP: 131/55 (07-09-19 @ 05:37)  RR: 18 (07-09-19 @ 05:37)  SpO2: 97% (07-09-19 @ 07:48)  CAPILLARY BLOOD GLUCOSE      POCT Blood Glucose.: 421 mg/dL (09 Jul 2019 13:04)  POCT Blood Glucose.: 422 mg/dL (09 Jul 2019 13:02)  POCT Blood Glucose.: 217 mg/dL (09 Jul 2019 08:36)  POCT Blood Glucose.: 248 mg/dL (08 Jul 2019 22:26)  POCT Blood Glucose.: 196 mg/dL (08 Jul 2019 18:07)    I&O's Summary    08 Jul 2019 07:01  -  09 Jul 2019 07:00  --------------------------------------------------------  IN: 340 mL / OUT: 850 mL / NET: -510 mL        PHYSICAL EXAM:  GENERAL: NAD, well-developed, AOx3  HEAD:  Atraumatic, Normocephalic  EYES: EOMI, PERRL, conjunctiva and sclera clear  NECK: Supple, No JVD  CHEST/LUNG: basilar crackles   HEART: Regular rate and rhythm; No murmurs, rubs, or gallops, + Edema  ABDOMEN: Soft, Nontender, Nondistended; Bowel sounds present  EXTREMITIES:  2+ Peripheral Pulses, No clubbing, cyanosis  PSYCH: No SI/HI  NEUROLOGY: non-focal  SKIN: No rashes or lesions    LABS:                        9.6    13.60 )-----------( 230      ( 09 Jul 2019 06:40 )             30.2     07-09    134<L>  |  96<L>  |  85<H>  ----------------------------<  160<H>  4.2   |  20<L>  |  3.79<H>    Ca    10.8<H>      09 Jul 2019 06:40  Mg     2.1     07-09      PT/INR - ( 08 Jul 2019 17:30 )   PT: 24.2 SEC;   INR: 2.08          PTT - ( 08 Jul 2019 17:30 )  PTT:38.8 SEC              RADIOLOGY & ADDITIONAL TESTS:    Imaging Personally Reviewed:    Consultant(s) Notes Reviewed:      Care Discussed with Consultants/Other Providers:

## 2019-07-09 NOTE — PROGRESS NOTE ADULT - PROBLEM SELECTOR PLAN 1
on CKD 3  suspect due to diuresis, but need to rule out retention  check bladder scan STAT and PVR - renal consult, hold Lasix, notified cards

## 2019-07-09 NOTE — CONSULT NOTE ADULT - SUBJECTIVE AND OBJECTIVE BOX
[facility] DIVISION OF KIDNEY DISEASES AND HYPERTENSION -- [phone]  -- INITIAL CONSULT NOTE  --------------------------------------------------------------------------------  HPI: 84F with PMH T2DM, HFrEF (moderate reduced EF), HLD, HTN (over 30 years), Afib, presented to the ER complaining of SOB. Patient was admitted for CHF exacerbation on 7/7/2019. Patient was started on IV Lasix for diuresis. Nephrology consulted for rising creatinine. 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 @ [facility])  History of Hysterectomy (ICD9 V88.01)    FAMILY HISTORY:  No pertinent family history in first degree relatives    PAST SOCIAL HISTORY:    ALLERGIES & MEDICATIONS  --------------------------------------------------------------------------------  Allergies    No Known Allergies    Intolerances      Standing Inpatient Medications  amLODIPine   Tablet 10 milliGRAM(s) Oral daily  apixaban 2.5 milliGRAM(s) Oral two times a day  aspirin enteric coated 81 milliGRAM(s) Oral daily  atorvastatin 40 milliGRAM(s) Oral at bedtime  azithromycin  IVPB 500 milliGRAM(s) IV Intermittent every 24 hours  cefTRIAXone   IVPB 1000 milliGRAM(s) IV Intermittent every 24 hours  dextrose 5%. 1000 milliLiter(s) IV Continuous <Continuous>  dextrose 50% Injectable 12.5 Gram(s) IV Push once  dextrose 50% Injectable 25 Gram(s) IV Push once  dextrose 50% Injectable 25 Gram(s) IV Push once  hydrALAZINE 25 milliGRAM(s) Oral two times a day  insulin lispro (HumaLOG) corrective regimen sliding scale   SubCutaneous three times a day before meals  insulin lispro (HumaLOG) corrective regimen sliding scale   SubCutaneous at bedtime  isosorbide   mononitrate ER Tablet (IMDUR) 60 milliGRAM(s) Oral daily  metoprolol succinate ER 75 milliGRAM(s) Oral daily  sodium chloride 0.9% lock flush 3 milliLiter(s) IV Push every 8 hours    PRN Inpatient Medications  dextrose 40% Gel 15 Gram(s) Oral once PRN  glucagon  Injectable 1 milliGRAM(s) IntraMuscular once PRN      REVIEW OF SYSTEMS  --------------------------------------------------------------------------------  Gen: No fevers/chills  Skin: No rashes  Head/Eyes/Ears: Normal hearing,   Respiratory: No dyspnea, cough  CV: No chest pain  GI: No abdominal pain, diarrhea  : No dysuria, hematuria  MSK: No  edema  Heme: No easy bruising or bleeding  Psych: No significant depression    All other systems were reviewed and are negative, except as noted.    VITALS/PHYSICAL EXAM  --------------------------------------------------------------------------------  T(C): 36.8 (07-09-19 @ 15:06), Max: 37.1 (07-08-19 @ 22:11)  HR: 60 (07-09-19 @ 15:06) (60 - 85)  BP: 145/50 (07-09-19 @ 15:06) (131/55 - 150/50)  RR: 19 (07-09-19 @ 15:06) (18 - 19)  SpO2: 95% (07-09-19 @ 15:06) (95% - 99%)  Wt(kg): --  Height (cm): 157.48 (07-09-19 @ 05:37)  Weight (kg): 60.8 (07-09-19 @ 05:37)  BMI (kg/m2): 24.5 (07-09-19 @ 05:37)  BSA (m2): 1.61 (07-09-19 @ 05:37)      07-08-19 @ 07:01  -  07-09-19 @ 07:00  --------------------------------------------------------  IN: 340 mL / OUT: 850 mL / NET: -510 mL      Physical Exam:  	Gen: NAD  	HEENT: MMM  	Pulm: CTA B/L  	CV: S1S2  	Abd: Soft, +BS   	Ext: No LE edema B/L  	Neuro: Awake  	Skin: Warm and dry  	Vascular access:    LABS/STUDIES  --------------------------------------------------------------------------------              9.6    13.60 >-----------<  230      [07-09-19 @ 06:40]              30.2     134  |  96  |  85  ----------------------------<  160      [07-09-19 @ 06:40]  4.2   |  20  |  3.79        Ca     10.8     [07-09-19 @ 06:40]      Mg     2.1     [07-09-19 @ 06:40]      PT/INR: PT 24.2 , INR 2.08       [07-08-19 @ 17:30]  PTT: 38.8       [07-08-19 @ 17:30]      Creatinine Trend:  SCr 3.79 [07-09 @ 06:40]  SCr 3.00 [07-08 @ 17:30]  SCr 1.37 [07-07 @ 07:18]    Urinalysis - [07-07-19 @ 11:08]      Color YELLOW / Appearance CLEAR / SG 1.013 / pH 6.0      Gluc 70 / Ketone NEGATIVE  / Bili NEGATIVE / Urobili NORMAL       Blood MODERATE / Protein 200 / Leuk Est NEGATIVE / Nitrite NEGATIVE      RBC 3-5 / WBC 0-2 / Hyaline NEGATIVE / Gran  / Sq Epi OCC / Non Sq Epi  / Bacteria NEGATIVE      Iron 15, TIBC 262, %sat --      [10-14-18 @ 15:33]  Ferritin 96.17      [10-14-18 @ 15:33]  HbA1c 6.9      [10-08-18 @ 06:10]  TSH 1.43      [10-15-18 @ 03:40]  Lipid: chol 82, TG 53, HDL 40, LDL 33      [10-14-18 @ 06:44]    HBsAg Nonreactive      [10-08-17 @ 06:00]  HCV 1.89, Weakly Reactive Hepatitis C AB  S/CO Ratio                        Interpretation  < 1.0                                     Non-Reactive  1.0 - 4.9                           Weakly-Reactive  > 5.0                                 Reactive  Non-Reactive: A person with a non-reactive HCV antibody  result is considered uninfected.  No further action is  needed unless recent infection is suspected.  In these  cases, consider repeat testing later to detect  seroconversion..  Weakly-Reactive: HCV antibody test is abnormal, HCV RNA  Qualitative test will follow.  Reactive: HCV antibody test is abnormal, HCV RNA  Qualitative test will follow.  Note: HCV antibody testing is performed on the [employer] system.      [10-08-17 @ 06:00] [facility] DIVISION OF KIDNEY DISEASES AND HYPERTENSION -- [phone]  -- INITIAL CONSULT NOTE  --------------------------------------------------------------------------------  HPI: 84F with PMH T2DM, HFrEF (moderate reduced EF), HLD, HTN (over 30 years), Afib, presented to the ER complaining of SOB. Patient was admitted for CHF exacerbation on 7/7/2019. Patient was started on IV Lasix for diuresis. Nephrology consulted for rising creatinine. On admission, sCr was 1.37 (7/7/2019) and has steadily increased to 3.0 yesterday, and now is 3.8 today (7/9/2019). [facility] reviewed, patient has had multiple AKIs in the past 12 years since 2007. Baseline sCR appears to be around 1.88-2.2. Patient denies every being told she had a kidney problem previously, never seen a nephrologist. Patient does report some loose stools yesterday, requiring her to take Imodium. Also reports some 4 episodes of BRBPR but those have resolved. Patient denies any family history of kidney disease. Patient used to use NSAIDs previously for pain but two months ago her PMD told her they "are too strong" for her. Also reports that since hospitalization, she has had decreased PO intake. No history of ACE-i use.     Patient evaluated at bedside, on nasal cannula. Reports her SOB much improved and that she is urinating frequently. Denies any other complaints  at this time.    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 @ [facility])  History of Hysterectomy (ICD9 V88.01)    FAMILY HISTORY:  No pertinent family history in first degree relatives    PAST SOCIAL HISTORY:    ALLERGIES & MEDICATIONS  --------------------------------------------------------------------------------  Allergies    No Known Allergies    Intolerances      Standing Inpatient Medications  amLODIPine   Tablet 10 milliGRAM(s) Oral daily  apixaban 2.5 milliGRAM(s) Oral two times a day  aspirin enteric coated 81 milliGRAM(s) Oral daily  atorvastatin 40 milliGRAM(s) Oral at bedtime  azithromycin  IVPB 500 milliGRAM(s) IV Intermittent every 24 hours  cefTRIAXone   IVPB 1000 milliGRAM(s) IV Intermittent every 24 hours  dextrose 5%. 1000 milliLiter(s) IV Continuous <Continuous>  dextrose 50% Injectable 12.5 Gram(s) IV Push once  dextrose 50% Injectable 25 Gram(s) IV Push once  dextrose 50% Injectable 25 Gram(s) IV Push once  hydrALAZINE 25 milliGRAM(s) Oral two times a day  insulin lispro (HumaLOG) corrective regimen sliding scale   SubCutaneous three times a day before meals  insulin lispro (HumaLOG) corrective regimen sliding scale   SubCutaneous at bedtime  isosorbide   mononitrate ER Tablet (IMDUR) 60 milliGRAM(s) Oral daily  metoprolol succinate ER 75 milliGRAM(s) Oral daily  sodium chloride 0.9% lock flush 3 milliLiter(s) IV Push every 8 hours    PRN Inpatient Medications  dextrose 40% Gel 15 Gram(s) Oral once PRN  glucagon  Injectable 1 milliGRAM(s) IntraMuscular once PRN      REVIEW OF SYSTEMS  --------------------------------------------------------------------------------  Gen: (+) decreased appetite  Head/Eyes/Ears: Normal hearing,   Respiratory: (+) dyspnea  CV: No chest pain  GI: No abdominal pain, diarrhea  MSK: Trace edema    All other systems were reviewed and are negative, except as noted.    VITALS/PHYSICAL EXAM  --------------------------------------------------------------------------------  T(C): 36.8 (07-09-19 @ 15:06), Max: 37.1 (07-08-19 @ 22:11)  HR: 60 (07-09-19 @ 15:06) (60 - 85)  BP: 145/50 (07-09-19 @ 15:06) (131/55 - 150/50)  RR: 19 (07-09-19 @ 15:06) (18 - 19)  SpO2: 95% (07-09-19 @ 15:06) (95% - 99%)  Wt(kg): --  Height (cm): 157.48 (07-09-19 @ 05:37)  Weight (kg): 60.8 (07-09-19 @ 05:37)  BMI (kg/m2): 24.5 (07-09-19 @ 05:37)  BSA (m2): 1.61 (07-09-19 @ 05:37)      07-08-19 @ 07:01  -  07-09-19 @ 07:00  --------------------------------------------------------  IN: 340 mL / OUT: 850 mL / NET: -510 mL      Physical Exam:  	Gen: NAD  	HEENT: MMM  	Pulm: minimal crackles bilaterally  	CV: S1S2  	Abd: Soft, +BS   	Ext: trace LE edema B/L  	Neuro: Awake  	Skin: Warm and dry  	  LABS/STUDIES  --------------------------------------------------------------------------------              9.6    13.60 >-----------<  230      [07-09-19 @ 06:40]              30.2     134  |  96  |  85  ----------------------------<  160      [07-09-19 @ 06:40]  4.2   |  20  |  3.79        Ca     10.8     [07-09-19 @ 06:40]      Mg     2.1     [07-09-19 @ 06:40]      PT/INR: PT 24.2 , INR 2.08       [07-08-19 @ 17:30]  PTT: 38.8       [07-08-19 @ 17:30]      Creatinine Trend:  SCr 3.79 [07-09 @ 06:40]  SCr 3.00 [07-08 @ 17:30]  SCr 1.37 [07-07 @ 07:18]    Urinalysis - [07-07-19 @ 11:08]      Color YELLOW / Appearance CLEAR / SG 1.013 / pH 6.0      Gluc 70 / Ketone NEGATIVE  / Bili NEGATIVE / Urobili NORMAL       Blood MODERATE / Protein 200 / Leuk Est NEGATIVE / Nitrite NEGATIVE      RBC 3-5 / WBC 0-2 / Hyaline NEGATIVE / Gran  / Sq Epi OCC / Non Sq Epi  / Bacteria NEGATIVE [facility] DIVISION OF KIDNEY DISEASES AND HYPERTENSION -- [phone]  -- INITIAL CONSULT NOTE  --------------------------------------------------------------------------------  HPI: 84F with PMH T2DM, HFrEF (moderate reduced EF), HLD, HTN (over 30 years), Afib, presented to the ER complaining of SOB. Patient was admitted for CHF exacerbation on 7/7/2019. Patient was started on IV Lasix for diuresis. Nephrology consulted for rising creatinine. On admission, sCr was 1.37 (7/7/2019) and has steadily increased to 3.0 yesterday, and now is 3.8 today (7/9/2019). [facility] reviewed, patient has had multiple AKIs in the past 12 years since 2007. Last sCr prior to admission was 2.08 on 12/17/2018. Patient denies every being told she had a kidney problem previously, never seen a nephrologist. Patient does report some loose stools yesterday, requiring her to take Imodium. Also reports some 4 episodes of BRBPR but those have resolved. Patient denies any family history of kidney disease. Patient used to use NSAIDs previously for pain but two months ago her PMD told her they "are too strong" for her. Also reports that since hospitalization, she has had decreased PO intake. No history of ACE-i use.     Patient evaluated at bedside, on nasal cannula. Reports her SOB much improved and that she is urinating frequently. Denies any other complaints  at this time.    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 @ [facility])  History of Hysterectomy (ICD9 V88.01)    FAMILY HISTORY:  No pertinent family history in first degree relatives    PAST SOCIAL HISTORY:    ALLERGIES & MEDICATIONS  --------------------------------------------------------------------------------  Allergies    No Known Allergies    Intolerances      Standing Inpatient Medications  amLODIPine   Tablet 10 milliGRAM(s) Oral daily  apixaban 2.5 milliGRAM(s) Oral two times a day  aspirin enteric coated 81 milliGRAM(s) Oral daily  atorvastatin 40 milliGRAM(s) Oral at bedtime  azithromycin  IVPB 500 milliGRAM(s) IV Intermittent every 24 hours  cefTRIAXone   IVPB 1000 milliGRAM(s) IV Intermittent every 24 hours  dextrose 5%. 1000 milliLiter(s) IV Continuous <Continuous>  dextrose 50% Injectable 12.5 Gram(s) IV Push once  dextrose 50% Injectable 25 Gram(s) IV Push once  dextrose 50% Injectable 25 Gram(s) IV Push once  hydrALAZINE 25 milliGRAM(s) Oral two times a day  insulin lispro (HumaLOG) corrective regimen sliding scale   SubCutaneous three times a day before meals  insulin lispro (HumaLOG) corrective regimen sliding scale   SubCutaneous at bedtime  isosorbide   mononitrate ER Tablet (IMDUR) 60 milliGRAM(s) Oral daily  metoprolol succinate ER 75 milliGRAM(s) Oral daily  sodium chloride 0.9% lock flush 3 milliLiter(s) IV Push every 8 hours    PRN Inpatient Medications  dextrose 40% Gel 15 Gram(s) Oral once PRN  glucagon  Injectable 1 milliGRAM(s) IntraMuscular once PRN      REVIEW OF SYSTEMS  --------------------------------------------------------------------------------  Gen: (+) decreased appetite  Head/Eyes/Ears: Normal hearing,   Respiratory: (+) dyspnea  CV: No chest pain  GI: No abdominal pain, diarrhea  MSK: Trace edema    All other systems were reviewed and are negative, except as noted.    VITALS/PHYSICAL EXAM  --------------------------------------------------------------------------------  T(C): 36.8 (07-09-19 @ 15:06), Max: 37.1 (07-08-19 @ 22:11)  HR: 60 (07-09-19 @ 15:06) (60 - 85)  BP: 145/50 (07-09-19 @ 15:06) (131/55 - 150/50)  RR: 19 (07-09-19 @ 15:06) (18 - 19)  SpO2: 95% (07-09-19 @ 15:06) (95% - 99%)  Wt(kg): --  Height (cm): 157.48 (07-09-19 @ 05:37)  Weight (kg): 60.8 (07-09-19 @ 05:37)  BMI (kg/m2): 24.5 (07-09-19 @ 05:37)  BSA (m2): 1.61 (07-09-19 @ 05:37)      07-08-19 @ 07:01  -  07-09-19 @ 07:00  --------------------------------------------------------  IN: 340 mL / OUT: 850 mL / NET: -510 mL      Physical Exam:  	Gen: NAD  	HEENT: MMM  	Pulm: minimal crackles bilaterally  	CV: S1S2  	Abd: Soft, +BS   	Ext: trace LE edema B/L  	Neuro: Awake  	Skin: Warm and dry  	  LABS/STUDIES  --------------------------------------------------------------------------------              9.6    13.60 >-----------<  230      [07-09-19 @ 06:40]              30.2     134  |  96  |  85  ----------------------------<  160      [07-09-19 @ 06:40]  4.2   |  20  |  3.79        Ca     10.8     [07-09-19 @ 06:40]      Mg     2.1     [07-09-19 @ 06:40]      PT/INR: PT 24.2 , INR 2.08       [07-08-19 @ 17:30]  PTT: 38.8       [07-08-19 @ 17:30]      Creatinine Trend:  SCr 3.79 [07-09 @ 06:40]  SCr 3.00 [07-08 @ 17:30]  SCr 1.37 [07-07 @ 07:18]    Urinalysis - [07-07-19 @ 11:08]      Color YELLOW / Appearance CLEAR / SG 1.013 / pH 6.0      Gluc 70 / Ketone NEGATIVE  / Bili NEGATIVE / Urobili NORMAL       Blood MODERATE / Protein 200 / Leuk Est NEGATIVE / Nitrite NEGATIVE      RBC 3-5 / WBC 0-2 / Hyaline NEGATIVE / Gran  / Sq Epi OCC / Non Sq Epi  / Bacteria NEGATIVE [facility] DIVISION OF KIDNEY DISEASES AND HYPERTENSION -- [phone]  -- INITIAL CONSULT NOTE  --------------------------------------------------------------------------------  HPI: 84F with PMH T2DM, HFrEF (moderate reduced EF), HLD, HTN (over 30 years), Afib, presented to the ER complaining of SOB. Patient was admitted for CHF exacerbation on 7/7/2019. Patient was started on IV Lasix for diuresis. Nephrology consulted for rising creatinine. On admission, sCr was 1.37 (7/7/2019) and has steadily increased to 3.0 yesterday, and now is 3.8 today (7/9/2019). [facility] reviewed, patient has had multiple AKIs in the past 12 years since 2007. Last sCr prior to admission was 2.08 on 12/17/2018. Patient denies every being told she had a kidney problem previously, never seen a nephrologist. Patient does report some loose stools yesterday, requiring her to take Imodium. Also reports some 4 episodes of BRBPR but those have resolved. Patient denies any family history of kidney disease. Patient used to use NSAIDs previously for pain but two months ago her PMD told her they "are too strong" for her. Also reports that since hospitalization, she has had decreased PO intake. No history of ACE-i use.     Patient evaluated at bedside, on nasal cannula. Reports her SOB much improved and that she is urinating frequently. Denies any other complaints  at this time.    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 @ [facility])  History of Hysterectomy (ICD9 V88.01)    FAMILY HISTORY:  No pertinent family history in first degree relatives    PAST SOCIAL HISTORY:    ALLERGIES & MEDICATIONS  --------------------------------------------------------------------------------  Allergies    No Known Allergies    Intolerances    Standing Inpatient Medications  amLODIPine   Tablet 10 milliGRAM(s) Oral daily  apixaban 2.5 milliGRAM(s) Oral two times a day  aspirin enteric coated 81 milliGRAM(s) Oral daily  atorvastatin 40 milliGRAM(s) Oral at bedtime  azithromycin  IVPB 500 milliGRAM(s) IV Intermittent every 24 hours  cefTRIAXone   IVPB 1000 milliGRAM(s) IV Intermittent every 24 hours  dextrose 5%. 1000 milliLiter(s) IV Continuous <Continuous>  dextrose 50% Injectable 12.5 Gram(s) IV Push once  dextrose 50% Injectable 25 Gram(s) IV Push once  dextrose 50% Injectable 25 Gram(s) IV Push once  hydrALAZINE 25 milliGRAM(s) Oral two times a day  insulin lispro (HumaLOG) corrective regimen sliding scale   SubCutaneous three times a day before meals  insulin lispro (HumaLOG) corrective regimen sliding scale   SubCutaneous at bedtime  isosorbide   mononitrate ER Tablet (IMDUR) 60 milliGRAM(s) Oral daily  metoprolol succinate ER 75 milliGRAM(s) Oral daily  sodium chloride 0.9% lock flush 3 milliLiter(s) IV Push every 8 hours    PRN Inpatient Medications  dextrose 40% Gel 15 Gram(s) Oral once PRN  glucagon  Injectable 1 milliGRAM(s) IntraMuscular once PRN    REVIEW OF SYSTEMS  --------------------------------------------------------------------------------  Gen: (+) decreased appetite  Head/Eyes/Ears: Normal hearing,   Respiratory: (+) dyspnea  CV: No chest pain  GI: No abdominal pain, diarrhea  MSK: Trace edema    All other systems were reviewed and are negative, except as noted.    VITALS/PHYSICAL EXAM  --------------------------------------------------------------------------------  T(C): 36.8 (07-09-19 @ 15:06), Max: 37.1 (07-08-19 @ 22:11)  HR: 60 (07-09-19 @ 15:06) (60 - 85)  BP: 145/50 (07-09-19 @ 15:06) (131/55 - 150/50)  RR: 19 (07-09-19 @ 15:06) (18 - 19)  SpO2: 95% (07-09-19 @ 15:06) (95% - 99%)  Wt(kg): --  Height (cm): 157.48 (07-09-19 @ 05:37)  Weight (kg): 60.8 (07-09-19 @ 05:37)  BMI (kg/m2): 24.5 (07-09-19 @ 05:37)  BSA (m2): 1.61 (07-09-19 @ 05:37)    07-08-19 @ 07:01  -  07-09-19 @ 07:00  --------------------------------------------------------  IN: 340 mL / OUT: 850 mL / NET: -510 mL    Physical Exam:  	Gen: NAD  	HEENT: MMM  	Pulm: minimal crackles bilaterally  	CV: S1S2  	Abd: Soft, +BS   	Ext: trace LE edema B/L  	Neuro: Awake  	Skin: Warm and dry  	  LABS/STUDIES  --------------------------------------------------------------------------------              9.6    13.60 >-----------<  230      [07-09-19 @ 06:40]              30.2     134  |  96  |  85  ----------------------------<  160      [07-09-19 @ 06:40]  4.2   |  20  |  3.79        Ca     10.8     [07-09-19 @ 06:40]      Mg     2.1     [07-09-19 @ 06:40]    07-10    128<L>  |  89<L>  |  103<H>  ----------------------------<  255<H>  4.9   |  18<L>  |  5.26<H>    Ca    10.7<H>      10 Jul 2019 07:20  Phos  4.6     07-10  Mg     2.3     07-10    Creatinine Trend:  SCr 3.79 [07-09 @ 06:40]  SCr 3.00 [07-08 @ 17:30]  SCr 1.37 [07-07 @ 07:18]    Urinalysis - [07-07-19 @ 11:08]      Color YELLOW / Appearance CLEAR / SG 1.013 / pH 6.0      Gluc 70 / Ketone NEGATIVE  / Bili NEGATIVE / Urobili NORMAL       Blood MODERATE / Protein 200 / Leuk Est NEGATIVE / Nitrite NEGATIVE      RBC 3-5 / WBC 0-2 / Hyaline NEGATIVE / Gran  / Sq Epi OCC / Non Sq Epi  / Bacteria NEGATIVE

## 2019-07-09 NOTE — PROGRESS NOTE ADULT - PROBLEM SELECTOR PLAN 2
improving, but need to hold diuresis due to CLARI for now  cards follow up, no further need for BiPAP, cw other meds   pt refusing INDU for AS

## 2019-07-09 NOTE — PROGRESS NOTE ADULT - ATTENDING COMMENTS
Personally saw and examined patient  agree with above assessment  pt does state sob is improving, but remains volume overloaded, b/l crackles on exam  would hold IV lasix for today given rise in Cr. Please check labs daily.   consider renal eval  continue hydralazine, imdur, bb regimen.  pt continues to refuse further eval of aortic valve

## 2019-07-10 LAB
ANION GAP SERPL CALC-SCNC: 21 MMO/L — HIGH (ref 7–14)
BASOPHILS # BLD AUTO: 0.03 K/UL — SIGNIFICANT CHANGE UP (ref 0–0.2)
BASOPHILS NFR BLD AUTO: 0.2 % — SIGNIFICANT CHANGE UP (ref 0–2)
BUN SERPL-MCNC: 103 MG/DL — HIGH (ref 7–23)
CALCIUM SERPL-MCNC: 10.7 MG/DL — HIGH (ref 8.4–10.5)
CHLORIDE SERPL-SCNC: 89 MMOL/L — LOW (ref 98–107)
CO2 SERPL-SCNC: 18 MMOL/L — LOW (ref 22–31)
CREAT SERPL-MCNC: 5.26 MG/DL — HIGH (ref 0.5–1.3)
EOSINOPHIL # BLD AUTO: 0 K/UL — SIGNIFICANT CHANGE UP (ref 0–0.5)
EOSINOPHIL NFR BLD AUTO: 0 % — SIGNIFICANT CHANGE UP (ref 0–6)
GLUCOSE BLDC GLUCOMTR-MCNC: 258 MG/DL — HIGH (ref 70–99)
GLUCOSE BLDC GLUCOMTR-MCNC: 278 MG/DL — HIGH (ref 70–99)
GLUCOSE BLDC GLUCOMTR-MCNC: 280 MG/DL — HIGH (ref 70–99)
GLUCOSE BLDC GLUCOMTR-MCNC: 317 MG/DL — HIGH (ref 70–99)
GLUCOSE SERPL-MCNC: 255 MG/DL — HIGH (ref 70–99)
HCT VFR BLD CALC: 26.4 % — LOW (ref 34.5–45)
HGB BLD-MCNC: 8.5 G/DL — LOW (ref 11.5–15.5)
IMM GRANULOCYTES NFR BLD AUTO: 1 % — SIGNIFICANT CHANGE UP (ref 0–1.5)
LYMPHOCYTES # BLD AUTO: 0.75 K/UL — LOW (ref 1–3.3)
LYMPHOCYTES # BLD AUTO: 4.6 % — LOW (ref 13–44)
MAGNESIUM SERPL-MCNC: 2.3 MG/DL — SIGNIFICANT CHANGE UP (ref 1.6–2.6)
MCHC RBC-ENTMCNC: 28.1 PG — SIGNIFICANT CHANGE UP (ref 27–34)
MCHC RBC-ENTMCNC: 32.2 % — SIGNIFICANT CHANGE UP (ref 32–36)
MCV RBC AUTO: 87.1 FL — SIGNIFICANT CHANGE UP (ref 80–100)
MONOCYTES # BLD AUTO: 1.01 K/UL — HIGH (ref 0–0.9)
MONOCYTES NFR BLD AUTO: 6.2 % — SIGNIFICANT CHANGE UP (ref 2–14)
NEUTROPHILS # BLD AUTO: 14.26 K/UL — HIGH (ref 1.8–7.4)
NEUTROPHILS NFR BLD AUTO: 88 % — HIGH (ref 43–77)
NRBC # FLD: 0 K/UL — SIGNIFICANT CHANGE UP (ref 0–0)
PHOSPHATE SERPL-MCNC: 4.6 MG/DL — HIGH (ref 2.5–4.5)
PLATELET # BLD AUTO: 247 K/UL — SIGNIFICANT CHANGE UP (ref 150–400)
PMV BLD: 10.7 FL — SIGNIFICANT CHANGE UP (ref 7–13)
POTASSIUM SERPL-MCNC: 4.9 MMOL/L — SIGNIFICANT CHANGE UP (ref 3.5–5.3)
POTASSIUM SERPL-SCNC: 4.9 MMOL/L — SIGNIFICANT CHANGE UP (ref 3.5–5.3)
RBC # BLD: 3.03 M/UL — LOW (ref 3.8–5.2)
RBC # FLD: 18.3 % — HIGH (ref 10.3–14.5)
SODIUM SERPL-SCNC: 128 MMOL/L — LOW (ref 135–145)
WBC # BLD: 16.21 K/UL — HIGH (ref 3.8–10.5)
WBC # FLD AUTO: 16.21 K/UL — HIGH (ref 3.8–10.5)

## 2019-07-10 PROCEDURE — 99233 SBSQ HOSP IP/OBS HIGH 50: CPT

## 2019-07-10 PROCEDURE — 99223 1ST HOSP IP/OBS HIGH 75: CPT | Mod: GC

## 2019-07-10 PROCEDURE — 76770 US EXAM ABDO BACK WALL COMP: CPT | Mod: 26

## 2019-07-10 RX ORDER — INSULIN GLARGINE 100 [IU]/ML
5 INJECTION, SOLUTION SUBCUTANEOUS AT BEDTIME
Refills: 0 | Status: DISCONTINUED | OUTPATIENT
Start: 2019-07-10 | End: 2019-07-11

## 2019-07-10 RX ADMIN — Medication 6: at 17:42

## 2019-07-10 RX ADMIN — ISOSORBIDE MONONITRATE 60 MILLIGRAM(S): 60 TABLET, EXTENDED RELEASE ORAL at 18:55

## 2019-07-10 RX ADMIN — SODIUM CHLORIDE 3 MILLILITER(S): 9 INJECTION INTRAMUSCULAR; INTRAVENOUS; SUBCUTANEOUS at 21:53

## 2019-07-10 RX ADMIN — ATORVASTATIN CALCIUM 40 MILLIGRAM(S): 80 TABLET, FILM COATED ORAL at 21:53

## 2019-07-10 RX ADMIN — CEFTRIAXONE 100 MILLIGRAM(S): 500 INJECTION, POWDER, FOR SOLUTION INTRAMUSCULAR; INTRAVENOUS at 12:52

## 2019-07-10 RX ADMIN — INSULIN GLARGINE 5 UNIT(S): 100 INJECTION, SOLUTION SUBCUTANEOUS at 21:54

## 2019-07-10 RX ADMIN — Medication 81 MILLIGRAM(S): at 12:54

## 2019-07-10 RX ADMIN — APIXABAN 2.5 MILLIGRAM(S): 2.5 TABLET, FILM COATED ORAL at 17:40

## 2019-07-10 RX ADMIN — Medication 25 MILLIGRAM(S): at 06:17

## 2019-07-10 RX ADMIN — Medication 25 MILLIGRAM(S): at 17:41

## 2019-07-10 RX ADMIN — APIXABAN 2.5 MILLIGRAM(S): 2.5 TABLET, FILM COATED ORAL at 06:17

## 2019-07-10 RX ADMIN — AMLODIPINE BESYLATE 10 MILLIGRAM(S): 2.5 TABLET ORAL at 06:17

## 2019-07-10 RX ADMIN — SODIUM CHLORIDE 3 MILLILITER(S): 9 INJECTION INTRAMUSCULAR; INTRAVENOUS; SUBCUTANEOUS at 12:14

## 2019-07-10 RX ADMIN — SODIUM CHLORIDE 3 MILLILITER(S): 9 INJECTION INTRAMUSCULAR; INTRAVENOUS; SUBCUTANEOUS at 06:16

## 2019-07-10 RX ADMIN — Medication 8: at 12:55

## 2019-07-10 RX ADMIN — Medication 2: at 21:55

## 2019-07-10 RX ADMIN — Medication 6: at 08:42

## 2019-07-10 RX ADMIN — AZITHROMYCIN 250 MILLIGRAM(S): 500 TABLET, FILM COATED ORAL at 17:40

## 2019-07-10 RX ADMIN — Medication 75 MILLIGRAM(S): at 06:17

## 2019-07-10 NOTE — PROGRESS NOTE ADULT - PROBLEM SELECTOR PLAN 1
on CKD 3  suspect due to diuresis, sono neg for retention, might be becoming oliguric with worsening renal fx, dw renal, no need for further w/u but might need HD   lasix on hold  renally dose meds on CKD 3 now associated with hyponatremia which is also likely due to volume overload   suspect due to diuresis, sono neg for retention, might be becoming oliguric with worsening renal fx, dw renal, no need for further w/u but might need HD   Lasix on hold  renally dose meds, monitor BMP

## 2019-07-10 NOTE — PROGRESS NOTE ADULT - PROBLEM SELECTOR PLAN 2
improving, but need to hold diuresis due to CLARI for now  cards follow up, no further need for BiPAP, cw other meds   pt refusing INDU for AS  HF consulted per cards improving, but need to hold diuresis due to CLARI for now  cards follow up, no further need for BiPAP, cw other meds   pt refusing INDU for AS  HF consulted per cards as still volume overload and unable to administer diuretics, ? might benefit from inotrope

## 2019-07-10 NOTE — PROGRESS NOTE ADULT - ATTENDING COMMENTS
personally saw and examined patient today  agree with above assessment  volume status improved from admission but persistent crackles on exam  acute renal failure noted, appreciate further renal recs  tte reviewed, biV dysfunction, pt with known severe AS, has refused eval in the past for treatment, however today says that she will discuss it with her son  pt with known fixed defects on stress testing, and suspect etiology of Left heart failure multifactorial due to ischemia and severe AS. Right heart failure at least partially due to left heart failure.  appreciate HF team recs

## 2019-07-10 NOTE — PROGRESS NOTE ADULT - ASSESSMENT
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 Admitted for CHF exacerbation and initially required BiPAP. Cardiology consulted for management of HF.     #HFrEF  - Remains volume up  - Continue hydralazine, Imdur, metoprolol  - Daily standing weights, strict I/Os  - Monitor BMP daily, replete K and Mg PRN  - Lasix on hold given CLARI, appreciate renal recs  - Recommend that HF team see patient, consult has been placed    #Aortic stenosis  - Patient declined INDU in the past, but now wishes us to readdress it with her son, Derian Echavarria ([phone])    #AFib  - Continue apixaban and BB    #HTN  - Continue amlodipine      BOB Morales  Cardiology Fellow   [id number]  All cardiology service information can be found 24/7 at [email or web address], password: [email or web address]

## 2019-07-10 NOTE — PROGRESS NOTE ADULT - SUBJECTIVE AND OBJECTIVE BOX
Patient is a 84y old  Female who presents with a chief complaint of dyspnea (10 Jul 2019 12:31)      SUBJECTIVE / OVERNIGHT EVENTS: was making less urine the last few hours, SOB better but still with KRAUS. family at bedside     ROS:  No HA/DZ  No Vision changes   No CP, SOB  No N/V/D  No Edema  No Rash  NO weakness, numbness    MEDICATIONS  (STANDING):  amLODIPine   Tablet 10 milliGRAM(s) Oral daily  apixaban 2.5 milliGRAM(s) Oral two times a day  aspirin enteric coated 81 milliGRAM(s) Oral daily  atorvastatin 40 milliGRAM(s) Oral at bedtime  azithromycin  IVPB 500 milliGRAM(s) IV Intermittent every 24 hours  cefTRIAXone   IVPB 1000 milliGRAM(s) IV Intermittent every 24 hours  dextrose 5%. 1000 milliLiter(s) (50 mL/Hr) IV Continuous <Continuous>  dextrose 50% Injectable 12.5 Gram(s) IV Push once  dextrose 50% Injectable 25 Gram(s) IV Push once  dextrose 50% Injectable 25 Gram(s) IV Push once  hydrALAZINE 25 milliGRAM(s) Oral two times a day  insulin lispro (HumaLOG) corrective regimen sliding scale   SubCutaneous three times a day before meals  insulin lispro (HumaLOG) corrective regimen sliding scale   SubCutaneous at bedtime  isosorbide   mononitrate ER Tablet (IMDUR) 60 milliGRAM(s) Oral daily  metoprolol succinate ER 75 milliGRAM(s) Oral daily  sodium chloride 0.9% lock flush 3 milliLiter(s) IV Push every 8 hours    MEDICATIONS  (PRN):  dextrose 40% Gel 15 Gram(s) Oral once PRN Blood Glucose LESS THAN 70 milliGRAM(s)/deciliter  glucagon  Injectable 1 milliGRAM(s) IntraMuscular once PRN Glucose LESS THAN 70 milligrams/deciliter      T(C): 37.2 (07-10-19 @ 05:30)  HR: 60 (07-10-19 @ 10:40)  BP: 152/67 (07-10-19 @ 05:30)  RR: 20 (07-10-19 @ 05:30)  SpO2: 96% (07-10-19 @ 10:40)  CAPILLARY BLOOD GLUCOSE      POCT Blood Glucose.: 317 mg/dL (10 Jul 2019 12:26)  POCT Blood Glucose.: 280 mg/dL (10 Jul 2019 08:39)  POCT Blood Glucose.: 250 mg/dL (09 Jul 2019 21:37)  POCT Blood Glucose.: 232 mg/dL (09 Jul 2019 17:21)    I&O's Summary    09 Jul 2019 07:01  -  10 Jul 2019 07:00  --------------------------------------------------------  IN: 220 mL / OUT: 150 mL / NET: 70 mL        PHYSICAL EXAM:  GENERAL: NAD, well-developed, AOx3  HEAD:  Atraumatic, Normocephalic  EYES: EOMI, PERRL, conjunctiva and sclera clear  NECK: Supple, No JVD  CHEST/LUNG: Clear to auscultation bilaterally  HEART: Regular rate and rhythm; No murmurs, rubs, or gallops, No Edema  ABDOMEN: Soft, Nontender, Nondistended; Bowel sounds present  EXTREMITIES:  2+ Peripheral Pulses, No clubbing, cyanosis  PSYCH: No SI/HI  NEUROLOGY: non-focal  SKIN: No rashes or lesions    LABS:                        8.5    16.21 )-----------( 247      ( 10 Jul 2019 07:20 )             26.4     07-10    128<L>  |  89<L>  |  103<H>  ----------------------------<  255<H>  4.9   |  18<L>  |  5.26<H>    Ca    10.7<H>      10 Jul 2019 07:20  Phos  4.6     07-10  Mg     2.3     07-10      PT/INR - ( 08 Jul 2019 17:30 )   PT: 24.2 SEC;   INR: 2.08          PTT - ( 08 Jul 2019 17:30 )  PTT:38.8 SEC              RADIOLOGY & ADDITIONAL TESTS:    Imaging Personally Reviewed:    Consultant(s) Notes Reviewed:      Care Discussed with Consultants/Other Providers: renal, Dr Maynard

## 2019-07-10 NOTE — PROGRESS NOTE ADULT - PROBLEM SELECTOR PLAN 8
Monitor FSG and cover with correctional lispro. uncontrolled, at home on Januvia, A1C in 2018 was 6.9, bit FS now running high 200s. will start Lantus 5 units and repeat A1C and adjust accordingly

## 2019-07-10 NOTE — PROGRESS NOTE ADULT - SUBJECTIVE AND OBJECTIVE BOX
Patient seen and examined at bedside.    Overnight Events: Continues to have SOB      REVIEW OF SYSTEMS:  Constitutional:     No fevers, chills, weight loss, weight gain  HEENT:                  No dry eyes, nasal congestion, postnasal drip  CV:                         No chest pain, palpitations, orthopnea, PND  Resp:                     see above  GI:                          No nausea, vomiting, abdominal pain, diarrhea, constipation  :                        No dysuria, nocturia, hematuria, increased urinary frequency  Musculoskeletal: No back pain, myalgias, arthralgias   Skin:                       No rash, pruritus, ecchymoses  Neurological:        No headache, dizziness, syncope, weakness, numbness  Psychiatric:           No anxiety, depression   Endocrine:            No hot/cold intolerance, polydipsia  Heme/Lymph:      No bleeding, easy bruising  Allergic/Immune: No itchy eyes, rhinorrhea, hives angioedema      Current Meds:  amLODIPine   Tablet 10 milliGRAM(s) Oral daily  apixaban 2.5 milliGRAM(s) Oral two times a day  aspirin enteric coated 81 milliGRAM(s) Oral daily  atorvastatin 40 milliGRAM(s) Oral at bedtime  azithromycin  IVPB 500 milliGRAM(s) IV Intermittent every 24 hours  cefTRIAXone   IVPB 1000 milliGRAM(s) IV Intermittent every 24 hours  dextrose 40% Gel 15 Gram(s) Oral once PRN  dextrose 5%. 1000 milliLiter(s) IV Continuous <Continuous>  dextrose 50% Injectable 12.5 Gram(s) IV Push once  dextrose 50% Injectable 25 Gram(s) IV Push once  dextrose 50% Injectable 25 Gram(s) IV Push once  glucagon  Injectable 1 milliGRAM(s) IntraMuscular once PRN  hydrALAZINE 25 milliGRAM(s) Oral two times a day  insulin lispro (HumaLOG) corrective regimen sliding scale   SubCutaneous three times a day before meals  insulin lispro (HumaLOG) corrective regimen sliding scale   SubCutaneous at bedtime  isosorbide   mononitrate ER Tablet (IMDUR) 60 milliGRAM(s) Oral daily  metoprolol succinate ER 75 milliGRAM(s) Oral daily  sodium chloride 0.9% lock flush 3 milliLiter(s) IV Push every 8 hours      PAST MEDICAL & SURGICAL HISTORY:  Chronic systolic heart failure  Tachycardia-bradycardia syndrome  Hyperlipidemia  Uterine Cancer (ICD9 179): treated with hysterectomy and radiation therapy in 2008  Osteoarthritis of Knee (ICD9 715.96): b/l knees  DM Type 2 (Diabetes Mellitus, Type 2) (ICD9 250.00)  HTN (Hypertension) (ICD9 401.9)  Status Post Total Knee Replacement: LISA, 6 months apart 2010  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 @ [facility])  History of Hysterectomy (ICD9 V88.01)      Vitals:  T(F): 98.9 (07-10), Max: 98.9 (07-10)  HR: 60 (07-10) (60 - 71)  BP: 152/67 (07-10) (128/58 - 152/67)  RR: 20 (07-10)  SpO2: 96% (07-10)  I&O's Summary    09 Jul 2019 07:01  -  10 Jul 2019 07:00  --------------------------------------------------------  IN: 220 mL / OUT: 150 mL / NET: 70 mL        Physical Exam:  Appearance: No acute distress  Eyes: PERRL, EOMI, pink conjunctiva  HENT: Normal oral mucosa  Cardiovascular: RRR, S1, S2,3/6 systolic murmur, trace LE edema   Respiratory: tachypneic with speaking, diminished in bases  Gastrointestinal: soft, non-tender, non-distended with normal bowel sounds  Musculoskeletal: No clubbing; no joint deformity   Neurologic: Non-focal  Lymphatic: No lymphadenopathy  Psychiatry: AAOx3, mood & affect appropriate  Skin: No rashes, ecchymoses, or cyanosis                          8.5    16.21 )-----------( 247      ( 10 Jul 2019 07:20 )             26.4     07-10    128<L>  |  89<L>  |  103<H>  ----------------------------<  255<H>  4.9   |  18<L>  |  5.26<H>    Ca    10.7<H>      10 Jul 2019 07:20  Phos  4.6     07-10  Mg     2.3     07-10      PT/INR - ( 08 Jul 2019 17:30 )   PT: 24.2 SEC;   INR: 2.08          PTT - ( 08 Jul 2019 17:30 )  PTT:38.8 SEC      Serum Pro-Brain Natriuretic Peptide: 54624 pg/mL (07-07 @ 07:18)        Echo:  < from: Transthoracic Echocardiogram (07.09.19 @ 17:53) >  CONCLUSIONS:  1. Mitral annular calcification and calcified mitral  leaflets. Moderate-severe mitral regurgitation. Peak mitral  valve gradient equals 13 mm Hg, mean transmitral valve  gradient equals 3 mm Hg, consistent with mild mitral  stenosis. Gradient measured at a HR of 60bpm.  2. Aortic valve not well visualized; appears calcified.  Peak transaortic valve gradient equals 41 mm Hg, mean  transaortic valve gradient equals 17 mm Hg, estimated  aortic valve area equals 0.7 sqcm (by continuity equation),  aortic valve velocity time integral equals 81 cm,  consistent with severe aortic stenosis. Moderate aortic  regurgitation.  3. Moderate segmental left ventricular systolic  dysfunction. The mid anterior septum, the apical inferior  wall, the apical anterior wall, the apical septum, the  apical lateral wall, and the mid inferoseptum are  hypokinetic.  4. Moderate diastolic dysfunction (Stage II).  5. A device wire is noted in the right heart. Normal right  ventricular size and systolic function.  *** Compared with echocardiogram of 10/10/2018, there has  been an interval increase in gradientsaccross the aortic  valve.    < end of copied text >      Interpretation of Telemetry: a-paced 60s, PVCs

## 2019-07-11 DIAGNOSIS — I50.23 ACUTE ON CHRONIC SYSTOLIC (CONGESTIVE) HEART FAILURE: ICD-10-CM

## 2019-07-11 DIAGNOSIS — I35.0 NONRHEUMATIC AORTIC (VALVE) STENOSIS: ICD-10-CM

## 2019-07-11 LAB
ANION GAP SERPL CALC-SCNC: 21 MMO/L — HIGH (ref 7–14)
BASOPHILS # BLD AUTO: 0.03 K/UL — SIGNIFICANT CHANGE UP (ref 0–0.2)
BASOPHILS NFR BLD AUTO: 0.2 % — SIGNIFICANT CHANGE UP (ref 0–2)
BUN SERPL-MCNC: 114 MG/DL — HIGH (ref 7–23)
CALCIUM SERPL-MCNC: 10.4 MG/DL — SIGNIFICANT CHANGE UP (ref 8.4–10.5)
CHLORIDE SERPL-SCNC: 89 MMOL/L — LOW (ref 98–107)
CO2 SERPL-SCNC: 19 MMOL/L — LOW (ref 22–31)
CREAT SERPL-MCNC: 5.87 MG/DL — HIGH (ref 0.5–1.3)
EOSINOPHIL # BLD AUTO: 0.08 K/UL — SIGNIFICANT CHANGE UP (ref 0–0.5)
EOSINOPHIL NFR BLD AUTO: 0.6 % — SIGNIFICANT CHANGE UP (ref 0–6)
GLUCOSE BLDC GLUCOMTR-MCNC: 120 MG/DL — HIGH (ref 70–99)
GLUCOSE BLDC GLUCOMTR-MCNC: 267 MG/DL — HIGH (ref 70–99)
GLUCOSE BLDC GLUCOMTR-MCNC: 299 MG/DL — HIGH (ref 70–99)
GLUCOSE BLDC GLUCOMTR-MCNC: 424 MG/DL — HIGH (ref 70–99)
GLUCOSE BLDC GLUCOMTR-MCNC: 447 MG/DL — HIGH (ref 70–99)
GLUCOSE SERPL-MCNC: 88 MG/DL — SIGNIFICANT CHANGE UP (ref 70–99)
HCT VFR BLD CALC: 24.3 % — LOW (ref 34.5–45)
HGB BLD-MCNC: 7.9 G/DL — LOW (ref 11.5–15.5)
IMM GRANULOCYTES NFR BLD AUTO: 0.6 % — SIGNIFICANT CHANGE UP (ref 0–1.5)
LYMPHOCYTES # BLD AUTO: 2.88 K/UL — SIGNIFICANT CHANGE UP (ref 1–3.3)
LYMPHOCYTES # BLD AUTO: 20.7 % — SIGNIFICANT CHANGE UP (ref 13–44)
MAGNESIUM SERPL-MCNC: 2.3 MG/DL — SIGNIFICANT CHANGE UP (ref 1.6–2.6)
MCHC RBC-ENTMCNC: 27.4 PG — SIGNIFICANT CHANGE UP (ref 27–34)
MCHC RBC-ENTMCNC: 32.5 % — SIGNIFICANT CHANGE UP (ref 32–36)
MCV RBC AUTO: 84.4 FL — SIGNIFICANT CHANGE UP (ref 80–100)
MONOCYTES # BLD AUTO: 1.27 K/UL — HIGH (ref 0–0.9)
MONOCYTES NFR BLD AUTO: 9.1 % — SIGNIFICANT CHANGE UP (ref 2–14)
NEUTROPHILS # BLD AUTO: 9.55 K/UL — HIGH (ref 1.8–7.4)
NEUTROPHILS NFR BLD AUTO: 68.8 % — SIGNIFICANT CHANGE UP (ref 43–77)
NRBC # FLD: 0 K/UL — SIGNIFICANT CHANGE UP (ref 0–0)
PHOSPHATE SERPL-MCNC: 5.9 MG/DL — HIGH (ref 2.5–4.5)
PLATELET # BLD AUTO: 236 K/UL — SIGNIFICANT CHANGE UP (ref 150–400)
PMV BLD: 10.5 FL — SIGNIFICANT CHANGE UP (ref 7–13)
POTASSIUM SERPL-MCNC: 4 MMOL/L — SIGNIFICANT CHANGE UP (ref 3.5–5.3)
POTASSIUM SERPL-SCNC: 4 MMOL/L — SIGNIFICANT CHANGE UP (ref 3.5–5.3)
RBC # BLD: 2.88 M/UL — LOW (ref 3.8–5.2)
RBC # FLD: 17.9 % — HIGH (ref 10.3–14.5)
SODIUM SERPL-SCNC: 129 MMOL/L — LOW (ref 135–145)
WBC # BLD: 13.9 K/UL — HIGH (ref 3.8–10.5)
WBC # FLD AUTO: 13.9 K/UL — HIGH (ref 3.8–10.5)

## 2019-07-11 PROCEDURE — 99233 SBSQ HOSP IP/OBS HIGH 50: CPT

## 2019-07-11 PROCEDURE — 99223 1ST HOSP IP/OBS HIGH 75: CPT

## 2019-07-11 PROCEDURE — 99233 SBSQ HOSP IP/OBS HIGH 50: CPT | Mod: GC

## 2019-07-11 RX ORDER — INSULIN GLARGINE 100 [IU]/ML
7 INJECTION, SOLUTION SUBCUTANEOUS AT BEDTIME
Refills: 0 | Status: DISCONTINUED | OUTPATIENT
Start: 2019-07-11 | End: 2019-07-11

## 2019-07-11 RX ORDER — INSULIN GLARGINE 100 [IU]/ML
5 INJECTION, SOLUTION SUBCUTANEOUS AT BEDTIME
Refills: 0 | Status: DISCONTINUED | OUTPATIENT
Start: 2019-07-11 | End: 2019-07-16

## 2019-07-11 RX ORDER — HYDRALAZINE HCL 50 MG
50 TABLET ORAL
Refills: 0 | Status: DISCONTINUED | OUTPATIENT
Start: 2019-07-11 | End: 2019-07-12

## 2019-07-11 RX ORDER — INSULIN LISPRO 100/ML
2 VIAL (ML) SUBCUTANEOUS
Refills: 0 | Status: DISCONTINUED | OUTPATIENT
Start: 2019-07-11 | End: 2019-07-16

## 2019-07-11 RX ORDER — ACETAMINOPHEN 500 MG
650 TABLET ORAL ONCE
Refills: 0 | Status: COMPLETED | OUTPATIENT
Start: 2019-07-11 | End: 2019-07-13

## 2019-07-11 RX ADMIN — SODIUM CHLORIDE 3 MILLILITER(S): 9 INJECTION INTRAMUSCULAR; INTRAVENOUS; SUBCUTANEOUS at 13:19

## 2019-07-11 RX ADMIN — SODIUM CHLORIDE 3 MILLILITER(S): 9 INJECTION INTRAMUSCULAR; INTRAVENOUS; SUBCUTANEOUS at 21:45

## 2019-07-11 RX ADMIN — SODIUM CHLORIDE 3 MILLILITER(S): 9 INJECTION INTRAMUSCULAR; INTRAVENOUS; SUBCUTANEOUS at 06:14

## 2019-07-11 RX ADMIN — Medication 2 UNIT(S): at 18:58

## 2019-07-11 RX ADMIN — ATORVASTATIN CALCIUM 40 MILLIGRAM(S): 80 TABLET, FILM COATED ORAL at 21:44

## 2019-07-11 RX ADMIN — Medication 6: at 13:29

## 2019-07-11 RX ADMIN — AMLODIPINE BESYLATE 10 MILLIGRAM(S): 2.5 TABLET ORAL at 06:15

## 2019-07-11 RX ADMIN — Medication 50 MILLIGRAM(S): at 18:58

## 2019-07-11 RX ADMIN — Medication 8: at 21:44

## 2019-07-11 RX ADMIN — Medication 25 MILLIGRAM(S): at 06:15

## 2019-07-11 RX ADMIN — Medication 75 MILLIGRAM(S): at 06:15

## 2019-07-11 RX ADMIN — Medication 6: at 19:00

## 2019-07-11 RX ADMIN — Medication 81 MILLIGRAM(S): at 13:26

## 2019-07-11 RX ADMIN — INSULIN GLARGINE 5 UNIT(S): 100 INJECTION, SOLUTION SUBCUTANEOUS at 21:44

## 2019-07-11 RX ADMIN — APIXABAN 2.5 MILLIGRAM(S): 2.5 TABLET, FILM COATED ORAL at 06:15

## 2019-07-11 RX ADMIN — AZITHROMYCIN 250 MILLIGRAM(S): 500 TABLET, FILM COATED ORAL at 19:01

## 2019-07-11 RX ADMIN — CEFTRIAXONE 100 MILLIGRAM(S): 500 INJECTION, POWDER, FOR SOLUTION INTRAMUSCULAR; INTRAVENOUS at 13:27

## 2019-07-11 RX ADMIN — ISOSORBIDE MONONITRATE 60 MILLIGRAM(S): 60 TABLET, EXTENDED RELEASE ORAL at 13:26

## 2019-07-11 NOTE — PROGRESS NOTE ADULT - ATTENDING COMMENTS
personally saw and examined patient today  agree with above assessment and plan  pt states she is feeling better today, states she has more enrgy and sob is improving. LE resolved, persistent bibasilar crackles noted  plan for RHC if pt agreeable, she wants to speak to her son first. awaiting her decision  appreciate hf team recs  cr cont to rise, appreciate renal recs

## 2019-07-11 NOTE — CONSULT NOTE ADULT - ASSESSMENT
85 y/o female with PMHX of HTN, HLD, DM II, HFrEF (moderate systolic dysfunction) severe AS, severe pulm HTN comes in with complaints of KRAUS x 3 days. She was found to be in ADHF, was given Lasix 80 mg IV x 2.  HF consulted b/c of worsening renal function. She states prior to coming in she had KRAUS after a few steps with orthopnea and PND. Currently she feels well, no more KRAUS when walking in room. Non smoker, no ETOH or drug abuse. Daughter gives medications at home. Per daughter she is on a very low salt diet and hardly eats outside (once a year).

## 2019-07-11 NOTE — PROGRESS NOTE ADULT - PROBLEM SELECTOR PLAN 1
on CKD 3 now associated with hyponatremia which is also likely due to volume overload - cr appears plateauing today   suspect due to diuresis, sono neg for retention, dw renal, no need for further w/u but might need HD   Lasix on hold  renally dose meds, monitor BMP

## 2019-07-11 NOTE — CONSULT NOTE ADULT - ATTENDING COMMENTS
Probably contrast-induced nephropathy.  Agree with holding diuretics.  Will review TTE.  RHC tomorrow.

## 2019-07-11 NOTE — PROGRESS NOTE ADULT - PROBLEM SELECTOR PLAN 8
uncontrolled, improved FS this AM on Lantus, will adjust accordingly uncontrolled, improved FS this AM on Lantus, but post prandial remains high  add premeal lispro 2 units TID and adjust accordingly

## 2019-07-11 NOTE — CONSULT NOTE ADULT - SUBJECTIVE AND OBJECTIVE BOX
Date of Admission:    CHIEF COMPLAINT:    HISTORY OF PRESENT ILLNESS:      Allergies    No Known Allergies    Intolerances    	    MEDICATIONS:  amLODIPine   Tablet 10 milliGRAM(s) Oral daily  apixaban 2.5 milliGRAM(s) Oral two times a day  aspirin enteric coated 81 milliGRAM(s) Oral daily  hydrALAZINE 25 milliGRAM(s) Oral two times a day  isosorbide   mononitrate ER Tablet (IMDUR) 60 milliGRAM(s) Oral daily  metoprolol succinate ER 75 milliGRAM(s) Oral daily    azithromycin  IVPB 500 milliGRAM(s) IV Intermittent every 24 hours  cefTRIAXone   IVPB 1000 milliGRAM(s) IV Intermittent every 24 hours          atorvastatin 40 milliGRAM(s) Oral at bedtime  dextrose 40% Gel 15 Gram(s) Oral once PRN  dextrose 50% Injectable 12.5 Gram(s) IV Push once  dextrose 50% Injectable 25 Gram(s) IV Push once  dextrose 50% Injectable 25 Gram(s) IV Push once  glucagon  Injectable 1 milliGRAM(s) IntraMuscular once PRN  insulin glargine Injectable (LANTUS) 5 Unit(s) SubCutaneous at bedtime  insulin lispro (HumaLOG) corrective regimen sliding scale   SubCutaneous three times a day before meals  insulin lispro (HumaLOG) corrective regimen sliding scale   SubCutaneous at bedtime  insulin lispro Injectable (HumaLOG) 2 Unit(s) SubCutaneous three times a day before meals    dextrose 5%. 1000 milliLiter(s) IV Continuous <Continuous>  sodium chloride 0.9% lock flush 3 milliLiter(s) IV Push every 8 hours      PAST MEDICAL & SURGICAL HISTORY:  Chronic systolic heart failure  Tachycardia-bradycardia syndrome  Hyperlipidemia  Uterine Cancer (ICD9 179): treated with hysterectomy and radiation therapy in 2008  Osteoarthritis of Knee (ICD9 715.96): b/l knees  DM Type 2 (Diabetes Mellitus, Type 2) (ICD9 250.00)  HTN (Hypertension) (ICD9 401.9)  Status Post Total Knee Replacement: LISA, 6 months apart 2010  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 @ [facility])  History of Hysterectomy (ICD9 V88.01)      FAMILY HISTORY:  No pertinent family history in first degree relatives      SOCIAL HISTORY:    [ ] Non-smoker  [ ] Smoker  [ ] Alcohol      REVIEW OF SYSTEMS:  CONSTITUTIONAL: No fever, weight loss, or fatigue  EYES: No eye pain, visual disturbances, or discharge  ENMT:  No difficulty hearing, tinnitus, vertigo; No sinus or throat pain  NECK: No pain or stiffness  RESPIRATORY: No cough, wheezing, chills or hemoptysis; No Shortness of Breath  CARDIOVASCULAR: No chest pain, palpitations, passing out, dizziness, or leg swelling  GASTROINTESTINAL: No abdominal or epigastric pain. No nausea, vomiting, or hematemesis; No diarrhea or constipation. No melena or hematochezia.  GENITOURINARY: No dysuria, frequency, hematuria, or incontinence  NEUROLOGICAL: No headaches, memory loss, loss of strength, numbness, or tremors  SKIN: No itching, burning, rashes, or lesions   LYMPH Nodes: No enlarged glands  ENDOCRINE: No heat or cold intolerance; No hair loss  MUSCULOSKELETAL: No joint pain or swelling; No muscle, back, or extremity pain  PSYCHIATRIC: No depression, anxiety, mood swings, or difficulty sleeping  HEME/LYMPH: No easy bruising, or bleeding gums  ALLERY AND IMMUNOLOGIC: No hives or eczema	    [ ] All others negative	  [ ] Unable to obtain    PHYSICAL EXAM:  T(C): 36.6 (07-11-19 @ 05:45), Max: 36.6 (07-10-19 @ 14:28)  HR: 59 (07-11-19 @ 13:24) (59 - 75)  BP: 142/54 (07-11-19 @ 13:24) (139/66 - 151/57)  RR: 18 (07-11-19 @ 05:45) (18 - 18)  SpO2: 96% (07-11-19 @ 07:41) (95% - 96%)  Wt(kg): --  I&O's Summary    10 Jul 2019 07:01  -  11 Jul 2019 07:00  --------------------------------------------------------  IN: 120 mL / OUT: 750 mL / NET: -630 mL        Appearance: Normal	  HEENT:   Normal oral mucosa, PERRL, EOMI	  Lymphatic: No lymphadenopathy  Cardiovascular: Normal S1 S2, No JVD, No murmurs, No edema  Respiratory: Lungs clear to auscultation	  Psychiatry: A & O x 3, Mood & affect appropriate  Gastrointestinal:  Soft, Non-tender, + BS	  Skin: No rashes, No ecchymoses, No cyanosis	  Neurologic: Non-focal  Extremities: Normal range of motion, No clubbing, cyanosis or edema  Vascular: Peripheral pulses palpable 2+ bilaterally        LABS:	 	    CBC Full  -  ( 11 Jul 2019 06:31 )  WBC Count : 13.90 K/uL  Hemoglobin : 7.9 g/dL  Hematocrit : 24.3 %  Platelet Count - Automated : 236 K/uL  Mean Cell Volume : 84.4 fL  Mean Cell Hemoglobin : 27.4 pg  Mean Cell Hemoglobin Concentration : 32.5 %  Auto Neutrophil # : 9.55 K/uL  Auto Lymphocyte # : 2.88 K/uL  Auto Monocyte # : 1.27 K/uL  Auto Eosinophil # : 0.08 K/uL  Auto Basophil # : 0.03 K/uL  Auto Neutrophil % : 68.8 %  Auto Lymphocyte % : 20.7 %  Auto Monocyte % : 9.1 %  Auto Eosinophil % : 0.6 %  Auto Basophil % : 0.2 %    07-11    129<L>  |  89<L>  |  114<H>  ----------------------------<  88  4.0   |  19<L>  |  5.87<H>  07-10    128<L>  |  89<L>  |  103<H>  ----------------------------<  255<H>  4.9   |  18<L>  |  5.26<H>    Ca    10.4      11 Jul 2019 06:31  Ca    10.7<H>      10 Jul 2019 07:20  Phos  5.9     07-11  Phos  4.6     07-10  Mg     2.3     07-11  Mg     2.3     07-10 Date of Admission: 7/7/19    CHIEF COMPLAINT: SOB     HISTORY OF PRESENT ILLNESS:    83 y/o female with PMHX of HTN, HLD, DM II, HFrEF (moderate systolic dysfunction) severe AS, severe pulm HTN comes in with complaints of KRAUS x 3 days. She was found to be in ADHF, was given Lasix 80 mg IV x 2.  HF consulted b/c of worsening renal function. She states prior to coming in she had KRAUS after a few steps with orthopnea and PND. Currently she feels well, no more KRAUS when walking in room. Non smoker, no ETOH or drug abuse. Daughter gives medications at home. Per chicoer she is on a very low salt diet and hardly eats outside (once a year).       Allergies    No Known Allergies      MEDICATIONS:  amLODIPine   Tablet 10 milliGRAM(s) Oral daily  apixaban 2.5 milliGRAM(s) Oral two times a day  aspirin enteric coated 81 milliGRAM(s) Oral daily  hydrALAZINE 25 milliGRAM(s) Oral two times a day  isosorbide   mononitrate ER Tablet (IMDUR) 60 milliGRAM(s) Oral daily  metoprolol succinate ER 75 milliGRAM(s) Oral daily  azithromycin  IVPB 500 milliGRAM(s) IV Intermittent every 24 hours  cefTRIAXone   IVPB 1000 milliGRAM(s) IV Intermittent every 24 hours          atorvastatin 40 milliGRAM(s) Oral at bedtime  dextrose 40% Gel 15 Gram(s) Oral once PRN  dextrose 50% Injectable 12.5 Gram(s) IV Push once  dextrose 50% Injectable 25 Gram(s) IV Push once  dextrose 50% Injectable 25 Gram(s) IV Push once  glucagon  Injectable 1 milliGRAM(s) IntraMuscular once PRN  insulin glargine Injectable (LANTUS) 5 Unit(s) SubCutaneous at bedtime  insulin lispro (HumaLOG) corrective regimen sliding scale   SubCutaneous three times a day before meals  insulin lispro (HumaLOG) corrective regimen sliding scale   SubCutaneous at bedtime  insulin lispro Injectable (HumaLOG) 2 Unit(s) SubCutaneous three times a day before meals    dextrose 5%. 1000 milliLiter(s) IV Continuous <Continuous>  sodium chloride 0.9% lock flush 3 milliLiter(s) IV Push every 8 hours      PAST MEDICAL & SURGICAL HISTORY:  Chronic systolic heart failure  Tachycardia-bradycardia syndrome  Hyperlipidemia  Uterine Cancer (ICD9 179): treated with hysterectomy and radiation therapy in 2008  Osteoarthritis of Knee (ICD9 715.96): b/l knees  DM Type 2 (Diabetes Mellitus, Type 2) (ICD9 250.00)  HTN (Hypertension) (ICD9 401.9)  Status Post Total Knee Replacement: LISA, 6 months apart 2010  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 @ [facility])  History of Hysterectomy (ICD9 V88.01)      FAMILY HISTORY:  No pertinent family history in first degree relatives      SOCIAL HISTORY:    Non smoker, no ETOH or drug abuse       REVIEW OF SYSTEMS:  CONSTITUTIONAL: No fever, weight loss, or fatigue  EYES: No eye pain, visual disturbances, or discharge  ENMT:  No difficulty hearing, tinnitus, vertigo; No sinus or throat pain  NECK: No pain or stiffness  RESPIRATORY: No cough, wheezing, chills or hemoptysis; currently no Shortness of Breath  CARDIOVASCULAR: No chest pain, palpitations, passing out, dizziness, or leg swelling  GASTROINTESTINAL: No abdominal or epigastric pain. No nausea, vomiting, or hematemesis; No diarrhea or constipation. No melena or hematochezia.  GENITOURINARY: No dysuria, frequency, hematuria, or incontinence  NEUROLOGICAL: No headaches, memory loss, loss of strength, numbness, or tremors  SKIN: No itching, burning, rashes, or lesions   LYMPH Nodes: No enlarged glands  ENDOCRINE: No heat or cold intolerance; No hair loss  MUSCULOSKELETAL: No joint pain or swelling; No muscle, back, or extremity pain  PSYCHIATRIC: No depression, anxiety, mood swings, or difficulty sleeping  HEME/LYMPH: No easy bruising, or bleeding gums  ALLERY AND IMMUNOLOGIC: No hives or eczema	    [ ] All others negative	  [ ] Unable to obtain    PHYSICAL EXAM:  T(C): 36.6 (07-11-19 @ 05:45), Max: 36.6 (07-10-19 @ 14:28)  HR: 59 (07-11-19 @ 13:24) (59 - 75)  BP: 142/54 (07-11-19 @ 13:24) (139/66 - 151/57)  RR: 18 (07-11-19 @ 05:45) (18 - 18)  SpO2: 96% (07-11-19 @ 07:41) (95% - 96%)  Wt(kg): --  I&O's Summary    10 Jul 2019 07:01  -  11 Jul 2019 07:00  --------------------------------------------------------  IN: 120 mL / OUT: 750 mL / NET: -630 mL        Appearance: Normal	  HEENT:   Normal oral mucosa, PERRL, EOMI	  Lymphatic: No lymphadenopathy  Cardiovascular: S1 S2 RRR, low JVDP No murmurs, No edema b/l, warm b/l.   Respiratory: faint crackles b/l   Psychiatry: A & O x 3, Mood & affect appropriate  Gastrointestinal:  Soft, Non-tender, + BS	  Skin: No rashes, No ecchymoses, No cyanosis	  Neurologic: Non-focal  Extremities: Normal range of motion, No clubbing, cyanosis or edema  Vascular: Peripheral pulses palpable 2+ bilaterally        LABS:	 	    CBC Full  -  ( 11 Jul 2019 06:31 )  WBC Count : 13.90 K/uL  Hemoglobin : 7.9 g/dL  Hematocrit : 24.3 %  Platelet Count - Automated : 236 K/uL  Mean Cell Volume : 84.4 fL  Mean Cell Hemoglobin : 27.4 pg  Mean Cell Hemoglobin Concentration : 32.5 %  Auto Neutrophil # : 9.55 K/uL  Auto Lymphocyte # : 2.88 K/uL  Auto Monocyte # : 1.27 K/uL  Auto Eosinophil # : 0.08 K/uL  Auto Basophil # : 0.03 K/uL  Auto Neutrophil % : 68.8 %  Auto Lymphocyte % : 20.7 %  Auto Monocyte % : 9.1 %  Auto Eosinophil % : 0.6 %  Auto Basophil % : 0.2 %    07-11    129<L>  |  89<L>  |  114<H>  ----------------------------<  88  4.0   |  19<L>  |  5.87<H>  07-10    128<L>  |  89<L>  |  103<H>  ----------------------------<  255<H>  4.9   |  18<L>  |  5.26<H>    Ca    10.4      11 Jul 2019 06:31  Ca    10.7<H>      10 Jul 2019 07:20  Phos  5.9     07-11  Phos  4.6     07-10  Mg     2.3     07-11  Mg     2.3     07-10

## 2019-07-11 NOTE — PROGRESS NOTE ADULT - SUBJECTIVE AND OBJECTIVE BOX
Patient seen and examined at bedside.    Overnight Events: Patient feels better, but continues to have orthopnea. She reports good urine output.       REVIEW OF SYSTEMS:  Constitutional:     No fevers, chills, weight loss, weight gain  HEENT:                  No dry eyes, nasal congestion, postnasal drip  CV:                        see above  Resp:                     No cough, SOB, dyspnea, wheezing, sputum  GI:                          No nausea, vomiting, abdominal pain, diarrhea, constipation  :                        No dysuria, nocturia, hematuria, increased urinary frequency  Musculoskeletal: No back pain, myalgias, arthralgias   Skin:                       No rash, pruritus, ecchymoses  Neurological:        No headache, dizziness, syncope, weakness, numbness  Psychiatric:           No anxiety, depression   Endocrine:            No hot/cold intolerance, polydipsia  Heme/Lymph:      No bleeding, easy bruising  Allergic/Immune: No itchy eyes, rhinorrhea, hives angioedema      Current Meds:  amLODIPine   Tablet 10 milliGRAM(s) Oral daily  apixaban 2.5 milliGRAM(s) Oral two times a day  aspirin enteric coated 81 milliGRAM(s) Oral daily  atorvastatin 40 milliGRAM(s) Oral at bedtime  azithromycin  IVPB 500 milliGRAM(s) IV Intermittent every 24 hours  cefTRIAXone   IVPB 1000 milliGRAM(s) IV Intermittent every 24 hours  dextrose 40% Gel 15 Gram(s) Oral once PRN  dextrose 5%. 1000 milliLiter(s) IV Continuous <Continuous>  dextrose 50% Injectable 12.5 Gram(s) IV Push once  dextrose 50% Injectable 25 Gram(s) IV Push once  dextrose 50% Injectable 25 Gram(s) IV Push once  glucagon  Injectable 1 milliGRAM(s) IntraMuscular once PRN  hydrALAZINE 25 milliGRAM(s) Oral two times a day  insulin glargine Injectable (LANTUS) 5 Unit(s) SubCutaneous at bedtime  insulin lispro (HumaLOG) corrective regimen sliding scale   SubCutaneous three times a day before meals  insulin lispro (HumaLOG) corrective regimen sliding scale   SubCutaneous at bedtime  isosorbide   mononitrate ER Tablet (IMDUR) 60 milliGRAM(s) Oral daily  metoprolol succinate ER 75 milliGRAM(s) Oral daily  sodium chloride 0.9% lock flush 3 milliLiter(s) IV Push every 8 hours      PAST MEDICAL & SURGICAL HISTORY:  Chronic systolic heart failure  Tachycardia-bradycardia syndrome  Hyperlipidemia  Uterine Cancer (ICD9 179): treated with hysterectomy and radiation therapy in 2008  Osteoarthritis of Knee (ICD9 715.96): b/l knees  DM Type 2 (Diabetes Mellitus, Type 2) (ICD9 250.00)  HTN (Hypertension) (ICD9 401.9)  Status Post Total Knee Replacement: LISA, 6 months apart 2010  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 @ [facility])  History of Hysterectomy (ICD9 V88.01)      Vitals:  T(F): 97.9 (07-11), Max: 97.9 (07-10)  HR: 62 (07-11) (59 - 75)  BP: 139/66 (07-11) (139/66 - 151/57)  RR: 18 (07-11)  SpO2: 96% (07-11)  I&O's Summary    10 Jul 2019 07:01  -  11 Jul 2019 07:00  --------------------------------------------------------  IN: 120 mL / OUT: 750 mL / NET: -630 mL        Physical Exam:  Appearance: No acute distress  Eyes: PERRL, EOMI, pink conjunctiva  HENT: Normal oral mucosa  Cardiovascular: RRR, S1, S2,3/6 systolic murmur, +JVD  Respiratory: diminished in bases with crackles  Gastrointestinal: soft, non-tender, non-distended with normal bowel sounds  Musculoskeletal: No clubbing; no joint deformity   Neurologic: Non-focal  Lymphatic: No lymphadenopathy  Psychiatry: AAOx3, mood & affect appropriate                          7.9    13.90 )-----------( 236      ( 11 Jul 2019 06:31 )             24.3     07-11    129<L>  |  89<L>  |  114<H>  ----------------------------<  88  4.0   |  19<L>  |  5.87<H>    Ca    10.4      11 Jul 2019 06:31  Phos  5.9     07-11  Mg     2.3     07-11            Serum Pro-Brain Natriuretic Peptide: [id number] pg/mL (07-07 @ 07:18)        Interpretation of Telemetry: a-paced 60s, PVCs, trigem

## 2019-07-11 NOTE — CONSULT NOTE ADULT - PROBLEM SELECTOR RECOMMENDATION 9
HFrEF (moderate systolic dysfunction) severe AS, severe pulm HTN   Came in with NYHA class IV symptoms, now improved s/p IV Lasix.   She only received 2 doses of Lasix 80 mg IV on 7/7. BUN/Cr has been trending up.   Currently she appears warm, normotensive.   Suspect that she is hypovolemic/dry. Elevation in BUN/Cr and hyponatremia likely from diuretics.   Hold diuretics. RHC planned for tomorrow.   Increase hydral to 50 mg po BID.   Continue imdur 60 qd.   Continue Toprol 75 qd.   D/c norvasc.   Not on ACEI/ARB/ARNI or bree b/c of CLARI.

## 2019-07-11 NOTE — PROGRESS NOTE ADULT - PROBLEM SELECTOR PLAN 1
Patient with non-olgiuric CLARI on CKD in the setting of CHF exacerbation. Exact duration of CKD unknown, likely secondary to longstanding HTN and DM. On admission, sCr was 1.37 (7/7/2019) and has steadily increased to 3.0 on 7/8/2019, then 3.8 on 7/9/2019, and on 7/10/2019 was 5.26. [facility] has had multiple [facility]Is in the past 12 years since 2007. Last Scr prior to admission was 2.08 on 12/17/2018. US Kidney negative for hydronephrosis, consistent with medical renal disease. CLARI likely hemodynamically related in the setting of CHF exacerbation. Lasix held since 7/10/2019 but patient had 500 mL UO. Monitor UOP, daily weights, and sCr. If sCr continues to rise or urine output decreases, will consider HD. Avoid volume depletion, NSAIDs, ARB/ACE-I. Dose medications as per eGFR. Patient with non-olgiuric CLARI on CKD in the setting of CHF exacerbation. Exact duration of CKD unknown, likely secondary to longstanding HTN and DM. On admission, sCr was 1.37 (7/7/2019) and has steadily increased to 3.0 on 7/8/2019, then 3.8 on 7/9/2019, and on 7/10/2019 was 5.26. [facility] patient has had multiple [facility]Is in the past 12 years since 2007. Last Scr prior to admission was 2.08 on 12/17/2018. US Kidney negative for hydronephrosis, consistent with medical renal disease. CLARI likely hemodynamically related in the setting of CHF exacerbation. Lasix held since 7/10/2019 but patient had 750 mL UO. Monitor UOP, daily weights, and sCr. If sCr continues to rise or urine output decreases, will consider HD. Avoid volume depletion, NSAIDs, ARB/ACE-I. Dose medications as per eGFR. Pt. with CLARI on CKD in the setting of CHF exacerbation. Exact duration of CKD unknown, likely secondary to longstanding HTN and DM. On admission, Scr was 1.37 (7/7/2019), worsened to 5.87 today (7/11/19). Kidney sonogram negative for hydronephrosis, consistent with medical renal disease. CLARI likely hemodynamically related in the setting of CHF exacerbation. Lasix held since 7/10/2019. Pt. non-oliguric at present. Will need to consider HD, if CLARI continues to worsen. Monitor labs and urine output. Avoid any potential nephrotoxins

## 2019-07-11 NOTE — PROGRESS NOTE ADULT - SUBJECTIVE AND OBJECTIVE BOX
Patient is a 84y old  Female who presents with a chief complaint of dyspnea (11 Jul 2019 10:35)      SUBJECTIVE / OVERNIGHT EVENTS: still with KRAUS, but urine output improved     ROS:  No HA/DZ  No Vision changes   No CP  No N/V/D  No Rash  NO weakness, numbness    MEDICATIONS  (STANDING):  amLODIPine   Tablet 10 milliGRAM(s) Oral daily  apixaban 2.5 milliGRAM(s) Oral two times a day  aspirin enteric coated 81 milliGRAM(s) Oral daily  atorvastatin 40 milliGRAM(s) Oral at bedtime  azithromycin  IVPB 500 milliGRAM(s) IV Intermittent every 24 hours  cefTRIAXone   IVPB 1000 milliGRAM(s) IV Intermittent every 24 hours  dextrose 5%. 1000 milliLiter(s) (50 mL/Hr) IV Continuous <Continuous>  dextrose 50% Injectable 12.5 Gram(s) IV Push once  dextrose 50% Injectable 25 Gram(s) IV Push once  dextrose 50% Injectable 25 Gram(s) IV Push once  hydrALAZINE 25 milliGRAM(s) Oral two times a day  insulin glargine Injectable (LANTUS) 5 Unit(s) SubCutaneous at bedtime  insulin lispro (HumaLOG) corrective regimen sliding scale   SubCutaneous three times a day before meals  insulin lispro (HumaLOG) corrective regimen sliding scale   SubCutaneous at bedtime  isosorbide   mononitrate ER Tablet (IMDUR) 60 milliGRAM(s) Oral daily  metoprolol succinate ER 75 milliGRAM(s) Oral daily  sodium chloride 0.9% lock flush 3 milliLiter(s) IV Push every 8 hours    MEDICATIONS  (PRN):  dextrose 40% Gel 15 Gram(s) Oral once PRN Blood Glucose LESS THAN 70 milliGRAM(s)/deciliter  glucagon  Injectable 1 milliGRAM(s) IntraMuscular once PRN Glucose LESS THAN 70 milligrams/deciliter      T(C): 36.6 (07-11-19 @ 05:45)  HR: 62 (07-11-19 @ 07:41)  BP: 139/66 (07-11-19 @ 05:45)  RR: 18 (07-11-19 @ 05:45)  SpO2: 96% (07-11-19 @ 07:41)  CAPILLARY BLOOD GLUCOSE      POCT Blood Glucose.: 267 mg/dL (11 Jul 2019 12:21)  POCT Blood Glucose.: 120 mg/dL (11 Jul 2019 08:33)  POCT Blood Glucose.: 258 mg/dL (10 Jul 2019 21:35)  POCT Blood Glucose.: 278 mg/dL (10 Jul 2019 17:22)    I&O's Summary    10 Jul 2019 07:01  -  11 Jul 2019 07:00  --------------------------------------------------------  IN: 120 mL / OUT: 750 mL / NET: -630 mL        PHYSICAL EXAM:  GENERAL: NAD, well-developed, AOx3  HEAD:  Atraumatic, Normocephalic  EYES: EOMI, PERRL, conjunctiva and sclera clear  NECK: Supple, No JVD  CHEST/LUNG: basilar crackles b/l   HEART: Regular rate and rhythm; No murmurs, rubs, or gallops, + Edema  ABDOMEN: Soft, Nontender, Nondistended; Bowel sounds present  EXTREMITIES:  2+ Peripheral Pulses, No clubbing, cyanosis  PSYCH: No SI/HI  NEUROLOGY: non-focal  SKIN: No rashes or lesions    LABS:                        7.9    13.90 )-----------( 236      ( 11 Jul 2019 06:31 )             24.3     07-11    129<L>  |  89<L>  |  114<H>  ----------------------------<  88  4.0   |  19<L>  |  5.87<H>    Ca    10.4      11 Jul 2019 06:31  Phos  5.9     07-11  Mg     2.3     07-11                    RADIOLOGY & ADDITIONAL TESTS:    Imaging Personally Reviewed:    Consultant(s) Notes Reviewed:      Care Discussed with Consultants/Other Providers: renal - Dr Maynard

## 2019-07-11 NOTE — PROGRESS NOTE ADULT - PROBLEM SELECTOR PLAN 2
improving, but need to hold diuresis due to CLARI for now  cards follow up, no further need for BiPAP, cw other meds   pt now will discuss INDU for AS  HF consulted per cards as still volume overload and unable to administer diuretics, ? might benefit from inotrope  possible RHC

## 2019-07-11 NOTE — PROGRESS NOTE ADULT - SUBJECTIVE AND OBJECTIVE BOX
[facility] DIVISION OF KIDNEY DISEASES AND HYPERTENSION -- FOLLOW UP NOTE  --------------------------------------------------------------------------------  HPI: 84F with PMH T2DM, HFrEF (moderate reduced EF), HLD, HTN (over 30 years), Afib, presented to the ER complaining of SOB. Patient was admitted for CHF exacerbation on 7/7/2019. Patient was started on IV Lasix for diuresis. Nephrology consulted for rising creatinine. On admission, sCr was 1.37 (7/7/2019) and has steadily increased to 3.0 yesterday, and now is 3.8 today (7/9/2019). [facility] reviewed, patient has had multiple AKIs in the past 12 years since 2007. Last sCr prior to admission was 2.08 on 12/17/2018. On 7/10/2019 Scr increased to 5.26 so Lasix was held.    Patient evaluated at bedside, still reports dyspnea when off NC. Lasix was held since yesterday and patient had 500 mL urinary output.     PAST HISTORY  --------------------------------------------------------------------------------  No significant changes to PMH, PSH, FHx, SHx, unless otherwise noted    ALLERGIES & MEDICATIONS  --------------------------------------------------------------------------------  Allergies    No Known Allergies    Intolerances      Standing Inpatient Medications  amLODIPine   Tablet 10 milliGRAM(s) Oral daily  apixaban 2.5 milliGRAM(s) Oral two times a day  aspirin enteric coated 81 milliGRAM(s) Oral daily  atorvastatin 40 milliGRAM(s) Oral at bedtime  azithromycin  IVPB 500 milliGRAM(s) IV Intermittent every 24 hours  cefTRIAXone   IVPB 1000 milliGRAM(s) IV Intermittent every 24 hours  dextrose 5%. 1000 milliLiter(s) IV Continuous <Continuous>  dextrose 50% Injectable 12.5 Gram(s) IV Push once  dextrose 50% Injectable 25 Gram(s) IV Push once  dextrose 50% Injectable 25 Gram(s) IV Push once  hydrALAZINE 25 milliGRAM(s) Oral two times a day  insulin glargine Injectable (LANTUS) 5 Unit(s) SubCutaneous at bedtime  insulin lispro (HumaLOG) corrective regimen sliding scale   SubCutaneous three times a day before meals  insulin lispro (HumaLOG) corrective regimen sliding scale   SubCutaneous at bedtime  isosorbide   mononitrate ER Tablet (IMDUR) 60 milliGRAM(s) Oral daily  metoprolol succinate ER 75 milliGRAM(s) Oral daily  sodium chloride 0.9% lock flush 3 milliLiter(s) IV Push every 8 hours    PRN Inpatient Medications  dextrose 40% Gel 15 Gram(s) Oral once PRN  glucagon  Injectable 1 milliGRAM(s) IntraMuscular once PRN      REVIEW OF SYSTEMS  --------------------------------------------------------------------------------  Gen: No fevers/chills  Head/Eyes/Ears: Normal hearing,   Respiratory: (+) dyspnea, no cough  CV: No chest pain  GI: No abdominal pain, diarrhea  : No dysuria, hematuria  MSK: (+) trace edema    All other systems were reviewed and are negative, except as noted.    VITALS/PHYSICAL EXAM  --------------------------------------------------------------------------------  T(C): 36.6 (07-11-19 @ 05:45), Max: 36.6 (07-10-19 @ 14:28)  HR: 62 (07-11-19 @ 07:41) (59 - 75)  BP: 139/66 (07-11-19 @ 05:45) (139/66 - 151/57)  RR: 18 (07-11-19 @ 05:45) (18 - 18)  SpO2: 96% (07-11-19 @ 07:41) (95% - 96%)  Wt(kg): --    07-10-19 @ 07:01  -  07-11-19 @ 07:00  --------------------------------------------------------  IN: 60 mL / OUT: 750 mL / NET: -690 mL        Physical Exam:  	Gen: NAD  	HEENT: MMM  	Pulm: minimal crackles bilaterally  	CV: S1S2  	Abd: Soft, +BS   	Ext: trace LE edema B/L  	Neuro: Awake  	Skin: Warm and dry    LABS/STUDIES  --------------------------------------------------------------------------------              8.5    16.21 >-----------<  247      [07-10-19 @ 07:20]              26.4     128  |  89  |  103  ----------------------------<  255      [07-10-19 @ 07:20]  4.9   |  18  |  5.26        Ca     10.7     [07-10-19 @ 07:20]      Mg     2.3     [07-10-19 @ 07:20]      Phos  4.6     [07-10-19 @ 07:20]    Creatinine Trend:  SCr 5.26 [07-10 @ 07:20]  SCr 3.79 [07-09 @ 06:40]  SCr 3.00 [07-08 @ 17:30]  SCr 1.37 [07-07 @ 07:18] [facility] DIVISION OF KIDNEY DISEASES AND HYPERTENSION -- FOLLOW UP NOTE  --------------------------------------------------------------------------------  HPI: 84F with PMH T2DM, HFrEF (moderate reduced EF), HLD, HTN (over 30 years), Afib, presented to the ER complaining of SOB. Patient was admitted for CHF exacerbation on 7/7/2019. Patient was started on IV Lasix for diuresis. Nephrology consulted for rising creatinine. On admission, sCr was 1.37 (7/7/2019) and has steadily increased to 3.0 yesterday, and now is 3.8 today (7/9/2019). [facility] reviewed, patient has had multiple AKIs in the past 12 years since 2007. Last sCr prior to admission was 2.08 on 12/17/2018. On 7/10/2019 Scr increased to 5.26 so Lasix was held.    Patient evaluated at bedside, still reports dyspnea when off NC. Lasix has been held and patient had 750 mL urinary output yesterday.     PAST HISTORY  --------------------------------------------------------------------------------  No significant changes to PMH, PSH, FHx, SHx, unless otherwise noted    ALLERGIES & MEDICATIONS  --------------------------------------------------------------------------------  Allergies    No Known Allergies    Intolerances      Standing Inpatient Medications  amLODIPine   Tablet 10 milliGRAM(s) Oral daily  apixaban 2.5 milliGRAM(s) Oral two times a day  aspirin enteric coated 81 milliGRAM(s) Oral daily  atorvastatin 40 milliGRAM(s) Oral at bedtime  azithromycin  IVPB 500 milliGRAM(s) IV Intermittent every 24 hours  cefTRIAXone   IVPB 1000 milliGRAM(s) IV Intermittent every 24 hours  dextrose 5%. 1000 milliLiter(s) IV Continuous <Continuous>  dextrose 50% Injectable 12.5 Gram(s) IV Push once  dextrose 50% Injectable 25 Gram(s) IV Push once  dextrose 50% Injectable 25 Gram(s) IV Push once  hydrALAZINE 25 milliGRAM(s) Oral two times a day  insulin glargine Injectable (LANTUS) 5 Unit(s) SubCutaneous at bedtime  insulin lispro (HumaLOG) corrective regimen sliding scale   SubCutaneous three times a day before meals  insulin lispro (HumaLOG) corrective regimen sliding scale   SubCutaneous at bedtime  isosorbide   mononitrate ER Tablet (IMDUR) 60 milliGRAM(s) Oral daily  metoprolol succinate ER 75 milliGRAM(s) Oral daily  sodium chloride 0.9% lock flush 3 milliLiter(s) IV Push every 8 hours    PRN Inpatient Medications  dextrose 40% Gel 15 Gram(s) Oral once PRN  glucagon  Injectable 1 milliGRAM(s) IntraMuscular once PRN      REVIEW OF SYSTEMS  --------------------------------------------------------------------------------  Gen: No fevers/chills  Head/Eyes/Ears: Normal hearing,   Respiratory: (+) dyspnea, no cough  CV: No chest pain  GI: No abdominal pain, diarrhea  : No dysuria, hematuria  MSK: (+) trace edema    All other systems were reviewed and are negative, except as noted.    VITALS/PHYSICAL EXAM  --------------------------------------------------------------------------------  T(C): 36.6 (07-11-19 @ 05:45), Max: 36.6 (07-10-19 @ 14:28)  HR: 62 (07-11-19 @ 07:41) (59 - 75)  BP: 139/66 (07-11-19 @ 05:45) (139/66 - 151/57)  RR: 18 (07-11-19 @ 05:45) (18 - 18)  SpO2: 96% (07-11-19 @ 07:41) (95% - 96%)  Wt(kg): --    07-10-19 @ 07:01  -  07-11-19 @ 07:00  --------------------------------------------------------  IN: 60 mL / OUT: 750 mL / NET: -690 mL        Physical Exam:  	Gen: NAD  	HEENT: MMM  	Pulm: minimal crackles bilaterally  	CV: S1S2  	Abd: Soft, +BS   	Ext: trace LE edema B/L  	Neuro: Awake  	Skin: Warm and dry    LABS/STUDIES  --------------------------------------------------------------------------------              8.5    16.21 >-----------<  247      [07-10-19 @ 07:20]              26.4     128  |  89  |  103  ----------------------------<  255      [07-10-19 @ 07:20]  4.9   |  18  |  5.26        Ca     10.7     [07-10-19 @ 07:20]      Mg     2.3     [07-10-19 @ 07:20]      Phos  4.6     [07-10-19 @ 07:20]    Creatinine Trend:  SCr 5.26 [07-10 @ 07:20]  SCr 3.79 [07-09 @ 06:40]  SCr 3.00 [07-08 @ 17:30]  SCr 1.37 [07-07 @ 07:18] [facility] DIVISION OF KIDNEY DISEASES AND HYPERTENSION -- FOLLOW UP NOTE  --------------------------------------------------------------------------------  HPI: 84-year-old female with PMH of T2DM, HFrEF (moderate reduced EF), HLD, HTN (over 30 years), AFib, presented to the ER complaining of SOB. Patient was admitted for CHF exacerbation on 7/7/2019. Patient was started on IV Lasix for diuresis. Nephrology consulted for CLARI. On admission, Scr was 1.37 (7/7/2019) and has increased to 5.87 today (7/11/2019). Diuretic therapy held. On review of [facility], pt. noted to have multiple AKIs in the past 12 years (since 2007).     Patient evaluated at bedside. Pt. feels better and says her SOB and LE swelling have improved. Pt. denies CP, HA or dizziness.    PAST HISTORY  --------------------------------------------------------------------------------  No significant changes to PMH, PSH, FHx, SHx, unless otherwise noted    ALLERGIES & MEDICATIONS  --------------------------------------------------------------------------------  Allergies    No Known Allergies    Intolerances    Standing Inpatient Medications  amLODIPine   Tablet 10 milliGRAM(s) Oral daily  apixaban 2.5 milliGRAM(s) Oral two times a day  aspirin enteric coated 81 milliGRAM(s) Oral daily  atorvastatin 40 milliGRAM(s) Oral at bedtime  azithromycin  IVPB 500 milliGRAM(s) IV Intermittent every 24 hours  cefTRIAXone   IVPB 1000 milliGRAM(s) IV Intermittent every 24 hours  dextrose 5%. 1000 milliLiter(s) IV Continuous <Continuous>  dextrose 50% Injectable 12.5 Gram(s) IV Push once  dextrose 50% Injectable 25 Gram(s) IV Push once  dextrose 50% Injectable 25 Gram(s) IV Push once  hydrALAZINE 25 milliGRAM(s) Oral two times a day  insulin glargine Injectable (LANTUS) 5 Unit(s) SubCutaneous at bedtime  insulin lispro (HumaLOG) corrective regimen sliding scale   SubCutaneous three times a day before meals  insulin lispro (HumaLOG) corrective regimen sliding scale   SubCutaneous at bedtime  isosorbide   mononitrate ER Tablet (IMDUR) 60 milliGRAM(s) Oral daily  metoprolol succinate ER 75 milliGRAM(s) Oral daily  sodium chloride 0.9% lock flush 3 milliLiter(s) IV Push every 8 hours    PRN Inpatient Medications  dextrose 40% Gel 15 Gram(s) Oral once PRN  glucagon  Injectable 1 milliGRAM(s) IntraMuscular once PRN    REVIEW OF SYSTEMS  --------------------------------------------------------------------------------  Gen: No fevers/chills  Head/Eyes/Ears: Normal hearing,   Respiratory: (+) dyspnea on exertion, no cough  CV: No chest pain  GI: No abdominal pain, diarrhea  : No dysuria, hematuria  MSK: (+) improved LE edema    All other systems were reviewed and are negative, except as noted.    VITALS/PHYSICAL EXAM  --------------------------------------------------------------------------------  T(C): 36.6 (07-11-19 @ 05:45), Max: 36.6 (07-10-19 @ 14:28)  HR: 62 (07-11-19 @ 07:41) (59 - 75)  BP: 139/66 (07-11-19 @ 05:45) (139/66 - 151/57)  RR: 18 (07-11-19 @ 05:45) (18 - 18)  SpO2: 96% (07-11-19 @ 07:41) (95% - 96%)  Wt(kg): --    07-10-19 @ 07:01  -  07-11-19 @ 07:00  --------------------------------------------------------  IN: 60 mL / OUT: 750 mL / NET: -690 mL    Physical Exam:  	Gen: NAD  	HEENT: No JVD  	Pulm: Fair air entry B/L, decreased bibasilar crackles+  	CV: S1S2+  	Abd: Soft, +BS   	Ext: trace LE edema B/L  	Neuro: Awake  	Skin: Warm and dry    LABS/STUDIES  --------------------------------------------------------------------------------              8.5    16.21 >-----------<  247      [07-10-19 @ 07:20]              26.4     128  |  89  |  103  ----------------------------<  255      [07-10-19 @ 07:20]  4.9   |  18  |  5.26        Ca     10.7     [07-10-19 @ 07:20]      Mg     2.3     [07-10-19 @ 07:20]      Phos  4.6     [07-10-19 @ 07:20]    07-11    129<L>  |  89<L>  |  114<H>  ----------------------------<  88  4.0   |  19<L>  |  5.87<H>    Ca    10.4      11 Jul 2019 06:31  Phos  5.9     07-11  Mg     2.3     07-11    Creatinine Trend:  SCr 5.26 [07-10 @ 07:20]  SCr 3.79 [07-09 @ 06:40]  SCr 3.00 [07-08 @ 17:30]  SCr 1.37 [07-07 @ 07:18]

## 2019-07-12 LAB
ANION GAP SERPL CALC-SCNC: 20 MMO/L — HIGH (ref 7–14)
BACTERIA BLD CULT: SIGNIFICANT CHANGE UP
BACTERIA BLD CULT: SIGNIFICANT CHANGE UP
BASOPHILS # BLD AUTO: 0.04 K/UL — SIGNIFICANT CHANGE UP (ref 0–0.2)
BASOPHILS NFR BLD AUTO: 0.4 % — SIGNIFICANT CHANGE UP (ref 0–2)
BUN SERPL-MCNC: 117 MG/DL — HIGH (ref 7–23)
CALCIUM SERPL-MCNC: 10.4 MG/DL — SIGNIFICANT CHANGE UP (ref 8.4–10.5)
CHLORIDE SERPL-SCNC: 91 MMOL/L — LOW (ref 98–107)
CO2 SERPL-SCNC: 19 MMOL/L — LOW (ref 22–31)
CREAT SERPL-MCNC: 4.86 MG/DL — HIGH (ref 0.5–1.3)
EOSINOPHIL # BLD AUTO: 0.19 K/UL — SIGNIFICANT CHANGE UP (ref 0–0.5)
EOSINOPHIL NFR BLD AUTO: 1.7 % — SIGNIFICANT CHANGE UP (ref 0–6)
GLUCOSE BLDC GLUCOMTR-MCNC: 136 MG/DL — HIGH (ref 70–99)
GLUCOSE BLDC GLUCOMTR-MCNC: 194 MG/DL — HIGH (ref 70–99)
GLUCOSE BLDC GLUCOMTR-MCNC: 197 MG/DL — HIGH (ref 70–99)
GLUCOSE SERPL-MCNC: 118 MG/DL — HIGH (ref 70–99)
HCT VFR BLD CALC: 22.6 % — LOW (ref 34.5–45)
HCT VFR BLD CALC: 24.2 % — LOW (ref 34.5–45)
HGB BLD-MCNC: 7.2 G/DL — LOW (ref 11.5–15.5)
HGB BLD-MCNC: 7.7 G/DL — LOW (ref 11.5–15.5)
IMM GRANULOCYTES NFR BLD AUTO: 0.7 % — SIGNIFICANT CHANGE UP (ref 0–1.5)
LYMPHOCYTES # BLD AUTO: 1.61 K/UL — SIGNIFICANT CHANGE UP (ref 1–3.3)
LYMPHOCYTES # BLD AUTO: 14.5 % — SIGNIFICANT CHANGE UP (ref 13–44)
MAGNESIUM SERPL-MCNC: 2.3 MG/DL — SIGNIFICANT CHANGE UP (ref 1.6–2.6)
MCHC RBC-ENTMCNC: 26.9 PG — LOW (ref 27–34)
MCHC RBC-ENTMCNC: 27.1 PG — SIGNIFICANT CHANGE UP (ref 27–34)
MCHC RBC-ENTMCNC: 31.8 % — LOW (ref 32–36)
MCHC RBC-ENTMCNC: 31.9 % — LOW (ref 32–36)
MCV RBC AUTO: 84.6 FL — SIGNIFICANT CHANGE UP (ref 80–100)
MCV RBC AUTO: 85 FL — SIGNIFICANT CHANGE UP (ref 80–100)
MONOCYTES # BLD AUTO: 0.97 K/UL — HIGH (ref 0–0.9)
MONOCYTES NFR BLD AUTO: 8.7 % — SIGNIFICANT CHANGE UP (ref 2–14)
NEUTROPHILS # BLD AUTO: 8.2 K/UL — HIGH (ref 1.8–7.4)
NEUTROPHILS NFR BLD AUTO: 74 % — SIGNIFICANT CHANGE UP (ref 43–77)
NRBC # FLD: 0 K/UL — SIGNIFICANT CHANGE UP (ref 0–0)
NRBC # FLD: 0.02 K/UL — SIGNIFICANT CHANGE UP (ref 0–0)
PHOSPHATE SERPL-MCNC: 6.2 MG/DL — HIGH (ref 2.5–4.5)
PLATELET # BLD AUTO: 247 K/UL — SIGNIFICANT CHANGE UP (ref 150–400)
PLATELET # BLD AUTO: 249 K/UL — SIGNIFICANT CHANGE UP (ref 150–400)
PMV BLD: 10.8 FL — SIGNIFICANT CHANGE UP (ref 7–13)
PMV BLD: 9.5 FL — SIGNIFICANT CHANGE UP (ref 7–13)
POTASSIUM SERPL-MCNC: 4 MMOL/L — SIGNIFICANT CHANGE UP (ref 3.5–5.3)
POTASSIUM SERPL-SCNC: 4 MMOL/L — SIGNIFICANT CHANGE UP (ref 3.5–5.3)
RBC # BLD: 2.66 M/UL — LOW (ref 3.8–5.2)
RBC # BLD: 2.86 M/UL — LOW (ref 3.8–5.2)
RBC # FLD: 17.9 % — HIGH (ref 10.3–14.5)
RBC # FLD: 17.9 % — HIGH (ref 10.3–14.5)
SODIUM SERPL-SCNC: 130 MMOL/L — LOW (ref 135–145)
WBC # BLD: 11.09 K/UL — HIGH (ref 3.8–10.5)
WBC # BLD: 9.89 K/UL — SIGNIFICANT CHANGE UP (ref 3.8–10.5)
WBC # FLD AUTO: 11.09 K/UL — HIGH (ref 3.8–10.5)
WBC # FLD AUTO: 9.89 K/UL — SIGNIFICANT CHANGE UP (ref 3.8–10.5)

## 2019-07-12 PROCEDURE — 99233 SBSQ HOSP IP/OBS HIGH 50: CPT

## 2019-07-12 PROCEDURE — 93460 R&L HRT ART/VENTRICLE ANGIO: CPT | Mod: 26

## 2019-07-12 PROCEDURE — 99232 SBSQ HOSP IP/OBS MODERATE 35: CPT | Mod: GC

## 2019-07-12 PROCEDURE — 76937 US GUIDE VASCULAR ACCESS: CPT | Mod: 26

## 2019-07-12 RX ORDER — HYDRALAZINE HCL 50 MG
50 TABLET ORAL THREE TIMES A DAY
Refills: 0 | Status: DISCONTINUED | OUTPATIENT
Start: 2019-07-12 | End: 2019-07-12

## 2019-07-12 RX ORDER — HYDRALAZINE HCL 50 MG
75 TABLET ORAL THREE TIMES A DAY
Refills: 0 | Status: DISCONTINUED | OUTPATIENT
Start: 2019-07-12 | End: 2019-07-15

## 2019-07-12 RX ADMIN — Medication 2 UNIT(S): at 08:34

## 2019-07-12 RX ADMIN — Medication 81 MILLIGRAM(S): at 13:11

## 2019-07-12 RX ADMIN — ATORVASTATIN CALCIUM 40 MILLIGRAM(S): 80 TABLET, FILM COATED ORAL at 22:37

## 2019-07-12 RX ADMIN — Medication 2: at 18:45

## 2019-07-12 RX ADMIN — INSULIN GLARGINE 5 UNIT(S): 100 INJECTION, SOLUTION SUBCUTANEOUS at 22:52

## 2019-07-12 RX ADMIN — Medication 2 UNIT(S): at 18:45

## 2019-07-12 RX ADMIN — SODIUM CHLORIDE 3 MILLILITER(S): 9 INJECTION INTRAMUSCULAR; INTRAVENOUS; SUBCUTANEOUS at 13:42

## 2019-07-12 RX ADMIN — CEFTRIAXONE 100 MILLIGRAM(S): 500 INJECTION, POWDER, FOR SOLUTION INTRAMUSCULAR; INTRAVENOUS at 12:28

## 2019-07-12 RX ADMIN — ISOSORBIDE MONONITRATE 60 MILLIGRAM(S): 60 TABLET, EXTENDED RELEASE ORAL at 13:41

## 2019-07-12 RX ADMIN — SODIUM CHLORIDE 3 MILLILITER(S): 9 INJECTION INTRAMUSCULAR; INTRAVENOUS; SUBCUTANEOUS at 22:52

## 2019-07-12 RX ADMIN — AZITHROMYCIN 250 MILLIGRAM(S): 500 TABLET, FILM COATED ORAL at 18:46

## 2019-07-12 RX ADMIN — Medication 75 MILLIGRAM(S): at 06:51

## 2019-07-12 RX ADMIN — Medication 50 MILLIGRAM(S): at 06:51

## 2019-07-12 RX ADMIN — Medication 50 MILLIGRAM(S): at 13:41

## 2019-07-12 RX ADMIN — Medication 75 MILLIGRAM(S): at 22:37

## 2019-07-12 RX ADMIN — SODIUM CHLORIDE 3 MILLILITER(S): 9 INJECTION INTRAMUSCULAR; INTRAVENOUS; SUBCUTANEOUS at 06:52

## 2019-07-12 NOTE — PROGRESS NOTE ADULT - SUBJECTIVE AND OBJECTIVE BOX
Patient seen and examined. She is s/p RHC and AV study. No acute SOB, orthopnea, PND, CP, palpitations.     Medications:  acetaminophen   Tablet .. 650 milliGRAM(s) Oral once PRN  aspirin enteric coated 81 milliGRAM(s) Oral daily  atorvastatin 40 milliGRAM(s) Oral at bedtime  azithromycin  IVPB 500 milliGRAM(s) IV Intermittent every 24 hours  cefTRIAXone   IVPB 1000 milliGRAM(s) IV Intermittent every 24 hours  dextrose 40% Gel 15 Gram(s) Oral once PRN  dextrose 5%. 1000 milliLiter(s) IV Continuous <Continuous>  dextrose 50% Injectable 12.5 Gram(s) IV Push once  dextrose 50% Injectable 25 Gram(s) IV Push once  dextrose 50% Injectable 25 Gram(s) IV Push once  glucagon  Injectable 1 milliGRAM(s) IntraMuscular once PRN  hydrALAZINE 50 milliGRAM(s) Oral three times a day  insulin glargine Injectable (LANTUS) 5 Unit(s) SubCutaneous at bedtime  insulin lispro (HumaLOG) corrective regimen sliding scale   SubCutaneous three times a day before meals  insulin lispro (HumaLOG) corrective regimen sliding scale   SubCutaneous at bedtime  insulin lispro Injectable (HumaLOG) 2 Unit(s) SubCutaneous three times a day before meals  isosorbide   mononitrate ER Tablet (IMDUR) 60 milliGRAM(s) Oral daily  metoprolol succinate ER 75 milliGRAM(s) Oral daily  sodium chloride 0.9% lock flush 3 milliLiter(s) IV Push every 8 hours      Vitals:  Vital Signs Last 24 Hrs  T(C): 36.7 (12 Jul 2019 06:48), Max: 36.7 (11 Jul 2019 21:26)  T(F): 98 (12 Jul 2019 06:48), Max: 98.1 (11 Jul 2019 21:26)  HR: 72 (12 Jul 2019 06:48) (59 - 87)  BP: 144/62 (12 Jul 2019 06:48) (144/62 - 151/69)  BP(mean): --  RR: 18 (12 Jul 2019 06:48) (17 - 19)  SpO2: 96% (12 Jul 2019 06:48) (95% - 98%)    Daily     Daily     I&O's Detail      Physical Exam:     General: No distress. Comfortable.  HEENT: EOM intact.  Neck: Neck supple. JVP not elevated. No masses  Chest: Clear to auscultation bilaterally  CV: Normal S1 and S2. II/VI HERBER, no rub, or gallops. Radial pulses normal. No LE edema b/l, warm.   Abdomen: Soft, non-distended, non-tender  Skin: No rashes or skin breakdown  Neurology: Alert and oriented times three. Sensation intact  Psych: Affect normal    Labs:                        7.7    11.09 )-----------( 247      ( 12 Jul 2019 08:26 )             24.2     07-12    130<L>  |  91<L>  |  117<H>  ----------------------------<  118<H>  4.0   |  19<L>  |  4.86<H>    Ca    10.4      12 Jul 2019 08:26  Phos  6.2     07-12  Mg     2.3     07-12

## 2019-07-12 NOTE — PROGRESS NOTE ADULT - PROBLEM SELECTOR PLAN 1
Came in with NYHA class IV symptoms, now improved.  BUN/Cr trending down. CLARI from contrast nephropathy.  RHC showed RA 9, PA 50/21/35, PCWP 23, PA sat 46.1%, CO 4.63, CI 2.63.   Per pt she is not SOB and is voiding.   Please collect strict I/O- not recorded today.   Get standing weight.   Ok to continue holding diuretics today. Start Torsemide 40 mg po qd tomorrow.   Increase hydral to 75 mg po BID, hypertensive.  Continue imdur 60 qd.   Continue Toprol 75 qd.   Not on ACEI/ARB/ARNI or bree b/c of CLARI.  D/C planing if renal function shows further downtrend tomorrow? Came in with NYHA class IV symptoms, now improved.  BUN/Cr trending down. CLARI from contrast nephropathy.  RHC showed RA 9, PA 50/21/35, PCWP 23, PA sat 46.1%, CO 4.63, CI 2.63.   Per pt she is not SOB and is voiding.   Please collect strict I/O- not recorded today.   Get standing weight.   Ok to continue holding diuretics today. Consider start Torsemide 40 mg po qd tomorrow.   Increase hydral to 75 mg po BID, hypertensive.  Continue imdur 60 qd.   Continue Toprol 75 qd.   Not on ACEI/ARB/ARNI or bree b/c of CLARI.  D/C planing if renal function shows further downtrend tomorrow?

## 2019-07-12 NOTE — CONSULT NOTE ADULT - ASSESSMENT
Impression:    1. Hematochezia: ddx includes radiation proctopathy, hemorrhoids, diverticular bleeding, colorectal cancer in the setting of likely supratherapeutic eliquis levels which were dosed in the setting of CLARI. Prior     2. Retained biliary stent likely the prior stent placed in 2015. no signs of cholangitis, LFTs mildly abnormal.    3. Acute on chronic CHF and pneumonia    4. CLARI on CKD    Recommendation:   -would appreicate cardiology risk stratification for colonoscopy, although patient would likely be high risk for anesthesia  -patient will likely need ERCP for removal of biliary stent  -trend LFTs Impression:    1. Hematochezia: ddx includes radiation proctopathy, hemorrhoids, diverticular bleeding, colorectal cancer in the setting of likely supratherapeutic eliquis levels which were dosed in the setting of CLARI. Prior     2. Retained biliary stent likely the prior stent placed in 2015. no signs of cholangitis, LFTs mildly abnormal.    3. Acute on chronic CHF and pneumonia    4. CLARI on CKD    Recommendation:   -would appreicate cardiology risk stratification for colonoscopy, although patient would likely be high risk for anesthesia  -patient will likely need ERCP for removal of biliary stent  -trend LFTs  -consider a careful blood transfusion with possible diuretic Impression:    1. Hematochezia: ddx includes radiation proctopathy, hemorrhoids, diverticular bleeding, colorectal cancer in the setting of likely supratherapeutic eliquis levels which were dosed in the setting of CLARI. Prior     2. Retained biliary stent likely the prior stent placed in 2015. no signs of cholangitis, LFTs mildly abnormal.    3. Acute on chronic CHF and pneumonia    4. CLARI on CKD    Recommendation:   -would appreicate cardiology risk stratification for colonoscopy, although patient would likely be high risk for anesthesia. another option would be conservative management  -patient will likely need ERCP for removal of biliary stent  -trend LFTs  -consider a careful blood transfusion with possible diuretic

## 2019-07-12 NOTE — PROGRESS NOTE ADULT - PROBLEM SELECTOR PLAN 2
As d/w Dr. [place], cath today did not demonstrate severe AS.  No surgical intervention needed at this time.

## 2019-07-12 NOTE — PROGRESS NOTE ADULT - ASSESSMENT
Patient with non oliguric CLARI on CKD in the setting of CHF exacerbation Patient with non-oliguric CLARI on CKD in the setting of CHF exacerbation

## 2019-07-12 NOTE — PROGRESS NOTE ADULT - PROBLEM SELECTOR PLAN 8
uncontrolled, improved FS this AM on Lantus, but post prandial remains high  add premeal lispro 2 units TID and adjust accordingly

## 2019-07-12 NOTE — PROGRESS NOTE ADULT - ATTENDING COMMENTS
+ttp with swelling noted to proximal L 5th finger with mild erythema to proximal finger with mild swelling extending to L 3rd-5th distal MCPJ with mild ttp, no streaking noted, skin intact, fingers warm & mobile, cap refill<2sec, pulses and sensation intact, NVI. addendum - pt seen post cath - on my arrival, nursing notified me pt had BRPBR in toilet. there is scant blood noted on toilet floor, but pt had already flushed toilet. family at bedside, along w pt report this is a chronic issues even at home where she bleeds transiently. As documented, on admission also with reported scant BRBPR but resolved and h/h remained stable so decision was made to cw AC considering risk factors. at this point considering reoccurrence will hold Eliquis, repeat STAT CBC and have GI input since if we are able to localize the source of LGIB and possibly intervene, might help in decision on continuing or discontinuing AC.

## 2019-07-12 NOTE — PROGRESS NOTE ADULT - PROBLEM SELECTOR PLAN 1
Pt. with CLARI on CKD in the setting of CHF exacerbation. On admission, Scr was 1.37 (7/7/2019), worsened to 5.87 on 7/11/19. Lasix held since 7/10/2019 with improvement of sCr, today is 4.86 (7/12/2019). CLARI likely hemodynamically related in the setting of CHF exacerbation and IV diuresis. Pt. non-oliguric at present. Monitor labs and urine output. Avoid any potential nephrotoxins Pt. with CLARI on CKD in the setting of CHF exacerbation. On admission, Scr was 1.37 (7/7/2019), worsened to 5.87 on 7/11/19. Lasix held since 7/10/2019. Scr decreased to 4.86 today (7/12/2019). CLARI likely hemodynamically related in the setting of CHF exacerbation and IV diuresis. Pt. non-oliguric at present. Monitor labs and urine output. Avoid any potential nephrotoxins

## 2019-07-12 NOTE — PROGRESS NOTE ADULT - PROBLEM SELECTOR PLAN 2
improving, but need to hold diuresis due to CLARI for now  cards follow up, no further need for BiPAP, cw other meds   pt now will discuss INDU for AS  HF consulted per cards as still volume overload and unable to administer diuretics, ? might benefit from inotrope  s/p RHC today

## 2019-07-12 NOTE — PROGRESS NOTE ADULT - SUBJECTIVE AND OBJECTIVE BOX
Patient is a 84y old  Female who presents with a chief complaint of dyspnea (12 Jul 2019 11:34)      SUBJECTIVE / OVERNIGHT EVENTS: no evnets, for cath today. Making urine, no CP, SOB better     ROS:  No HA/DZ  No Vision changes   No CP  No N/V/D  No Rash  NO weakness, numbness    MEDICATIONS  (STANDING):  aspirin enteric coated 81 milliGRAM(s) Oral daily  atorvastatin 40 milliGRAM(s) Oral at bedtime  azithromycin  IVPB 500 milliGRAM(s) IV Intermittent every 24 hours  cefTRIAXone   IVPB 1000 milliGRAM(s) IV Intermittent every 24 hours  dextrose 5%. 1000 milliLiter(s) (50 mL/Hr) IV Continuous <Continuous>  dextrose 50% Injectable 12.5 Gram(s) IV Push once  dextrose 50% Injectable 25 Gram(s) IV Push once  dextrose 50% Injectable 25 Gram(s) IV Push once  hydrALAZINE 50 milliGRAM(s) Oral three times a day  insulin glargine Injectable (LANTUS) 5 Unit(s) SubCutaneous at bedtime  insulin lispro (HumaLOG) corrective regimen sliding scale   SubCutaneous three times a day before meals  insulin lispro (HumaLOG) corrective regimen sliding scale   SubCutaneous at bedtime  insulin lispro Injectable (HumaLOG) 2 Unit(s) SubCutaneous three times a day before meals  isosorbide   mononitrate ER Tablet (IMDUR) 60 milliGRAM(s) Oral daily  metoprolol succinate ER 75 milliGRAM(s) Oral daily  sodium chloride 0.9% lock flush 3 milliLiter(s) IV Push every 8 hours    MEDICATIONS  (PRN):  acetaminophen   Tablet .. 650 milliGRAM(s) Oral once PRN Mild Pain (1 - 3), Moderate Pain (4 - 6), Severe Pain (7 - 10)  dextrose 40% Gel 15 Gram(s) Oral once PRN Blood Glucose LESS THAN 70 milliGRAM(s)/deciliter  glucagon  Injectable 1 milliGRAM(s) IntraMuscular once PRN Glucose LESS THAN 70 milligrams/deciliter      T(C): 36.7 (07-12-19 @ 06:48)  HR: 72 (07-12-19 @ 06:48)  BP: 144/62 (07-12-19 @ 06:48)  RR: 18 (07-12-19 @ 06:48)  SpO2: 96% (07-12-19 @ 06:48)  CAPILLARY BLOOD GLUCOSE      POCT Blood Glucose.: 136 mg/dL (12 Jul 2019 08:28)  POCT Blood Glucose.: 424 mg/dL (11 Jul 2019 21:15)  POCT Blood Glucose.: 447 mg/dL (11 Jul 2019 21:14)  POCT Blood Glucose.: 299 mg/dL (11 Jul 2019 17:34)    I&O's Summary      PHYSICAL EXAM:  GENERAL: NAD, well-developed, AOx3  HEAD:  Atraumatic, Normocephalic  EYES: EOMI, PERRL, conjunctiva and sclera clear  NECK: Supple, No JVD  CHEST/LUNG: Basilar Rhonchi   HEART: Regular rate and rhythm; No murmurs, rubs, or gallops, +  Edema  ABDOMEN: Soft, Nontender, Nondistended; Bowel sounds present  EXTREMITIES:  2+ Peripheral Pulses, No clubbing, cyanosis  PSYCH: No SI/HI  NEUROLOGY: non-focal  SKIN: No rashes or lesions    LABS:                        7.7    11.09 )-----------( 247      ( 12 Jul 2019 08:26 )             24.2     07-12    130<L>  |  91<L>  |  117<H>  ----------------------------<  118<H>  4.0   |  19<L>  |  4.86<H>    Ca    10.4      12 Jul 2019 08:26  Phos  6.2     07-12  Mg     2.3     07-12                    RADIOLOGY & ADDITIONAL TESTS:    Imaging Personally Reviewed:    Consultant(s) Notes Reviewed:      Care Discussed with Consultants/Other Providers: Patient is a 84y old  Female who presents with a chief complaint of dyspnea (12 Jul 2019 11:34)      SUBJECTIVE / OVERNIGHT EVENTS: no events for cath today. Making urine, no CP, SOB better     ROS:  No HA/DZ  No Vision changes   No CP  No N/V/D  No Rash  NO weakness, numbness    MEDICATIONS  (STANDING):  aspirin enteric coated 81 milliGRAM(s) Oral daily  atorvastatin 40 milliGRAM(s) Oral at bedtime  azithromycin  IVPB 500 milliGRAM(s) IV Intermittent every 24 hours  cefTRIAXone   IVPB 1000 milliGRAM(s) IV Intermittent every 24 hours  dextrose 5%. 1000 milliLiter(s) (50 mL/Hr) IV Continuous <Continuous>  dextrose 50% Injectable 12.5 Gram(s) IV Push once  dextrose 50% Injectable 25 Gram(s) IV Push once  dextrose 50% Injectable 25 Gram(s) IV Push once  hydrALAZINE 50 milliGRAM(s) Oral three times a day  insulin glargine Injectable (LANTUS) 5 Unit(s) SubCutaneous at bedtime  insulin lispro (HumaLOG) corrective regimen sliding scale   SubCutaneous three times a day before meals  insulin lispro (HumaLOG) corrective regimen sliding scale   SubCutaneous at bedtime  insulin lispro Injectable (HumaLOG) 2 Unit(s) SubCutaneous three times a day before meals  isosorbide   mononitrate ER Tablet (IMDUR) 60 milliGRAM(s) Oral daily  metoprolol succinate ER 75 milliGRAM(s) Oral daily  sodium chloride 0.9% lock flush 3 milliLiter(s) IV Push every 8 hours    MEDICATIONS  (PRN):  acetaminophen   Tablet .. 650 milliGRAM(s) Oral once PRN Mild Pain (1 - 3), Moderate Pain (4 - 6), Severe Pain (7 - 10)  dextrose 40% Gel 15 Gram(s) Oral once PRN Blood Glucose LESS THAN 70 milliGRAM(s)/deciliter  glucagon  Injectable 1 milliGRAM(s) IntraMuscular once PRN Glucose LESS THAN 70 milligrams/deciliter      T(C): 36.7 (07-12-19 @ 06:48)  HR: 72 (07-12-19 @ 06:48)  BP: 144/62 (07-12-19 @ 06:48)  RR: 18 (07-12-19 @ 06:48)  SpO2: 96% (07-12-19 @ 06:48)  CAPILLARY BLOOD GLUCOSE      POCT Blood Glucose.: 136 mg/dL (12 Jul 2019 08:28)  POCT Blood Glucose.: 424 mg/dL (11 Jul 2019 21:15)  POCT Blood Glucose.: 447 mg/dL (11 Jul 2019 21:14)  POCT Blood Glucose.: 299 mg/dL (11 Jul 2019 17:34)    I&O's Summary      PHYSICAL EXAM:  GENERAL: NAD, well-developed, AOx3  HEAD:  Atraumatic, Normocephalic  EYES: EOMI, PERRL, conjunctiva and sclera clear  NECK: Supple, No JVD  CHEST/LUNG: Basilar Rhonchi   HEART: Regular rate and rhythm; No murmurs, rubs, or gallops, +  Edema  ABDOMEN: Soft, Nontender, Nondistended; Bowel sounds present  EXTREMITIES:  2+ Peripheral Pulses, No clubbing, cyanosis  PSYCH: No SI/HI  NEUROLOGY: non-focal  SKIN: No rashes or lesions    LABS:                        7.7    11.09 )-----------( 247      ( 12 Jul 2019 08:26 )             24.2     07-12    130<L>  |  91<L>  |  117<H>  ----------------------------<  118<H>  4.0   |  19<L>  |  4.86<H>    Ca    10.4      12 Jul 2019 08:26  Phos  6.2     07-12  Mg     2.3     07-12                    RADIOLOGY & ADDITIONAL TESTS:    Imaging Personally Reviewed:    Consultant(s) Notes Reviewed:      Care Discussed with Consultants/Other Providers:

## 2019-07-12 NOTE — PROGRESS NOTE ADULT - ATTENDING COMMENTS
patient seen and personally examined today  family at bedside  RHC reviewed, elevated left sided filling pressure noted, however pt still making urine, without sob, dyspnea  will allow Cr to cont to improve (suspect it will as pt recovers from contrast nephropathy)  if SOB of symptoms of decompensated HF, will treat with lasix  appreciate further HF team recs    given elevated Cr, can not be on eliquis  please start heparin drip for afib starting tomorrow morning 7/13/19.    will cont to follow

## 2019-07-12 NOTE — PROGRESS NOTE ADULT - ATTENDING COMMENTS
CLARI resolving. Possibly due to IV contrast.  RHC data reviewed. Would hold off on IV diuretics until SCr<3.0 or per Renal.  No evidence of severe AS.  Increase hydralazine to 75 tid.

## 2019-07-12 NOTE — PROGRESS NOTE ADULT - PROBLEM SELECTOR PLAN 1
on CKD 3 now associated with hyponatremia which is also likely due to volume overload - cr now improving   suspect due to diuresis vs NORBERT, sono neg for retention, dw renal, no need for further w/u but might need HD   Lasix on hold  renally dose meds, monitor BMP

## 2019-07-12 NOTE — CONSULT NOTE ADULT - SUBJECTIVE AND OBJECTIVE BOX
Chief Complaint:  Patient is a 84y old  Female GI consulted for drop in hemoglobin      HPI:  85 y/o female with PMHX of HTN, HLD, DM II, HFrEF (moderate systolic dysfunction) severe AS, severe pulm HTN   Allergies:  No Known Allergies      Home Medications:    Hospital Medications:  acetaminophen   Tablet .. 650 milliGRAM(s) Oral once PRN  aspirin enteric coated 81 milliGRAM(s) Oral daily  atorvastatin 40 milliGRAM(s) Oral at bedtime  azithromycin  IVPB 500 milliGRAM(s) IV Intermittent every 24 hours  cefTRIAXone   IVPB 1000 milliGRAM(s) IV Intermittent every 24 hours  dextrose 40% Gel 15 Gram(s) Oral once PRN  dextrose 5%. 1000 milliLiter(s) IV Continuous <Continuous>  dextrose 50% Injectable 12.5 Gram(s) IV Push once  dextrose 50% Injectable 25 Gram(s) IV Push once  dextrose 50% Injectable 25 Gram(s) IV Push once  glucagon  Injectable 1 milliGRAM(s) IntraMuscular once PRN  hydrALAZINE 75 milliGRAM(s) Oral three times a day  insulin glargine Injectable (LANTUS) 5 Unit(s) SubCutaneous at bedtime  insulin lispro (HumaLOG) corrective regimen sliding scale   SubCutaneous three times a day before meals  insulin lispro (HumaLOG) corrective regimen sliding scale   SubCutaneous at bedtime  insulin lispro Injectable (HumaLOG) 2 Unit(s) SubCutaneous three times a day before meals  isosorbide   mononitrate ER Tablet (IMDUR) 60 milliGRAM(s) Oral daily  metoprolol succinate ER 75 milliGRAM(s) Oral daily  sodium chloride 0.9% lock flush 3 milliLiter(s) IV Push every 8 hours      PMHX/PSHX:  Chronic systolic heart failure  Tachycardia-bradycardia syndrome  Obese  Complete Spontaneous   Hyperlipidemia  Uterine Cancer (ICD9 179)  Osteoarthritis of Knee (ICD9 715.96)  DM Type 2 (Diabetes Mellitus, Type 2) (ICD9 250.00)  HTN (Hypertension) (ICD9 401.9)  Cardiac Pacemaker (ICD9 V45.01)  Status Post Total Knee Replacement  Cardiac Pacemaker (ICD9 V45.01)  History of Hysterectomy (ICD9 V88.01)      Family history:  No pertinent family history in first degree relatives      Social History:     ROS:     General:  No wt loss, fevers, chills, night sweats, fatigue,   Eyes:  Good vision, no reported pain  ENT:  No sore throat, pain, runny nose, dysphagia  CV:  No pain, palpitations, hypo/hypertension  Resp:  No dyspnea, cough, tachypnea, wheezing  GI:  See HPI  :  No pain, bleeding, incontinence, nocturia  Muscle:  No pain, weakness  Neuro:  No weakness, tingling, memory problems  Psych:  No fatigue, insomnia, mood problems, depression  Endocrine:  No polyuria, polydipsia, cold/heat intolerance  Heme:  No petechiae, ecchymosis, easy bruisability  Skin:  No rash, edema      PHYSICAL EXAM:     GENERAL:  Appears stated age, well-groomed, well-nourished, no distress  HEENT:  NC/AT,  conjunctivae clear and pink,  no JVD  CHEST:  Full & symmetric excursion, no increased effort, breath sounds clear  HEART:  Regular rhythm, S1, S2, no murmur/rub/S3/S4, no abdominal bruit, no edema  ABDOMEN:  Soft, non-tender, non-distended, normoactive bowel sounds,  no masses , no hepatosplenomegaly  EXTREMITIES:  no cyanosis,clubbing or edema  SKIN:  No rash/erythema/ecchymoses/petechiae/wounds/abscess/warm/dry  NEURO:  Alert, oriented    Vital Signs:  Vital Signs Last 24 Hrs  T(C): 36.7 (2019 06:48), Max: 36.7 (2019 21:26)  T(F): 98 (2019 06:48), Max: 98.1 (2019 21:26)  HR: 72 (2019 06:48) (59 - 72)  BP: 144/62 (2019 06:48) (144/62 - 151/69)  BP(mean): --  RR: 18 (2019 06:48) (17 - 18)  SpO2: 96% (2019 06:48) (95% - 98%)  Daily     Daily     LABS:                        7.2    9.89  )-----------( 249      ( 2019 16:23 )             22.6     07-12    130<L>  |  91<L>  |  117<H>  ----------------------------<  118<H>  4.0   |  19<L>  |  4.86<H>    Ca    10.4      2019 08:26  Phos  6.2     07-12  Mg     2.3     07-12                Imaging: Chief Complaint:  Patient is a 84y old  Female GI consulted for drop in hemoglobin      HPI:  83 y/o female with PMHX of HTN, HLD, DM II, HFPEF (moderate systolic dysfunction) severe AS, severe pulm HTN, afib on Eliquis presented with a CHF exacerbation. The patient was treated with diuretics for presumed CHF as well as with IV abx for possible pneumonia as well. The patient experienced hematochezia two times during the hospitalization. The patient had a colonoscopy in  which showed some abnormal rectosigmoid mucosa and hemorrhoids as well as a lipoma. She also is on aspirin 81 mg. On admission her hemoglobin was in the mid 9s and has downtrended to mid 7s.  Her course was also complicated by CLARI on CKD.  A CTA on  showed diverticulosis but no active bleeding.    Allergies:  No Known Allergies      Home Medications:    Hospital Medications:  acetaminophen   Tablet .. 650 milliGRAM(s) Oral once PRN  aspirin enteric coated 81 milliGRAM(s) Oral daily  atorvastatin 40 milliGRAM(s) Oral at bedtime  azithromycin  IVPB 500 milliGRAM(s) IV Intermittent every 24 hours  cefTRIAXone   IVPB 1000 milliGRAM(s) IV Intermittent every 24 hours  dextrose 40% Gel 15 Gram(s) Oral once PRN  dextrose 5%. 1000 milliLiter(s) IV Continuous <Continuous>  dextrose 50% Injectable 12.5 Gram(s) IV Push once  dextrose 50% Injectable 25 Gram(s) IV Push once  dextrose 50% Injectable 25 Gram(s) IV Push once  glucagon  Injectable 1 milliGRAM(s) IntraMuscular once PRN  hydrALAZINE 75 milliGRAM(s) Oral three times a day  insulin glargine Injectable (LANTUS) 5 Unit(s) SubCutaneous at bedtime  insulin lispro (HumaLOG) corrective regimen sliding scale   SubCutaneous three times a day before meals  insulin lispro (HumaLOG) corrective regimen sliding scale   SubCutaneous at bedtime  insulin lispro Injectable (HumaLOG) 2 Unit(s) SubCutaneous three times a day before meals  isosorbide   mononitrate ER Tablet (IMDUR) 60 milliGRAM(s) Oral daily  metoprolol succinate ER 75 milliGRAM(s) Oral daily  sodium chloride 0.9% lock flush 3 milliLiter(s) IV Push every 8 hours      PMHX/PSHX:  Chronic systolic heart failure  Tachycardia-bradycardia syndrome  Obese  Complete Spontaneous   Hyperlipidemia  Uterine Cancer (ICD9 179)  Osteoarthritis of Knee (ICD9 715.96)  DM Type 2 (Diabetes Mellitus, Type 2) (ICD9 250.00)  HTN (Hypertension) (ICD9 401.9)  Cardiac Pacemaker (ICD9 V45.01)  Status Post Total Knee Replacement  Cardiac Pacemaker (ICD9 V45.01)  History of Hysterectomy (ICD9 V88.01)      Family history:  No pertinent family history in first degree relatives      Social History:     ROS:     General:  No wt loss, fevers, chills, night sweats, fatigue,   Eyes:  Good vision, no reported pain  ENT:  No sore throat, pain, runny nose, dysphagia  CV:  No pain, palpitations, hypo/hypertension  Resp:  No dyspnea, cough, tachypnea, wheezing  GI:  See HPI  :  No pain, bleeding, incontinence, nocturia  Muscle:  No pain, weakness  Neuro:  No weakness, tingling, memory problems  Psych:  No fatigue, insomnia, mood problems, depression  Endocrine:  No polyuria, polydipsia, cold/heat intolerance  Heme:  No petechiae, ecchymosis, easy bruisability  Skin:  No rash, edema      PHYSICAL EXAM:     GENERAL:  Appears stated age, well-groomed, well-nourished, no distress  HEENT:  NC/AT,  conjunctivae clear and pink,  no JVD  CHEST:  Full & symmetric excursion, no increased effort, breath sounds clear  HEART:  Regular rhythm, S1, S2, no murmur/rub/S3/S4, no abdominal bruit, no edema  ABDOMEN:  Soft, non-tender, non-distended, normoactive bowel sounds,  no masses , no hepatosplenomegaly  EXTREMITIES:  no cyanosis,clubbing or edema  SKIN:  No rash/erythema/ecchymoses/petechiae/wounds/abscess/warm/dry  NEURO:  Alert, oriented    Vital Signs:  Vital Signs Last 24 Hrs  T(C): 36.7 (2019 06:48), Max: 36.7 (2019 21:26)  T(F): 98 (2019 06:48), Max: 98.1 (2019 21:26)  HR: 72 (2019 06:48) (59 - 72)  BP: 144/62 (2019 06:48) (144/62 - 151/69)  BP(mean): --  RR: 18 (2019 06:48) (17 - 18)  SpO2: 96% (2019 06:48) (95% - 98%)  Daily     Daily     LABS:                        7.2    9.89  )-----------( 249      ( 2019 16:23 )             22.6     07-12    130<L>  |  91<L>  |  117<H>  ----------------------------<  118<H>  4.0   |  19<L>  |  4.86<H>    Ca    10.4      2019 08:26  Phos  6.2     07-12  Mg     2.3     07-12                Imaging: Chief Complaint:  Patient is a 84y old  Female GI consulted for drop in hemoglobin      HPI:  83 y/o female with PMHX of uterine Ca s/p radiation, HTN, HLD, DM II, HFPEF (moderate systolic dysfunction) severe AS, severe pulm HTN, afib on Eliquis s/p PPM presented with a CHF exacerbation. The patient was treated with diuretics for presumed CHF as well as with IV abx for possible pneumonia as well. The patient experienced hematochezia one day prior to hospitalization and one large episode today. The patient had a colonoscopy in  which showed some abnormal rectosigmoid mucosa (path showed hyperplastic change) and hemorrhoids as well as a lipoma. She also is on aspirin 81 mg. On admission her hemoglobin was in the mid 9s and has downtrended to mid 7s.  Her course was also complicated by CLARI on CKD.  A CTA on  showed diverticulosis but no active bleeding.  She denies abdominal pain, nausea, vomiting dysphagia, NSAID use or diarrhea.  She has not had an EGD to her knowledge. Her last dose of apixaban was in the am on .   The patient had an ERCP for choledocholithiasis with a double pigtail stent in  but does not seem to have followed up.    Allergies:  No Known Allergies      Home Medications:    Hospital Medications:  acetaminophen   Tablet .. 650 milliGRAM(s) Oral once PRN  aspirin enteric coated 81 milliGRAM(s) Oral daily  atorvastatin 40 milliGRAM(s) Oral at bedtime  azithromycin  IVPB 500 milliGRAM(s) IV Intermittent every 24 hours  cefTRIAXone   IVPB 1000 milliGRAM(s) IV Intermittent every 24 hours  dextrose 40% Gel 15 Gram(s) Oral once PRN  dextrose 5%. 1000 milliLiter(s) IV Continuous <Continuous>  dextrose 50% Injectable 12.5 Gram(s) IV Push once  dextrose 50% Injectable 25 Gram(s) IV Push once  dextrose 50% Injectable 25 Gram(s) IV Push once  glucagon  Injectable 1 milliGRAM(s) IntraMuscular once PRN  hydrALAZINE 75 milliGRAM(s) Oral three times a day  insulin glargine Injectable (LANTUS) 5 Unit(s) SubCutaneous at bedtime  insulin lispro (HumaLOG) corrective regimen sliding scale   SubCutaneous three times a day before meals  insulin lispro (HumaLOG) corrective regimen sliding scale   SubCutaneous at bedtime  insulin lispro Injectable (HumaLOG) 2 Unit(s) SubCutaneous three times a day before meals  isosorbide   mononitrate ER Tablet (IMDUR) 60 milliGRAM(s) Oral daily  metoprolol succinate ER 75 milliGRAM(s) Oral daily  sodium chloride 0.9% lock flush 3 milliLiter(s) IV Push every 8 hours      PMHX/PSHX:  Chronic systolic heart failure  Tachycardia-bradycardia syndrome  Obese  Complete Spontaneous   Hyperlipidemia  Uterine Cancer (ICD9 179)  Osteoarthritis of Knee (ICD9 715.96)  DM Type 2 (Diabetes Mellitus, Type 2) (ICD9 250.00)  HTN (Hypertension) (ICD9 401.9)  Cardiac Pacemaker (ICD9 V45.01)  Status Post Total Knee Replacement  Cardiac Pacemaker (ICD9 V45.01)  History of Hysterectomy (ICD9 V88.01)      Family history:  No pertinent family history in first degree relatives      Social History:     ROS:     General:  No wt loss, fevers, chills, night sweats, fatigue,   Eyes:  Good vision, no reported pain  ENT:  No sore throat, pain, runny nose, dysphagia  CV:  No pain, palpitations, hypo/hypertension  Resp:  No dyspnea, cough, tachypnea, wheezing  GI:  See HPI  :  No pain, bleeding, incontinence, nocturia  Muscle:  No pain, weakness  Neuro:  No weakness, tingling, memory problems  Psych:  No fatigue, insomnia, mood problems, depression  Endocrine:  No polyuria, polydipsia, cold/heat intolerance  Heme:  No petechiae, ecchymosis, easy bruisability  Skin:  No rash, edema      PHYSICAL EXAM:     GENERAL:  Appears stated age, well-groomed, well-nourished, no distress  HEENT:  NC/AT,  conjunctivae clear and pink,  no JVD  CHEST:  Full & symmetric excursion, no increased effort, breath sounds clear  HEART:  Regular rhythm, S1, S2, no murmur/rub/S3/S4, no abdominal bruit, no edema  ABDOMEN:  Soft, non-tender, non-distended, normoactive bowel sounds,  no masses , no hepatosplenomegaly  EXTREMITIES:  no cyanosis,clubbing or edema  SKIN:  No rash/erythema/ecchymoses/petechiae/wounds/abscess/warm/dry  NEURO:  Alert, oriented    Vital Signs:  Vital Signs Last 24 Hrs  T(C): 36.7 (2019 06:48), Max: 36.7 (2019 21:26)  T(F): 98 (2019 06:48), Max: 98.1 (2019 21:26)  HR: 72 (2019 06:48) (59 - 72)  BP: 144/62 (2019 06:48) (144/62 - 151/69)  BP(mean): --  RR: 18 (2019 06:48) (17 - 18)  SpO2: 96% (2019 06:48) (95% - 98%)  Daily     Daily     LABS:                        7.2    9.89  )-----------( 249      ( 2019 16:23 )             22.6     07-12    130<L>  |  91<L>  |  117<H>  ----------------------------<  118<H>  4.0   |  19<L>  |  4.86<H>    Ca    10.4      2019 08:26  Phos  6.2     07-12  Mg     2.3     07-12                Imaging:

## 2019-07-12 NOTE — PROGRESS NOTE ADULT - ASSESSMENT
83 y/o female with PMHX of HTN, HLD, DM II, HFrEF (moderate systolic dysfunction) severe AS, severe pulm HTN comes in with complaints of KRAUS x 3 days. She was found to be in ADHF, was given Lasix 80 mg IV x 2.  HF consulted b/c of worsening renal function. She states prior to coming in she had KRAUS after a few steps with orthopnea and PND. Currently she feels well, no more KRAUS when walking in room. Non smoker, no ETOH or drug abuse. Daughter gives medications at home. Per daughter she is on a very low salt diet and hardly eats outside (once a year).

## 2019-07-12 NOTE — PROGRESS NOTE ADULT - ASSESSMENT
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 Admitted for CHF exacerbation and initially required BiPAP. Cardiology consulted for management of HF.     #HFrEF  - Remains volume up (62.9 kg -> 66.7 kg -> no weight today 7/12)  - RHC 7/12 with wedge 30, LVEDP mid 20s  - Continue hydralazine, Imdur, metoprolol  - Would continue to hold Lasix given CLARI and that she is not complaining of SOB and allow her to auto-diurese  - Daily standing weights, strict I/Os  - Monitor BMP daily, replete K and Mg PRN    #AFib  - Continue BB  - Discontinue apixaban given CLARI  - Last apixaban dose 7/11 AM -- allow for 48 hour washout of apixaban, then start heparin gtt on 7/13      BOB Morales  Cardiology Fellow   [id number]  All cardiology service information can be found 24/7 at [email or web address], password: [email or web address]

## 2019-07-12 NOTE — PROGRESS NOTE ADULT - SUBJECTIVE AND OBJECTIVE BOX
[facility] DIVISION OF KIDNEY DISEASES AND HYPERTENSION -- FOLLOW UP NOTE  --------------------------------------------------------------------------------  HPI: 84-year-old female with PMH of T2DM, HFrEF (moderate reduced EF), HLD, HTN (over 30 years), AFib, presented to the ER complaining of SOB. Patient was admitted for CHF exacerbation on 7/7/2019. Patient was started on IV Lasix for diuresis. Nephrology consulted for CLARI. On admission, Scr was 1.37 (7/7/2019) and increased to 5.87 on 7/11/2019. Diuretic therapy was held since then and repeat Scr today is 4.86.     Patient evaluated in cardiac cath recovery room, in no acute distress. Patient is planned for CATH today. Denies any new complaints.    PAST HISTORY  --------------------------------------------------------------------------------  No significant changes to PMH, PSH, FHx, SHx, unless otherwise noted    ALLERGIES & MEDICATIONS  --------------------------------------------------------------------------------  Allergies    No Known Allergies    Intolerances      Standing Inpatient Medications  aspirin enteric coated 81 milliGRAM(s) Oral daily  atorvastatin 40 milliGRAM(s) Oral at bedtime  azithromycin  IVPB 500 milliGRAM(s) IV Intermittent every 24 hours  cefTRIAXone   IVPB 1000 milliGRAM(s) IV Intermittent every 24 hours  dextrose 5%. 1000 milliLiter(s) IV Continuous <Continuous>  dextrose 50% Injectable 12.5 Gram(s) IV Push once  dextrose 50% Injectable 25 Gram(s) IV Push once  dextrose 50% Injectable 25 Gram(s) IV Push once  hydrALAZINE 50 milliGRAM(s) Oral two times a day  insulin glargine Injectable (LANTUS) 5 Unit(s) SubCutaneous at bedtime  insulin lispro (HumaLOG) corrective regimen sliding scale   SubCutaneous three times a day before meals  insulin lispro (HumaLOG) corrective regimen sliding scale   SubCutaneous at bedtime  insulin lispro Injectable (HumaLOG) 2 Unit(s) SubCutaneous three times a day before meals  isosorbide   mononitrate ER Tablet (IMDUR) 60 milliGRAM(s) Oral daily  metoprolol succinate ER 75 milliGRAM(s) Oral daily  sodium chloride 0.9% lock flush 3 milliLiter(s) IV Push every 8 hours    REVIEW OF SYSTEMS  --------------------------------------------------------------------------------  Gen: No fevers/chills  Head/Eyes/Ears: No headache,   Respiratory: (+) dyspnea on exertion, no cough  CV: No chest pain  GI: No abdominal pain, diarrhea  : No dysuria, hematuria  MSK: (+) improved LE edema    All other systems were reviewed and are negative, except as noted.    VITALS/PHYSICAL EXAM  --------------------------------------------------------------------------------  T(C): 36.7 (07-12-19 @ 06:48), Max: 36.7 (07-11-19 @ 21:26)  HR: 72 (07-12-19 @ 06:48) (59 - 87)  BP: 144/62 (07-12-19 @ 06:48) (142/54 - 151/69)  RR: 18 (07-12-19 @ 06:48) (17 - 19)  SpO2: 96% (07-12-19 @ 06:48) (95% - 98%)  Wt(kg): --    Physical Exam:  	Gen: NAD  	HEENT: No JVD  	Pulm: Clear to auscultation B/L  	CV: S1S2+  	Abd: Soft, +BS   	Ext: LE edema B/L  	Neuro: Awake  	Skin: Warm and dry    LABS/STUDIES  --------------------------------------------------------------------------------              7.7    11.09 >-----------<  247      [07-12-19 @ 08:26]              24.2     130  |  91  |  117  ----------------------------<  118      [07-12-19 @ 08:26]  4.0   |  19  |  4.86        Ca     10.4     [07-12-19 @ 08:26]      Mg     2.3     [07-12-19 @ 08:26]      Phos  6.2     [07-12-19 @ 08:26]    Creatinine Trend:  SCr 4.86 [07-12 @ 08:26]  SCr 5.87 [07-11 @ 06:31]  SCr 5.26 [07-10 @ 07:20]  SCr 3.79 [07-09 @ 06:40]  SCr 3.00 [07-08 @ 17:30] [facility] DIVISION OF KIDNEY DISEASES AND HYPERTENSION -- FOLLOW UP NOTE  --------------------------------------------------------------------------------  HPI: 84-year-old female with PMH of T2DM, HFrEF (moderate reduced EF), HLD, HTN (over 30 years), AFib, presented to the ER complaining of SOB. Patient was admitted for CHF exacerbation on 7/7/2019. Patient was started on IV Lasix for diuresis. Nephrology consulted for CLARI. On admission, Scr was 1.37 (7/7/2019) and increased to 5.87 on 7/11/2019. Diuretic therapy was held since then and repeat Scr today is 4.86.     Patient evaluated in cardiac cath recovery room, in no acute distress. Patient is planned for right heart cath today. Denies any new complaints.    PAST HISTORY  --------------------------------------------------------------------------------  No significant changes to PMH, PSH, FHx, SHx, unless otherwise noted    ALLERGIES & MEDICATIONS  --------------------------------------------------------------------------------  Allergies    No Known Allergies    Intolerances    Standing Inpatient Medications  aspirin enteric coated 81 milliGRAM(s) Oral daily  atorvastatin 40 milliGRAM(s) Oral at bedtime  azithromycin  IVPB 500 milliGRAM(s) IV Intermittent every 24 hours  cefTRIAXone   IVPB 1000 milliGRAM(s) IV Intermittent every 24 hours  dextrose 5%. 1000 milliLiter(s) IV Continuous <Continuous>  dextrose 50% Injectable 12.5 Gram(s) IV Push once  dextrose 50% Injectable 25 Gram(s) IV Push once  dextrose 50% Injectable 25 Gram(s) IV Push once  hydrALAZINE 50 milliGRAM(s) Oral two times a day  insulin glargine Injectable (LANTUS) 5 Unit(s) SubCutaneous at bedtime  insulin lispro (HumaLOG) corrective regimen sliding scale   SubCutaneous three times a day before meals  insulin lispro (HumaLOG) corrective regimen sliding scale   SubCutaneous at bedtime  insulin lispro Injectable (HumaLOG) 2 Unit(s) SubCutaneous three times a day before meals  isosorbide   mononitrate ER Tablet (IMDUR) 60 milliGRAM(s) Oral daily  metoprolol succinate ER 75 milliGRAM(s) Oral daily  sodium chloride 0.9% lock flush 3 milliLiter(s) IV Push every 8 hours    REVIEW OF SYSTEMS  --------------------------------------------------------------------------------  Gen: No fevers/chills  Head/Eyes/Ears: No headache,   Respiratory: Improved dyspnea on exertion, no cough  CV: No chest pain  GI: No abdominal pain, diarrhea  : No dysuria, hematuria  MSK: Improved LE edema    All other systems were reviewed and are negative, except as noted.    VITALS/PHYSICAL EXAM  --------------------------------------------------------------------------------  T(C): 36.7 (07-12-19 @ 06:48), Max: 36.7 (07-11-19 @ 21:26)  HR: 72 (07-12-19 @ 06:48) (59 - 87)  BP: 144/62 (07-12-19 @ 06:48) (142/54 - 151/69)  RR: 18 (07-12-19 @ 06:48) (17 - 19)  SpO2: 96% (07-12-19 @ 06:48) (95% - 98%)  Wt(kg): --    Physical Exam:  	Gen: NAD  	HEENT: No JVD  	Pulm: CTA B/L  	CV: S1S2+  	Abd: Soft, +BS   	Ext: LE edema B/L  	Neuro: Awake  	Skin: Warm and dry    LABS/STUDIES  --------------------------------------------------------------------------------              7.7    11.09 >-----------<  247      [07-12-19 @ 08:26]              24.2     130  |  91  |  117  ----------------------------<  118      [07-12-19 @ 08:26]  4.0   |  19  |  4.86        Ca     10.4     [07-12-19 @ 08:26]      Mg     2.3     [07-12-19 @ 08:26]      Phos  6.2     [07-12-19 @ 08:26]    Creatinine Trend:  SCr 4.86 [07-12 @ 08:26]  SCr 5.87 [07-11 @ 06:31]  SCr 5.26 [07-10 @ 07:20]  SCr 3.79 [07-09 @ 06:40]  SCr 3.00 [07-08 @ 17:30]

## 2019-07-12 NOTE — PROGRESS NOTE ADULT - SUBJECTIVE AND OBJECTIVE BOX
Patient seen and examined at bedside.    Overnight Events: Patient feels better today. Denies shortness of breath, chest pain, palpitations.       REVIEW OF SYSTEMS:  Constitutional:     No fevers, chills, weight loss, weight gain  HEENT:                  No dry eyes, nasal congestion, postnasal drip  CV:                         No chest pain, palpitations, orthopnea, PND  Resp:                     No cough, SOB, dyspnea, wheezing, sputum  GI:                          No nausea, vomiting, abdominal pain, diarrhea, constipation  :                        No dysuria, nocturia, hematuria, increased urinary frequency  Musculoskeletal: No back pain, myalgias, arthralgias   Skin:                       No rash, pruritus, ecchymoses  Neurological:        No headache, dizziness, syncope, weakness, numbness  Psychiatric:           No anxiety, depression   Endocrine:            No hot/cold intolerance, polydipsia  Heme/Lymph:      No bleeding, easy bruising  Allergic/Immune: No itchy eyes, rhinorrhea, hives angioedema      Current Meds:  acetaminophen   Tablet .. 650 milliGRAM(s) Oral once PRN  aspirin enteric coated 81 milliGRAM(s) Oral daily  atorvastatin 40 milliGRAM(s) Oral at bedtime  azithromycin  IVPB 500 milliGRAM(s) IV Intermittent every 24 hours  cefTRIAXone   IVPB 1000 milliGRAM(s) IV Intermittent every 24 hours  dextrose 40% Gel 15 Gram(s) Oral once PRN  dextrose 5%. 1000 milliLiter(s) IV Continuous <Continuous>  dextrose 50% Injectable 12.5 Gram(s) IV Push once  dextrose 50% Injectable 25 Gram(s) IV Push once  dextrose 50% Injectable 25 Gram(s) IV Push once  glucagon  Injectable 1 milliGRAM(s) IntraMuscular once PRN  hydrALAZINE 50 milliGRAM(s) Oral three times a day  insulin glargine Injectable (LANTUS) 5 Unit(s) SubCutaneous at bedtime  insulin lispro (HumaLOG) corrective regimen sliding scale   SubCutaneous three times a day before meals  insulin lispro (HumaLOG) corrective regimen sliding scale   SubCutaneous at bedtime  insulin lispro Injectable (HumaLOG) 2 Unit(s) SubCutaneous three times a day before meals  isosorbide   mononitrate ER Tablet (IMDUR) 60 milliGRAM(s) Oral daily  metoprolol succinate ER 75 milliGRAM(s) Oral daily  sodium chloride 0.9% lock flush 3 milliLiter(s) IV Push every 8 hours      PAST MEDICAL & SURGICAL HISTORY:  Chronic systolic heart failure  Tachycardia-bradycardia syndrome  Hyperlipidemia  Uterine Cancer (ICD9 179): treated with hysterectomy and radiation therapy in 2008  Osteoarthritis of Knee (ICD9 715.96): b/l knees  DM Type 2 (Diabetes Mellitus, Type 2) (ICD9 250.00)  HTN (Hypertension) (ICD9 401.9)  Status Post Total Knee Replacement: LISA, 6 months apart 2010  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 @ [facility])  History of Hysterectomy (ICD9 V88.01)      Vitals:  T(F): 98 (07-12), Max: 98.1 (07-11)  HR: 72 (07-12) (59 - 87)  BP: 144/62 (07-12) (144/62 - 151/69)  RR: 18 (07-12)  SpO2: 96% (07-12)  I&O's Summary      Physical Exam:  Appearance: No acute distress  Eyes: PERRL, EOMI, pink conjunctiva  HENT: Normal oral mucosa  Cardiovascular: RRR, S1, S2, 3/6 systolic murmur, no LE edema  Respiratory: diminished in bases with crackles  Gastrointestinal: soft, non-tender, non-distended with normal bowel sounds  Musculoskeletal: No clubbing; no joint deformity   Neurologic: Non-focal  Lymphatic: No lymphadenopathy  Psychiatry: AAOx3, mood & affect appropriate                         7.7    11.09 )-----------( 247      ( 12 Jul 2019 08:26 )             24.2     07-12    130<L>  |  91<L>  |  117<H>  ----------------------------<  118<H>  4.0   |  19<L>  |  4.86<H>    Ca    10.4      12 Jul 2019 08:26  Phos  6.2     07-12  Mg     2.3     07-12            Serum Pro-Brain Natriuretic Peptide: 16064 pg/mL (07-07 @ 07:18)          Interpretation of Telemetry: a-paced 60s, PVCs, couplets

## 2019-07-13 DIAGNOSIS — E87.70 FLUID OVERLOAD, UNSPECIFIED: ICD-10-CM

## 2019-07-13 DIAGNOSIS — D50.0 IRON DEFICIENCY ANEMIA SECONDARY TO BLOOD LOSS (CHRONIC): ICD-10-CM

## 2019-07-13 LAB
ANION GAP SERPL CALC-SCNC: 17 MMO/L — HIGH (ref 7–14)
BASOPHILS # BLD AUTO: 0.04 K/UL — SIGNIFICANT CHANGE UP (ref 0–0.2)
BASOPHILS NFR BLD AUTO: 0.4 % — SIGNIFICANT CHANGE UP (ref 0–2)
BLD GP AB SCN SERPL QL: NEGATIVE — SIGNIFICANT CHANGE UP
BUN SERPL-MCNC: 108 MG/DL — HIGH (ref 7–23)
CALCIUM SERPL-MCNC: 9.8 MG/DL — SIGNIFICANT CHANGE UP (ref 8.4–10.5)
CHLORIDE SERPL-SCNC: 98 MMOL/L — SIGNIFICANT CHANGE UP (ref 98–107)
CO2 SERPL-SCNC: 19 MMOL/L — LOW (ref 22–31)
CREAT SERPL-MCNC: 3.36 MG/DL — HIGH (ref 0.5–1.3)
EOSINOPHIL # BLD AUTO: 0.09 K/UL — SIGNIFICANT CHANGE UP (ref 0–0.5)
EOSINOPHIL NFR BLD AUTO: 0.8 % — SIGNIFICANT CHANGE UP (ref 0–6)
GLUCOSE BLDC GLUCOMTR-MCNC: 126 MG/DL — HIGH (ref 70–99)
GLUCOSE BLDC GLUCOMTR-MCNC: 139 MG/DL — HIGH (ref 70–99)
GLUCOSE BLDC GLUCOMTR-MCNC: 143 MG/DL — HIGH (ref 70–99)
GLUCOSE BLDC GLUCOMTR-MCNC: 211 MG/DL — HIGH (ref 70–99)
GLUCOSE SERPL-MCNC: 97 MG/DL — SIGNIFICANT CHANGE UP (ref 70–99)
HCT VFR BLD CALC: 21.3 % — LOW (ref 34.5–45)
HGB BLD-MCNC: 6.8 G/DL — CRITICAL LOW (ref 11.5–15.5)
IMM GRANULOCYTES NFR BLD AUTO: 1.2 % — SIGNIFICANT CHANGE UP (ref 0–1.5)
LYMPHOCYTES # BLD AUTO: 1 K/UL — SIGNIFICANT CHANGE UP (ref 1–3.3)
LYMPHOCYTES # BLD AUTO: 9 % — LOW (ref 13–44)
MAGNESIUM SERPL-MCNC: 2.3 MG/DL — SIGNIFICANT CHANGE UP (ref 1.6–2.6)
MCHC RBC-ENTMCNC: 27.1 PG — SIGNIFICANT CHANGE UP (ref 27–34)
MCHC RBC-ENTMCNC: 31.9 % — LOW (ref 32–36)
MCV RBC AUTO: 84.9 FL — SIGNIFICANT CHANGE UP (ref 80–100)
MONOCYTES # BLD AUTO: 0.97 K/UL — HIGH (ref 0–0.9)
MONOCYTES NFR BLD AUTO: 8.7 % — SIGNIFICANT CHANGE UP (ref 2–14)
NEUTROPHILS # BLD AUTO: 8.87 K/UL — HIGH (ref 1.8–7.4)
NEUTROPHILS NFR BLD AUTO: 79.9 % — HIGH (ref 43–77)
NRBC # FLD: 0.02 K/UL — SIGNIFICANT CHANGE UP (ref 0–0)
PHOSPHATE SERPL-MCNC: 5.4 MG/DL — HIGH (ref 2.5–4.5)
PLATELET # BLD AUTO: 224 K/UL — SIGNIFICANT CHANGE UP (ref 150–400)
PMV BLD: 10.5 FL — SIGNIFICANT CHANGE UP (ref 7–13)
POTASSIUM SERPL-MCNC: 3.5 MMOL/L — SIGNIFICANT CHANGE UP (ref 3.5–5.3)
POTASSIUM SERPL-SCNC: 3.5 MMOL/L — SIGNIFICANT CHANGE UP (ref 3.5–5.3)
RBC # BLD: 2.51 M/UL — LOW (ref 3.8–5.2)
RBC # FLD: 18.2 % — HIGH (ref 10.3–14.5)
RH IG SCN BLD-IMP: POSITIVE — SIGNIFICANT CHANGE UP
SODIUM SERPL-SCNC: 134 MMOL/L — LOW (ref 135–145)
WBC # BLD: 11.1 K/UL — HIGH (ref 3.8–10.5)
WBC # FLD AUTO: 11.1 K/UL — HIGH (ref 3.8–10.5)

## 2019-07-13 PROCEDURE — 99233 SBSQ HOSP IP/OBS HIGH 50: CPT | Mod: GC

## 2019-07-13 PROCEDURE — 99233 SBSQ HOSP IP/OBS HIGH 50: CPT

## 2019-07-13 PROCEDURE — 99222 1ST HOSP IP/OBS MODERATE 55: CPT | Mod: GC

## 2019-07-13 RX ORDER — FUROSEMIDE 40 MG
20 TABLET ORAL ONCE
Refills: 0 | Status: COMPLETED | OUTPATIENT
Start: 2019-07-13 | End: 2019-07-13

## 2019-07-13 RX ORDER — ACETAMINOPHEN 500 MG
650 TABLET ORAL ONCE
Refills: 0 | Status: COMPLETED | OUTPATIENT
Start: 2019-07-13 | End: 2019-07-13

## 2019-07-13 RX ADMIN — AZITHROMYCIN 250 MILLIGRAM(S): 500 TABLET, FILM COATED ORAL at 22:54

## 2019-07-13 RX ADMIN — INSULIN GLARGINE 5 UNIT(S): 100 INJECTION, SOLUTION SUBCUTANEOUS at 23:11

## 2019-07-13 RX ADMIN — ISOSORBIDE MONONITRATE 60 MILLIGRAM(S): 60 TABLET, EXTENDED RELEASE ORAL at 12:21

## 2019-07-13 RX ADMIN — Medication 75 MILLIGRAM(S): at 06:54

## 2019-07-13 RX ADMIN — Medication 20 MILLIGRAM(S): at 16:28

## 2019-07-13 RX ADMIN — SODIUM CHLORIDE 3 MILLILITER(S): 9 INJECTION INTRAMUSCULAR; INTRAVENOUS; SUBCUTANEOUS at 23:12

## 2019-07-13 RX ADMIN — Medication 2 UNIT(S): at 09:37

## 2019-07-13 RX ADMIN — Medication 75 MILLIGRAM(S): at 06:55

## 2019-07-13 RX ADMIN — CEFTRIAXONE 100 MILLIGRAM(S): 500 INJECTION, POWDER, FOR SOLUTION INTRAMUSCULAR; INTRAVENOUS at 12:21

## 2019-07-13 RX ADMIN — Medication 75 MILLIGRAM(S): at 22:55

## 2019-07-13 RX ADMIN — SODIUM CHLORIDE 3 MILLILITER(S): 9 INJECTION INTRAMUSCULAR; INTRAVENOUS; SUBCUTANEOUS at 06:55

## 2019-07-13 RX ADMIN — Medication 650 MILLIGRAM(S): at 11:30

## 2019-07-13 RX ADMIN — Medication 81 MILLIGRAM(S): at 12:21

## 2019-07-13 RX ADMIN — Medication 75 MILLIGRAM(S): at 12:23

## 2019-07-13 RX ADMIN — Medication 650 MILLIGRAM(S): at 16:30

## 2019-07-13 RX ADMIN — Medication 4: at 13:36

## 2019-07-13 RX ADMIN — Medication 650 MILLIGRAM(S): at 16:28

## 2019-07-13 RX ADMIN — SODIUM CHLORIDE 3 MILLILITER(S): 9 INJECTION INTRAMUSCULAR; INTRAVENOUS; SUBCUTANEOUS at 13:38

## 2019-07-13 RX ADMIN — ATORVASTATIN CALCIUM 40 MILLIGRAM(S): 80 TABLET, FILM COATED ORAL at 22:55

## 2019-07-13 RX ADMIN — Medication 650 MILLIGRAM(S): at 10:57

## 2019-07-13 NOTE — PROGRESS NOTE ADULT - ASSESSMENT
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 Admitted for CHF exacerbation and initially required BiPAP. Cardiology consulted for management of HF.     #HFrEF  - Remains volume up (62.9 kg -> 66.7 kg -> no weight since 7/11)  - RHC 7/12 with wedge 30, LVEDP mid 20s  - Continue hydralazine, Imdur, metoprolol. Uptitrate hydralazine as tolerated  - Would continue to hold Lasix given CLARI and that she is not complaining of SOB and allow her to auto-diurese. If starts having SOB then would give IV lasix and call  - Daily standing weights, strict I/Os  - Monitor BMP daily, replete K and Mg PRN    #AFib  - Continue BB  - hold apixaban given CLARI  - start hep gtt    Moncho San MD  Cardiology Fellow PGY-6  For all cardiology service contact information, please go to [email or web address] and use password [email or web address]' [email or web address] with PMH of AFib on apixaban, T2DM, HFrEF (moderate reduced EF), HLD, HTN, PPM 2/2 to tachybrady syndrome who presented with shortness of breath. Admitted for CHF exacerbation and initially required BiPAP. Cardiology consulted for management of HF.     #HFrEF  - Remains volume up (62.9 kg -> 66.7 kg -> no weight since 7/11)  - RHC 7/12 with wedge 30, LVEDP mid 20s  - Continue hydralazine, Imdur, metoprolol  - Would continue to hold Lasix given CLARI and GI bleed  - Daily standing weights, strict I/Os  - Monitor BMP daily, replete K and Mg PRN    #AFib  - Continue BB  - hold apixaban given CLARI  - hold hep gtt in setting of GI bleed    #GI bleed  - Hgb 6.8 this AM, watch respiratory status closely with transfusions  -patient likely needs colonoscopy urgently  -RCRI score of 2---> 6.6% risk of MACE, no further cardiac workup indicated at this time 83F with PMH of AFib on apixaban, T2DM, HFrEF (moderate reduced EF), HLD, HTN, PPM 2/2 to tachybrady syndrome who presented with shortness of breath. Admitted for CHF exacerbation and initially required BiPAP. Cardiology consulted for management of HF.     #HFrEF  - Remains volume up (62.9 kg -> 66.7 kg -> no weight since 7/11)  - RHC 7/12 with wedge 30, LVEDP mid 20s  - Continue hydralazine, Imdur, metoprolol  - Would continue to hold Lasix given CLARI and GI bleed  - Daily standing weights, strict I/Os  - Monitor BMP daily, replete K and Mg PRN    #AFib  - Continue BB  - hold apixaban given CLARI  - hold hep gtt in setting of GI bleed    #GI bleed  - Hgb 6.8 this AM, watch respiratory status closely with transfusions  -patient likely needs colonoscopy urgently

## 2019-07-13 NOTE — PROGRESS NOTE ADULT - PROBLEM SELECTOR PLAN 2
s/p RHC  improving, but need to hold diuresis due to CLARI for now  cards follow up, no further need for BiPAP, cw other meds   cath wo e/o AS  HF iraida, hold diuretics and monitor renal fx as pt making urine and appears comfortable

## 2019-07-13 NOTE — PROGRESS NOTE ADULT - PROBLEM SELECTOR PLAN 2
Patient has hypervolemia in the setting of CLARI and CHF.  Right heart cath data reviewed.  Agree that patient is presently asymptomatic.  Given bleeding and acute anemia would hold off on standing diuretics but can give PRN lasix as needed with PRBCs.

## 2019-07-13 NOTE — PROGRESS NOTE ADULT - ASSESSMENT
84 F with HTN, HLD, DM2, chronic systolic HF, afib on apixaban, prior uterine cancer s/p hysterectomy who presents with dyspnea concerning for acute on chronic systolic HF. Also p/w loose stools, now resolved, c/b BRBPR, CLARI

## 2019-07-13 NOTE — PROGRESS NOTE ADULT - PROBLEM SELECTOR PLAN 1
Pt. with CLARI on CKD in the setting of CHF exacerbation. On admission, Scr was 1.37 (7/7/2019), worsened to 5.87 on 7/11/19. Lasix held since 7/10/2019. Scr decreased to 3.36 today (7/13/2019). CLARI likely hemodynamically related in the setting of intravenous contrast and heart failure. Pt. non-oliguric at present. Monitor labs and urine output. Avoid any potential nephrotoxins.  No renal objection to diuretics with PRBCs.

## 2019-07-13 NOTE — PROGRESS NOTE ADULT - PROBLEM SELECTOR PLAN 10
IMPROVE = 2 (age + immobility). Pt therapeutically anticoagulated on apixaban.  DW family at bedside

## 2019-07-13 NOTE — CHART NOTE - NSCHARTNOTEFT_GEN_A_CORE
Pt is s/p LHC+RHC: NCA, PCWP 32, CO 4.2, MARY ELLEN 1.2, RRA and RBV accessed, without complaints    ICU Vital Signs Last 24 Hrs  T(C): 36.6 (12 Jul 2019 20:33), Max: 36.7 (12 Jul 2019 06:48)  T(F): 97.8 (12 Jul 2019 20:33), Max: 98 (12 Jul 2019 06:48)  HR: 59 (12 Jul 2019 20:33) (59 - 72)  BP: 123/61 (12 Jul 2019 20:33) (123/61 - 144/62)  BP(mean): --  ABP: --  ABP(mean): --  RR: 18 (12 Jul 2019 20:33) (18 - 18)  SpO2: 97% (12 Jul 2019 20:33) (96% - 98%)      RRA site clean and dry, no swelling    A/p:  Stable  Continue to monitor

## 2019-07-13 NOTE — PROGRESS NOTE ADULT - ATTENDING COMMENTS
personally saw and examined patient today  agree with above assessment and plan  RHC reviewed, elevated left sided filling pressures noted, pt continues to produce urine, will hold off on diuretics for right now as Cr cont to improve following suspected contrast induced nephropathy and pt cont to be making urine and without SOB, or orthopnea  now hospital course complicated by brbpr/melena  pt has been off eliquis for 2 days given elevated Cr, will need to hold off on heparin drip (for afib with elevated chasdvasc) for now until cleared by GI  pt is asymptomatic from volume standpoint and is able to lay flat, while it is noted that she is hypervolemic, pt has moderate aortic stenosis, pt has no hx recent arrythmia.  due to the acute blood loss anemia 2/2 GIB, pt urgently needs a endoscopic evaluation and further cardiac testing is not indicated.  RCRI score is 4 points corresponding to a 15.0 % 30-day risk of death, MI, or cardiac arrest  pt is high risk for low risk procedure    d/w hospitalist

## 2019-07-13 NOTE — PROGRESS NOTE ADULT - SUBJECTIVE AND OBJECTIVE BOX
[facility] Division of Kidney Diseases & Hypertension  FOLLOW UP NOTE  --------------------------------------------------------------------------------  HPI: 84-year-old female with PMH of T2DM, HFrEF (moderate reduced EF), HLD, HTN (over 30 years), AFib, presented to the ER complaining of SOB. Patient was admitted for CHF exacerbation on 7/7/2019. Patient was started on IV Lasix for diuresis. Nephrology consulted for CLARI. On admission, Scr was 1.37 (7/7/2019) and increased to 5.87 on 7/11/2019. Diuretic therapy was held since then and repeat Scr today is 4.86.     Patient evaluated at bedside this morning.  She has no chest pain and is stating that she is not sob at rest and that she is able to lie flat without difficulty breathing. She reports that she is urinating plenty.      PAST HISTORY  --------------------------------------------------------------------------------  No significant changes to PMH, PSH, FHx, SHx, unless otherwise noted    ALLERGIES & MEDICATIONS  --------------------------------------------------------------------------------  Allergies    No Known Allergies    Intolerances      Standing Inpatient Medications  acetaminophen   Tablet .. 650 milliGRAM(s) Oral once  aspirin enteric coated 81 milliGRAM(s) Oral daily  atorvastatin 40 milliGRAM(s) Oral at bedtime  azithromycin  IVPB 500 milliGRAM(s) IV Intermittent every 24 hours  cefTRIAXone   IVPB 1000 milliGRAM(s) IV Intermittent every 24 hours  dextrose 5%. 1000 milliLiter(s) IV Continuous <Continuous>  dextrose 50% Injectable 12.5 Gram(s) IV Push once  dextrose 50% Injectable 25 Gram(s) IV Push once  dextrose 50% Injectable 25 Gram(s) IV Push once  furosemide   Injectable 20 milliGRAM(s) IV Push once  hydrALAZINE 75 milliGRAM(s) Oral three times a day  insulin glargine Injectable (LANTUS) 5 Unit(s) SubCutaneous at bedtime  insulin lispro (HumaLOG) corrective regimen sliding scale   SubCutaneous three times a day before meals  insulin lispro (HumaLOG) corrective regimen sliding scale   SubCutaneous at bedtime  insulin lispro Injectable (HumaLOG) 2 Unit(s) SubCutaneous three times a day before meals  isosorbide   mononitrate ER Tablet (IMDUR) 60 milliGRAM(s) Oral daily  metoprolol succinate ER 75 milliGRAM(s) Oral daily  sodium chloride 0.9% lock flush 3 milliLiter(s) IV Push every 8 hours    PRN Inpatient Medications  dextrose 40% Gel 15 Gram(s) Oral once PRN  glucagon  Injectable 1 milliGRAM(s) IntraMuscular once PRN      REVIEW OF SYSTEMS  --------------------------------------------------------------------------------  Gen: no fever  Respiratory: not sob at rest  CV: no cp  : urinating a lot  MSK: no pain    VITALS/PHYSICAL EXAM  --------------------------------------------------------------------------------  T(C): 36.4 (07-13-19 @ 12:20), Max: 36.7 (07-13-19 @ 06:51)  HR: 60 (07-13-19 @ 12:20) (59 - 63)  BP: 125/50 (07-13-19 @ 12:20) (123/61 - 134/68)  RR: 18 (07-13-19 @ 12:20) (17 - 18)  SpO2: 98% (07-13-19 @ 12:20) (94% - 98%)  CVP(mm Hg): --    Physical Exam:  	Gen: NAD  	HEENT: No JVD  	Pulm: CTA B/L  	CV: S1S2+  	Abd: Soft, +BS   	Ext: no edema noted today  	Neuro: Awake  	Skin: Warm and dry    LABS/STUDIES  --------------------------------------------------------------------------------              6.8    11.10 >-----------<  224      [07-13-19 @ 06:10]              21.3     134  |  98  |  108  ----------------------------<  97      [07-13-19 @ 06:10]  3.5   |  19  |  3.36        Ca     9.8     [07-13-19 @ 06:10]      Mg     2.3     [07-13-19 @ 06:10]      Phos  5.4     [07-13-19 @ 06:10]    Creatinine Trend:  SCr 3.36 [07-13 @ 06:10]  SCr 4.86 [07-12 @ 08:26]  SCr 5.87 [07-11 @ 06:31]  SCr 5.26 [07-10 @ 07:20]  SCr 3.79 [07-09 @ 06:40]    Urinalysis - [07-07-19 @ 11:08]      Color YELLOW / Appearance CLEAR / SG 1.013 / pH 6.0      Gluc 70 / Ketone NEGATIVE  / Bili NEGATIVE / Urobili NORMAL       Blood MODERATE / Protein 200 / Leuk Est NEGATIVE / Nitrite NEGATIVE      RBC 3-5 / WBC 0-2 / Hyaline NEGATIVE / Gran  / Sq Epi OCC / Non Sq Epi  / Bacteria NEGATIVE

## 2019-07-13 NOTE — PROGRESS NOTE ADULT - SUBJECTIVE AND OBJECTIVE BOX
Overnight Events:       Medications:  acetaminophen   Tablet .. 650 milliGRAM(s) Oral once PRN  aspirin enteric coated 81 milliGRAM(s) Oral daily  atorvastatin 40 milliGRAM(s) Oral at bedtime  azithromycin  IVPB 500 milliGRAM(s) IV Intermittent every 24 hours  cefTRIAXone   IVPB 1000 milliGRAM(s) IV Intermittent every 24 hours  dextrose 40% Gel 15 Gram(s) Oral once PRN  dextrose 5%. 1000 milliLiter(s) IV Continuous <Continuous>  dextrose 50% Injectable 12.5 Gram(s) IV Push once  dextrose 50% Injectable 25 Gram(s) IV Push once  dextrose 50% Injectable 25 Gram(s) IV Push once  glucagon  Injectable 1 milliGRAM(s) IntraMuscular once PRN  hydrALAZINE 75 milliGRAM(s) Oral three times a day  insulin glargine Injectable (LANTUS) 5 Unit(s) SubCutaneous at bedtime  insulin lispro (HumaLOG) corrective regimen sliding scale   SubCutaneous three times a day before meals  insulin lispro (HumaLOG) corrective regimen sliding scale   SubCutaneous at bedtime  insulin lispro Injectable (HumaLOG) 2 Unit(s) SubCutaneous three times a day before meals  isosorbide   mononitrate ER Tablet (IMDUR) 60 milliGRAM(s) Oral daily  metoprolol succinate ER 75 milliGRAM(s) Oral daily  sodium chloride 0.9% lock flush 3 milliLiter(s) IV Push every 8 hours      PAST MEDICAL & SURGICAL HISTORY:  Chronic systolic heart failure  Tachycardia-bradycardia syndrome  Hyperlipidemia  Uterine Cancer (ICD9 179): treated with hysterectomy and radiation therapy in 2008  Osteoarthritis of Knee (ICD9 715.96): b/l knees  DM Type 2 (Diabetes Mellitus, Type 2) (ICD9 250.00)  HTN (Hypertension) (ICD9 401.9)  Status Post Total Knee Replacement: LISA, 6 months apart 2010  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 Comfortable.  HEENT: EOM intact.  Neck: Neck supple. JVP not elevated. No masses  Chest: Clear to auscultation bilaterally  CV: Normal S1 and S2. II/VI HERBER, no rub, or gallops. Radial pulses normal. No LE edema b/l, warm.   Abdomen: Soft, non-distended, non-tender  Skin: No rashes or skin breakdown  Neurology: Alert and oriented times three. Sensation intact  Psych: Affect normal                            7.2    9.89  )-----------( 249      ( 12 Jul 2019 16:23 )             22.6     07-12    130<L>  |  91<L>  |  117<H>  ----------------------------<  118<H>  4.0   |  19<L>  |  4.86<H>    Ca    10.4      12 Jul 2019 08:26  Phos  6.2     07-12  Mg     2.3     07-12            Serum Pro-Brain Natriuretic Peptide: 66584 pg/mL (07-07 @ 07:18) Overnight Events:   Patient with GI bleed overnight, hep gtt held    Medications:  acetaminophen   Tablet .. 650 milliGRAM(s) Oral once PRN  aspirin enteric coated 81 milliGRAM(s) Oral daily  atorvastatin 40 milliGRAM(s) Oral at bedtime  azithromycin  IVPB 500 milliGRAM(s) IV Intermittent every 24 hours  cefTRIAXone   IVPB 1000 milliGRAM(s) IV Intermittent every 24 hours  dextrose 40% Gel 15 Gram(s) Oral once PRN  dextrose 5%. 1000 milliLiter(s) IV Continuous <Continuous>  dextrose 50% Injectable 12.5 Gram(s) IV Push once  dextrose 50% Injectable 25 Gram(s) IV Push once  dextrose 50% Injectable 25 Gram(s) IV Push once  glucagon  Injectable 1 milliGRAM(s) IntraMuscular once PRN  hydrALAZINE 75 milliGRAM(s) Oral three times a day  insulin glargine Injectable (LANTUS) 5 Unit(s) SubCutaneous at bedtime  insulin lispro (HumaLOG) corrective regimen sliding scale   SubCutaneous three times a day before meals  insulin lispro (HumaLOG) corrective regimen sliding scale   SubCutaneous at bedtime  insulin lispro Injectable (HumaLOG) 2 Unit(s) SubCutaneous three times a day before meals  isosorbide   mononitrate ER Tablet (IMDUR) 60 milliGRAM(s) Oral daily  metoprolol succinate ER 75 milliGRAM(s) Oral daily  sodium chloride 0.9% lock flush 3 milliLiter(s) IV Push every 8 hours      PAST MEDICAL & SURGICAL HISTORY:  Chronic systolic heart failure  Tachycardia-bradycardia syndrome  Hyperlipidemia  Uterine Cancer (ICD9 179): treated with hysterectomy and radiation therapy in 2008  Osteoarthritis of Knee (ICD9 715.96): b/l knees  DM Type 2 (Diabetes Mellitus, Type 2) (ICD9 250.00)  HTN (Hypertension) (ICD9 401.9)  Status Post Total Knee Replacement: LISA, 6 months apart 2010  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 @ [facility])  History of Hysterectomy (ICD9 V88.01)      Vitals:  T(F): 98 (07-13), Max: 98 (07-13)  HR: 60 (07-13) (59 - 62)  BP: 134/68 (07-13) (123/61 - 134/68)  RR: 17 (07-13)  SpO2: 94% (07-13)  I&O's Summary      General: No distress. Comfortable.  HEENT: EOM intact.  Neck: Neck supple. JVP not elevated. No masses  Chest: Clear to auscultation bilaterally  CV: Normal S1 and S2. II/VI HERBER, no rub, or gallops. Radial pulses normal. No LE edema b/l, warm.   Abdomen: Soft, non-distended, non-tender  Skin: No rashes or skin breakdown  Neurology: Alert and oriented times three. Sensation intact  Psych: Affect normal                            7.2    9.89  )-----------( 249      ( 12 Jul 2019 16:23 )             22.6     07-12    130<L>  |  91<L>  |  117<H>  ----------------------------<  118<H>  4.0   |  19<L>  |  4.86<H>    Ca    10.4      12 Jul 2019 08:26  Phos  6.2     07-12  Mg     2.3     07-12            Serum Pro-Brain Natriuretic Peptide: 73021 pg/mL (07-07 @ 07:18)

## 2019-07-13 NOTE — PROGRESS NOTE ADULT - PROBLEM SELECTOR PLAN 1
on CKD 3 now associated with hyponatremia which is also likely due to volume overload - cr now improving   suspect due to diuresis vs NORBERT, sono neg for retention, dw renal, no need for further w/u   Lasix on hold, per cards okjto give 1 time dose in between transfusion - will dw renal prior to giving   renally dose meds, monitor BMP

## 2019-07-13 NOTE — PROGRESS NOTE ADULT - SUBJECTIVE AND OBJECTIVE BOX
Patient is a 84y old  Female who presents with a chief complaint of dyspnea (13 Jul 2019 08:02)      SUBJECTIVE / OVERNIGHT EVENTS: no further GIB, feels overall well, with no SOB. making urine     ROS:  No HA/DZ  No Vision changes   No CP, SOB  No N/V/D  No Rash  NO weakness, numbness    MEDICATIONS  (STANDING):  acetaminophen   Tablet .. 650 milliGRAM(s) Oral once  aspirin enteric coated 81 milliGRAM(s) Oral daily  atorvastatin 40 milliGRAM(s) Oral at bedtime  azithromycin  IVPB 500 milliGRAM(s) IV Intermittent every 24 hours  cefTRIAXone   IVPB 1000 milliGRAM(s) IV Intermittent every 24 hours  dextrose 5%. 1000 milliLiter(s) (50 mL/Hr) IV Continuous <Continuous>  dextrose 50% Injectable 12.5 Gram(s) IV Push once  dextrose 50% Injectable 25 Gram(s) IV Push once  dextrose 50% Injectable 25 Gram(s) IV Push once  furosemide   Injectable 20 milliGRAM(s) IV Push once  hydrALAZINE 75 milliGRAM(s) Oral three times a day  insulin glargine Injectable (LANTUS) 5 Unit(s) SubCutaneous at bedtime  insulin lispro (HumaLOG) corrective regimen sliding scale   SubCutaneous three times a day before meals  insulin lispro (HumaLOG) corrective regimen sliding scale   SubCutaneous at bedtime  insulin lispro Injectable (HumaLOG) 2 Unit(s) SubCutaneous three times a day before meals  isosorbide   mononitrate ER Tablet (IMDUR) 60 milliGRAM(s) Oral daily  metoprolol succinate ER 75 milliGRAM(s) Oral daily  sodium chloride 0.9% lock flush 3 milliLiter(s) IV Push every 8 hours    MEDICATIONS  (PRN):  dextrose 40% Gel 15 Gram(s) Oral once PRN Blood Glucose LESS THAN 70 milliGRAM(s)/deciliter  glucagon  Injectable 1 milliGRAM(s) IntraMuscular once PRN Glucose LESS THAN 70 milligrams/deciliter      T(C): 36.4 (07-13-19 @ 12:20)  HR: 60 (07-13-19 @ 12:20)  BP: 125/50 (07-13-19 @ 12:20)  RR: 18 (07-13-19 @ 12:20)  SpO2: 98% (07-13-19 @ 12:20)  CAPILLARY BLOOD GLUCOSE      POCT Blood Glucose.: 211 mg/dL (13 Jul 2019 12:39)  POCT Blood Glucose.: 143 mg/dL (13 Jul 2019 09:02)  POCT Blood Glucose.: 194 mg/dL (12 Jul 2019 22:44)  POCT Blood Glucose.: 197 mg/dL (12 Jul 2019 17:46)    I&O's Summary      PHYSICAL EXAM:  GENERAL: NAD, well-developed, AOx3  HEAD:  Atraumatic, Normocephalic  EYES: EOMI, PERRL, conjunctiva and sclera clear  NECK: Supple, No JVD  CHEST/LUNG: Clear to auscultation bilaterally  HEART: Regular rate and rhythm; No murmurs, rubs, or gallops, improved Edema   ABDOMEN: Soft, Nontender, Nondistended; Bowel sounds present  EXTREMITIES:  2+ Peripheral Pulses, No clubbing, cyanosis  PSYCH: No SI/HI  NEUROLOGY: non-focal  SKIN: UE ecchymosis, dressing c/d/i     LABS:                        6.8    11.10 )-----------( 224      ( 13 Jul 2019 06:10 )             21.3     07-13    134<L>  |  98  |  108<H>  ----------------------------<  97  3.5   |  19<L>  |  3.36<H>    Ca    9.8      13 Jul 2019 06:10  Phos  5.4     07-13  Mg     2.3     07-13                    RADIOLOGY & ADDITIONAL TESTS:    Imaging Personally Reviewed:    Consultant(s) Notes Reviewed:      Care Discussed with Consultants/Other Providers: nj Mohan

## 2019-07-14 LAB
ANION GAP SERPL CALC-SCNC: 17 MMO/L — HIGH (ref 7–14)
BASOPHILS # BLD AUTO: 0.04 K/UL — SIGNIFICANT CHANGE UP (ref 0–0.2)
BASOPHILS NFR BLD AUTO: 0.3 % — SIGNIFICANT CHANGE UP (ref 0–2)
BUN SERPL-MCNC: 101 MG/DL — HIGH (ref 7–23)
CALCIUM SERPL-MCNC: 10 MG/DL — SIGNIFICANT CHANGE UP (ref 8.4–10.5)
CHLORIDE SERPL-SCNC: 98 MMOL/L — SIGNIFICANT CHANGE UP (ref 98–107)
CO2 SERPL-SCNC: 21 MMOL/L — LOW (ref 22–31)
CREAT SERPL-MCNC: 2.4 MG/DL — HIGH (ref 0.5–1.3)
EOSINOPHIL # BLD AUTO: 0.13 K/UL — SIGNIFICANT CHANGE UP (ref 0–0.5)
EOSINOPHIL NFR BLD AUTO: 1.1 % — SIGNIFICANT CHANGE UP (ref 0–6)
GLUCOSE BLDC GLUCOMTR-MCNC: 125 MG/DL — HIGH (ref 70–99)
GLUCOSE BLDC GLUCOMTR-MCNC: 162 MG/DL — HIGH (ref 70–99)
GLUCOSE BLDC GLUCOMTR-MCNC: 171 MG/DL — HIGH (ref 70–99)
GLUCOSE BLDC GLUCOMTR-MCNC: 273 MG/DL — HIGH (ref 70–99)
GLUCOSE SERPL-MCNC: 124 MG/DL — HIGH (ref 70–99)
HCT VFR BLD CALC: 25.1 % — LOW (ref 34.5–45)
HGB BLD-MCNC: 8 G/DL — LOW (ref 11.5–15.5)
IMM GRANULOCYTES NFR BLD AUTO: 0.6 % — SIGNIFICANT CHANGE UP (ref 0–1.5)
LYMPHOCYTES # BLD AUTO: 1.03 K/UL — SIGNIFICANT CHANGE UP (ref 1–3.3)
LYMPHOCYTES # BLD AUTO: 8.8 % — LOW (ref 13–44)
MAGNESIUM SERPL-MCNC: 2.2 MG/DL — SIGNIFICANT CHANGE UP (ref 1.6–2.6)
MCHC RBC-ENTMCNC: 27.6 PG — SIGNIFICANT CHANGE UP (ref 27–34)
MCHC RBC-ENTMCNC: 31.9 % — LOW (ref 32–36)
MCV RBC AUTO: 86.6 FL — SIGNIFICANT CHANGE UP (ref 80–100)
MONOCYTES # BLD AUTO: 1.18 K/UL — HIGH (ref 0–0.9)
MONOCYTES NFR BLD AUTO: 10.1 % — SIGNIFICANT CHANGE UP (ref 2–14)
NEUTROPHILS # BLD AUTO: 9.28 K/UL — HIGH (ref 1.8–7.4)
NEUTROPHILS NFR BLD AUTO: 79.1 % — HIGH (ref 43–77)
NRBC # FLD: 0.05 K/UL — SIGNIFICANT CHANGE UP (ref 0–0)
PHOSPHATE SERPL-MCNC: 5.1 MG/DL — HIGH (ref 2.5–4.5)
PLATELET # BLD AUTO: 228 K/UL — SIGNIFICANT CHANGE UP (ref 150–400)
PMV BLD: 10.6 FL — SIGNIFICANT CHANGE UP (ref 7–13)
POTASSIUM SERPL-MCNC: 3.4 MMOL/L — LOW (ref 3.5–5.3)
POTASSIUM SERPL-SCNC: 3.4 MMOL/L — LOW (ref 3.5–5.3)
RBC # BLD: 2.9 M/UL — LOW (ref 3.8–5.2)
RBC # FLD: 17.6 % — HIGH (ref 10.3–14.5)
SODIUM SERPL-SCNC: 136 MMOL/L — SIGNIFICANT CHANGE UP (ref 135–145)
WBC # BLD: 11.73 K/UL — HIGH (ref 3.8–10.5)
WBC # FLD AUTO: 11.73 K/UL — HIGH (ref 3.8–10.5)

## 2019-07-14 PROCEDURE — 99232 SBSQ HOSP IP/OBS MODERATE 35: CPT | Mod: GC

## 2019-07-14 PROCEDURE — 99233 SBSQ HOSP IP/OBS HIGH 50: CPT

## 2019-07-14 RX ORDER — ACETAMINOPHEN 500 MG
650 TABLET ORAL ONCE
Refills: 0 | Status: COMPLETED | OUTPATIENT
Start: 2019-07-14 | End: 2019-07-14

## 2019-07-14 RX ADMIN — CEFTRIAXONE 100 MILLIGRAM(S): 500 INJECTION, POWDER, FOR SOLUTION INTRAMUSCULAR; INTRAVENOUS at 12:38

## 2019-07-14 RX ADMIN — INSULIN GLARGINE 5 UNIT(S): 100 INJECTION, SOLUTION SUBCUTANEOUS at 21:21

## 2019-07-14 RX ADMIN — Medication 2 UNIT(S): at 18:19

## 2019-07-14 RX ADMIN — Medication 81 MILLIGRAM(S): at 12:38

## 2019-07-14 RX ADMIN — Medication 75 MILLIGRAM(S): at 05:20

## 2019-07-14 RX ADMIN — SODIUM CHLORIDE 3 MILLILITER(S): 9 INJECTION INTRAMUSCULAR; INTRAVENOUS; SUBCUTANEOUS at 13:31

## 2019-07-14 RX ADMIN — Medication 2 UNIT(S): at 13:17

## 2019-07-14 RX ADMIN — SODIUM CHLORIDE 3 MILLILITER(S): 9 INJECTION INTRAMUSCULAR; INTRAVENOUS; SUBCUTANEOUS at 05:21

## 2019-07-14 RX ADMIN — AZITHROMYCIN 250 MILLIGRAM(S): 500 TABLET, FILM COATED ORAL at 18:20

## 2019-07-14 RX ADMIN — Medication 2 UNIT(S): at 09:29

## 2019-07-14 RX ADMIN — ATORVASTATIN CALCIUM 40 MILLIGRAM(S): 80 TABLET, FILM COATED ORAL at 21:21

## 2019-07-14 RX ADMIN — Medication 6: at 13:16

## 2019-07-14 RX ADMIN — Medication 75 MILLIGRAM(S): at 15:15

## 2019-07-14 RX ADMIN — Medication 75 MILLIGRAM(S): at 21:21

## 2019-07-14 RX ADMIN — Medication 2: at 18:19

## 2019-07-14 RX ADMIN — SODIUM CHLORIDE 3 MILLILITER(S): 9 INJECTION INTRAMUSCULAR; INTRAVENOUS; SUBCUTANEOUS at 21:14

## 2019-07-14 RX ADMIN — Medication 650 MILLIGRAM(S): at 15:45

## 2019-07-14 RX ADMIN — ISOSORBIDE MONONITRATE 60 MILLIGRAM(S): 60 TABLET, EXTENDED RELEASE ORAL at 12:38

## 2019-07-14 RX ADMIN — Medication 650 MILLIGRAM(S): at 15:15

## 2019-07-14 NOTE — PROGRESS NOTE ADULT - PROBLEM SELECTOR PLAN 1
Pt. with CLARI on CKD in the setting of CHF exacerbation. On admission, Scr was 1.37 (7/7/2019), worsened to 5.87 on 7/11/19. Lasix held since 7/10/2019. Scr decreased to 2.40 today (7/14/2019). CLARI likely hemodynamically related in the setting of intravenous contrast, heart failure, and diuresis. Pt. non-oliguric at present. Monitor labs and urine output. Avoid any potential nephrotoxins.

## 2019-07-14 NOTE — PROGRESS NOTE ADULT - ASSESSMENT
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 Admitted for CHF exacerbation and initially required BiPAP. Cardiology consulted for management of HF.     HFrEF  RHC 7/12 with wedge 30, LVEDP mid 20s. She is close to euvolemia on my exam this morning. Her Cr continues to improve.   - Continue hydralazine 75 mg TID, Imdur 60 mg daily, metoprolol 75 mg qd  - Would continue to hold Lasix given CLARI and GI bleed and give her diuresis vacation through weekend. I suspect she will need low dose PO regimen initiation in near future to maintain euvolemia.   - Daily standing weights, strict I/Os  - Monitor BMP daily, replete K and Mg PRN    #AFib  - Continue BB  - hold apixaban given CLARI  - hold hep gtt in setting of GI bleed    #GI bleed  Hgb 8 his AM from 6.8, watch respiratory status closely with transfusions. No more further episodes of bleeding.  -patient still needs colonoscopy in near future given significant drop in setting of being off of AC in anticipation of RHC. Would inform regarding when to initiate AC for afib  -would obtain FOBT   -unclear indication for aspirin, would consider stopping     Please call on call cardiology team at [phone] with any further questions.

## 2019-07-14 NOTE — PROGRESS NOTE ADULT - PROBLEM SELECTOR PLAN 1
on CKD 3 - resolving   suspect due to diuresis vs NORBERT, sono neg for retention, dw renal, no need for further w/u   Lasix, Eliquis on hold  renally dose meds, monitor BMP

## 2019-07-14 NOTE — PROGRESS NOTE ADULT - ATTENDING COMMENTS
personally saw and examined patient  agree with above assessment and plan  no further episodes of melena/bpbpr overnight, hgb improved post transfusion, further w/u per GI  will wait for GI recs prior to initiation of a/c (for a fib)  cont bb, hydralazine/imdur  replete K  cont to hold diuretics  will follow

## 2019-07-14 NOTE — PROGRESS NOTE ADULT - PROBLEM SELECTOR PLAN 2
Patient has hypervolemia in the setting of CLARI and CHF. Right heart cath data reviewed. Agree that patient is presently asymptomatic. Agree with holding Lasix for now

## 2019-07-14 NOTE — PROGRESS NOTE ADULT - SUBJECTIVE AND OBJECTIVE BOX
24H hour events: No acute events overnight. Patient denies any further bleeding episodes.     MEDICATIONS:  aspirin enteric coated 81 milliGRAM(s) Oral daily  hydrALAZINE 75 milliGRAM(s) Oral three times a day  isosorbide   mononitrate ER Tablet (IMDUR) 60 milliGRAM(s) Oral daily  metoprolol succinate ER 75 milliGRAM(s) Oral daily  azithromycin  IVPB 500 milliGRAM(s) IV Intermittent every 24 hours  cefTRIAXone   IVPB 1000 milliGRAM(s) IV Intermittent every 24 hours  atorvastatin 40 milliGRAM(s) Oral at bedtime  dextrose 40% Gel 15 Gram(s) Oral once PRN  dextrose 50% Injectable 12.5 Gram(s) IV Push once  dextrose 50% Injectable 25 Gram(s) IV Push once  dextrose 50% Injectable 25 Gram(s) IV Push once  glucagon  Injectable 1 milliGRAM(s) IntraMuscular once PRN  insulin glargine Injectable (LANTUS) 5 Unit(s) SubCutaneous at bedtime  insulin lispro (HumaLOG) corrective regimen sliding scale   SubCutaneous three times a day before meals  insulin lispro (HumaLOG) corrective regimen sliding scale   SubCutaneous at bedtime  insulin lispro Injectable (HumaLOG) 2 Unit(s) SubCutaneous three times a day before meals  dextrose 5%. 1000 milliLiter(s) IV Continuous <Continuous>  sodium chloride 0.9% lock flush 3 milliLiter(s) IV Push every 8 hours    REVIEW OF SYSTEMS:  Complete 10point ROS negative except as documented above.    PHYSICAL EXAM:  T(C): 36.5 (07-14-19 @ 04:53), Max: 36.7 (07-13-19 @ 20:53)  HR: 60 (07-14-19 @ 07:05) (59 - 68)  BP: 124/56 (07-14-19 @ 04:53) (111/38 - 128/48)  RR: 18 (07-14-19 @ 04:53) (17 - 18)  SpO2: 96% (07-14-19 @ 07:05) (94% - 98%)  Wt(kg): --  I&O's Summary    Appearance: Normal	  HEENT:   Normal oral mucosa, PERRL, EOMI	  Lymphatic: No lymphadenopathy  Cardiovascular: Normal S1 S2, JVD not visualized  Respiratory: distant crackles 	  Psychiatry: A & O x 3, Mood & affect appropriate  Gastrointestinal:  Soft, Non-tender, + BS	  Skin: No rashes, No ecchymoses, No cyanosis	  Neurologic: Non-focal  Extremities: no edema   Vascular: Peripheral pulses palpable 2+ bilaterally    LABS:	 	    CBC Full  -  ( 14 Jul 2019 06:50 )  WBC Count : 11.73 K/uL  Hemoglobin : 8.0 g/dL  Hematocrit : 25.1 %  Platelet Count - Automated : 228 K/uL  Mean Cell Volume : 86.6 fL  Mean Cell Hemoglobin : 27.6 pg  Mean Cell Hemoglobin Concentration : 31.9 %  Auto Neutrophil # : 9.28 K/uL  Auto Lymphocyte # : 1.03 K/uL  Auto Monocyte # : 1.18 K/uL  Auto Eosinophil # : 0.13 K/uL  Auto Basophil # : 0.04 K/uL  Auto Neutrophil % : 79.1 %  Auto Lymphocyte % : 8.8 %  Auto Monocyte % : 10.1 %  Auto Eosinophil % : 1.1 %  Auto Basophil % : 0.3 %    07-14    136  |  98  |  101<H>  ----------------------------<  124<H>  3.4<L>   |  21<L>  |  2.40<H>  07-13    134<L>  |  98  |  108<H>  ----------------------------<  97  3.5   |  19<L>  |  3.36<H>    Ca    10.0      14 Jul 2019 06:50  Ca    9.8      13 Jul 2019 06:10  Phos  5.1     07-14  Phos  5.4     07-13  Mg     2.2     07-14  Mg     2.3     07-13    TELEMETRY: a paced 60s	    ECG:  	  RADIOLOGY:  OTHER: 	    PREVIOUS DIAGNOSTIC TESTING:    [ ] Echocardiogram:  [ ]  Catheterization:  [ ] Stress Test:  	  	  ASSESSMENT/PLAN:

## 2019-07-14 NOTE — PROGRESS NOTE ADULT - SUBJECTIVE AND OBJECTIVE BOX
[facility] DIVISION OF KIDNEY DISEASES AND HYPERTENSION -- FOLLOW UP NOTE  --------------------------------------------------------------------------------  HPI: 84-year-old female with PMH of T2DM, HFrEF (moderate reduced EF), HLD, HTN (over 30 years), AFib, presented to the ER complaining of SOB. Patient was admitted for CHF exacerbation on 7/7/2019. Patient was started on IV Lasix for diuresis. Nephrology consulted for CLARI. On admission, Scr was 1.37 (7/7/2019) and increased to 5.87 on 7/11/2019. Diuretic therapy was held since then sCr is downtrending to 2.40.    Patient evaluated at bedside, in no acute distress. Denies any new complaints. Denies any SOB.    PAST HISTORY  --------------------------------------------------------------------------------  No significant changes to PMH, PSH, FHx, SHx, unless otherwise noted    ALLERGIES & MEDICATIONS  --------------------------------------------------------------------------------  Allergies    No Known Allergies    Intolerances      Standing Inpatient Medications  aspirin enteric coated 81 milliGRAM(s) Oral daily  atorvastatin 40 milliGRAM(s) Oral at bedtime  azithromycin  IVPB 500 milliGRAM(s) IV Intermittent every 24 hours  cefTRIAXone   IVPB 1000 milliGRAM(s) IV Intermittent every 24 hours  dextrose 5%. 1000 milliLiter(s) IV Continuous <Continuous>  dextrose 50% Injectable 12.5 Gram(s) IV Push once  dextrose 50% Injectable 25 Gram(s) IV Push once  dextrose 50% Injectable 25 Gram(s) IV Push once  hydrALAZINE 75 milliGRAM(s) Oral three times a day  insulin glargine Injectable (LANTUS) 5 Unit(s) SubCutaneous at bedtime  insulin lispro (HumaLOG) corrective regimen sliding scale   SubCutaneous three times a day before meals  insulin lispro (HumaLOG) corrective regimen sliding scale   SubCutaneous at bedtime  insulin lispro Injectable (HumaLOG) 2 Unit(s) SubCutaneous three times a day before meals  isosorbide   mononitrate ER Tablet (IMDUR) 60 milliGRAM(s) Oral daily  metoprolol succinate ER 75 milliGRAM(s) Oral daily  sodium chloride 0.9% lock flush 3 milliLiter(s) IV Push every 8 hours    REVIEW OF SYSTEMS  --------------------------------------------------------------------------------  Gen: No fevers/chills  Skin: No rashes  Respiratory: No dyspnea, cough  CV: No chest pain  GI: No abdominal pain, diarrhea  : No dysuria, hematuria    All other systems were reviewed and are negative, except as noted.    VITALS/PHYSICAL EXAM  --------------------------------------------------------------------------------  T(C): 36.6 (07-14-19 @ 12:36), Max: 36.7 (07-13-19 @ 20:53)  HR: 61 (07-14-19 @ 15:05) (60 - 68)  BP: 142/56 (07-14-19 @ 15:05) (111/38 - 142/56)  RR: 18 (07-14-19 @ 12:36) (17 - 18)  SpO2: 95% (07-14-19 @ 14:44) (94% - 100%)  Wt(kg): --    Physical Exam:  	Gen: NAD  	HEENT: No JVD  	Pulm: CTA B/L  	CV: S1S2+  	Abd: Soft, +BS   	Ext: no edema noted today  	Neuro: Awake  	Skin: Warm and dry    LABS/STUDIES  --------------------------------------------------------------------------------              8.0    11.73 >-----------<  228      [07-14-19 @ 06:50]              25.1     136  |  98  |  101  ----------------------------<  124      [07-14-19 @ 06:50]  3.4   |  21  |  2.40        Ca     10.0     [07-14-19 @ 06:50]      Mg     2.2     [07-14-19 @ 06:50]      Phos  5.1     [07-14-19 @ 06:50]    Creatinine Trend:  SCr 2.40 [07-14 @ 06:50]  SCr 3.36 [07-13 @ 06:10]  SCr 4.86 [07-12 @ 08:26]  SCr 5.87 [07-11 @ 06:31]  SCr 5.26 [07-10 @ 07:20]

## 2019-07-14 NOTE — PROGRESS NOTE ADULT - PROBLEM SELECTOR PLAN 2
s/p RHC  improving, diuresis held due to CLARI  now self diuresed over the last few dyas off meds and appears close to euvolemic  cards follow up iraida, might need po lasix as out-pt, ce to hold diuretics for now  mod AS - out-pt follow up  HF iraida, hold diuretics and monitor renal fx as pt making urine and appears comfortable

## 2019-07-14 NOTE — PROGRESS NOTE ADULT - SUBJECTIVE AND OBJECTIVE BOX
Patient is a 84y old  Female who presents with a chief complaint of dyspnea (14 Jul 2019 09:36)      SUBJECTIVE / OVERNIGHT EVENTS: feels well, no more SOB, no further bleed. making urine     ROS:  No HA/DZ  No Vision changes   No CP, SOB  No N/V/D  No Edema  No Rash  NO weakness, numbness    MEDICATIONS  (STANDING):  aspirin enteric coated 81 milliGRAM(s) Oral daily  atorvastatin 40 milliGRAM(s) Oral at bedtime  azithromycin  IVPB 500 milliGRAM(s) IV Intermittent every 24 hours  cefTRIAXone   IVPB 1000 milliGRAM(s) IV Intermittent every 24 hours  dextrose 5%. 1000 milliLiter(s) (50 mL/Hr) IV Continuous <Continuous>  dextrose 50% Injectable 12.5 Gram(s) IV Push once  dextrose 50% Injectable 25 Gram(s) IV Push once  dextrose 50% Injectable 25 Gram(s) IV Push once  hydrALAZINE 75 milliGRAM(s) Oral three times a day  insulin glargine Injectable (LANTUS) 5 Unit(s) SubCutaneous at bedtime  insulin lispro (HumaLOG) corrective regimen sliding scale   SubCutaneous three times a day before meals  insulin lispro (HumaLOG) corrective regimen sliding scale   SubCutaneous at bedtime  insulin lispro Injectable (HumaLOG) 2 Unit(s) SubCutaneous three times a day before meals  isosorbide   mononitrate ER Tablet (IMDUR) 60 milliGRAM(s) Oral daily  metoprolol succinate ER 75 milliGRAM(s) Oral daily  sodium chloride 0.9% lock flush 3 milliLiter(s) IV Push every 8 hours    MEDICATIONS  (PRN):  dextrose 40% Gel 15 Gram(s) Oral once PRN Blood Glucose LESS THAN 70 milliGRAM(s)/deciliter  glucagon  Injectable 1 milliGRAM(s) IntraMuscular once PRN Glucose LESS THAN 70 milligrams/deciliter      T(C): 36.6 (07-14-19 @ 12:36)  HR: 62 (07-14-19 @ 12:36)  BP: 133/51 (07-14-19 @ 12:36)  RR: 18 (07-14-19 @ 12:36)  SpO2: 100% (07-14-19 @ 12:36)  CAPILLARY BLOOD GLUCOSE      POCT Blood Glucose.: 125 mg/dL (14 Jul 2019 09:27)  POCT Blood Glucose.: 139 mg/dL (13 Jul 2019 22:54)  POCT Blood Glucose.: 126 mg/dL (13 Jul 2019 17:43)    I&O's Summary      PHYSICAL EXAM:  GENERAL: NAD, well-developed, AOx3  HEAD:  Atraumatic, Normocephalic  EYES: EOMI, PERRL, conjunctiva and sclera clear  NECK: Supple, No JVD  CHEST/LUNG: Clear to auscultation bilaterally  HEART: Regular rate and rhythm; No murmurs, rubs, or gallops, Improved Edema  ABDOMEN: Soft, Nontender, Nondistended; Bowel sounds present  EXTREMITIES:  2+ Peripheral Pulses, No clubbing, cyanosis  PSYCH: No SI/HI  NEUROLOGY: non-focal  SKIN: No rashes or lesions    LABS:                        8.0    11.73 )-----------( 228      ( 14 Jul 2019 06:50 )             25.1     07-14    136  |  98  |  101<H>  ----------------------------<  124<H>  3.4<L>   |  21<L>  |  2.40<H>    Ca    10.0      14 Jul 2019 06:50  Phos  5.1     07-14  Mg     2.2     07-14                    RADIOLOGY & ADDITIONAL TESTS:    Imaging Personally Reviewed:    Consultant(s) Notes Reviewed:      Care Discussed with Consultants/Other Providers:

## 2019-07-15 DIAGNOSIS — D64.9 ANEMIA, UNSPECIFIED: ICD-10-CM

## 2019-07-15 DIAGNOSIS — R52 PAIN, UNSPECIFIED: ICD-10-CM

## 2019-07-15 LAB
ANION GAP SERPL CALC-SCNC: 15 MMO/L — HIGH (ref 7–14)
BASOPHILS # BLD AUTO: 0.05 K/UL — SIGNIFICANT CHANGE UP (ref 0–0.2)
BASOPHILS NFR BLD AUTO: 0.4 % — SIGNIFICANT CHANGE UP (ref 0–2)
BUN SERPL-MCNC: 79 MG/DL — HIGH (ref 7–23)
CALCIUM SERPL-MCNC: 10.1 MG/DL — SIGNIFICANT CHANGE UP (ref 8.4–10.5)
CHLORIDE SERPL-SCNC: 102 MMOL/L — SIGNIFICANT CHANGE UP (ref 98–107)
CO2 SERPL-SCNC: 22 MMOL/L — SIGNIFICANT CHANGE UP (ref 22–31)
CREAT SERPL-MCNC: 1.74 MG/DL — HIGH (ref 0.5–1.3)
EOSINOPHIL # BLD AUTO: 0.17 K/UL — SIGNIFICANT CHANGE UP (ref 0–0.5)
EOSINOPHIL NFR BLD AUTO: 1.3 % — SIGNIFICANT CHANGE UP (ref 0–6)
GLUCOSE BLDC GLUCOMTR-MCNC: 152 MG/DL — HIGH (ref 70–99)
GLUCOSE SERPL-MCNC: 104 MG/DL — HIGH (ref 70–99)
HCT VFR BLD CALC: 26.5 % — LOW (ref 34.5–45)
HGB BLD-MCNC: 8.3 G/DL — LOW (ref 11.5–15.5)
IMM GRANULOCYTES NFR BLD AUTO: 0.8 % — SIGNIFICANT CHANGE UP (ref 0–1.5)
LYMPHOCYTES # BLD AUTO: 1.4 K/UL — SIGNIFICANT CHANGE UP (ref 1–3.3)
LYMPHOCYTES # BLD AUTO: 10.7 % — LOW (ref 13–44)
MAGNESIUM SERPL-MCNC: 2.1 MG/DL — SIGNIFICANT CHANGE UP (ref 1.6–2.6)
MCHC RBC-ENTMCNC: 27.6 PG — SIGNIFICANT CHANGE UP (ref 27–34)
MCHC RBC-ENTMCNC: 31.3 % — LOW (ref 32–36)
MCV RBC AUTO: 88 FL — SIGNIFICANT CHANGE UP (ref 80–100)
MONOCYTES # BLD AUTO: 1.26 K/UL — HIGH (ref 0–0.9)
MONOCYTES NFR BLD AUTO: 9.6 % — SIGNIFICANT CHANGE UP (ref 2–14)
NEUTROPHILS # BLD AUTO: 10.08 K/UL — HIGH (ref 1.8–7.4)
NEUTROPHILS NFR BLD AUTO: 77.2 % — HIGH (ref 43–77)
NRBC # FLD: 0.03 K/UL — SIGNIFICANT CHANGE UP (ref 0–0)
PHOSPHATE SERPL-MCNC: 4.4 MG/DL — SIGNIFICANT CHANGE UP (ref 2.5–4.5)
PLATELET # BLD AUTO: 266 K/UL — SIGNIFICANT CHANGE UP (ref 150–400)
PMV BLD: 10.7 FL — SIGNIFICANT CHANGE UP (ref 7–13)
POTASSIUM SERPL-MCNC: 3.3 MMOL/L — LOW (ref 3.5–5.3)
POTASSIUM SERPL-SCNC: 3.3 MMOL/L — LOW (ref 3.5–5.3)
PROT UR-MCNC: 38.7 MG/DL — SIGNIFICANT CHANGE UP
RBC # BLD: 3.01 M/UL — LOW (ref 3.8–5.2)
RBC # FLD: 18.1 % — HIGH (ref 10.3–14.5)
SODIUM SERPL-SCNC: 139 MMOL/L — SIGNIFICANT CHANGE UP (ref 135–145)
WBC # BLD: 13.07 K/UL — HIGH (ref 3.8–10.5)
WBC # FLD AUTO: 13.07 K/UL — HIGH (ref 3.8–10.5)

## 2019-07-15 PROCEDURE — 99233 SBSQ HOSP IP/OBS HIGH 50: CPT

## 2019-07-15 PROCEDURE — 99232 SBSQ HOSP IP/OBS MODERATE 35: CPT | Mod: GC

## 2019-07-15 PROCEDURE — 84166 PROTEIN E-PHORESIS/URINE/CSF: CPT | Mod: 26

## 2019-07-15 PROCEDURE — 93931 UPPER EXTREMITY STUDY: CPT | Mod: 26,RT

## 2019-07-15 RX ORDER — HYDRALAZINE HCL 50 MG
100 TABLET ORAL THREE TIMES A DAY
Refills: 0 | Status: DISCONTINUED | OUTPATIENT
Start: 2019-07-15 | End: 2019-07-16

## 2019-07-15 RX ORDER — POTASSIUM CHLORIDE 20 MEQ
20 PACKET (EA) ORAL ONCE
Refills: 0 | Status: COMPLETED | OUTPATIENT
Start: 2019-07-15 | End: 2019-07-15

## 2019-07-15 RX ADMIN — SODIUM CHLORIDE 3 MILLILITER(S): 9 INJECTION INTRAMUSCULAR; INTRAVENOUS; SUBCUTANEOUS at 12:49

## 2019-07-15 RX ADMIN — SODIUM CHLORIDE 3 MILLILITER(S): 9 INJECTION INTRAMUSCULAR; INTRAVENOUS; SUBCUTANEOUS at 06:18

## 2019-07-15 RX ADMIN — ISOSORBIDE MONONITRATE 60 MILLIGRAM(S): 60 TABLET, EXTENDED RELEASE ORAL at 12:44

## 2019-07-15 RX ADMIN — SODIUM CHLORIDE 3 MILLILITER(S): 9 INJECTION INTRAMUSCULAR; INTRAVENOUS; SUBCUTANEOUS at 21:02

## 2019-07-15 RX ADMIN — Medication 20 MILLIEQUIVALENT(S): at 18:06

## 2019-07-15 RX ADMIN — Medication 2: at 09:03

## 2019-07-15 RX ADMIN — Medication 2 UNIT(S): at 18:06

## 2019-07-15 RX ADMIN — Medication 75 MILLIGRAM(S): at 06:19

## 2019-07-15 RX ADMIN — Medication 100 MILLIGRAM(S): at 21:01

## 2019-07-15 RX ADMIN — Medication 2: at 18:06

## 2019-07-15 RX ADMIN — Medication 2 UNIT(S): at 09:03

## 2019-07-15 RX ADMIN — Medication 100 MILLIGRAM(S): at 13:33

## 2019-07-15 RX ADMIN — Medication 81 MILLIGRAM(S): at 12:43

## 2019-07-15 RX ADMIN — CEFTRIAXONE 100 MILLIGRAM(S): 500 INJECTION, POWDER, FOR SOLUTION INTRAMUSCULAR; INTRAVENOUS at 12:49

## 2019-07-15 RX ADMIN — Medication 4: at 12:44

## 2019-07-15 RX ADMIN — INSULIN GLARGINE 5 UNIT(S): 100 INJECTION, SOLUTION SUBCUTANEOUS at 22:00

## 2019-07-15 RX ADMIN — AZITHROMYCIN 250 MILLIGRAM(S): 500 TABLET, FILM COATED ORAL at 18:05

## 2019-07-15 RX ADMIN — Medication 2 UNIT(S): at 12:44

## 2019-07-15 RX ADMIN — ATORVASTATIN CALCIUM 40 MILLIGRAM(S): 80 TABLET, FILM COATED ORAL at 21:02

## 2019-07-15 RX ADMIN — Medication 40 MILLIGRAM(S): at 12:43

## 2019-07-15 NOTE — PROGRESS NOTE ADULT - ASSESSMENT
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 Admitted for CHF exacerbation and initially required BiPAP. Cardiology consulted for management of HF.     #HFrEF  - RHC 7/12 with wedge 30, LVEDP mid 20s. Volume status improving, nearly euvolemic. Cr continues to improve.   - Continue hydralazine 75 mg TID, Imdur 60 mg daily, metoprolol 75 mg qd  - Would continue to hold Lasix given CLARI and GI bleed  - Please check daily standing weights, last weight documented was on 7/11  - Strict I/Os  - Monitor BMP daily, replete K and Mg PRN    #AFib  - Continue BB  - Hold apixaban given CLARI  - Hold hep gtt in setting of GI bleed, appreciate GI recs    #GI bleed  - Hgb 8 -> 6.8 -> 8.3. She denies bleeding since Friday  - Monitor respiratory status closely with transfusions.   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 Admitted for CHF exacerbation and initially required BiPAP. Cardiology consulted for management of HF.     #HFrEF  - RHC 7/12 with wedge 30, LVEDP mid 20s. Volume status improving. Cr continues to improve.   - Continue hydralazine 75 mg TID, Imdur 60 mg daily, metoprolol 75 mg qd  - Start torsemide 40 mg daily  - Please check daily standing weights, last weight documented was on 7/11  - Strict I/Os  - Monitor BMP daily, replete K and Mg PRN  - Check iron studies, UPEP/SPEP    #RUE swelling  - Consider vas duplex ultrasound    #AFib  - Continue BB  - Hold apixaban given CLARI  - Hold hep gtt in setting of GI bleed, appreciate GI recs    #GI bleed  - Hgb 8 -> 6.8 -> 8.3. She denies bleeding since Friday  - Monitor respiratory status closely with transfusions.   - Given AFib, patient warrants therapeutic a/c when okay from GI perspective  - Unclear indication for aspirin, would consider stopping     BOB Morales MD  Cardiology Fellow  [email or web address]  All cardiology service information may be found 24/7 on [email or web address], password: [email or web address]

## 2019-07-15 NOTE — PROGRESS NOTE ADULT - SUBJECTIVE AND OBJECTIVE BOX
Patient is a 84y old  Female who presents with a chief complaint of dyspnea (15 Jul 2019 14:16)      SUBJECTIVE / OVERNIGHT EVENTS: complianing of RT hand pain with mild swelling at site of RHC cath no overt bleeding    MEDICATIONS  (STANDING):  aspirin enteric coated 81 milliGRAM(s) Oral daily  atorvastatin 40 milliGRAM(s) Oral at bedtime  azithromycin  IVPB 500 milliGRAM(s) IV Intermittent every 24 hours  cefTRIAXone   IVPB 1000 milliGRAM(s) IV Intermittent every 24 hours  dextrose 5%. 1000 milliLiter(s) (50 mL/Hr) IV Continuous <Continuous>  dextrose 50% Injectable 12.5 Gram(s) IV Push once  dextrose 50% Injectable 25 Gram(s) IV Push once  dextrose 50% Injectable 25 Gram(s) IV Push once  hydrALAZINE 100 milliGRAM(s) Oral three times a day  insulin glargine Injectable (LANTUS) 5 Unit(s) SubCutaneous at bedtime  insulin lispro (HumaLOG) corrective regimen sliding scale   SubCutaneous three times a day before meals  insulin lispro (HumaLOG) corrective regimen sliding scale   SubCutaneous at bedtime  insulin lispro Injectable (HumaLOG) 2 Unit(s) SubCutaneous three times a day before meals  isosorbide   mononitrate ER Tablet (IMDUR) 60 milliGRAM(s) Oral daily  metoprolol succinate ER 75 milliGRAM(s) Oral daily  sodium chloride 0.9% lock flush 3 milliLiter(s) IV Push every 8 hours  torsemide 40 milliGRAM(s) Oral daily    MEDICATIONS  (PRN):  dextrose 40% Gel 15 Gram(s) Oral once PRN Blood Glucose LESS THAN 70 milliGRAM(s)/deciliter  glucagon  Injectable 1 milliGRAM(s) IntraMuscular once PRN Glucose LESS THAN 70 milligrams/deciliter        CAPILLARY BLOOD GLUCOSE      POCT Blood Glucose.: 248 mg/dL (15 Jul 2019 12:39)  POCT Blood Glucose.: 152 mg/dL (15 Jul 2019 08:46)  POCT Blood Glucose.: 171 mg/dL (14 Jul 2019 21:21)  POCT Blood Glucose.: 162 mg/dL (14 Jul 2019 17:32)    I&O's Summary    14 Jul 2019 07:01  -  15 Jul 2019 07:00  --------------------------------------------------------  IN: 240 mL / OUT: 450 mL / NET: -210 mL    15 Jul 2019 07:01  -  15 Jul 2019 14:47  --------------------------------------------------------  IN: 150 mL / OUT: 310 mL / NET: -160 mL        T(C): 36.7 (07-15-19 @ 12:42), Max: 36.7 (07-15-19 @ 12:42)  HR: 60 (07-15-19 @ 12:42) (60 - 61)  BP: 144/51 (07-15-19 @ 12:42) (134/40 - 144/51)  RR: 17 (07-15-19 @ 12:42) (17 - 18)  SpO2: 100% (07-15-19 @ 12:42) (97% - 100%)    PHYSICAL EXAM:  GENERAL: NAD, well-developed  HEAD:  Atraumatic, Normocephalic  EYES: EOMI, PERRLA, conjunctiva and sclera clear  NECK: Supple, No JVD  CHEST/LUNG: Clear to auscultation bilaterally; No wheeze  HEART: Regular rate and rhythm; No murmurs, rubs, or gallops  ABDOMEN: Soft, Nontender, Nondistended; Bowel sounds present  EXTREMITIES:  2+ Peripheral Pulses, No clubbing, cyanosis, or edema  PSYCH: AAOx3  NEUROLOGY: non-focal  SKIN: No rashes or lesions    LABS:                        8.3    13.07 )-----------( 266      ( 15 Jul 2019 07:20 )             26.5     07-15    139  |  102  |  79<H>  ----------------------------<  104<H>  3.3<L>   |  22  |  1.74<H>    Ca    10.1      15 Jul 2019 07:20  Phos  4.4     07-15  Mg     2.1     07-15                  RADIOLOGY & ADDITIONAL TESTS:    Imaging Personally Reviewed:    Consultant(s) Notes Reviewed:      Care Discussed with Consultants/Other Providers:

## 2019-07-15 NOTE — DIETITIAN INITIAL EVALUATION ADULT. - PERTINENT LABORATORY DATA
07-15 Na 139 mmol/L Glu 104 mg/dL<H> K+ 3.3 mmol/L<L> Cr 1.74 mg/dL<H> BUN 79 mg/dL<H> Phos 4.4 mg/dL  07-15 @ 12:39 POCT 248 mg/dL  07-15 @ 08:46 POCT 152 mg/dL  07-14 @ 21:21 POCT 171 mg/dL  07-14 @ 17:32 POCT 162 mg/dL

## 2019-07-15 NOTE — PROGRESS NOTE ADULT - ATTENDING COMMENTS
Briefly,, 85 y/o F w/ h/o HTN, HL, IDDM (A1c 6.9), HFrEF (present since 2017, segmental LV dysfunction, EF approx 40%), dual chamber PPM, likely CAD (infarct present since 2017 on nuclear imaging), presented on 7/7 with SOB/KRAUS and orthopnea/PND; underwent CTA on 7/7 given SOB and GIB notable for pneumonia with subsequent worsening of Cr from 1.3 to 5 likely 2/2 NORBERT now improving. TTE was concerning for worsening severe AS however RHC with LVEDP (no LV gram done) was suggestive of moderate AS but RHC was notable for RA 10, RV 50/12, PA 50/21/36, PCWP 36, LVEDP 22, CO/CI 4.3/2.6, MARY ELLEN 1.4 with peak gradient of 10. TTE was notable for mod-severe MR, calcified MV (mean gradient 3), moderate AI, mod-severe TR, moderate LV dysfunction (akinetic apex). Currently feels well but reports pain in RUE at site of angiogram. Had BRBPR recently which self-resolved but did have H/H drop and received transfusion. Exam notable for JVP approx 12 with HJR to 16, RRR, clear lungs, grade II/VI systolic murmur, no pedal edema, RUE ecchymosis. Labs reivewed - BUN/Cr 79/1.74 (improving; peaked at 5.8 on 7/11). EKG possible junctional, HR 63, LVH, PRWP. Prior nuclear test 2017 with new defects c/w infarct, negative for ischemia. Briefly,, 85 y/o F w/ h/o HTN, HL, IDDM (A1c 6.9), HFrEF (present since 2017, segmental LV dysfunction, EF approx 40%), dual chamber PPM, likely CAD (infarct present since 2017 on nuclear imaging), presented on 7/7 with SOB/KRAUS and orthopnea/PND; underwent CTA on 7/7 given SOB and GIB notable for pneumonia with subsequent worsening of Cr from 1.3 to 5 likely 2/2 NORBERT now improving. TTE was concerning for worsening severe AS however RHC with LVEDP (no LV gram done) was suggestive of moderate AS but RHC was notable for RA 10, RV 50/12, PA 50/21/36, PCWP 36, LVEDP 22, CO/CI 4.3/2.6, MARY ELLEN 1.4 with peak gradient of 10. TTE was notable for mod-severe MR, calcified MV (mean gradient 3), moderate AI, mod-severe TR, moderate LV dysfunction (akinetic apex). Currently feels well but reports pain in RUE at site of angiogram. Had BRBPR recently which self-resolved but did have H/H drop and received transfusion. Exam notable for JVP approx 12 with HJR to 16, RRR, clear lungs, grade II/VI systolic murmur, no pedal edema, RUE ecchymosis. Labs reivewed - BUN/Cr 79/1.74 (improving; peaked at 5.8 on 7/11). EKG possible junctional, HR 63, LVH, PRWP. Prior nuclear test 2017 with new defects c/w infarct, negative for ischemia. Overall stage C HF, NYHA class III with evidence of elevated filling pressures and notably hypertensive improving. Dyspnea possibly 2/2 to mod-severe MR.  - would consider repeat nuclear stress test to r/o ischemia; at high risk for NORBERT if gets LHC  - resume torsemide 40 mg daily  - standing weights daily  - uptitrate hydral as tolerated  - further workup per general cardiology    will sign off at this time. Please call with questions

## 2019-07-15 NOTE — PROGRESS NOTE ADULT - ASSESSMENT
85 y/o female with PMHX of HTN, HLD, DM II, HFrEF (moderate systolic dysfunction) severe AS, severe pulm HTN comes in with complaints of KRAUS x 3 days. She was found to be in ADHF, was given Lasix 80 mg IV x 2.  HF consulted b/c of worsening renal function. She states prior to coming in she had KRAUS after a few steps with orthopnea and PND. Currently she feels well, no more KRAUS when walking in room. Non smoker, no ETOH or drug abuse. Daughter gives medications at home. Per daughter she is on a very low salt diet and hardly eats outside (once a year). RHC on 7/12 showed RA 9, PA 50/21/35, PCWP 23, PA sat 46.1%, CO 4.63, CI 2.63. B/c of hospital course complicated by CLARI (likely contrast induced), diuretics were held.

## 2019-07-15 NOTE — PROGRESS NOTE ADULT - PROBLEM SELECTOR PLAN 2
Patient has hypervolemia in the setting of CLARI and CHF. Right heart cath data reviewed. Agree that patient is presently asymptomatic. Diuresis as per Cardiology

## 2019-07-15 NOTE — DIETITIAN INITIAL EVALUATION ADULT. - ADD RECOMMEND
1. Spoke with PA to d/c clear liquid diet. 2. Encourage PO intake and honor food preferences as able.

## 2019-07-15 NOTE — PROGRESS NOTE ADULT - SUBJECTIVE AND OBJECTIVE BOX
Patient seen and examined. She states she feels well- no acute SOB, orthopnea, PND, CP. Has not ambulated much, just to bathroom, no KRAUS.   Having right arm pain.       Medications:  aspirin enteric coated 81 milliGRAM(s) Oral daily  atorvastatin 40 milliGRAM(s) Oral at bedtime  azithromycin  IVPB 500 milliGRAM(s) IV Intermittent every 24 hours  cefTRIAXone   IVPB 1000 milliGRAM(s) IV Intermittent every 24 hours  dextrose 40% Gel 15 Gram(s) Oral once PRN  dextrose 5%. 1000 milliLiter(s) IV Continuous <Continuous>  dextrose 50% Injectable 12.5 Gram(s) IV Push once  dextrose 50% Injectable 25 Gram(s) IV Push once  dextrose 50% Injectable 25 Gram(s) IV Push once  glucagon  Injectable 1 milliGRAM(s) IntraMuscular once PRN  hydrALAZINE 75 milliGRAM(s) Oral three times a day  insulin glargine Injectable (LANTUS) 5 Unit(s) SubCutaneous at bedtime  insulin lispro (HumaLOG) corrective regimen sliding scale   SubCutaneous three times a day before meals  insulin lispro (HumaLOG) corrective regimen sliding scale   SubCutaneous at bedtime  insulin lispro Injectable (HumaLOG) 2 Unit(s) SubCutaneous three times a day before meals  isosorbide   mononitrate ER Tablet (IMDUR) 60 milliGRAM(s) Oral daily  metoprolol succinate ER 75 milliGRAM(s) Oral daily  sodium chloride 0.9% lock flush 3 milliLiter(s) IV Push every 8 hours  torsemide 40 milliGRAM(s) Oral daily      Vitals:  Vital Signs Last 24 Hrs  T(C): 36.7 (15 Jul 2019 12:42), Max: 36.7 (15 Jul 2019 12:42)  T(F): 98.1 (15 Jul 2019 12:42), Max: 98.1 (15 Jul 2019 12:42)  HR: 60 (15 Jul 2019 12:42) (60 - 68)  BP: 144/51 (15 Jul 2019 12:42) (134/40 - 144/51)  BP(mean): 72 (15 Jul 2019 06:17) (72 - 72)  RR: 17 (15 Jul 2019 12:42) (17 - 18)  SpO2: 100% (15 Jul 2019 12:42) (95% - 100%)    Daily     Daily     I&O's Detail    14 Jul 2019 07:01  -  15 Jul 2019 07:00  --------------------------------------------------------  IN:    Oral Fluid: 240 mL  Total IN: 240 mL    OUT:    Voided: 450 mL  Total OUT: 450 mL    Total NET: -210 mL          Physical Exam:     General: No distress. Comfortable.  HEENT: EOM intact.  Neck: Neck supple. +HJR. No masses  Chest: Clear to auscultation bilaterally  CV: S1 and S2 RRR. No murmurs, rub, or gallops. Radial pulses normal. Warm, no edema b/l LE.   Abdomen: Soft, non-distended, non-tender  Skin: No rashes or skin breakdown. Tender left arm, with some bruising.   Neurology: Alert and oriented times three. Sensation intact  Psych: Affect normal    Labs:                        8.3    13.07 )-----------( 266      ( 15 Jul 2019 07:20 )             26.5     07-15    139  |  102  |  79<H>  ----------------------------<  104<H>  3.3<L>   |  22  |  1.74<H>    Ca    10.1      15 Jul 2019 07:20  Phos  4.4     07-15  Mg     2.1     07-15

## 2019-07-15 NOTE — PROGRESS NOTE ADULT - PROBLEM SELECTOR PLAN 1
Came in with NYHA class IV symptoms, now improved.  BUN/Cr trending down. CLARI from contrast nephropathy.  Has mild volume overload.   Renal function now improving (likely contrast induced).  Started Torsemide 40 mg po qd.  Get standing weight.   Increased hydral to 100 mg po TID, hypertensive.  Continue imdur 60 qd.   Continue Toprol 75 qd.   Not on ACEI/ARB/ARNI or bree b/c of CLARI.  Dr. Maynard to discuss evaluation of mitral valve via INDU w/ general cardiology team.  Ordered PT consult.   Please get daily standing weight. D/w nurse.

## 2019-07-15 NOTE — PROGRESS NOTE ADULT - ATTENDING COMMENTS
personally saw and examined patient  pt resting without complaints  agree with above assessment above  increase hydralazine to 100 tid and begin torsemide per HF recs  cont imdur/bb  strict I and o's, daily wieghts, replete lytes.  Cr improving  appreciate further gi recs

## 2019-07-15 NOTE — PROGRESS NOTE ADULT - PROBLEM SELECTOR PLAN 1
on CKD 3 - resolving   suspect due to diuresis vs NORBERT, sono neg for retention, dw renal, no need for further w/u   Lasix, Eliquis on hold  renally dose meds, monitor BMP  Cr trending down

## 2019-07-15 NOTE — PROGRESS NOTE ADULT - PROBLEM SELECTOR PLAN 1
Pt. with CLARI on CKD in the setting of CHF exacerbation. On admission, Scr was 1.37 (7/7/2019), worsened to 5.87 on 7/11/19. Lasix held since 7/10/2019. Scr decreased to 1.74 today (7/15/2019). CLARI likely hemodynamically related in the setting of intravenous contrast, heart failure, and diuresis. Pt. non-oliguric at present. Diuresis restarted as per Cardiology. Monitor labs and urine output. Avoid any potential nephrotoxins. Pt. with CLARI on CKD in the setting of CHF exacerbation. On admission, Scr was 1.37 (7/7/2019), worsened to 5.87 on 7/11/19. Lasix held since 7/10/2019. Scr decreased to 1.74 today (7/15/2019). CLARI likely hemodynamically related in the setting of intravenous contrast, heart failure, and diuresis. Pt. non-oliguric at present. Pt. restarted on diuretic therapy today as per cardiology team. Monitor labs and urine output. Avoid any potential nephrotoxins

## 2019-07-15 NOTE — DIETITIAN INITIAL EVALUATION ADULT. - PERTINENT MEDS FT
MEDICATIONS  (STANDING):  atorvastatin 40 milliGRAM(s) Oral at bedtime  azithromycin  IVPB 500 milliGRAM(s) IV Intermittent every 24 hours  cefTRIAXone   IVPB 1000 milliGRAM(s) IV Intermittent every 24 hours  dextrose 5%. 1000 milliLiter(s) (50 mL/Hr) IV Continuous <Continuous>  dextrose 50% Injectable 12.5 Gram(s) IV Push once  dextrose 50% Injectable 25 Gram(s) IV Push once  dextrose 50% Injectable 25 Gram(s) IV Push once  hydrALAZINE 100 milliGRAM(s) Oral three times a day  insulin glargine Injectable (LANTUS) 5 Unit(s) SubCutaneous at bedtime  insulin lispro (HumaLOG) corrective regimen sliding scale   SubCutaneous three times a day before meals  insulin lispro (HumaLOG) corrective regimen sliding scale   SubCutaneous at bedtime  insulin lispro Injectable (HumaLOG) 2 Unit(s) SubCutaneous three times a day before meals  isosorbide   mononitrate ER Tablet (IMDUR) 60 milliGRAM(s) Oral daily  metoprolol succinate ER 75 milliGRAM(s) Oral daily  sodium chloride 0.9% lock flush 3 milliLiter(s) IV Push every 8 hours  torsemide 40 milliGRAM(s) Oral daily

## 2019-07-15 NOTE — DIETITIAN INITIAL EVALUATION ADULT. - ENERGY NEEDS
Ht: 61 in Wt: (7/14) 138.8 pounds BMI: 26.2  IBW: 105 pounds   Edema: 2+ R hand, wrist, arm  Pressure Injuries: none

## 2019-07-15 NOTE — PROVIDER CONTACT NOTE (OTHER) - BACKGROUND
Patient p/w brief loose stools, BPBPR now resolved. Patient has a history of CHF, Type 2 DM, HTN,. Patient had cath performed on 7/12 in right radial and brachial sites.

## 2019-07-15 NOTE — PROGRESS NOTE ADULT - ATTENDING COMMENTS
Patient seen and examined with the GI fellow. I agree with the above assessment and plan. Thank you for allowing us to care for your patient.    Plan for ERCP/colonoscopy tomorrow for further evaluation.

## 2019-07-15 NOTE — PROGRESS NOTE ADULT - SUBJECTIVE AND OBJECTIVE BOX
[facility] DIVISION OF KIDNEY DISEASES AND HYPERTENSION -- FOLLOW UP NOTE  --------------------------------------------------------------------------------  HPI: 84-year-old female with PMH of T2DM, HFrEF (moderate reduced EF), HLD, HTN (over 30 years), AFib, presented to the ER complaining of SOB. Patient was admitted for CHF exacerbation on 7/7/2019. Patient was started on IV Lasix for diuresis. Nephrology consulted for CLARI. On admission, Scr was 1.37 (7/7/2019) and increased to 5.87 on 7/11/2019. Diuretic therapy was held since then and sCr is downtrending to 1.74 today. Torsemide restarted today.    Patient evaluated at bedside, in no acute distress, sleeping comfortably. Denies any new complaints.    PAST HISTORY  --------------------------------------------------------------------------------  No significant changes to PMH, PSH, FHx, SHx, unless otherwise noted    ALLERGIES & MEDICATIONS  --------------------------------------------------------------------------------  Allergies    No Known Allergies    Intolerances      Standing Inpatient Medications  aspirin enteric coated 81 milliGRAM(s) Oral daily  atorvastatin 40 milliGRAM(s) Oral at bedtime  azithromycin  IVPB 500 milliGRAM(s) IV Intermittent every 24 hours  cefTRIAXone   IVPB 1000 milliGRAM(s) IV Intermittent every 24 hours  dextrose 5%. 1000 milliLiter(s) IV Continuous <Continuous>  dextrose 50% Injectable 12.5 Gram(s) IV Push once  dextrose 50% Injectable 25 Gram(s) IV Push once  dextrose 50% Injectable 25 Gram(s) IV Push once  hydrALAZINE 100 milliGRAM(s) Oral three times a day  insulin glargine Injectable (LANTUS) 5 Unit(s) SubCutaneous at bedtime  insulin lispro (HumaLOG) corrective regimen sliding scale   SubCutaneous three times a day before meals  insulin lispro (HumaLOG) corrective regimen sliding scale   SubCutaneous at bedtime  insulin lispro Injectable (HumaLOG) 2 Unit(s) SubCutaneous three times a day before meals  isosorbide   mononitrate ER Tablet (IMDUR) 60 milliGRAM(s) Oral daily  metoprolol succinate ER 75 milliGRAM(s) Oral daily  sodium chloride 0.9% lock flush 3 milliLiter(s) IV Push every 8 hours  torsemide 40 milliGRAM(s) Oral daily    REVIEW OF SYSTEMS  --------------------------------------------------------------------------------  Gen: No fevers/chills  Skin: No rashes  Respiratory: No dyspnea, cough  CV: No chest pain  GI: No abdominal pain, diarrhea  : No dysuria, hematuria    All other systems were reviewed and are negative, except as noted.    VITALS/PHYSICAL EXAM  --------------------------------------------------------------------------------  T(C): 36.7 (07-15-19 @ 12:42), Max: 36.7 (07-15-19 @ 12:42)  HR: 60 (07-15-19 @ 12:42) (60 - 68)  BP: 144/51 (07-15-19 @ 12:42) (134/40 - 144/51)  RR: 17 (07-15-19 @ 12:42) (17 - 18)  SpO2: 100% (07-15-19 @ 12:42) (95% - 100%)  Wt(kg): --    07-14-19 @ 07:01  -  07-15-19 @ 07:00  --------------------------------------------------------  IN: 240 mL / OUT: 450 mL / NET: -210 mL    07-15-19 @ 07:01  -  07-15-19 @ 14:17  --------------------------------------------------------  IN: 150 mL / OUT: 310 mL / NET: -160 mL    Physical Exam:  	Gen: NAD  	HEENT: No JVD  	Pulm: CTA B/L  	CV: S1S2+  	Abd: Soft, +BS   	Ext: no edema noted today  	Neuro: Awake  	Skin: Warm and dry    LABS/STUDIES  --------------------------------------------------------------------------------              8.3    13.07 >-----------<  266      [07-15-19 @ 07:20]              26.5     139  |  102  |  79  ----------------------------<  104      [07-15-19 @ 07:20]  3.3   |  22  |  1.74        Ca     10.1     [07-15-19 @ 07:20]      Mg     2.1     [07-15-19 @ 07:20]      Phos  4.4     [07-15-19 @ 07:20]    Creatinine Trend:  SCr 1.74 [07-15 @ 07:20]  SCr 2.40 [07-14 @ 06:50]  SCr 3.36 [07-13 @ 06:10]  SCr 4.86 [07-12 @ 08:26]  SCr 5.87 [07-11 @ 06:31] [facility] DIVISION OF KIDNEY DISEASES AND HYPERTENSION -- FOLLOW UP NOTE  --------------------------------------------------------------------------------  HPI: 84-year-old female with PMH of T2DM, HFrEF (moderate reduced EF), HLD, HTN (over 30 years), AFib, presented to the ER complaining of SOB. Patient was admitted for CHF exacerbation on 7/7/2019. Patient was started on IV Lasix for diuresis. Nephrology consulted for CLARI. On admission, Scr was 1.37 (7/7/2019) and increased to 5.87 on 7/11/2019. Diuretic therapy subsequently held and Scr has downtrended to 1.74 today. Pt. restarted on torsemide today.    Patient evaluated at bedside, in no acute distress, sleeping comfortably. Denies any new complaints.    PAST HISTORY  --------------------------------------------------------------------------------  No significant changes to PMH, PSH, FHx, SHx, unless otherwise noted    ALLERGIES & MEDICATIONS  --------------------------------------------------------------------------------  Allergies    No Known Allergies    Intolerances    Standing Inpatient Medications  aspirin enteric coated 81 milliGRAM(s) Oral daily  atorvastatin 40 milliGRAM(s) Oral at bedtime  azithromycin  IVPB 500 milliGRAM(s) IV Intermittent every 24 hours  cefTRIAXone   IVPB 1000 milliGRAM(s) IV Intermittent every 24 hours  dextrose 5%. 1000 milliLiter(s) IV Continuous <Continuous>  dextrose 50% Injectable 12.5 Gram(s) IV Push once  dextrose 50% Injectable 25 Gram(s) IV Push once  dextrose 50% Injectable 25 Gram(s) IV Push once  hydrALAZINE 100 milliGRAM(s) Oral three times a day  insulin glargine Injectable (LANTUS) 5 Unit(s) SubCutaneous at bedtime  insulin lispro (HumaLOG) corrective regimen sliding scale   SubCutaneous three times a day before meals  insulin lispro (HumaLOG) corrective regimen sliding scale   SubCutaneous at bedtime  insulin lispro Injectable (HumaLOG) 2 Unit(s) SubCutaneous three times a day before meals  isosorbide   mononitrate ER Tablet (IMDUR) 60 milliGRAM(s) Oral daily  metoprolol succinate ER 75 milliGRAM(s) Oral daily  sodium chloride 0.9% lock flush 3 milliLiter(s) IV Push every 8 hours  torsemide 40 milliGRAM(s) Oral daily    REVIEW OF SYSTEMS  --------------------------------------------------------------------------------  Gen: No fevers/chills  Skin: No rash  Respiratory: No dyspnea, cough  CV: No chest pain  GI: No abdominal pain, diarrhea  : No dysuria, hematuria    All other systems were reviewed and are negative, except as noted.    VITALS/PHYSICAL EXAM  --------------------------------------------------------------------------------  T(C): 36.7 (07-15-19 @ 12:42), Max: 36.7 (07-15-19 @ 12:42)  HR: 60 (07-15-19 @ 12:42) (60 - 68)  BP: 144/51 (07-15-19 @ 12:42) (134/40 - 144/51)  RR: 17 (07-15-19 @ 12:42) (17 - 18)  SpO2: 100% (07-15-19 @ 12:42) (95% - 100%)  Wt(kg): --    07-14-19 @ 07:01  -  07-15-19 @ 07:00  --------------------------------------------------------  IN: 240 mL / OUT: 450 mL / NET: -210 mL    07-15-19 @ 07:01  -  07-15-19 @ 14:17  --------------------------------------------------------  IN: 150 mL / OUT: 310 mL / NET: -160 mL    Physical Exam:  	Gen: NAD, resting   	HEENT: No JVD  	Pulm: CTA B/L  	CV: S1S2+  	Abd: Soft, +BS   	Ext: no LE edema   	Neuro: Awake  	Skin: Warm and dry    LABS/STUDIES  --------------------------------------------------------------------------------              8.3    13.07 >-----------<  266      [07-15-19 @ 07:20]              26.5     139  |  102  |  79  ----------------------------<  104      [07-15-19 @ 07:20]  3.3   |  22  |  1.74        Ca     10.1     [07-15-19 @ 07:20]      Mg     2.1     [07-15-19 @ 07:20]      Phos  4.4     [07-15-19 @ 07:20]    Creatinine Trend:  SCr 1.74 [07-15 @ 07:20]  SCr 2.40 [07-14 @ 06:50]  SCr 3.36 [07-13 @ 06:10]  SCr 4.86 [07-12 @ 08:26]  SCr 5.87 [07-11 @ 06:31]

## 2019-07-15 NOTE — PROGRESS NOTE ADULT - PROBLEM SELECTOR PLAN 2
with acute blood loss anemia  s/p transfusion of 1 unit in divided doses with 1 time Lasix in between.   I DW pt today in regards to rational of colonoscopy, she is not eager for colonoscopy -spoke to daughter Fang planned for ERCP and colonoscopy in AM who will speak to pt.  -If pt continues to refuse await GI input on whether should resume AC if refuses colonoscopy.

## 2019-07-15 NOTE — PROGRESS NOTE ADULT - SUBJECTIVE AND OBJECTIVE BOX
Patient is a 84y old  Female who presents with a chief complaint of dyspnea (14 Jul 2019 15:47)      SUBJECTIVE / OVERNIGHT EVENTS:  no hematochezia  MEDICATIONS  (STANDING):  aspirin enteric coated 81 milliGRAM(s) Oral daily  atorvastatin 40 milliGRAM(s) Oral at bedtime  azithromycin  IVPB 500 milliGRAM(s) IV Intermittent every 24 hours  cefTRIAXone   IVPB 1000 milliGRAM(s) IV Intermittent every 24 hours  dextrose 5%. 1000 milliLiter(s) (50 mL/Hr) IV Continuous <Continuous>  dextrose 50% Injectable 12.5 Gram(s) IV Push once  dextrose 50% Injectable 25 Gram(s) IV Push once  dextrose 50% Injectable 25 Gram(s) IV Push once  hydrALAZINE 75 milliGRAM(s) Oral three times a day  insulin glargine Injectable (LANTUS) 5 Unit(s) SubCutaneous at bedtime  insulin lispro (HumaLOG) corrective regimen sliding scale   SubCutaneous three times a day before meals  insulin lispro (HumaLOG) corrective regimen sliding scale   SubCutaneous at bedtime  insulin lispro Injectable (HumaLOG) 2 Unit(s) SubCutaneous three times a day before meals  isosorbide   mononitrate ER Tablet (IMDUR) 60 milliGRAM(s) Oral daily  metoprolol succinate ER 75 milliGRAM(s) Oral daily  sodium chloride 0.9% lock flush 3 milliLiter(s) IV Push every 8 hours    MEDICATIONS  (PRN):  dextrose 40% Gel 15 Gram(s) Oral once PRN Blood Glucose LESS THAN 70 milliGRAM(s)/deciliter  glucagon  Injectable 1 milliGRAM(s) IntraMuscular once PRN Glucose LESS THAN 70 milligrams/deciliter              PHYSICAL EXAM:  GENERAL: NAD, well-developed  HEAD:  Atraumatic, Normocephalic  EYES: EOMI, PERRLA, conjunctiva and sclera anicteric  NECK: Supple, No JVD  CHEST/LUNG: Clear to auscultation bilaterally; No wheeze  HEART: Regular rate and rhythm; No murmurs, rubs, or gallops  ABDOMEN: Soft, Nontender, Nondistended; Bowel sounds present, no hepatosplenomegaly, no rebound or guarding  EXTREMITIES:  2+ Peripheral Pulses, No clubbing, cyanosis, or edema  PSYCH: AAOx3  NEUROLOGY: non-focal, no asterixis  SKIN: No rashes or lesion    LABS:                        8.3    13.07 )-----------( 266      ( 15 Jul 2019 07:20 )             26.5     07-15    139  |  102  |  79<H>  ----------------------------<  104<H>  3.3<L>   |  22  |  1.74<H>    Ca    10.1      15 Jul 2019 07:20  Phos  4.4     07-15  Mg     2.1     07-15                  RADIOLOGY & ADDITIONAL TESTS:

## 2019-07-15 NOTE — CHART NOTE - NSCHARTNOTEFT_GEN_A_CORE
Called patient's son  I recommended ERCP/colonoscopy  risks of sepsis/cholangitis from retained stent explained  Patient and family wish to deliberate further

## 2019-07-15 NOTE — PROGRESS NOTE ADULT - ASSESSMENT
Impression:    1. Hematochezia: ddx includes radiation proctopathy, hemorrhoids, diverticular bleeding, colorectal cancer in the setting of likely supratherapeutic eliquis levels which were dosed in the setting of CLARI. Prior     2. Retained biliary stent likely the prior stent placed in 2015. no signs of cholangitis, LFTs mildly abnormal.    3. Acute on chronic CHF and pneumonia    4. CLARI on CKD    Recommendation:   -will notify today if planned for colon tomorrow  -patient will likely need ERCP for removal of biliary stent  -trend LFTs  -consider a careful blood transfusion with possible diuretic

## 2019-07-16 ENCOUNTER — TRANSCRIPTION ENCOUNTER (OUTPATIENT)
Age: 84
End: 2019-07-16

## 2019-07-16 ENCOUNTER — INBOUND DOCUMENT (OUTPATIENT)
Age: 84
End: 2019-07-16

## 2019-07-16 VITALS
HEART RATE: 60 BPM | DIASTOLIC BLOOD PRESSURE: 40 MMHG | RESPIRATION RATE: 16 BRPM | OXYGEN SATURATION: 99 % | SYSTOLIC BLOOD PRESSURE: 127 MMHG | TEMPERATURE: 98 F

## 2019-07-16 LAB
ANION GAP SERPL CALC-SCNC: 14 MMO/L — SIGNIFICANT CHANGE UP (ref 7–14)
BASOPHILS # BLD AUTO: 0.04 K/UL — SIGNIFICANT CHANGE UP (ref 0–0.2)
BASOPHILS NFR BLD AUTO: 0.3 % — SIGNIFICANT CHANGE UP (ref 0–2)
BUN SERPL-MCNC: 70 MG/DL — HIGH (ref 7–23)
CALCIUM SERPL-MCNC: 9.8 MG/DL — SIGNIFICANT CHANGE UP (ref 8.4–10.5)
CHLORIDE SERPL-SCNC: 101 MMOL/L — SIGNIFICANT CHANGE UP (ref 98–107)
CO2 SERPL-SCNC: 24 MMOL/L — SIGNIFICANT CHANGE UP (ref 22–31)
CREAT SERPL-MCNC: 1.79 MG/DL — HIGH (ref 0.5–1.3)
EOSINOPHIL # BLD AUTO: 0.17 K/UL — SIGNIFICANT CHANGE UP (ref 0–0.5)
EOSINOPHIL NFR BLD AUTO: 1.4 % — SIGNIFICANT CHANGE UP (ref 0–6)
FERRITIN SERPL-MCNC: 173.6 NG/ML — HIGH (ref 15–150)
FOLATE SERPL-MCNC: > 20 NG/ML — HIGH (ref 4.7–20)
GLUCOSE SERPL-MCNC: 157 MG/DL — HIGH (ref 70–99)
HCT VFR BLD CALC: 25.5 % — LOW (ref 34.5–45)
HGB BLD-MCNC: 7.8 G/DL — LOW (ref 11.5–15.5)
IMM GRANULOCYTES NFR BLD AUTO: 0.7 % — SIGNIFICANT CHANGE UP (ref 0–1.5)
IRON SATN MFR SERPL: 264 UG/DL — SIGNIFICANT CHANGE UP (ref 140–530)
IRON SATN MFR SERPL: 29 UG/DL — LOW (ref 30–160)
LYMPHOCYTES # BLD AUTO: 1.71 K/UL — SIGNIFICANT CHANGE UP (ref 1–3.3)
LYMPHOCYTES # BLD AUTO: 13.9 % — SIGNIFICANT CHANGE UP (ref 13–44)
MAGNESIUM SERPL-MCNC: 2 MG/DL — SIGNIFICANT CHANGE UP (ref 1.6–2.6)
MCHC RBC-ENTMCNC: 27.2 PG — SIGNIFICANT CHANGE UP (ref 27–34)
MCHC RBC-ENTMCNC: 30.6 % — LOW (ref 32–36)
MCV RBC AUTO: 88.9 FL — SIGNIFICANT CHANGE UP (ref 80–100)
MONOCYTES # BLD AUTO: 1.53 K/UL — HIGH (ref 0–0.9)
MONOCYTES NFR BLD AUTO: 12.4 % — SIGNIFICANT CHANGE UP (ref 2–14)
NEUTROPHILS # BLD AUTO: 8.78 K/UL — HIGH (ref 1.8–7.4)
NEUTROPHILS NFR BLD AUTO: 71.3 % — SIGNIFICANT CHANGE UP (ref 43–77)
NRBC # FLD: 0.06 K/UL — SIGNIFICANT CHANGE UP (ref 0–0)
PHOSPHATE SERPL-MCNC: 3.6 MG/DL — SIGNIFICANT CHANGE UP (ref 2.5–4.5)
PLATELET # BLD AUTO: 278 K/UL — SIGNIFICANT CHANGE UP (ref 150–400)
PMV BLD: 10.4 FL — SIGNIFICANT CHANGE UP (ref 7–13)
POTASSIUM SERPL-MCNC: 3.9 MMOL/L — SIGNIFICANT CHANGE UP (ref 3.5–5.3)
POTASSIUM SERPL-SCNC: 3.9 MMOL/L — SIGNIFICANT CHANGE UP (ref 3.5–5.3)
PROT SERPL-MCNC: 6.5 G/DL — SIGNIFICANT CHANGE UP (ref 6–8.3)
RBC # BLD: 2.87 M/UL — LOW (ref 3.8–5.2)
RBC # FLD: 18 % — HIGH (ref 10.3–14.5)
SODIUM SERPL-SCNC: 139 MMOL/L — SIGNIFICANT CHANGE UP (ref 135–145)
UIBC SERPL-MCNC: 234.7 UG/DL — SIGNIFICANT CHANGE UP (ref 110–370)
VIT B12 SERPL-MCNC: 1492 PG/ML — HIGH (ref 200–900)
WBC # BLD: 12.32 K/UL — HIGH (ref 3.8–10.5)
WBC # FLD AUTO: 12.32 K/UL — HIGH (ref 3.8–10.5)

## 2019-07-16 PROCEDURE — 99239 HOSP IP/OBS DSCHRG MGMT >30: CPT

## 2019-07-16 PROCEDURE — 99232 SBSQ HOSP IP/OBS MODERATE 35: CPT | Mod: GC

## 2019-07-16 PROCEDURE — 99233 SBSQ HOSP IP/OBS HIGH 50: CPT

## 2019-07-16 PROCEDURE — 84165 PROTEIN E-PHORESIS SERUM: CPT | Mod: 26

## 2019-07-16 RX ORDER — METOPROLOL TARTRATE 50 MG
3 TABLET ORAL
Qty: 90 | Refills: 0
Start: 2019-07-16 | End: 2019-08-14

## 2019-07-16 RX ORDER — APIXABAN 2.5 MG/1
1 TABLET, FILM COATED ORAL
Qty: 0 | Refills: 0 | DISCHARGE

## 2019-07-16 RX ORDER — HYDRALAZINE HCL 50 MG
1 TABLET ORAL
Qty: 0 | Refills: 0 | DISCHARGE

## 2019-07-16 RX ORDER — ASPIRIN/CALCIUM CARB/MAGNESIUM 324 MG
1 TABLET ORAL
Qty: 0 | Refills: 0 | DISCHARGE

## 2019-07-16 RX ORDER — HYDRALAZINE HCL 50 MG
1 TABLET ORAL
Qty: 90 | Refills: 0
Start: 2019-07-16 | End: 2019-08-14

## 2019-07-16 RX ADMIN — Medication 100 MILLIGRAM(S): at 13:54

## 2019-07-16 RX ADMIN — ISOSORBIDE MONONITRATE 60 MILLIGRAM(S): 60 TABLET, EXTENDED RELEASE ORAL at 13:24

## 2019-07-16 RX ADMIN — CEFTRIAXONE 100 MILLIGRAM(S): 500 INJECTION, POWDER, FOR SOLUTION INTRAMUSCULAR; INTRAVENOUS at 13:54

## 2019-07-16 RX ADMIN — Medication 2 UNIT(S): at 18:07

## 2019-07-16 RX ADMIN — SODIUM CHLORIDE 3 MILLILITER(S): 9 INJECTION INTRAMUSCULAR; INTRAVENOUS; SUBCUTANEOUS at 06:15

## 2019-07-16 RX ADMIN — Medication 2 UNIT(S): at 13:24

## 2019-07-16 RX ADMIN — Medication 2: at 18:06

## 2019-07-16 RX ADMIN — Medication 2: at 09:05

## 2019-07-16 RX ADMIN — Medication 100 MILLIGRAM(S): at 06:14

## 2019-07-16 RX ADMIN — Medication 40 MILLIGRAM(S): at 06:14

## 2019-07-16 RX ADMIN — Medication 2 UNIT(S): at 09:05

## 2019-07-16 RX ADMIN — SODIUM CHLORIDE 3 MILLILITER(S): 9 INJECTION INTRAMUSCULAR; INTRAVENOUS; SUBCUTANEOUS at 13:25

## 2019-07-16 RX ADMIN — Medication 75 MILLIGRAM(S): at 06:13

## 2019-07-16 RX ADMIN — Medication 6: at 13:23

## 2019-07-16 NOTE — DISCHARGE NOTE NURSING/CASE MANAGEMENT/SOCIAL WORK - NSDCVIVACCINE_GEN_ALL_CORE_FT
Influenza , 2017/9/25 16:40 , Terry Winn (RN)  Influenza , 2018/10/11 12:50 , She Maya (RN)  Tdap , 2018/10/7 17:32 , Sosa Walton (RN)

## 2019-07-16 NOTE — DISCHARGE NOTE PROVIDER - NSDCFUADDAPPT_GEN_ALL_CORE_FT
Follow up with PCP within 1-2 weeks of discharge.   Follow up with nephrology within 1-2 weeks of discharge.   Follow up with gastroenterology within 1-2 weeks of discharge.   Follow up with cardiology within 1-2 weeks of discharge.   Follow up with heart failure team within 1-2 weeks of discharge.

## 2019-07-16 NOTE — PROGRESS NOTE ADULT - PROBLEM SELECTOR PLAN 3
-RT arm pain at site of cath   -check RT UE r/o pseudoaneurysm
Now improved s/p PRBC.   She is having tenderness over right arm. B/c of recent cardiac cath, recommend US to r/o pseudoaneurysm, bleed. D/w lex ENRIQEU and Dr. Toledo.
PRBC transfusion per primary team. Monitor CBC
Despite relative lack of infectious symptoms, CT demonstrates bibasilar consolidations. In the context of leukocytosis and left shift, favor CAP treatment. No recent hospitalizations or obvious immune compromise (Hgb A1c has been at/near goal in recent months). Will continue with ceftriaxone + azithromycin. BCx remain NGTD  anticipate 10 day tx with transition to oral Levaquin on discharge
Despite relative lack of infectious symptoms, CT demonstrates bibasilar consolidations. In the context of leukocytosis and left shift, favor CAP treatment. No recent hospitalizations or obvious immune compromise (Hgb A1c has been at/near goal in recent months). Will continue with ceftriaxone + azithromycin. BCx remain NGTD  anticipate 7 day tx with transition to oral Levaquin in discharge
Despite relative lack of infectious symptoms, CT demonstrates bibasilar consolidations. In the context of leukocytosis and left shift, favor CAP treatment. No recent hospitalizations or obvious immune compromise (Hgb A1c has been at/near goal in recent months). Will continue with ceftriaxone + azithromycin. BCx remain NGTD  anticipate 7 day tx with transition to oral Levaquin on discharge
Despite relative lack of infectious symptoms, CT demonstrates bibasilar consolidations. In the context of leukocytosis and left shift, favor CAP treatment. No recent hospitalizations or obvious immune compromise (Hgb A1c has been at/near goal in recent months). Will continue with ceftriaxone + azithromycin. BCx remain NGTD  anticipate 7 day tx with transition to oral Levaquin on discharge - day 4
Despite relative lack of infectious symptoms, CT demonstrates bibasilar consolidations. In the context of leukocytosis and left shift, favor CAP treatment. No recent hospitalizations or obvious immune compromise (Hgb A1c has been at/near goal in recent months). Will continue with ceftriaxone + azithromycin. BCx remain NGTD  anticipate 7 day tx with transition to oral Levaquin on discharge - day 5
Despite relative lack of infectious symptoms, CT demonstrates bibasilar consolidations. In the context of leukocytosis and left shift, favor CAP treatment. No recent hospitalizations or obvious immune compromise (Hgb A1c has been at/near goal in recent months). Will continue with ceftriaxone + azithromycin. BCx remain NGTD  anticipate 7 day tx with transition to oral Levaquin on discharge - day 6
Despite relative lack of infectious symptoms, CT demonstrates bibasilar consolidations. In the context of leukocytosis and left shift, favor CAP treatment. No recent hospitalizations or obvious immune compromise (Hgb A1c has been at/near goal in recent months). Will continue with ceftriaxone + azithromycin. BCx remain NGTD  anticipate 7 day tx with transition to oral Levaquin on discharge - day 7
PRBC transfusion per primary team. Monitor CBC
PRBC transfusion per primary team.  Monitor CBC.  lasix with PRBc as above.
Suspect not a clinically significant amount of bleeding given quick resolution, stable hgb, and prior similar episode on apixaban in the context of reassuring colonoscopy. occult neg, Monitor CBC daily. If further BRBPR, or worsening anemia consider d/c'ing apixaban and GI eval

## 2019-07-16 NOTE — PROGRESS NOTE ADULT - PROBLEM SELECTOR PLAN 4
History does not suggest a clear precipitant, but diarrhea has been relatively small volume. now resolved  if reoccurs will work up
s/p RHC  mod AS - out-pt follow up  daily weight  HF appreciated diuretics restart torsemide/hydralazine
-RT arm pain at site of cath improved  -RT UE no pseudoaneurysm
Suspect not a clinically significant amount of bleeding given quick resolution, stable hgb, and prior similar episode on apixaban in the context of reassuring colonoscopy. occult neg, Monitor CBC daily. If further BRBPR, or worsening anemia consider d/c'ing apixaban and GI eval
Suspect not a clinically significant amount of bleeding given quick resolution, stable hgb, and prior similar episode on apixaban in the context of reassuring colonoscopy. occult neg, Monitor CBC daily. If further BRBPR, or worsening anemia consider d/cing apixaban and GI eval
with acute blood loss anemia  s/p transfusion of 1 unit in divided doses with 1 time Lasix in between. [facility], possible colonoscopy, ? ERCP for stent removal  cards for risk stratification  I DW pt today, she is not eager for colonoscopy - will await GI input on whether should resume AC if refuses colonoscopy and source of bleed remains undetermined.
with acute blood loss anemia  will transfuse 1 unit in divided doses with 1 time Lasix in between. [facility], possible colonoscopy, cards on risk stratification, ? ERCP for stent removal

## 2019-07-16 NOTE — PROGRESS NOTE ADULT - SUBJECTIVE AND OBJECTIVE BOX
[facility] DIVISION OF KIDNEY DISEASES AND HYPERTENSION -- FOLLOW UP NOTE  --------------------------------------------------------------------------------  HPI: 84-year-old female with PMH of T2DM, HFrEF (moderate reduced EF), HLD, HTN (over 30 years), AFib, presented to the ER complaining of SOB. Patient was admitted for CHF exacerbation on 7/7/2019. Patient was started on IV Lasix for diuresis. Nephrology consulted for CLARI. On admission, Scr was 1.37 (7/7/2019) and increased to 5.87 on 7/11/2019. Diuretic therapy subsequently held and Scr downtrended to 1.74 yesterday. Pt. restarted on Torsemide yesterday, Scr remains stable at 1.79.    Patient evaluated at bedside, sitting up in bed. Denies any new complaints at this time.     PAST HISTORY  --------------------------------------------------------------------------------  No significant changes to PMH, PSH, FHx, SHx, unless otherwise noted    ALLERGIES & MEDICATIONS  --------------------------------------------------------------------------------  Allergies    No Known Allergies    Intolerances      Standing Inpatient Medications  atorvastatin 40 milliGRAM(s) Oral at bedtime  azithromycin  IVPB 500 milliGRAM(s) IV Intermittent every 24 hours  cefTRIAXone   IVPB 1000 milliGRAM(s) IV Intermittent every 24 hours  dextrose 5%. 1000 milliLiter(s) IV Continuous <Continuous>  dextrose 50% Injectable 12.5 Gram(s) IV Push once  dextrose 50% Injectable 25 Gram(s) IV Push once  dextrose 50% Injectable 25 Gram(s) IV Push once  hydrALAZINE 100 milliGRAM(s) Oral three times a day  insulin glargine Injectable (LANTUS) 5 Unit(s) SubCutaneous at bedtime  insulin lispro (HumaLOG) corrective regimen sliding scale   SubCutaneous three times a day before meals  insulin lispro (HumaLOG) corrective regimen sliding scale   SubCutaneous at bedtime  insulin lispro Injectable (HumaLOG) 2 Unit(s) SubCutaneous three times a day before meals  isosorbide   mononitrate ER Tablet (IMDUR) 60 milliGRAM(s) Oral daily  metoprolol succinate ER 75 milliGRAM(s) Oral daily  sodium chloride 0.9% lock flush 3 milliLiter(s) IV Push every 8 hours  torsemide 40 milliGRAM(s) Oral daily    REVIEW OF SYSTEMS  --------------------------------------------------------------------------------  Gen: No fevers/chills  Skin: No rash  Respiratory: No dyspnea, cough  CV: No chest pain  GI: No abdominal pain, diarrhea  : No dysuria, hematuria    All other systems were reviewed and are negative, except as noted.    VITALS/PHYSICAL EXAM  --------------------------------------------------------------------------------  T(C): 36.7 (07-16-19 @ 06:12), Max: 36.7 (07-15-19 @ 12:42)  HR: 60 (07-16-19 @ 06:12) (60 - 62)  BP: 143/53 (07-16-19 @ 06:12) (126/45 - 145/46)  RR: 16 (07-16-19 @ 06:12) (16 - 17)  SpO2: 97% (07-16-19 @ 06:12) (97% - 100%)  Wt(kg): --    07-15-19 @ 07:01  -  07-16-19 @ 07:00  --------------------------------------------------------  IN: 750 mL / OUT: 310 mL / NET: 440 mL    Physical Exam:  	Gen: NAD, resting   	HEENT: No JVD  	Pulm: CTA B/L  	CV: S1S2+  	Abd: Soft, +BS   	Ext: no LE edema   	Neuro: Awake  	Skin: Warm and dry    LABS/STUDIES  --------------------------------------------------------------------------------              7.8    12.32 >-----------<  278      [07-16-19 @ 05:28]              25.5     139  |  101  |  70  ----------------------------<  157      [07-16-19 @ 05:28]  3.9   |  24  |  1.79        Ca     9.8     [07-16-19 @ 05:28]      Mg     2.0     [07-16-19 @ 05:28]      Phos  3.6     [07-16-19 @ 05:28]    TPro  6.5  /  Alb  x   /  TBili  x   /  DBili  x   /  AST  x   /  ALT  x   /  AlkPhos  x   [07-16-19 @ 05:28]    Creatinine Trend:  SCr 1.79 [07-16 @ 05:28]  SCr 1.74 [07-15 @ 07:20]  SCr 2.40 [07-14 @ 06:50]  SCr 3.36 [07-13 @ 06:10]  SCr 4.86 [07-12 @ 08:26] [facility] DIVISION OF KIDNEY DISEASES AND HYPERTENSION -- FOLLOW UP NOTE  --------------------------------------------------------------------------------  HPI: 84-year-old female with PMH of T2DM, HFrEF (moderate reduced EF), HLD, HTN (over 30 years), AFib, presented to the ER complaining of SOB. Patient was admitted for CHF exacerbation on 7/7/2019. Patient was started on IV Lasix for diuresis. Nephrology consulted for CLARI. On admission, Scr was 1.37 (7/7/2019) and increased to 5.87 on 7/11/2019. Diuretic therapy subsequently held and Scr downtrended to 1.74 yesterday. Pt. restarted on Torsemide yesterday, Scr remains stable at 1.79 today (7/16/19).    Patient evaluated at bedside, sitting up in bed. Denies any new complaints at this time.     PAST HISTORY  --------------------------------------------------------------------------------  No significant changes to PMH, PSH, FHx, SHx, unless otherwise noted    ALLERGIES & MEDICATIONS  --------------------------------------------------------------------------------  Allergies    No Known Allergies    Intolerances    Standing Inpatient Medications  atorvastatin 40 milliGRAM(s) Oral at bedtime  azithromycin  IVPB 500 milliGRAM(s) IV Intermittent every 24 hours  cefTRIAXone   IVPB 1000 milliGRAM(s) IV Intermittent every 24 hours  dextrose 5%. 1000 milliLiter(s) IV Continuous <Continuous>  dextrose 50% Injectable 12.5 Gram(s) IV Push once  dextrose 50% Injectable 25 Gram(s) IV Push once  dextrose 50% Injectable 25 Gram(s) IV Push once  hydrALAZINE 100 milliGRAM(s) Oral three times a day  insulin glargine Injectable (LANTUS) 5 Unit(s) SubCutaneous at bedtime  insulin lispro (HumaLOG) corrective regimen sliding scale   SubCutaneous three times a day before meals  insulin lispro (HumaLOG) corrective regimen sliding scale   SubCutaneous at bedtime  insulin lispro Injectable (HumaLOG) 2 Unit(s) SubCutaneous three times a day before meals  isosorbide   mononitrate ER Tablet (IMDUR) 60 milliGRAM(s) Oral daily  metoprolol succinate ER 75 milliGRAM(s) Oral daily  sodium chloride 0.9% lock flush 3 milliLiter(s) IV Push every 8 hours  torsemide 40 milliGRAM(s) Oral daily    REVIEW OF SYSTEMS  --------------------------------------------------------------------------------  Gen: No fevers/chills  Skin: No rash  Respiratory: No dyspnea, cough  CV: No chest pain  GI: No abdominal pain, diarrhea  : No dysuria, hematuria    All other systems were reviewed and are negative, except as noted.    VITALS/PHYSICAL EXAM  --------------------------------------------------------------------------------  T(C): 36.7 (07-16-19 @ 06:12), Max: 36.7 (07-15-19 @ 12:42)  HR: 60 (07-16-19 @ 06:12) (60 - 62)  BP: 143/53 (07-16-19 @ 06:12) (126/45 - 145/46)  RR: 16 (07-16-19 @ 06:12) (16 - 17)  SpO2: 97% (07-16-19 @ 06:12) (97% - 100%)  Wt(kg): --    07-15-19 @ 07:01  -  07-16-19 @ 07:00  --------------------------------------------------------  IN: 750 mL / OUT: 310 mL / NET: 440 mL    Physical Exam:  	Gen: NAD, resting   	HEENT: No JVD  	Pulm: CTA B/L  	CV: S1S2+  	Abd: Soft, +BS   	Ext: no LE edema   	Neuro: Awake  	Skin: Warm and dry    LABS/STUDIES  --------------------------------------------------------------------------------              7.8    12.32 >-----------<  278      [07-16-19 @ 05:28]              25.5     139  |  101  |  70  ----------------------------<  157      [07-16-19 @ 05:28]  3.9   |  24  |  1.79        Ca     9.8     [07-16-19 @ 05:28]      Mg     2.0     [07-16-19 @ 05:28]      Phos  3.6     [07-16-19 @ 05:28]    TPro  6.5  /  Alb  x   /  TBili  x   /  DBili  x   /  AST  x   /  ALT  x   /  AlkPhos  x   [07-16-19 @ 05:28]    Creatinine Trend:  SCr 1.79 [07-16 @ 05:28]  SCr 1.74 [07-15 @ 07:20]  SCr 2.40 [07-14 @ 06:50]  SCr 3.36 [07-13 @ 06:10]  SCr 4.86 [07-12 @ 08:26]

## 2019-07-16 NOTE — PROGRESS NOTE ADULT - ASSESSMENT
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 Admitted for CHF exacerbation and initially required BiPAP. Cardiology consulted for management of HF.     #HFrEF  - RHC 7/12 with wedge 30, LVEDP mid 20s. Cr stable  - Continue hydralazine 100 mg TID, Imdur 60 mg daily, metoprolol succinate 75 mg qd  - Continue torsemide 40 mg daily  - Please check daily standing weights, 63 -> 63.9 kg  - Strict I/Os  - Monitor BMP daily, replete K and Mg PRN  - Check iron studies, UPEP/SPEP  - Patient's symptoms could be due to mod-severe MR. INDU would help us better assess the valve, will discuss with patient and family    #RUE swelling  - Consider vas duplex ultrasound    #AFib  - Continue BB  - Hold apixaban given CLARI  - Hold hep gtt in setting of GI bleed, appreciate GI recs    #GI bleed  - Hgb 8 -> 6.8 -> 8.3 ->7.8. She denies bleeding since Friday  - Monitor respiratory status closely with transfusions.   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 Admitted for CHF exacerbation and initially required BiPAP. Cardiology consulted for management of HF.     #HFrEF  - RHC 7/12 with wedge 30, LVEDP mid 20s. Cr stable  - Continue hydralazine 100 mg TID, Imdur 60 mg daily, metoprolol succinate 75 mg qd  - Continue torsemide 40 mg daily  - Please check daily standing weights, 63 -> 63.9 kg  - Strict I/Os  - Monitor BMP daily, replete K and Mg PRN  - Check iron studies, UPEP/SPEP  - Patient's symptoms could be due to mod-severe MR. INDU would help us better assess the valve, will discuss with patient and family    #RUE swelling  - Consider vas duplex ultrasound    #AFib  - Continue BB  - Hold apixaban given CLARI  - Hold hep gtt in setting of GI bleed, appreciate GI recs    #GI bleed  - Hgb 8 -> 6.8 -> 8.3 ->7.8. She denies bleeding since Friday  - Monitor respiratory status closely with transfusions.   - Given AFib, patient warrants therapeutic a/c when okay from GI perspective    ***Addendum***  Recommend further work-up, including INDU, to assess MR, but patient is refusing.     If she requests discharge today, she should f/u with Dr. Luis Mccarthy on 8/1 on 14:30.     BOB Morales MD  Cardiology Fellow  [id number]  All cardiology service information may be found 24/7 on [email or web address], password: [email or web address]

## 2019-07-16 NOTE — PROGRESS NOTE ADULT - PROBLEM SELECTOR PROBLEM 1
Acute on chronic systolic heart failure
Acute kidney injury superimposed on CKD
Acute on chronic systolic (congestive) heart failure
Acute on chronic systolic heart failure
CLARI (acute kidney injury)
Acute kidney injury superimposed on CKD
Acute on chronic systolic (congestive) heart failure
CLARI (acute kidney injury)

## 2019-07-16 NOTE — PROGRESS NOTE ADULT - PROBLEM SELECTOR PROBLEM 2
BRBPR (bright red blood per rectum)
Aortic stenosis
Hypervolemia
Acute on chronic systolic (congestive) heart failure
Aortic stenosis
BRBPR (bright red blood per rectum)
Hypervolemia
Hypervolemia
Pneumonia of both lower lobes due to infectious organism

## 2019-07-16 NOTE — PROGRESS NOTE ADULT - PROBLEM SELECTOR PLAN 9
IMPROVE = 2 (age + immobility). Pt therapeutically anticoagulated on apixaban.
better controlled this morning   cw w insulin and will adjust accordingly
Continue statin.
better controlled this morning   cw w insulin and will adjust accordingly

## 2019-07-16 NOTE — PROGRESS NOTE ADULT - ASSESSMENT
Impression:    1. Hematochezia: ddx includes radiation proctopathy, hemorrhoids, diverticular bleeding, colorectal cancer , no active bleeding. Patient declining endoscopic evaluation. H/H stable    2. Retained biliary stent likely the prior stent placed in 2015. no signs of cholangitis, LFTs mildly abnormal. Patient refusing stent removal. Discussed w her son.    3. Acute on chronic CHF and pneumonia    4. CLARI on CKD    Recommendation:   -risks of not removing biliary stent including life threatening cholangitis explained to patient and her son Jason Thomas who understand the risks but decline to proceed with the procedure  -risks of recurrent GI bleeding without endoscopic evaluation also explained, they declined  -anticoagulation per primary team (risks vs. benefits, no absolute GI contraindication)

## 2019-07-16 NOTE — DISCHARGE NOTE PROVIDER - CARE PROVIDERS DIRECT ADDRESSES
[email or web address]

## 2019-07-16 NOTE — PROGRESS NOTE ADULT - ATTENDING COMMENTS
personally saw and examined patient today  agree with above assessment and plan  pt states she refused endoscopic evaluation by GI  denies sob, without edema, however with bibasilar crackles  cont hydralazine/imdur, toprol, and torsemide  monitor I and os  replete lytes as required  pt has been refusing INDU, but states she will think about it  will follow

## 2019-07-16 NOTE — PROGRESS NOTE ADULT - PROBLEM SELECTOR PROBLEM 7
Type 2 diabetes mellitus with complication, unspecified whether long term insulin use
Atrial fibrillation, unspecified type
CLARI (acute kidney injury)
Essential hypertension

## 2019-07-16 NOTE — PROGRESS NOTE ADULT - PROBLEM SELECTOR PLAN 5
Rate-controlled currently. S/p ppm. Continue apixaban as above.
Despite relative lack of infectious symptoms, CT demonstrates bibasilar consolidations. In the context of leukocytosis and left shift, favor CAP treatment. No recent hospitalizations or obvious immune compromise (Hgb A1c has been at/near goal in recent months). Will continue with ceftriaxone + azithromycin. BCx remain NGTD  anticipate 10 day tx with transition to oral Levaquin on discharge
History does not suggest a clear precipitant, but diarrhea has been relatively small volume. now resolved  if reoccurs will work up

## 2019-07-16 NOTE — PROGRESS NOTE ADULT - SUBJECTIVE AND OBJECTIVE BOX
Patient is a 84y old  Female who presents with a chief complaint of dyspnea (16 Jul 2019 10:22)      SUBJECTIVE / OVERNIGHT EVENTS:  no fevers or hematochezia  MEDICATIONS  (STANDING):  atorvastatin 40 milliGRAM(s) Oral at bedtime  azithromycin  IVPB 500 milliGRAM(s) IV Intermittent every 24 hours  cefTRIAXone   IVPB 1000 milliGRAM(s) IV Intermittent every 24 hours  dextrose 5%. 1000 milliLiter(s) (50 mL/Hr) IV Continuous <Continuous>  dextrose 50% Injectable 12.5 Gram(s) IV Push once  dextrose 50% Injectable 25 Gram(s) IV Push once  dextrose 50% Injectable 25 Gram(s) IV Push once  hydrALAZINE 100 milliGRAM(s) Oral three times a day  insulin glargine Injectable (LANTUS) 5 Unit(s) SubCutaneous at bedtime  insulin lispro (HumaLOG) corrective regimen sliding scale   SubCutaneous three times a day before meals  insulin lispro (HumaLOG) corrective regimen sliding scale   SubCutaneous at bedtime  insulin lispro Injectable (HumaLOG) 2 Unit(s) SubCutaneous three times a day before meals  isosorbide   mononitrate ER Tablet (IMDUR) 60 milliGRAM(s) Oral daily  metoprolol succinate ER 75 milliGRAM(s) Oral daily  sodium chloride 0.9% lock flush 3 milliLiter(s) IV Push every 8 hours  torsemide 40 milliGRAM(s) Oral daily    MEDICATIONS  (PRN):  dextrose 40% Gel 15 Gram(s) Oral once PRN Blood Glucose LESS THAN 70 milliGRAM(s)/deciliter  glucagon  Injectable 1 milliGRAM(s) IntraMuscular once PRN Glucose LESS THAN 70 milligrams/deciliter              PHYSICAL EXAM:  GENERAL: NAD, well-developed  HEAD:  Atraumatic, Normocephalic  EYES: EOMI, PERRLA, conjunctiva and sclera anicteric  NECK: Supple, No JVD  CHEST/LUNG: Clear to auscultation bilaterally; No wheeze  HEART: + murmur  ABDOMEN: Soft, Nontender, Nondistended; Bowel sounds present, no hepatosplenomegaly, no rebound or guarding  EXTREMITIES:  2+ Peripheral Pulses, No clubbing, cyanosis, or edema  PSYCH: AAOx3  NEUROLOGY: non-focal, no asterixis  SKIN: No rashes or lesion    LABS:                        7.8    12.32 )-----------( 278      ( 16 Jul 2019 05:28 )             25.5     07-16    139  |  101  |  70<H>  ----------------------------<  157<H>  3.9   |  24  |  1.79<H>    Ca    9.8      16 Jul 2019 05:28  Phos  3.6     07-16  Mg     2.0     07-16    TPro  6.5  /  Alb  x   /  TBili  x   /  DBili  x   /  AST  x   /  ALT  x   /  AlkPhos  x   07-16    LIVER FUNCTIONS - ( 16 Jul 2019 05:28 )  Alb: x     / Pro: 6.5 g/dL / ALK PHOS: x     / ALT: x     / AST: x     / GGT: x                     RADIOLOGY & ADDITIONAL TESTS:

## 2019-07-16 NOTE — PROGRESS NOTE ADULT - PROBLEM SELECTOR PROBLEM 8
Hyperlipidemia, unspecified hyperlipidemia type
Essential hypertension
Essential hypertension
Type 2 diabetes mellitus with complication, unspecified whether long term insulin use

## 2019-07-16 NOTE — PROGRESS NOTE ADULT - PROBLEM SELECTOR PLAN 1
Pt. with CLARI on CKD in the setting of CHF exacerbation. On admission, Scr was 1.37 (7/7/2019), worsened to 5.87 on 7/11/19. Lasix held since 7/10/2019. Scr decreased to 1.74 on 7/15/2019. Torsemide restarted as per Cardiology, today Scr remains stable at 1.79. CLARI likely hemodynamically related in the setting of intravenous contrast, heart failure, and diuresis. Pt. non-oliguric at present. Monitor labs and urine output. Avoid any potential nephrotoxins Pt. with CLARI on CKD in the setting of CHF exacerbation. On admission, Scr was 1.37 (7/7/2019), worsened to 5.87 on 7/11/19. Lasix held since 7/10/2019. Scr decreased to 1.74 on 7/15/2019. Torsemide restarted as per Cardiology, today Scr remains stable at 1.79 today (7/16/19). CLARI likely hemodynamically related in the setting of intravenous contrast, heart failure, and diuresis. Pt. non-oliguric at present. Monitor labs and urine output. Avoid any potential nephrotoxins    Will discontinue follow-up. Reconsult as needed.

## 2019-07-16 NOTE — DISCHARGE NOTE PROVIDER - HOSPITAL COURSE
85 y/o female with HTN, HLD, DM2, chronic systolic HF, afib on apixaban, prior uterine cancer s/p hysterectomy who presented with dyspnea concerning for acute on chronic systolic HF. Also presented with loose stools that later resolved, complicated by BRBPR and CLARI.          Problem/Plan - 1:    ·  Problem: Acute on chronic systolic (congestive) heart failure.  Plan: s/p RHC    mod AS - out-pt follow up    daily weight    HF appreciated diuretics restart torsemide/hydralazine.          Problem/Plan - 2:    ·  Problem: BRBPR (bright red blood per rectum).  Plan: with acute blood loss anemia    s/p transfusion of 1 unit in divided doses with 1 time Lasix in between.     GI discussed with pt in regards to eval of Bleed and risk of cholangitis if not removing billary stent pt and family still refusing    -Will not restart AC as pt still source of GIB unevaluated can consider AC as outpt.          Problem/Plan - 3:    ·  Problem: Pneumonia of both lower lobes due to infectious organism.  Plan: Despite relative lack of infectious symptoms, CT demonstrates bibasilar consolidations. In the context of leukocytosis and left shift, favor CAP treatment. No recent hospitalizations or obvious immune compromise (Hgb A1c has been at/near goal in recent months). Will continue with ceftriaxone + azithromycin. BCx remain NGTD    anticipate 10 day tx with transition to oral Levaquin on discharge.          Problem/Plan - 4:    ·  Problem: Pain.  Plan: -RT arm pain at site of cath improved    -RT UE no pseudoaneurysm.          Problem/Plan - 5:    ·  Problem: Diarrhea.  Plan: History does not suggest a clear precipitant, but diarrhea has been relatively small volume. now resolved    if reoccurs will work up.          Problem/Plan - 6:    Problem: Atrial fibrillation, unspecified type. Plan: Rate-controlled currently. S/p ppm.         Problem/Plan - 7:    ·  Problem: CLARI (acute kidney injury).  Plan: on CKD 3 - resolving     suspect due to diuresis vs NORBERT, sono neg for retention, dw renal, no need for further w/u     Lasix, Eliquis on hold    renally dose meds, monitor BMP    Cr trending down.          Problem/Plan - 8:    ·  Problem: Essential hypertension.  Plan: cw home regimen, controlled for age.          Problem/Plan - 9:    ·  Problem: Type 2 diabetes mellitus with complication, unspecified whether long term insulin use.  Plan: better controlled this morning     cw w insulin and will adjust accordingly.          Problem/Plan - 10:    Problem: Need for prophylactic measure. Plan; held due to GIB    D/w daughter Fang over telephone. 85 y/o female with HTN, HLD, DM2, chronic systolic HF, afib on apixaban, prior uterine cancer s/p hysterectomy who presented with dyspnea concerning for acute on chronic systolic HF. Also presented with loose stools that later resolved, complicated by BRBPR and CLARI.         1. Acute on chronic systolic (congestive) heart failure: s/p RHC on 7/12 with wedge 30, LVEDP mid 20s. Cr stable. To continue Toresemide, Hydralazine, Imdur and Metoprolol. Symptoms could be due to moderate to severe MR. INDU would better assess the valve, however, patient and family adamantly refusing INDU as well as NST. Patient may follow up with Dr. Luis Mccarthy on 8/1 at 2:30pm.     2. Afib: Continue beta blocker. Hold apixaban in setting of GI bleed. Currently rate controlled.      3. GI bleed: Patient without active bleeding. She is s/p 1 unit of PRBC. Refused ERCP/Colonoscopy evaluation by GI team (discussed with patient the risk of cholangitis if biliary stent is not removed). Anticoagulation may be evaluated as outpatient, given that source of GIB is still unevaluated.     4. PNA: CT shows bibasilar consolidation and given leukocytosis, decision was made to treat pneumonia. Treated with Ceftriaxone and Azithromycin while admitted. Will switch to Levaquin upon discharge. No growth to date on BCx.     5. Pain and swelling to right arm: US without thrombus or pseudoaneurysm.     6. CLARI on CKD 3: Resolving, thought to be due to diuresis vs NORBERT. Sonogram negative for retention. Seen by renal team, and no further work up. Hold Lasix and Eliquis. Torsemide restarted.     7. HTN: Continue home regimen.     8. DM2: Continue home regimen.         Patient seen and evaluated, no acute somatic complaints. Medically cleared/stable for discharge as per Dr. Porter with close outpatient follow up within 1-2 weeks of discharge. Patient understands and agrees with plan of care.

## 2019-07-16 NOTE — PROGRESS NOTE ADULT - PROBLEM SELECTOR PROBLEM 5
Atrial fibrillation, unspecified type
Pneumonia of both lower lobes due to infectious organism
Diarrhea

## 2019-07-16 NOTE — DISCHARGE NOTE PROVIDER - CARE PROVIDER_API CALL
Doug Maynard)  Nephrology  [address] [facility], [address] [place] [address]  Phone: [phone]  Fax: [phone]  Follow Up Time:     Bam Maynard)  Cardiovascular Disease; Internal Medicine  [address] [place] [address]  Phone: [phone]  Fax: [phone]  Follow Up Time:     Garfield Mohan)  Internal Medicine  [address] [place] [address]  Phone: [phone]  Fax: [phone]  Follow Up Time:     Uriel Pugh)  Gastroenterology; Internal Medicine  [address] [place] [address] [address]  [place] [address]  Phone: [phone]  Fax: [phone]  Follow Up Time:

## 2019-07-16 NOTE — PROGRESS NOTE ADULT - PROBLEM/PLAN-8
Anticoagulation Summary  As of 5/4/2018    INR goal:   2.0-3.0   TTR:   52.1 % (2.8 y)   Today's INR:   3   Warfarin maintenance plan:   6 mg (6 mg x 1) on Mon, Wed, Fri; 3 mg (6 mg x 0.5) all other days   Weekly warfarin total:   30 mg   Plan last modified:   Ayan Moya, PharmD (5/4/2018)   Next INR check:   5/25/2018   Target end date:   Indefinite    Indications    Atrial fibrillation (HCC) [I48.91]  Long term (current) use of anticoagulants [Z79.01] [Z79.01]             Anticoagulation Episode Summary     INR check location:   [facility]    Preferred lab:       Send INR reminders to:       Comments:   TTR is 45.05%, consider alternative - JEMIMA       Anticoagulation Care Providers     Provider Role Specialty Phone number    Dontae Ng D.O. [facility] [phone]    [facility]   [phone]    AIYANA Easley  Family Medicine [phone]        Anticoagulation Patient Findings      HPI:  John MOISE Mary seen in clinic today, on anticoagulation therapy with warfarin for AF  Reason for today's visit (per our collaborative practice policy) is because their last INR was 2.4 on 4/20. Intervention at the last visit:none  Changes to current medical/health status since last appt: none  No signs/symptoms of bleeding and/or thrombosis since the last appt.    No interval changes to diet or any interval changes to medications since last appt.   No complications or cost restrictions with current therapy.   BP recorded in vitals.  Confirmed dosing regimen.     A/P   INR  therapeutic.     Pt taking warfarin slightly different, will have pt continue his regimen he has been taking.     Follow up appointment in 3 weeks to reduce risk of adverse events related to this high risk medication, Warfarin.    Purpose of next visit:  They have a TTR of 45% which is not at target (TTR target/goal is 100%) and requires close follow up to prevent a adverse event (the lower the 
TTR the higher risk of clots, strokes, or bleeding).     Other info:  Pt educated to contact our clinic with any changes in medications or s/s of bleeding or thrombosis  CHEST guidelines recommend frequent INR monitoring at regular intervals (a few days up to a max of 12 weeks) to ensure they are on the proper dose of warfarin and not having any complications from therapy. INRs can dramatically change over a short time period due to diet, medications, and medical conditions.     Ayan Moya, PharmD   
DISPLAY PLAN FREE TEXT

## 2019-07-16 NOTE — PROGRESS NOTE ADULT - PROBLEM SELECTOR PLAN 2
with acute blood loss anemia  s/p transfusion of 1 unit in divided doses with 1 time Lasix in between.   GI discussed with pt in regards to eval of Bleed and risk of cholangitis if not removing billary stent pt and family still refusing  -Will not restart AC as pt still source of GIB unevaluated can consider AC as outpt

## 2019-07-16 NOTE — DISCHARGE NOTE PROVIDER - PROVIDER TOKENS
PROVIDER:[TOKEN:[[id number]]],PROVIDER:[TOKEN:[[id number]]],PROVIDER:[TOKEN:[[id number]]],PROVIDER:[TOKEN:[[id number]]]

## 2019-07-16 NOTE — PROGRESS NOTE ADULT - PROBLEM SELECTOR PROBLEM 6
Essential hypertension
Diarrhea
Atrial fibrillation, unspecified type

## 2019-07-16 NOTE — DISCHARGE NOTE PROVIDER - NSDCCPCAREPLAN_GEN_ALL_CORE_FT
PRINCIPAL DISCHARGE DIAGNOSIS  Diagnosis: Congestive heart failure (CHF)  Assessment and Plan of Treatment: s/p RHC on 7/12 with wedge 30, LVEDP mid 20s. Creatinine stable. To continue Toresemide, Hydralazine, Imdur and Metoprolol. Symptoms could be due to moderate to severe MR. INDU would better assess the valve, however, patient and family adamantly refusing INDU as well as NST. Patient may follow up with Dr. Luis Mccarthy on 8/1 at 2:30pm.      SECONDARY DISCHARGE DIAGNOSES  Diagnosis: Atrial fibrillation, unspecified type  Assessment and Plan of Treatment: Continue beta blocker. Hold apixaban in setting of GI bleed. Currently rate controlled.  Please take your medications as prescribed.  Continue to take your blood thinner as prescribed and follow with your physician to monitor your levels.  Low fat diet, reduce caffeine intake, and exercise at least 30 minutes daily.    Diagnosis: Acute kidney injury superimposed on CKD  Assessment and Plan of Treatment: Resolving, thought to be due to diuresis vs NORBERT. Sonogram negative for retention. Seen by renal team, and no further work up. Hold Lasix and Eliquis. Torsemide restarted.   Continue blood pressure, cholesterol and diabetic medications. Goal of hemoglobin A1C (HgbA1C) < 7%.  Avoid nephrotoxic drugs such as nonsteroidal anti-inflammatory agents (NSAIDs).   Please follow up with your nephrologist to monitor your kidney function, continue with low protein and potassium diet.    Diagnosis: Pneumonia of both lower lobes due to infectious organism  Assessment and Plan of Treatment: CT shows bibasilar consolidation and given leukocytosis, decision was made to treat pneumonia. Treated with Ceftriaxone and Azithromycin while admitted. Will switch to Levaquin upon discharge for total of 10 days. No growth to date on BCx.    Diagnosis: GIB (gastrointestinal bleeding)  Assessment and Plan of Treatment: Patient without active bleeding. She is s/p 1 unit of PRBC. Refused ERCP/colonoscopy evaluation by GI team (discussed with patient the risk of cholangitis if biliary stent is not removed). Anticoagulation may be evaluated as outpatient, given that source of GIB is still unevaluated.

## 2019-07-16 NOTE — PROGRESS NOTE ADULT - PROBLEM SELECTOR PROBLEM 9
Need for prophylactic measure
Type 2 diabetes mellitus with complication, unspecified whether long term insulin use
Hyperlipidemia, unspecified hyperlipidemia type
Type 2 diabetes mellitus with complication, unspecified whether long term insulin use

## 2019-07-16 NOTE — DISCHARGE NOTE NURSING/CASE MANAGEMENT/SOCIAL WORK - NSDCDPATPORTLINK_GEN_ALL_CORE
You can access the [facility] Portal, offered by , by registering with the following website: [email or web address]

## 2019-07-16 NOTE — PROGRESS NOTE ADULT - PROBLEM SELECTOR PLAN 7
Monitor FSG and cover with correctional lispro.
Rate-controlled currently. S/p ppm.
cw home regimen, controlled for age
on CKD 3 - resolving   suspect due to diuresis vs NORBERT, sono neg for retention, dw renal, no need for further w/u   Lasix, Eliquis on hold  renally dose meds, monitor BMP  Cr trending down

## 2019-07-16 NOTE — PROGRESS NOTE ADULT - SUBJECTIVE AND OBJECTIVE BOX
Patient seen and examined at bedside.    Overnight Events: Denies bleeding in stool or urine. Her breathing is better. Denies CP, SOB, palpitations.       REVIEW OF SYSTEMS:  Constitutional:     No fevers, chills, weight loss, weight gain  HEENT:                  No dry eyes, nasal congestion, postnasal drip  CV:                         No chest pain, palpitations, orthopnea, PND  Resp:                     No cough, SOB, dyspnea, wheezing, sputum  GI:                          No nausea, vomiting, abdominal pain, diarrhea, constipation  :                        No dysuria, nocturia, hematuria, increased urinary frequency  Musculoskeletal: No back pain, myalgias, arthralgias   Skin:                       No rash, pruritus, ecchymoses  Neurological:        No headache, dizziness, syncope, weakness, numbness  Psychiatric:           No anxiety, depression   Endocrine:            No hot/cold intolerance, polydipsia  Heme/Lymph:      No bleeding, easy bruising  Allergic/Immune: No itchy eyes, rhinorrhea, hives angioedema      Current Meds:  atorvastatin 40 milliGRAM(s) Oral at bedtime  azithromycin  IVPB 500 milliGRAM(s) IV Intermittent every 24 hours  cefTRIAXone   IVPB 1000 milliGRAM(s) IV Intermittent every 24 hours  dextrose 40% Gel 15 Gram(s) Oral once PRN  dextrose 5%. 1000 milliLiter(s) IV Continuous <Continuous>  dextrose 50% Injectable 12.5 Gram(s) IV Push once  dextrose 50% Injectable 25 Gram(s) IV Push once  dextrose 50% Injectable 25 Gram(s) IV Push once  glucagon  Injectable 1 milliGRAM(s) IntraMuscular once PRN  hydrALAZINE 100 milliGRAM(s) Oral three times a day  insulin glargine Injectable (LANTUS) 5 Unit(s) SubCutaneous at bedtime  insulin lispro (HumaLOG) corrective regimen sliding scale   SubCutaneous three times a day before meals  insulin lispro (HumaLOG) corrective regimen sliding scale   SubCutaneous at bedtime  insulin lispro Injectable (HumaLOG) 2 Unit(s) SubCutaneous three times a day before meals  isosorbide   mononitrate ER Tablet (IMDUR) 60 milliGRAM(s) Oral daily  metoprolol succinate ER 75 milliGRAM(s) Oral daily  sodium chloride 0.9% lock flush 3 milliLiter(s) IV Push every 8 hours  torsemide 40 milliGRAM(s) Oral daily      PAST MEDICAL & SURGICAL HISTORY:  Chronic systolic heart failure  Tachycardia-bradycardia syndrome  Hyperlipidemia  Uterine Cancer (ICD9 179): treated with hysterectomy and radiation therapy in 2008  Osteoarthritis of Knee (ICD9 715.96): b/l knees  DM Type 2 (Diabetes Mellitus, Type 2) (ICD9 250.00)  HTN (Hypertension) (ICD9 401.9)  Status Post Total Knee Replacement: LISA, 6 months apart 2010  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 @ [facility])  History of Hysterectomy (ICD9 V88.01)      Vitals:  T(F): 98 (07-16), Max: 98.1 (07-15)  HR: 60 (07-16) (60 - 62)  BP: 143/53 (07-16) (126/45 - 145/46)  RR: 16 (07-16)  SpO2: 97% (07-16)  I&O's Summary    15 Jul 2019 07:01  -  16 Jul 2019 07:00  --------------------------------------------------------  IN: 750 mL / OUT: 310 mL / NET: 440 mL        Physical Exam:  Appearance: No acute distress; well appearing  Eyes: PERRL, EOMI, pink conjunctiva  HENT: Normal oral mucosa  Cardiovascular: RRR, S1, S2, no edema;+JVD  Respiratory: diminished in bases  Gastrointestinal: soft, non-tender, non-distended with normal bowel sounds  Musculoskeletal: No clubbing; no joint deformity   Neurologic: Non-focal  Lymphatic: No lymphadenopathy  Psychiatry: AAOx3, mood & affect appropriate  Skin: No rashes, ecchymoses, or cyanosis                          7.8    12.32 )-----------( 278      ( 16 Jul 2019 05:28 )             25.5     07-16    139  |  101  |  70<H>  ----------------------------<  157<H>  3.9   |  24  |  1.79<H>    Ca    9.8      16 Jul 2019 05:28  Phos  3.6     07-16  Mg     2.0     07-16    TPro  6.5  /  Alb  x   /  TBili  x   /  DBili  x   /  AST  x   /  ALT  x   /  AlkPhos  x   07-16            Interpretation of Telemetry: a paced 60s, PVCs

## 2019-07-16 NOTE — PROGRESS NOTE ADULT - PROBLEM SELECTOR PLAN 6
cw home regimen, controlled for age
History does not suggest a clear precipitant, but diarrhea has been relatively small volume. now resolved  if reoccurs will work up
Rate-controlled currently. S/p ppm.
Rate-controlled currently. S/p ppm.
Rate-controlled currently. S/p ppm. Continue apixaban as above.

## 2019-07-16 NOTE — PROGRESS NOTE ADULT - PROBLEM SELECTOR PLAN 1
s/p RHC  mod AS - out-pt follow up  daily weight  HF appreciated diuretics restart torsemide/hydralazine

## 2019-07-16 NOTE — PROGRESS NOTE ADULT - PROBLEM SELECTOR PROBLEM 3
Pain
Anemia
Anemia due to blood loss
Anemia due to blood loss
Pneumonia of both lower lobes due to infectious organism
Anemia due to blood loss
BRBPR (bright red blood per rectum)

## 2019-07-16 NOTE — PROGRESS NOTE ADULT - ATTENDING COMMENTS
Patient seen and examined with the GI fellow. I agree with the above assessment and plan. Thank you for allowing us to care for your patient.    Pt refusing ERCP with stent removal and colonoscopy. We explained the risks of a retained CBD stent.

## 2019-07-16 NOTE — PROGRESS NOTE ADULT - SUBJECTIVE AND OBJECTIVE BOX
Patient is a 84y old  Female who presents with a chief complaint of dyspnea (16 Jul 2019 12:20)      SUBJECTIVE / OVERNIGHT EVENTS: no acute events ON. denies CP/SOB    MEDICATIONS  (STANDING):  atorvastatin 40 milliGRAM(s) Oral at bedtime  azithromycin  IVPB 500 milliGRAM(s) IV Intermittent every 24 hours  cefTRIAXone   IVPB 1000 milliGRAM(s) IV Intermittent every 24 hours  dextrose 5%. 1000 milliLiter(s) (50 mL/Hr) IV Continuous <Continuous>  dextrose 50% Injectable 12.5 Gram(s) IV Push once  dextrose 50% Injectable 25 Gram(s) IV Push once  dextrose 50% Injectable 25 Gram(s) IV Push once  hydrALAZINE 100 milliGRAM(s) Oral three times a day  insulin glargine Injectable (LANTUS) 5 Unit(s) SubCutaneous at bedtime  insulin lispro (HumaLOG) corrective regimen sliding scale   SubCutaneous three times a day before meals  insulin lispro (HumaLOG) corrective regimen sliding scale   SubCutaneous at bedtime  insulin lispro Injectable (HumaLOG) 2 Unit(s) SubCutaneous three times a day before meals  isosorbide   mononitrate ER Tablet (IMDUR) 60 milliGRAM(s) Oral daily  metoprolol succinate ER 75 milliGRAM(s) Oral daily  sodium chloride 0.9% lock flush 3 milliLiter(s) IV Push every 8 hours  torsemide 40 milliGRAM(s) Oral daily    MEDICATIONS  (PRN):  dextrose 40% Gel 15 Gram(s) Oral once PRN Blood Glucose LESS THAN 70 milliGRAM(s)/deciliter  glucagon  Injectable 1 milliGRAM(s) IntraMuscular once PRN Glucose LESS THAN 70 milligrams/deciliter        CAPILLARY BLOOD GLUCOSE      POCT Blood Glucose.: 288 mg/dL (16 Jul 2019 12:35)  POCT Blood Glucose.: 197 mg/dL (16 Jul 2019 08:41)  POCT Blood Glucose.: 241 mg/dL (15 Jul 2019 21:36)  POCT Blood Glucose.: 169 mg/dL (15 Jul 2019 17:12)    I&O's Summary    15 Jul 2019 07:01  -  16 Jul 2019 07:00  --------------------------------------------------------  IN: 750 mL / OUT: 310 mL / NET: 440 mL        T(C): 36.7 (07-16-19 @ 06:12), Max: 36.7 (07-15-19 @ 17:58)  HR: 60 (07-16-19 @ 06:12) (60 - 62)  BP: 143/53 (07-16-19 @ 06:12) (126/45 - 145/46)  RR: 16 (07-16-19 @ 06:12) (16 - 16)  SpO2: 97% (07-16-19 @ 06:12) (97% - 97%)    PHYSICAL EXAM:  GENERAL: NAD, well-developed  HEAD:  Atraumatic, Normocephalic  EYES: EOMI, conjunctiva and sclera clear  NECK: Supple, No JVD  CHEST/LUNG: Clear to auscultation bilaterally; No wheeze  HEART: Regular rate and rhythm; No murmurs, rubs, or gallops  ABDOMEN: Soft, Nontender, Nondistended; Bowel sounds present  EXTREMITIES:  2+ Peripheral Pulses, No clubbing, cyanosis, or edema  PSYCH: AAOx3  NEUROLOGY: non-focal      LABS:                        7.8    12.32 )-----------( 278      ( 16 Jul 2019 05:28 )             25.5     07-16    139  |  101  |  70<H>  ----------------------------<  157<H>  3.9   |  24  |  1.79<H>    Ca    9.8      16 Jul 2019 05:28  Phos  3.6     07-16  Mg     2.0     07-16    TPro  6.5  /  Alb  x   /  TBili  x   /  DBili  x   /  AST  x   /  ALT  x   /  AlkPhos  x   07-16                RADIOLOGY & ADDITIONAL TESTS:    Imaging Personally Reviewed:< from: VA Duplex Arterial Upper Ext, Right. (07.15.19 @ 15:02) >  Summary/Impressions:  No evidence of occlusive arterial disease in the  visualized right upper extremity.  No pseudoaneurysm.  ------------------------------------------------    < end of copied text >      Consultant(s) Notes Reviewed:      Care Discussed with Consultants/Other Providers:

## 2019-07-16 NOTE — PROGRESS NOTE ADULT - PROBLEM SELECTOR PROBLEM 4
Diarrhea
Acute on chronic systolic (congestive) heart failure
BRBPR (bright red blood per rectum)
Pain

## 2019-07-17 ENCOUNTER — INBOUND DOCUMENT (OUTPATIENT)
Age: 84
End: 2019-07-17

## 2019-07-18 LAB
GAS PNL BLDMV: SIGNIFICANT CHANGE UP
GAS PNL BLDMV: SIGNIFICANT CHANGE UP

## 2019-07-18 RX ORDER — METOPROLOL SUCCINATE 50 MG/1
50 TABLET, EXTENDED RELEASE ORAL DAILY
Qty: 30 | Refills: 2 | Status: DISCONTINUED | COMMUNITY
Start: 2019-06-12 | End: 2019-07-18

## 2019-07-18 RX ORDER — LEVOFLOXACIN 750 MG/1
750 TABLET, FILM COATED ORAL DAILY
Refills: 0 | Status: ACTIVE | COMMUNITY
Start: 2019-07-18

## 2019-07-18 RX ORDER — APIXABAN 2.5 MG/1
2.5 TABLET, FILM COATED ORAL
Qty: 180 | Refills: 3 | Status: DISCONTINUED | COMMUNITY
Start: 2018-04-30 | End: 2019-07-18

## 2019-07-18 RX ORDER — GLIMEPIRIDE 1 MG/1
1 TABLET ORAL DAILY
Qty: 90 | Refills: 3 | Status: DISCONTINUED | COMMUNITY
Start: 2018-08-02 | End: 2019-07-18

## 2019-07-18 RX ORDER — SITAGLIPTIN 50 MG/1
50 TABLET, FILM COATED ORAL DAILY
Refills: 0 | Status: ACTIVE | COMMUNITY
Start: 2018-04-16

## 2019-07-18 RX ORDER — ASPIRIN ENTERIC COATED TABLETS 81 MG 81 MG/1
81 TABLET, DELAYED RELEASE ORAL DAILY
Qty: 90 | Refills: 3 | Status: DISCONTINUED | COMMUNITY
Start: 2017-06-15 | End: 2019-07-18

## 2019-07-18 RX ORDER — TORSEMIDE 20 MG/1
20 TABLET ORAL DAILY
Refills: 0 | Status: ACTIVE | COMMUNITY
Start: 2019-07-18

## 2019-07-18 RX ORDER — ISOSORBIDE MONONITRATE 60 MG/1
60 TABLET, EXTENDED RELEASE ORAL DAILY
Refills: 0 | Status: ACTIVE | COMMUNITY
Start: 2017-10-03

## 2019-07-18 RX ORDER — FUROSEMIDE 80 MG/1
80 TABLET ORAL TWICE DAILY
Qty: 120 | Refills: 3 | Status: DISCONTINUED | COMMUNITY
Start: 2019-04-26 | End: 2019-07-18

## 2019-07-18 RX ORDER — METOPROLOL SUCCINATE 25 MG/1
25 TABLET, EXTENDED RELEASE ORAL
Refills: 0 | Status: ACTIVE | COMMUNITY
Start: 2019-06-12

## 2019-07-18 RX ORDER — HYDRALAZINE HYDROCHLORIDE 100 MG/1
100 TABLET ORAL 3 TIMES DAILY
Refills: 0 | Status: ACTIVE | COMMUNITY
Start: 2018-08-02

## 2019-07-19 DIAGNOSIS — N18.3 CHRONIC KIDNEY DISEASE, STAGE 3 (MODERATE): ICD-10-CM

## 2019-07-19 DIAGNOSIS — I50.22 CHRONIC SYSTOLIC (CONGESTIVE) HEART FAILURE: ICD-10-CM

## 2019-07-19 NOTE — DISCUSSION/SUMMARY
[Home] : patient was discharged to home [FreeTextEntry1] : Ms. White was admitted to [facility] for ADHF c/b PNA and CLARI. Called and spoke to patient and daughter. She feels significantly better after discharge from the hospital. She denies any fevers, cough, dyspnea. Is continuing to hold the Eliquis and furosemide. Will task  to schedule patient f/u appt for medicine clinic.

## 2019-07-27 ENCOUNTER — INPATIENT (INPATIENT)
Facility: HOSPITAL | Age: 84
LOS: 0 days | End: 2019-07-27
Attending: INTERNAL MEDICINE | Admitting: INTERNAL MEDICINE
Payer: MEDICARE

## 2019-07-27 DIAGNOSIS — I46.9 CARDIAC ARREST, CAUSE UNSPECIFIED: ICD-10-CM

## 2019-07-27 LAB
ALBUMIN SERPL ELPH-MCNC: 2.4 G/DL — LOW (ref 3.3–5)
ALP SERPL-CCNC: 138 U/L — HIGH (ref 40–120)
ALT FLD-CCNC: 367 U/L — HIGH (ref 4–33)
ANION GAP SERPL CALC-SCNC: 29 MMO/L — HIGH (ref 7–14)
APTT BLD: 78 SEC — HIGH (ref 27.5–36.3)
AST SERPL-CCNC: 828 U/L — HIGH (ref 4–32)
BASE EXCESS BLDV CALC-SCNC: -17.5 MMOL/L — SIGNIFICANT CHANGE UP
BASE EXCESS BLDV CALC-SCNC: -18.8 MMOL/L — SIGNIFICANT CHANGE UP
BASOPHILS # BLD AUTO: 0.02 K/UL — SIGNIFICANT CHANGE UP (ref 0–0.2)
BASOPHILS # BLD AUTO: 0.04 K/UL — SIGNIFICANT CHANGE UP (ref 0–0.2)
BASOPHILS NFR BLD AUTO: 0.1 % — SIGNIFICANT CHANGE UP (ref 0–2)
BASOPHILS NFR BLD AUTO: 0.2 % — SIGNIFICANT CHANGE UP (ref 0–2)
BILIRUB SERPL-MCNC: 0.3 MG/DL — SIGNIFICANT CHANGE UP (ref 0.2–1.2)
BLD GP AB SCN SERPL QL: NEGATIVE — SIGNIFICANT CHANGE UP
BLOOD GAS VENOUS - CREATININE: 3.05 MG/DL — HIGH (ref 0.5–1.3)
BLOOD GAS VENOUS - CREATININE: 3.05 MG/DL — HIGH (ref 0.5–1.3)
BLOOD GAS VENOUS - FIO2: 21 — SIGNIFICANT CHANGE UP
BUN SERPL-MCNC: 135 MG/DL — HIGH (ref 7–23)
CALCIUM SERPL-MCNC: 12.1 MG/DL — HIGH (ref 8.4–10.5)
CHLORIDE BLDV-SCNC: 104 MMOL/L — SIGNIFICANT CHANGE UP (ref 96–108)
CHLORIDE BLDV-SCNC: 98 MMOL/L — SIGNIFICANT CHANGE UP (ref 96–108)
CHLORIDE SERPL-SCNC: 93 MMOL/L — LOW (ref 98–107)
CK MB BLD-MCNC: 3.02 NG/ML — SIGNIFICANT CHANGE UP (ref 1–4.7)
CK MB BLD-MCNC: SIGNIFICANT CHANGE UP (ref 0–2.5)
CK SERPL-CCNC: 110 U/L — SIGNIFICANT CHANGE UP (ref 25–170)
CO2 SERPL-SCNC: 9 MMOL/L — CRITICAL LOW (ref 22–31)
CREAT SERPL-MCNC: 2.98 MG/DL — HIGH (ref 0.5–1.3)
EOSINOPHIL # BLD AUTO: 0.01 K/UL — SIGNIFICANT CHANGE UP (ref 0–0.5)
EOSINOPHIL # BLD AUTO: 0.18 K/UL — SIGNIFICANT CHANGE UP (ref 0–0.5)
EOSINOPHIL NFR BLD AUTO: 0.1 % — SIGNIFICANT CHANGE UP (ref 0–6)
EOSINOPHIL NFR BLD AUTO: 0.9 % — SIGNIFICANT CHANGE UP (ref 0–6)
GAS PNL BLDV: 130 MMOL/L — LOW (ref 136–146)
GAS PNL BLDV: 130 MMOL/L — LOW (ref 136–146)
GLUCOSE BLDV-MCNC: 337 MG/DL — HIGH (ref 70–99)
GLUCOSE BLDV-MCNC: 389 MG/DL — HIGH (ref 70–99)
GLUCOSE SERPL-MCNC: 405 MG/DL — HIGH (ref 70–99)
HCO3 BLDV-SCNC: 10 MMOL/L — LOW (ref 20–27)
HCO3 BLDV-SCNC: 11 MMOL/L — LOW (ref 20–27)
HCT VFR BLD CALC: 15.2 % — CRITICAL LOW (ref 34.5–45)
HCT VFR BLD CALC: 16.3 % — CRITICAL LOW (ref 34.5–45)
HCT VFR BLD CALC: 37.5 % — SIGNIFICANT CHANGE UP (ref 34.5–45)
HCT VFR BLDV CALC: 12.3 % — CRITICAL LOW (ref 34.5–45)
HCT VFR BLDV CALC: 13.9 % — CRITICAL LOW (ref 34.5–45)
HGB BLD-MCNC: 11.5 G/DL — SIGNIFICANT CHANGE UP (ref 11.5–15.5)
HGB BLD-MCNC: 4.3 G/DL — CRITICAL LOW (ref 11.5–15.5)
HGB BLD-MCNC: 4.6 G/DL — CRITICAL LOW (ref 11.5–15.5)
HGB BLDV-MCNC: 3.8 G/DL — CRITICAL LOW (ref 11.5–15.5)
HGB BLDV-MCNC: 4.3 G/DL — CRITICAL LOW (ref 11.5–15.5)
IMM GRANULOCYTES NFR BLD AUTO: 1.8 % — HIGH (ref 0–1.5)
IMM GRANULOCYTES NFR BLD AUTO: 2.7 % — HIGH (ref 0–1.5)
INR BLD: 4.12 — HIGH (ref 0.88–1.17)
LACTATE BLDV-MCNC: 16 MMOL/L — CRITICAL HIGH (ref 0.5–2)
LACTATE BLDV-MCNC: 17 MMOL/L — CRITICAL HIGH (ref 0.5–2)
LYMPHOCYTES # BLD AUTO: 1.33 K/UL — SIGNIFICANT CHANGE UP (ref 1–3.3)
LYMPHOCYTES # BLD AUTO: 30.8 % — SIGNIFICANT CHANGE UP (ref 13–44)
LYMPHOCYTES # BLD AUTO: 5.25 K/UL — HIGH (ref 1–3.3)
LYMPHOCYTES # BLD AUTO: 6.6 % — LOW (ref 13–44)
MCHC RBC-ENTMCNC: 26.9 PG — LOW (ref 27–34)
MCHC RBC-ENTMCNC: 28.2 % — LOW (ref 32–36)
MCHC RBC-ENTMCNC: 28.3 % — LOW (ref 32–36)
MCHC RBC-ENTMCNC: 28.4 PG — SIGNIFICANT CHANGE UP (ref 27–34)
MCHC RBC-ENTMCNC: 28.7 PG — SIGNIFICANT CHANGE UP (ref 27–34)
MCHC RBC-ENTMCNC: 30.7 % — LOW (ref 32–36)
MCV RBC AUTO: 100.6 FL — HIGH (ref 80–100)
MCV RBC AUTO: 101.3 FL — HIGH (ref 80–100)
MCV RBC AUTO: 87.6 FL — SIGNIFICANT CHANGE UP (ref 80–100)
MONOCYTES # BLD AUTO: 1.88 K/UL — HIGH (ref 0–0.9)
MONOCYTES # BLD AUTO: 2.55 K/UL — HIGH (ref 0–0.9)
MONOCYTES NFR BLD AUTO: 11 % — SIGNIFICANT CHANGE UP (ref 2–14)
MONOCYTES NFR BLD AUTO: 12.6 % — SIGNIFICANT CHANGE UP (ref 2–14)
NEUTROPHILS # BLD AUTO: 15.75 K/UL — HIGH (ref 1.8–7.4)
NEUTROPHILS # BLD AUTO: 9.44 K/UL — HIGH (ref 1.8–7.4)
NEUTROPHILS NFR BLD AUTO: 55.3 % — SIGNIFICANT CHANGE UP (ref 43–77)
NEUTROPHILS NFR BLD AUTO: 77.9 % — HIGH (ref 43–77)
NRBC # FLD: 0.04 K/UL — SIGNIFICANT CHANGE UP (ref 0–0)
NRBC # FLD: 0.11 K/UL — SIGNIFICANT CHANGE UP (ref 0–0)
NRBC # FLD: 0.17 K/UL — SIGNIFICANT CHANGE UP (ref 0–0)
PCO2 BLDV: 40 MMHG — LOW (ref 41–51)
PCO2 BLDV: 55 MMHG — HIGH (ref 41–51)
PH BLDV: 6.97 PH — CRITICAL LOW (ref 7.32–7.43)
PH BLDV: 7.02 PH — CRITICAL LOW (ref 7.32–7.43)
PLATELET # BLD AUTO: 128 K/UL — LOW (ref 150–400)
PLATELET # BLD AUTO: 157 K/UL — SIGNIFICANT CHANGE UP (ref 150–400)
PLATELET # BLD AUTO: 166 K/UL — SIGNIFICANT CHANGE UP (ref 150–400)
PMV BLD: 10.7 FL — SIGNIFICANT CHANGE UP (ref 7–13)
PMV BLD: 11.5 FL — SIGNIFICANT CHANGE UP (ref 7–13)
PMV BLD: 11.9 FL — SIGNIFICANT CHANGE UP (ref 7–13)
PO2 BLDV: 35 MMHG — SIGNIFICANT CHANGE UP (ref 35–40)
PO2 BLDV: 49 MMHG — HIGH (ref 35–40)
POTASSIUM BLDV-SCNC: 5 MMOL/L — HIGH (ref 3.4–4.5)
POTASSIUM BLDV-SCNC: 5.9 MMOL/L — HIGH (ref 3.4–4.5)
POTASSIUM SERPL-MCNC: 6.1 MMOL/L — HIGH (ref 3.5–5.3)
POTASSIUM SERPL-SCNC: 6.1 MMOL/L — HIGH (ref 3.5–5.3)
PROT SERPL-MCNC: 5.9 G/DL — LOW (ref 6–8.3)
PROTHROM AB SERPL-ACNC: 47.8 SEC — HIGH (ref 9.8–13.1)
RBC # BLD: 1.5 M/UL — LOW (ref 3.8–5.2)
RBC # BLD: 1.62 M/UL — LOW (ref 3.8–5.2)
RBC # BLD: 4.28 M/UL — SIGNIFICANT CHANGE UP (ref 3.8–5.2)
RBC # FLD: 18.4 % — HIGH (ref 10.3–14.5)
RBC # FLD: 18.5 % — HIGH (ref 10.3–14.5)
RBC # FLD: 18.6 % — HIGH (ref 10.3–14.5)
RH IG SCN BLD-IMP: POSITIVE — SIGNIFICANT CHANGE UP
SAO2 % BLDV: 35.7 % — LOW (ref 60–85)
SAO2 % BLDV: 54.9 % — LOW (ref 60–85)
SODIUM SERPL-SCNC: 131 MMOL/L — LOW (ref 135–145)
TROPONIN T, HIGH SENSITIVITY: 149 NG/L — CRITICAL HIGH (ref ?–14)
WBC # BLD: 17.06 K/UL — HIGH (ref 3.8–10.5)
WBC # BLD: 18.61 K/UL — HIGH (ref 3.8–10.5)
WBC # BLD: 20.22 K/UL — HIGH (ref 3.8–10.5)
WBC # FLD AUTO: 17.06 K/UL — HIGH (ref 3.8–10.5)
WBC # FLD AUTO: 18.61 K/UL — HIGH (ref 3.8–10.5)
WBC # FLD AUTO: 20.22 K/UL — HIGH (ref 3.8–10.5)

## 2019-07-27 PROCEDURE — 71045 X-RAY EXAM CHEST 1 VIEW: CPT | Mod: 26

## 2019-07-27 PROCEDURE — 99291 CRITICAL CARE FIRST HOUR: CPT

## 2019-07-27 RX ORDER — GLUCAGON INJECTION, SOLUTION 0.5 MG/.1ML
1 INJECTION, SOLUTION SUBCUTANEOUS ONCE
Refills: 0 | Status: DISCONTINUED | OUTPATIENT
Start: 2019-07-27 | End: 2019-07-27

## 2019-07-27 RX ORDER — DEXTROSE 50 % IN WATER 50 %
25 SYRINGE (ML) INTRAVENOUS ONCE
Refills: 0 | Status: DISCONTINUED | OUTPATIENT
Start: 2019-07-27 | End: 2019-07-27

## 2019-07-27 RX ORDER — PANTOPRAZOLE SODIUM 20 MG/1
8 TABLET, DELAYED RELEASE ORAL
Qty: 80 | Refills: 0 | Status: DISCONTINUED | OUTPATIENT
Start: 2019-07-27 | End: 2019-07-27

## 2019-07-27 RX ORDER — DEXTROSE 50 % IN WATER 50 %
15 SYRINGE (ML) INTRAVENOUS ONCE
Refills: 0 | Status: DISCONTINUED | OUTPATIENT
Start: 2019-07-27 | End: 2019-07-27

## 2019-07-27 RX ORDER — CHLORHEXIDINE GLUCONATE 213 G/1000ML
1 SOLUTION TOPICAL
Refills: 0 | Status: DISCONTINUED | OUTPATIENT
Start: 2019-07-27 | End: 2019-07-27

## 2019-07-27 RX ORDER — DEXTROSE 50 % IN WATER 50 %
12.5 SYRINGE (ML) INTRAVENOUS ONCE
Refills: 0 | Status: DISCONTINUED | OUTPATIENT
Start: 2019-07-27 | End: 2019-07-27

## 2019-07-27 RX ORDER — MIDAZOLAM HYDROCHLORIDE 1 MG/ML
1 INJECTION, SOLUTION INTRAMUSCULAR; INTRAVENOUS ONCE
Refills: 0 | Status: DISCONTINUED | OUTPATIENT
Start: 2019-07-27 | End: 2019-07-27

## 2019-07-27 RX ORDER — CHLORHEXIDINE GLUCONATE 213 G/1000ML
15 SOLUTION TOPICAL EVERY 12 HOURS
Refills: 0 | Status: DISCONTINUED | OUTPATIENT
Start: 2019-07-27 | End: 2019-07-27

## 2019-07-27 RX ORDER — EPINEPHRINE 0.3 MG/.3ML
0.05 INJECTION INTRAMUSCULAR; SUBCUTANEOUS
Qty: 4 | Refills: 0 | Status: DISCONTINUED | OUTPATIENT
Start: 2019-07-27 | End: 2019-07-27

## 2019-07-27 RX ORDER — INSULIN LISPRO 100/ML
VIAL (ML) SUBCUTANEOUS EVERY 6 HOURS
Refills: 0 | Status: DISCONTINUED | OUTPATIENT
Start: 2019-07-27 | End: 2019-07-27

## 2019-07-27 RX ORDER — FENTANYL CITRATE 50 UG/ML
0.5 INJECTION INTRAVENOUS
Qty: 2500 | Refills: 0 | Status: DISCONTINUED | OUTPATIENT
Start: 2019-07-27 | End: 2019-07-27

## 2019-07-27 RX ORDER — FENTANYL CITRATE 50 UG/ML
50 INJECTION INTRAVENOUS ONCE
Refills: 0 | Status: DISCONTINUED | OUTPATIENT
Start: 2019-07-27 | End: 2019-07-27

## 2019-07-27 RX ORDER — PANTOPRAZOLE SODIUM 20 MG/1
80 TABLET, DELAYED RELEASE ORAL ONCE
Refills: 0 | Status: COMPLETED | OUTPATIENT
Start: 2019-07-27 | End: 2019-07-27

## 2019-07-27 RX ORDER — SODIUM CHLORIDE 9 MG/ML
1000 INJECTION, SOLUTION INTRAVENOUS
Refills: 0 | Status: DISCONTINUED | OUTPATIENT
Start: 2019-07-27 | End: 2019-07-27

## 2019-07-27 RX ORDER — CALCIUM CHLORIDE
1000 POWDER (GRAM) MISCELLANEOUS ONCE
Refills: 0 | Status: COMPLETED | OUTPATIENT
Start: 2019-07-27 | End: 2019-07-27

## 2019-07-27 RX ORDER — NOREPINEPHRINE BITARTRATE/D5W 8 MG/250ML
0.05 PLASTIC BAG, INJECTION (ML) INTRAVENOUS
Qty: 8 | Refills: 0 | Status: DISCONTINUED | OUTPATIENT
Start: 2019-07-27 | End: 2019-07-27

## 2019-07-27 RX ORDER — PROPOFOL 10 MG/ML
10 INJECTION, EMULSION INTRAVENOUS
Qty: 1000 | Refills: 0 | Status: DISCONTINUED | OUTPATIENT
Start: 2019-07-27 | End: 2019-07-27

## 2019-07-27 RX ADMIN — EPINEPHRINE 14.25 MICROGRAM(S)/KG/MIN: 0.3 INJECTION INTRAMUSCULAR; SUBCUTANEOUS at 07:39

## 2019-07-27 RX ADMIN — MIDAZOLAM HYDROCHLORIDE 1 MILLIGRAM(S): 1 INJECTION, SOLUTION INTRAMUSCULAR; INTRAVENOUS at 08:00

## 2019-07-27 RX ADMIN — Medication 1000 MILLIGRAM(S): at 06:34

## 2019-07-27 RX ADMIN — PANTOPRAZOLE SODIUM 80 MILLIGRAM(S): 20 TABLET, DELAYED RELEASE ORAL at 07:47

## 2019-07-27 RX ADMIN — Medication 7.12 MICROGRAM(S)/KG/MIN: at 09:55

## 2019-07-27 RX ADMIN — CHLORHEXIDINE GLUCONATE 1 APPLICATION(S): 213 SOLUTION TOPICAL at 13:22

## 2019-07-27 RX ADMIN — PROPOFOL 4.56 MICROGRAM(S)/KG/MIN: 10 INJECTION, EMULSION INTRAVENOUS at 13:20

## 2019-07-27 RX ADMIN — PROPOFOL 4.56 MICROGRAM(S)/KG/MIN: 10 INJECTION, EMULSION INTRAVENOUS at 11:20

## 2019-07-27 RX ADMIN — Medication 7.12 MICROGRAM(S)/KG/MIN: at 13:20

## 2019-07-27 RX ADMIN — FENTANYL CITRATE 3.8 MICROGRAM(S)/KG/HR: 50 INJECTION INTRAVENOUS at 07:40

## 2019-07-27 RX ADMIN — FENTANYL CITRATE 50 MICROGRAM(S): 50 INJECTION INTRAVENOUS at 07:00

## 2019-07-27 RX ADMIN — FENTANYL CITRATE 3.8 MICROGRAM(S)/KG/HR: 50 INJECTION INTRAVENOUS at 13:20

## 2019-07-27 NOTE — ED PROVIDER NOTE - CLINICAL SUMMARY MEDICAL DECISION MAKING FREE TEXT BOX
85yo F hx CHF, DM presenting with cardiac arrest. Patient was complaining of SOB and KRAUS for 1 day, worse this morning, family drove her to ED. was talking in the car. Right when they arrived in the parking lot, patient unresponsive. Chest compressions started in the parking lot, continued during transport to trauma bay. Switched to johan. Intubated. Epi, bicarb, calcium. Compressions x2 rounds. Pulse check with rosc. Started on epi for low BP. EKG showing sgarbossa with no pacer spikes and then shortly after became paced rhythm on ekg. STEMI consult being called now. CCu vs MICU. 85yo F hx CHF, DM presenting with cardiac arrest. Patient was complaining of SOB and KRAUS for 1 day, worse this morning, family drove her to ED. was talking in the car. Right when they arrived in the parking lot, patient unresponsive. Chest compressions started in the parking lot, continued during transport to trauma bay. Switched to johan. Intubated. Epi, bicarb, calcium. Compressions x2 rounds. Pulse check with rosc. Started on epi for low BP. EKG showing wide complex with no pacer spikes 2 leads meet Sgarbossa critera and then shortly after became paced rhythm on ekg. STEMI consult being called now. CUu vs MICU.

## 2019-07-27 NOTE — CHART NOTE - NSCHARTNOTEFT_GEN_A_CORE
Called into patient room by nurse to see patient for unresponsiveness. On exam the patient did not respond to verbal or physical stimuli. No spontaneous respirations. Absent heart and breath sounds. Absent radial and carotid pulses. Pupils are fixed and dilated, no corneal reflex. Patient pronounced dead at 19:55. Dr. Stiven Green, attending physician, notified. Next of kin and family were notified. Autopsy declined.

## 2019-07-27 NOTE — ED CLERICAL - NS ED CLERK NOTE PRE-ARRIVAL INFORMATION; ADDITIONAL PRE-ARRIVAL INFORMATION
This patient is enrolled in the Heart Failure STARS readmission reduction initiative and has active care navigation.     - This patient can be followed up by the care navigation team within 24 hours.  To arrange close follow-up or to obtain additional clinical information , please call the contact number above    - For house cards patients, please call cardiology ([phone]) for ALL PATIENTS with a cardiac issue PRIOR to disposition if you are considering admission or observation.      - Consider CDU for management of CHF exacerbations per guidelines.

## 2019-07-27 NOTE — ED PROVIDER NOTE - CRITICAL CARE PROVIDED
direct patient care (not related to procedure)/documentation/additional history taking/consultation with other physicians/consult w/ pt's family directly relating to pts condition/interpretation of diagnostic studies

## 2019-07-27 NOTE — H&P ADULT - ASSESSMENT
84 F with HTN, HLD, DM2, chronic systolic HF, afib on apixaban, prior uterine cancer s/p hysterectomy who presented to the ER s/p PEA arrest ROSC achieved in 5mins down for approx. 3mins, insetting of hypovolumic shock 2/2 GIB.     #neuro  -patient sedated, intubated. currently on Fentanyl and Propofol achieve RASS 0- -2, as per family meetings and patient wishes, family to withdraw care when everyone arrives.     #respiratory   on vent, 450/18/40/5,  as per family meetings and patient wishes, family to withdraw care when everyone arrives.     #cardiovascular   -PEA arrest 3mins down, 5min ROSC, 2/2 hypovolumic shock now on norepinephrine ggt and s/p 4UPRBC 2U platelets. as per family meetings and patient wishes, family to withdraw care when everyone arrives, no further lab draws or interventions.      #GI  -hx GI bleed, now hypovolumic shock in setting of GIB s/p code fusion 4U PRBC &2U platelet, on pressure support  - as per family meetings and patient wishes, family to withdraw care when everyone arrives, holding further meds  -previously patient was recommended GI scope however patient refused and wishes are to be respected, no further evaluations     #Heme   -acute blood loss anemia 2/2 GIB, s/p code transfusion 4U PRBC 2U platelet, hgb on arrival 4 now 11, also with elevated INR may be insetting of hepatic injury.  as per family meetings and patient wishes, family to withdraw care when everyone arrives, no further lab draws or interventions.     #Renal  -patient with known CKD stage 3-4; now w/ CLARI on CKD likely pre-renal in setting of acute blood loss anemia 2/2 GIB, as per family meetings and patient wishes, family to withdraw care when everyone arrives, no further lab draws or interventions.     #GOC   as per family meetings and patient wishes, family to withdraw care when everyone arrives, no further lab draws or interventions. patient made DNR/I comfort care only.

## 2019-07-27 NOTE — H&P ADULT - NSICDXPASTSURGICALHX_GEN_ALL_CORE_FT
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

## 2019-07-27 NOTE — H&P ADULT - NSHPPHYSICALEXAM_GEN_ALL_CORE
Gen: sedated  HENT: neck soft / supple, ET Tube in place  Lymph: no LAD noted in neck  Eye: PERRL, sclerae anicteric  CV: normal rate, regular rhythm  Pulm: CTAB, no w/r/r auscultated  Abd: +BS, soft, NT, ND  Skin: warm, dry  Ext: no LE edema  Neuro:intubated sedated

## 2019-07-27 NOTE — H&P ADULT - HISTORY OF PRESENT ILLNESS
84 F with HTN, HLD, DM2, chronic systolic HF, afib on apixaban, prior uterine cancer s/p hysterectomy who presented to the ER s/p PEA arrest ROSC achieved in 5mins down for approx. 3mins, insetting of hypovolumic shock 2/2 GIB.     Patient noted to be more lethargic over the last 2 days. +dark stool as per family however did not seek medical evaluation. hx of GIB was recommended to have scope however patient refused. Has been off all A/C. No fevers, chills or night sweats, patient visibility noted to be more pale than usual.      In ED patient under went code blue w/ ROSC achieved after 5mins started on epinephrine ggt and now on norepineprine, code fusion activated and patient received 4U PRBC 2U platelet.

## 2019-07-27 NOTE — ED ADULT NURSE REASSESSMENT NOTE - NS ED NURSE REASSESS COMMENT FT1
Preston RN: Report received from night RN. Pt intubated with 7.5 ETT 23 at the lip. Pt hypothermic on bairhugger. Pt has 16F criticore sadler in place. Pt on epinephrine gtt at 0.1mcg. Fentanyl gtt at 2.5mcg at this time. Will continue to monitor.

## 2019-07-27 NOTE — ED PROVIDER NOTE - PROGRESS NOTE DETAILS
Melena on exam and VBG reported as Hgb 3.8. Pt. has hx of GI bleed and prev. transfusion. Family had noted melena but thought due to new abx. Will institute MTP from blood bank. Discussed role of emergent transfusion with family. VICKI ED ATTENDING- accepted sign out on this patient with h/o CHF with recent admission p/w cardiac arrest, now s/p ROSC, found to have low hgb, receiving blood products, now on epi drip, soon to be transitioned to levophed per MICU team reccs, signed out to f/u MICU eval. pt accepted to MICU for admission. Spoke with family Mr. Jason Echavarria ([phone]) and states at this time patient still FULL CODE and understands patient is sick and could decompensate at any time

## 2019-07-27 NOTE — ED ADULT NURSE REASSESSMENT NOTE - NS ED NURSE REASSESS COMMENT FT1
Preston RN: Pt started on emergent blood transfusions, PRBCs to go in as fast as possible per MD Sow. Pt hypothermic, pt placed on bairhugger. 16 fr sadler criticore inserted with no urine output, MD Vega at bedside to confirm sadler placement. Per MD Vega, sadler placement is confirmed via bedside US. Primary RN at bedside.

## 2019-07-27 NOTE — ED PROVIDER NOTE - ATTENDING CONTRIBUTION TO CARE
Seen and examined, Seen and examined, pt. with hx of CHF, recent admission 2 wks ago for same, initially mild fatigue at home then this a.m. c/o severe SOB and driven to hospital by family. Pt. in parking lot appeared to stop breathing, RN at side of car began CPR. In ED pt. in apparent PEA, rec'd CPR w 1 epi and intubated 1st pass, IO placed R tibia but poor flow. Pt. resumed NSR and then paced rhythm, initial pressure 110s, dropping rapidly, pt. spont. breathing on vent. Heart [place]/regular (paced), abd soft, NT to palp, ext. cool, no ecchymoses, weak pulses all ext., equal pulses all ext., melena on exam.

## 2019-07-27 NOTE — ED PROVIDER NOTE - PHYSICAL EXAMINATION
Gen: unrepsonive  HENT: Normocephalic, atraumatic.   Eyes: pupils 3mm, very sluggish  Resp: LISA breath sounds while being bagged  CV: pulseless  Abd: soft  MSK: unresponsive  Skin: warm and dry  Neuro:

## 2019-07-27 NOTE — ED ADULT NURSE REASSESSMENT NOTE - NS ED NURSE REASSESS COMMENT FT1
Pt awake bucking ventilator at this time. MICU called. Propofol order placed and started as per MD orders. Propofol gtt at 10mcg, fentanyl at 2mcg, levo at 0.7mcg. Will continue to monitor.

## 2019-07-27 NOTE — ED ADULT TRIAGE NOTE - CHIEF COMPLAINT QUOTE
Pt. arrives via walk-in w/ family c/o SOB . Upon arrival pt. in car had no pulse , compressions immediately started , Pt. brought to Trauma room C

## 2019-07-27 NOTE — H&P ADULT - NSHPLABSRESULTS_GEN_ALL_CORE
.  Labs reviewed personally.                          11.5   20.22 )-----------( 128      ( 27 Jul 2019 09:43 )             37.5     Hgb Trend: 11.5<--, 4.3<--, 4.6<--  07-27    131<L>  |  93<L>  |  135<H>  ----------------------------<  405<H>  6.1<H>   |  9<LL>  |  2.98<H>    Ca    12.1<H>      27 Jul 2019 06:30    TPro  5.9<L>  /  Alb  2.4<L>  /  TBili  0.3  /  DBili  x   /  AST  828<H>  /  ALT  367<H>  /  AlkPhos  138<H>  07-27    Creatinine Trend: 2.98<--, 1.79<--, 1.74<--, 2.40<--, 3.36<--, 4.86<--  PT/INR - ( 27 Jul 2019 06:30 )   PT: 47.8 SEC;   INR: 4.12          PTT - ( 27 Jul 2019 06:30 )  PTT:78.0 SEC      Imaging reviewed personally.

## 2019-07-27 NOTE — ED ADULT NURSE REASSESSMENT NOTE - NS ED NURSE REASSESS COMMENT FT1
Pt continuing to quintana the vent at this time. Propofol increased to 20mcg, fentanyl at 3mcg. Will continue to monitor.

## 2019-07-27 NOTE — ED ADULT NURSE NOTE - OBJECTIVE STATEMENT
[place]on RN: Received pt in trauma C at 0610, pt with witnessed cardiac arrest at [facility] ED parking lot in car, compressions initiated by RN and assisted out of car onto stretcher by staff, pt brought directly to trauma C. MD Sow at bedside, MD Butler at bedside, respiratory, EDTechs, float RNs at bedside. Pt with no pulse, ACLS protocol initiated, pt placed on zoll with pads, Harlan in progress, EKG completed. Pt intubated at 0613 by MD Vega with ett size 7.5, 23 at lip. IO placed on right hamm by MD Sow. IVL 20g Angiocath placed on right hand. Labs sent. ROSC achieved at 0619. See code sheet. Primary RN in area aware.

## 2019-07-27 NOTE — ED PROVIDER NOTE - OBJECTIVE STATEMENT
85yo F hx CHF, DM presenting with cardiac arrest. Patient was complaining of SOB and KRAUS for 1 day, worse this morning, family drove her to ED. was talking in the car. Right when they arrived in the parking lot, patient unresponsive. Called for help. Family thinks she was down for 3-5 minutes before being wheeled to ED Trauma bay.

## 2019-07-27 NOTE — ED ADULT NURSE NOTE - NSIMPLEMENTINTERV_GEN_ALL_ED
Implemented All Fall with Harm Risk Interventions:  [place] to call system. Call bell, personal items and telephone within reach. Instruct patient to call for assistance. Room bathroom lighting operational. Non-slip footwear when patient is off stretcher. Physically safe environment: no spills, clutter or unnecessary equipment. Stretcher in lowest position, wheels locked, appropriate side rails in place. Provide visual cue, wrist band, yellow gown, etc. Monitor gait and stability. Monitor for mental status changes and reorient to person, place, and time. Review medications for side effects contributing to fall risk. Reinforce activity limits and safety measures with patient and family. Provide visual clues: red socks.

## 2019-07-27 NOTE — ED PROVIDER NOTE - CARE PLAN
Principal Discharge DX:	Cardiac arrest  Secondary Diagnosis:	Anemia  Secondary Diagnosis:	GI bleed  Secondary Diagnosis:	Dyspnea

## 2019-07-27 NOTE — H&P ADULT - ATTENDING COMMENTS
84 F with htn, hld, DM2, HF, severe AS, afib previously on a/c here with weakness and had PEA arrest in car with ROSC after 5 minutes, now with hypovolemic shock due to likely acute blood loss anemia in setting of UGIB (has melena per report), CLARI due to shock, acute hypoxic respiratory failure requiring intubation and multi organ failure (shock liver, high troponins).  Discussed at length with family who state she would not have wanted any of this.  Plan for elective extubation today once more faimly arrives.    This patient is critically ill. I have personally provided 35 minutes of critical care time concurrently with the resident/fellow. This time excludes time spent on separate procedures and time spent teaching. I have reviewed the resident/fellow’s documentation and I agree with the assessment and plan of care.

## 2019-08-01 ENCOUNTER — APPOINTMENT (OUTPATIENT)
Dept: CARDIOLOGY | Facility: HOSPITAL | Age: 84
End: 2019-08-01

## 2019-08-21 ENCOUNTER — APPOINTMENT (OUTPATIENT)
Dept: INTERNAL MEDICINE | Facility: CLINIC | Age: 84
End: 2019-08-21

## 2019-09-26 ENCOUNTER — APPOINTMENT (OUTPATIENT)
Dept: ELECTROPHYSIOLOGY | Facility: CLINIC | Age: 84
End: 2019-09-26

## 2019-09-26 ENCOUNTER — APPOINTMENT (OUTPATIENT)
Dept: CARDIOLOGY | Facility: HOSPITAL | Age: 84
End: 2019-09-26

## 2019-10-06 NOTE — ED ADULT NURSE NOTE - NSFALLRSKPASTHIST_ED_ALL_ED
Continue home regimen of oxycodone 10 mg Q8hrs scheduled   PRN oxycodone and dilaudid while inpatient   Continue outpatient palliative care follow up yes

## 2019-12-04 ENCOUNTER — APPOINTMENT (OUTPATIENT)
Dept: INTERNAL MEDICINE | Facility: CLINIC | Age: 84
End: 2019-12-04

## 2019-12-30 NOTE — ED PROVIDER NOTE - SEPSIS ALERT QUESTION 1
Pt's son John calls, message relayed. John will have one of the nurses at [facility] take pt's BP and call it in. Oral disclosure confirmed.    In the future please call John with any results and / or any issues.   This patient was evaluated for sepsis.  At this time, a diagnosis of sepsis is not supported by the overall clinical picture.

## 2020-02-10 NOTE — DISCHARGE NOTE ADULT - ABILITY TO HEAR (WITH HEARING AID OR HEARING APPLIANCE IF NORMALLY USED):
Prep Survey      Responses   Facility patient discharged from?  [facility]   Is patient eligible?  Yes   Discharge diagnosis  Increased LE edema, LE pain, LE rash-likely stasis dermatitis,    Does the patient have one of the following disease processes/diagnoses(primary or secondary)?  Other   Does the patient have Home health ordered?  No   Is there a DME ordered?  No   Comments regarding appointments  Pt to schedule F/U with PCP   General alerts for this patient  Resident [facility]   Prep survey completed?  Yes          Saira Heredia RN         Adequate: hears normal conversation without difficulty

## 2020-02-14 NOTE — DISCHARGE NOTE ADULT - CLICK TO LAUNCH ORM
CALLED PT TO SCHEDULE AN APPT BUT WAS UNABLE TO REACH HER SO I LEFT A MESSAGE FOR HER TO CALL THE OFFICE  .

## 2020-07-15 NOTE — ED ADULT NURSE NOTE - CAS DISCH CONDITION
-- DO NOT REPLY / DO NOT REPLY ALL --  -- Message is from the [facility]--    COVID-19 Universal Screening: N/A - Not about scheduling    General Patient Message      Reason for Call: mother requesting a letter to give to the school regarding his raspatory history stating that she needs a letter letting them know that he is at risk at getting the COVID mom would like a call back to better explain regarding this. Needs this by 07/24/2020    Caller Information       Type Contact Phone    07/14/2020 07:24 PM Phone (Incoming) MIKE MEYER (Mother) [phone]          Alternative phone number:     Turnaround time given to caller:   \"This message will be sent to [state Provider's name]. The clinical team will fulfill your request as soon as they review your message when the office opens tomorrow.\"     Stable

## 2021-02-04 NOTE — CONSULT NOTE ADULT - PROBLEM/RECOMMENDATION-1
[Home] : at home, [unfilled] , at the time of the visit. [Medical Office: ([facility])___] : at the medical office located in  [Verbal consent obtained from patient] : the patient, [unfilled] [FreeTextEntry1] : Patient c/o of increased anxiety x several months as well as depressed mood.  She has had anxiety symptoms since 2018 and has been seeing a therapist since that time which has been very helpful.  Over the past few months, with the impact of the pandemic, anxiety has been worse and is beginning to interfere with daily life in addition to depressed symptoms.  Patient is experiencing difficulty falling asleep, and decreased motivation for social or pleasurable activities.  She denies feeling hopeless or any suicidal ideation. DISPLAY PLAN FREE TEXT

## 2021-02-11 NOTE — PROGRESS NOTE ADULT - PROVIDER SPECIALTY LIST ADULT
Cardiology
Gastroenterology
Gastroenterology
Heart Failure
Hospitalist
Nephrology
Cardiology
No
Cardiology
Heart Failure
Nephrology
Nephrology
Hospitalist
Hospitalist

## 2021-06-14 NOTE — DISCHARGE NOTE ADULT - MEDICATION SUMMARY - MEDICATIONS TO TAKE
Can the patient scan a form where my license number is required into [employer]?   I will START or STAY ON the medications listed below when I get home from the hospital:    Aspirin Enteric Coated 81 mg oral delayed release tablet  -- 1 tab(s) by mouth once a day  -- Indication: For cardioprotective    losartan 100 mg oral tablet  -- 1 tab(s) by mouth once a day  -- Indication: For CHF    isosorbide mononitrate 60 mg oral tablet, extended release  -- 1 tab(s) by mouth once a day  -- Indication: For CHF    Eliquis 2.5 mg oral tablet  -- 1 tab(s) by mouth 2 times a day  -- Indication: For Atrial fibrillation      glimepiride 4 mg oral tablet  -- 1 tab(s) by mouth once a day  -- Indication: For diabetes    Januvia 50 mg oral tablet  -- 1 tab(s) by mouth once a day  -- Indication: For diabetes    atorvastatin 40 mg oral tablet  -- 1 tab(s) by mouth once a day (at bedtime)  -- Indication: For cholesterol    metoprolol succinate 200 mg oral tablet, extended release  -- 1 tab(s) by mouth once a day  -- Indication: For CHF    amLODIPine 10 mg oral tablet  -- 1 tab(s) by mouth once a day  -- Indication: For HTN    torsemide 20 mg oral tablet  -- 2 tab(s) by mouth 2 times a day  -- Indication: For CHF    levoFLOXacin 250 mg oral tablet  -- 1 tab(s) by mouth once a day on 6/22  -- Indication: For Pneumonia I will START or STAY ON the medications listed below when I get home from the hospital:    Aspirin Enteric Coated 81 mg oral delayed release tablet  -- 1 tab(s) by mouth once a day  -- Indication: For Cardioprotective    losartan 100 mg oral tablet  -- 1 tab(s) by mouth once a day  -- Indication: For CHF    isosorbide mononitrate 60 mg oral tablet, extended release  -- 1 tab(s) by mouth once a day  -- Indication: For CHF    Eliquis 2.5 mg oral tablet  -- 1 tab(s) by mouth 2 times a day  -- Indication: For Afib    glimepiride 4 mg oral tablet  -- 1 tab(s) by mouth once a day  -- Indication: For diabetes    Januvia 50 mg oral tablet  -- 1 tab(s) by mouth once a day  -- Indication: For diabetes    atorvastatin 40 mg oral tablet  -- 1 tab(s) by mouth once a day (at bedtime)  -- Indication: For cholesterol    metoprolol succinate 200 mg oral tablet, extended release  -- 1 tab(s) by mouth once a day  -- Indication: For CHF    amLODIPine 10 mg oral tablet  -- 1 tab(s) by mouth once a day  -- Indication: For HTN    torsemide 20 mg oral tablet  -- 2 tab(s) by mouth 2 times a day  -- Indication: For CHF    levoFLOXacin 250 mg oral tablet  -- 1 tab(s) by mouth once a day on 6/22  -- Indication: For Pneumonia

## 2022-01-01 NOTE — ED ADULT TRIAGE NOTE - NS ED NURSE BANDS TYPE
Hearing Risk  Risk Factors for Hearing Loss: No known risk factors    Hearing Screening 1     Screener Name: Al  Method: Otoacoustic emissions  Screening 1 Results: Right Ear Pass,Left Ear Pass    Hearing Screening 2              Mom Name: Renan Velazco Name: Alexa Newby  : 2022  Pediatrician: Rosie Ramachandran MD Name band;

## 2022-02-11 NOTE — PATIENT PROFILE ADULT - NSPROGENOTHERPROVIDER_GEN_A_NUR
70-year-old gentleman presents with concerns about pain in his right flank. He was initially seen in urgent care and due to his flank pain and fairly severe high blood pressure there was a concern for a possible dissection so he was transferred here for further evaluation. He did receive some IV labetalol in route for his hypertension. He says his pain has been present for a few days and it feels like a cramping and sometimes stabbing pain. He says it does not radiate anywhere. He has had no nausea, vomiting, or diarrhea with it. He said no blood in his urine or bowels. Denies any injury or trauma. He says with respect to his high blood pressure he stopped taking his blood pressure medicine year and a half ago because he did not think he needed it anymore. No other associated symptoms. Elements of this note were created using speech recognition software. As such, errors of speech recognition may be present. Past Medical History:   Diagnosis Date    Hypertension     Stroke [facility]) 2016       History reviewed. No pertinent surgical history. History reviewed. No pertinent family history.     Social History     Socioeconomic History    Marital status:      Spouse name: Not on file    Number of children: Not on file    Years of education: Not on file    Highest education level: Not on file   Occupational History    Not on file   Tobacco Use    Smoking status: Never Smoker    Smokeless tobacco: Never Used   Substance and Sexual Activity    Alcohol use: No    Drug use: No    Sexual activity: Not on file   Other Topics Concern    Not on file   Social History Narrative    Not on file     Social Determinants of Health     Financial Resource Strain:     Difficulty of Paying Living Expenses: Not on file   Food Insecurity:     Worried About Running Out of Food in the Last Year: Not on file    Burt of Food in the Last Year: Not on file   Transportation Needs:     Lack of Transportation (Medical): Not on file    Lack of Transportation (Non-Medical): Not on file   Physical Activity:     Days of Exercise per Week: Not on file    Minutes of Exercise per Session: Not on file   Stress:     Feeling of Stress : Not on file   Social Connections:     Frequency of Communication with Friends and Family: Not on file    Frequency of Social Gatherings with Friends and Family: Not on file    Attends Nondenominational Services: Not on file    Active Member of [address] [place] [address] or Organizations: Not on file    Attends Club or Organization Meetings: Not on file    Marital Status: Not on file   Intimate Partner Violence:     Fear of Current or Ex-Partner: Not on file    Emotionally Abused: Not on file    Physically Abused: Not on file    Sexually Abused: Not on file   Housing Stability:     Unable to Pay for Housing in the Last Year: Not on file    Number of [place] in the Last Year: Not on file    Unstable Housing in the Last Year: Not on file         ALLERGIES: Patient has no known allergies. Review of Systems   Constitutional: Negative for chills, diaphoresis and fever. HENT: Negative for congestion, rhinorrhea and sore throat. Eyes: Negative for redness and visual disturbance. Respiratory: Negative for cough, chest tightness, shortness of breath and wheezing. Cardiovascular: Negative for chest pain and palpitations. Gastrointestinal: Negative for abdominal pain, blood in stool, diarrhea, nausea and vomiting. Endocrine: Negative for polydipsia and polyuria. Genitourinary: Positive for flank pain. Negative for dysuria and hematuria. Musculoskeletal: Negative for arthralgias, myalgias and neck stiffness. Skin: Negative for rash. Allergic/Immunologic: Negative for environmental allergies and food allergies. Neurological: Negative for dizziness, weakness and headaches. Hematological: Negative for adenopathy. Does not bruise/bleed easily.    Psychiatric/Behavioral: Negative for confusion and sleep disturbance. The patient is not nervous/anxious. Vitals:    02/10/22 2000 02/10/22 2005 02/10/22 2010 02/10/22 2012   BP: (!) 208/94      Pulse:   68 63   Resp:       Temp:       SpO2:  100%  98%            Physical Exam  Vitals and nursing note reviewed. Constitutional:       General: He is not in acute distress. Appearance: He is well-developed. He is not toxic-appearing. HENT:      Head: Normocephalic and atraumatic. Eyes:      General: No scleral icterus. Right eye: No discharge. Left eye: No discharge. Conjunctiva/sclera: Conjunctivae normal.      Pupils: Pupils are equal, round, and reactive to light. Cardiovascular:      Rate and Rhythm: Normal rate and regular rhythm. Heart sounds: Normal heart sounds. Pulmonary:      Effort: Pulmonary effort is normal. No respiratory distress. Breath sounds: Normal breath sounds. No wheezing or rales. Chest:      Chest wall: No tenderness. Abdominal:      General: Bowel sounds are normal. There is no distension. Palpations: Abdomen is soft. Tenderness: There is no guarding or rebound. Musculoskeletal:         General: No tenderness. Normal range of motion. Cervical back: Normal range of motion. No rigidity. Lymphadenopathy:      Cervical: No cervical adenopathy. Skin:     General: Skin is warm and dry. Neurological:      General: No focal deficit present. Mental Status: He is alert and oriented to person, place, and time. Psychiatric:         Mood and Affect: Mood normal.         Behavior: Behavior normal.          MDM  Number of Diagnoses or Management Options  Diagnosis management comments: Bedside ultrasound did not reveal any obvious free fluid in his abdomen. There is no obvious aortic dilatation.   I will get a CT scan for further evaluation    ED Course as of 02/10/22 2351   Thu Feb 10, 2022   2347 CT scan shows some right sided renal calculi with no obvious obstruction. He does have a little white cells and bacteria in his urine but his white count is negative. I will plan to discharge him home with a prescription for some antibiotics and antihypertensives.  [AC]      ED Course User Index  [AC] Joy Antony MD       Procedures none

## 2022-04-06 NOTE — PATIENT PROFILE ADULT - NSPROPTRIGHTSUPPORTNAME_GEN_A_NUR
Fang Saunders Gabapentin Counseling: I discussed with the patient the risks of gabapentin including but not limited to dizziness, somnolence, fatigue and ataxia.

## 2022-04-17 NOTE — ED PROVIDER NOTE - CRITICAL CARE PROVIDED
Face Mask
consult w/ pt's family directly relating to pts condition/interpretation of diagnostic studies/documentation/direct patient care (not related to procedure)/conducted a detailed discussion of DNR status/additional history taking

## 2022-06-05 NOTE — H&P ADULT - NEGATIVE GENERAL SYMPTOMS
no sweating/no fever/no weight gain/no chills/no malaise/no fatigue/no weight loss 05-Jun-2022 21:00

## 2022-10-02 NOTE — PROGRESS NOTE ADULT - PROBLEM SELECTOR PROBLEM 3
Atrial fibrillation, unspecified type
Her/She

## 2022-10-17 NOTE — PROGRESS NOTE ADULT - SUBJECTIVE AND OBJECTIVE BOX
----- Message from Richy Bright sent at 10/3/2022  9:37 AM EDT -----  Regarding: No response from phone messages  Dr. Sherwood:  Last Friday morning I called your office, and left message for Isis at 8:36, and received no response.  I later called again, 9:51, and tried to leave another message, but the  said she couldn't get any answer to her either, and not to worry, you would call me back before the end of the day.  I never heard from either you or Isis.  I felt a serious sinus/throat infection coming on, and needed a prescription called in.  I called you because you are aware of all the medications I'm taking for the heart issue, and didn't want to take anything that might conflict with those.    This is the second time I've called, and received no response.  Am I doing something wrong by calling ?  Richy   Patient seen and examined at bedside.    Overnight Events: Denies BRBPR, last episode on Friday. She denies chest pain, SOB, palpitations, LE edema.       REVIEW OF SYSTEMS:  Constitutional:     No fevers, chills, weight loss, weight gain  HEENT:                  No dry eyes, nasal congestion, postnasal drip  CV:                         No chest pain, palpitations, orthopnea, PND  Resp:                     No cough, SOB, dyspnea, wheezing, sputum  GI:                          No nausea, vomiting, abdominal pain, diarrhea, constipation  :                        No dysuria, nocturia, hematuria, increased urinary frequency  Musculoskeletal: No back pain, myalgias, arthralgias   Skin:                       No rash, pruritus, ecchymoses  Neurological:        No headache, dizziness, syncope, weakness, numbness  Psychiatric:           No anxiety, depression   Endocrine:            No hot/cold intolerance, polydipsia  Heme/Lymph:      No bleeding, easy bruising  Allergic/Immune: No itchy eyes, rhinorrhea, hives angioedema      Current Meds:  aspirin enteric coated 81 milliGRAM(s) Oral daily  atorvastatin 40 milliGRAM(s) Oral at bedtime  azithromycin  IVPB 500 milliGRAM(s) IV Intermittent every 24 hours  cefTRIAXone   IVPB 1000 milliGRAM(s) IV Intermittent every 24 hours  dextrose 40% Gel 15 Gram(s) Oral once PRN  dextrose 5%. 1000 milliLiter(s) IV Continuous <Continuous>  dextrose 50% Injectable 12.5 Gram(s) IV Push once  dextrose 50% Injectable 25 Gram(s) IV Push once  dextrose 50% Injectable 25 Gram(s) IV Push once  glucagon  Injectable 1 milliGRAM(s) IntraMuscular once PRN  hydrALAZINE 75 milliGRAM(s) Oral three times a day  insulin glargine Injectable (LANTUS) 5 Unit(s) SubCutaneous at bedtime  insulin lispro (HumaLOG) corrective regimen sliding scale   SubCutaneous three times a day before meals  insulin lispro (HumaLOG) corrective regimen sliding scale   SubCutaneous at bedtime  insulin lispro Injectable (HumaLOG) 2 Unit(s) SubCutaneous three times a day before meals  isosorbide   mononitrate ER Tablet (IMDUR) 60 milliGRAM(s) Oral daily  metoprolol succinate ER 75 milliGRAM(s) Oral daily  sodium chloride 0.9% lock flush 3 milliLiter(s) IV Push every 8 hours      PAST MEDICAL & SURGICAL HISTORY:  Chronic systolic heart failure  Tachycardia-bradycardia syndrome  Hyperlipidemia  Uterine Cancer (ICD9 179): treated with hysterectomy and radiation therapy in 2008  Osteoarthritis of Knee (ICD9 715.96): b/l knees  DM Type 2 (Diabetes Mellitus, Type 2) (ICD9 250.00)  HTN (Hypertension) (ICD9 401.9)  Status Post Total Knee Replacement: LISA, 6 months apart 2010  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 @ [facility])  History of Hysterectomy (ICD9 V88.01)      Vitals:  T(F): 97.4 (07-15), Max: 97.9 (07-14)  HR: 61 (07-15) (60 - 68)  BP: 139/47 (07-15) (133/51 - 142/56)  RR: 18 (07-15)  SpO2: 97% (07-15)  I&O's Summary    14 Jul 2019 07:01  -  15 Jul 2019 07:00  --------------------------------------------------------  IN: 240 mL / OUT: 450 mL / NET: -210 mL        Physical Exam:  Appearance: No acute distress; opens eyes to name  Eyes: PERRL, EOMI, pink conjunctiva  HENT: Normal oral mucosa  Cardiovascular: RRR, S1, S2, no murmurs, rubs, or gallops; no edema   Respiratory: diminished in bases  Gastrointestinal: soft, non-tender, non-distended with normal bowel sounds  Musculoskeletal: No clubbing; no joint deformity   Neurologic: Non-focal  Lymphatic: No lymphadenopathy  Psychiatry: AAOx3, mood & affect appropriate  Skin: No rashes, ecchymoses, or cyanosis                          8.3    13.07 )-----------( 266      ( 15 Jul 2019 07:20 )             26.5     07-15    139  |  102  |  79<H>  ----------------------------<  104<H>  3.3<L>   |  22  |  1.74<H>    Ca    10.1      15 Jul 2019 07:20  Phos  4.4     07-15  Mg     2.1     07-15          Interpretation of Telemetry: A-paced 60-70s, PVCs

## 2023-06-13 NOTE — PHYSICAL THERAPY INITIAL EVALUATION ADULT - ADL SKILLS, REHAB EVAL
Call back from mom who says they spoke w/ triage earlier and did the warm mist for 30 min. Patient continues to have stridor.    Assessment/Disposition: ED    Reviewed care advice with caller per RN triage protocol guideline.  Advised to call back with worsening symptoms, concerns or questions. Caller verbalized understanding.      Kiera Araujo, RN, BSN  Triage Nurse Advisor          Reason for Disposition    [1] Stridor (harsh sound with breathing in) AND [2] sounds severe (tight) to the triager    Protocols used: CROUP-P-TONG    
independent

## 2023-11-01 NOTE — ED ADULT TRIAGE NOTE - MODE OF ARRIVAL
Utilization Reviews       photos to include with 10/30 clinical review by Fanny Rascon RN  Last Updated by Fanny Rascon RN on 11/1/2023 1021     Review Status Created By   In Zoey Padilla RN       Review Type   --      Criteria Review                10/30 by Fanny Rascon RN  Last Updated by Fanny Rascon RN on 11/1/2023 5608     Review Status Created By   In Zoey Padilla RN       Review Type   Continued Stay      Criteria Review   DATE: 10/30        PERTINENT UPDATES:  Patient with Rt leg cellulitis  Underlying DM 2, essential HTN and cardiomyopathy     VITALS:  98.9  20  121, 130  147/92, 172/116  95%rar   Rates pain 5/10, 6/10  Weight is 306 lb     ABNL/PERTINENT LABS/RADIOLOGY/DIAGNOSTIC STUDIES:  Na 132  glucose 147, 141, 138, 151  AST 49  ALT 61     PHYSICAL EXAM:  General Appearance: Awake, alert, and in no apparent distress  Head:  Normocephalic, no trauma  Eyes: Pupils equal, round, reactive to light and accommodation; extraocular movements intact; sclera anicteric; conjunctivae pink. No embolic phenomena. ENT: Oropharynx clear, without erythema, exudate, or thrush. No tenderness of sinuses. Mouth/throat: mucosa pink and moist. No lesions. Dentition in good repair. Neck:Supple, without lymphadenopathy. Thyroid normal, No bruits. Pulmonary/Chest: Clear to auscultation, without wheezes, rales, or rhonchi. No dullness to percussion. Cardiovascular: Regular rate and rhythm without murmurs, rubs, or gallops. Abdomen: Soft, non tender. Bowel sounds normal. No organomegaly  All four Extremities: No cyanosis, clubbing, edema, or effusions. Has chronic venous stasis changes Rt leg with cellulitis of Rt leg extending into thigh. Rash around groin  Neurologic: No gross sensory or motor deficits. Skin: Warm and dry with good turgor. Signs of peripheral arterial and venous insufficiency. No ulcerations.  No open wounds     MD CONSULTS/ASSESSMENT AND PLAN:  Per ID:  D/C vancomycin Walk in

## 2023-11-14 NOTE — ED PROVIDER NOTE - CHIEF COMPLAINT
The patient is a 84y Female complaining of I will STOP taking the medications listed below when I get home from the hospital:  None

## 2023-11-22 NOTE — CHART NOTE - NSCHARTNOTESELECT_GEN_ALL_CORE
Event Note Elidel Pregnancy And Lactation Text: This medication is Pregnancy Category C. It is unknown if this medication is excreted in breast milk.

## 2023-12-27 NOTE — PHYSICAL THERAPY INITIAL EVALUATION ADULT - ORIENTATION, REHAB EVAL
oriented to person, place, time and situation PAST MEDICAL HISTORY:  Cervical cancer     Gastritis     H/O varicose veins     HLD (hyperlipidemia)     HTN (hypertension)     Kidney stones     Morbid obesity     Psoriasis

## 2024-02-20 NOTE — DISCHARGE NOTE FOR THE EXPIRED PATIENT - HOSPITAL COURSE
- Mag 1.9 this AM  - Phos 3.5 this AM  - Replete PRN   84 F with HTN, HLD, DM2, chronic systolic HF, afib on apixaban, prior uterine cancer s/p hysterectomy who presented to the ER s/p PEA arrest, ROSC achieved in 5mins down for approx. 3mins, insetting of hypovolumic shock 2/2 GIB.     Patient noted to be more lethargic over the last 2 days. +dark stool as per family however did not seek medical evaluation. Hx of GIB, was recommended to have scope, however patient refused. Has been off all A/C. No fevers, chills or night sweats, patient visibility noted to be more pale than usual.      In ED patient under went code blue w/ ROSC achieved after 5mins started on epinephrine ggt. Intubated and put on norepineprine drip, code fusion activated and patient received 4U PRBC 2U platelet.    Patient was admitted to MICU for stabilization and further management. However, family decided patient would not want aggressive interventions, given she declined further workup for GI bleed. Patient was made comfortable and decision was made by family to extubate patient. 84 F with HTN, HLD, DM2, chronic systolic HF, afib on apixaban, prior uterine cancer s/p hysterectomy who presented to the ER s/p PEA arrest. Pt was down for approximately 3mins, insetting of hypovolemic shock secondary to a GIB.    Patient was noted to be more lethargic over the last 2 days with dark stool as per family, however she did not seek medical evaluation. With history of GIB, she was recommended to have scope, however patient refused. Pt has been off all A/C. No fevers, chills or night sweats. Patient visibility noted to be more pale than usual.      In ED patient under went code blue w/ ROSC achieved after 5 mins and was started on epinephrine ggt and intubated. Code fusion was activated and patient received a total of 5 units of PRBCs and 3 units of platelets.    Patient was admitted to MICU for stabilization and further management. However, family decided patient would not want aggressive interventions, given that she declined further workup for GI bleed. Patient was made comfortable and decision was made by family to extubate patient.

## 2024-07-23 NOTE — PATIENT PROFILE ADULT - ANY IMPAIRED UPPER EXTREMITY FUNCTION WITHIN A WEEK PRIOR TO ADMISSION RELATED TO?
PULMONARY FOLLOW-UP NOTE    Subjective    Ms. Sultana is a 59 year old female  who has COPD, chronic hypoxic respiratory failure    Chief Complaint:    Chief Complaint   Patient presents with   • COPD         HPI:  Patient was last seen by Dr. Flaherty 12/12/2023.  At that time she was doing fairly well in terms of COPD management.  He recommended to continue with LABA/LAMA and recommended pulmonary rehab.  She underwent CT lung cancer screening on 6/4/2024 which revealed severe emphysematous changes with bullous changes in the left upper lung, new 5 mm left upper lobe nodule, will need f/u CT chest in 12/2024 to reevaluate.     She was admitted to [facility] 6/23/2023 through 6/30/2023 for acute on chronic hypoxic respiratory failure secondary to influenza A leading to COPD exacerbation.  She was treated with Tamiflu.  She required systemic corticosteroids and bronchodilators.  She was weaned back to her baseline supplemental oxygen.  She was discharged home with prednisone.    She has been wearing her oxygen at home. She has been using 2lpm, spo2 92% w rest.  Walking to bathroom wo o2 , will decrease to 88% w walking.     She is taking bevespsi.  She does not take albuterol.  Does not have nebulizer at home. Taking Mullien supplement        HISTORY:     Patient Active Problem List   Diagnosis   • Other headache syndrome   • Abdominal pain, LLQ (left lower quadrant)   • Graves disease   • Sacrococcygeal pain   • Trochanteric bursitis of left hip   • Essential hypertension   • Class 2 severe obesity due to excess calories with serious comorbidity and body mass index (BMI) of 38.0 to 38.9 in adult  (CMD)   • Hearing loss of right ear   • Generalized abdominal pain   • Anxiety   • Mastalgia   • Breast cancer screening   • Cervical cancer screening   • Colon cancer screening   • Need for hepatitis C screening test   • Dental abscess   • Hiatal hernia   • Chronic obstructive pulmonary disease (COPD)   (CMD)   • Chronic hypoxemic respiratory failure  (CMD)   • Nausea and vomiting   • DVT prophylaxis   • Atypical chest pain   • Personal history of tobacco use, presenting hazards to health       Past Medical History:   Diagnosis Date   • Abdominal pain    • Abnormal stress test    • Acute pharyngitis    • Bradycardia    • Graves disease     s/p radioactive ablation with resultant hypothyroidism   • HTN (hypertension)    • Multilevel degenerative disc disease    • Obesity    • Tachycardia    • Thyroid nodule        Past Surgical History:   Procedure Laterality Date   • Appendectomy     • Cholecystectomy         Family History   Problem Relation Age of Onset   • Hypertension Mother    • Thyroid Mother    • Lung Disease Father    • Heart disease Neg Hx        Social History     Tobacco Use   • Smoking status: Former     Current packs/day: 0.00     Average packs/day: 1 pack/day for 30.0 years (30.0 ttl pk-yrs)     Types: Cigarettes     Start date: 1990     Quit date: 2020     Years since quittin.5   • Smokeless tobacco: Never   Vaping Use   • Vaping status: never used   Substance Use Topics   • Alcohol use: No   • Drug use: Not Currently       MEDS:    Current Outpatient Medications   Medication Sig Dispense Refill   • hydroCHLOROthiazide 12.5 MG tablet Take 1 tablet by mouth daily. 90 tablet 0   • Bevespi Aerosphere 9-4.8 MCG/ACT inhaler INHALE 2 PUFFS INTO THE LUNGS TWICE DAILY 10.7 g 3   • losartan (COZAAR) 50 MG tablet TAKE 1 TABLET BY MOUTH DAILY 90 tablet 3   • albuterol 108 (90 Base) MCG/ACT inhaler Inhale 1-2 puffs into the lungs every 4 hours as needed for Shortness of Breath or Wheezing. 1 each 1   • oxyCODONE-acetaminophen (PERCOCET)  MG per tablet Take 2 tablets every 3 hours as needed 7 tablet    • levothyroxine 112 MCG tablet 112 mcg daily.     • Cholecalciferol (VITAMIN D3 PO) Take 2,500 Units by mouth daily. +K2     • UNKNOWN TO PATIENT daily. [place] PO     • UNKNOWN TO PATIENT  Mushroom supplement.     • clonazePAM (KlonoPIN) 0.5 MG tablet Take 0.25 mg by mouth daily as needed for Anxiety.     • oxygen (O2) gas Inhale 2 L/min into the lungs continuous.     • liothyronine (CYTOMEL) 5 MCG tablet Take 2.5 mcg by mouth daily.  11   • B Complex Vitamins (B COMPLEX PO) Take 1 tablet by mouth every other day.        No current facility-administered medications for this visit.       ALLERGY:    ALLERGIES:   Allergen Reactions   • Methimazole Other (See Comments)     Anxiety, palpitations, felt horrible!   • Azithromycin DIARRHEA   • Ketorolac Tromethamine NAUSEA   • Toradol NAUSEA   • Tramadol GI UPSET   • Tramadol-Acetaminophen Nausea & Vomiting     Pt can tolerate Acetaminophen       REVIEW OF SYSTEMS:    Review of Systems   All other systems reviewed and are negative.      Examination    VITALS:  Vitals:    07/23/24 1457   BP: 135/86   BP Location: LUE - Left upper extremity   Patient Position: Sitting   Cuff Size: Regular   Pulse: 65   SpO2: 93%   Weight: 104.3 kg (230 lb)   Height: 5' 6\" (1.676 m)   PainSc:  0        Body mass index is 37.12 kg/m².    PHYSICAL EXAM F/U:    Physical Exam  Vitals and nursing note reviewed.   Constitutional:       General: She is not in acute distress.     Appearance: She is well-developed. She is not ill-appearing, toxic-appearing or diaphoretic.   HENT:      Head: Normocephalic and atraumatic.   Cardiovascular:      Rate and Rhythm: Normal rate and regular rhythm.      Heart sounds: Normal heart sounds.   Pulmonary:      Effort: Pulmonary effort is normal. No tachypnea, accessory muscle usage or respiratory distress.      Breath sounds: Normal breath sounds and air entry. No wheezing, rhonchi or rales.   Musculoskeletal:      Cervical back: Normal range of motion and neck supple.   Skin:     General: Skin is warm and dry.      Findings: No erythema.   Neurological:      General: No focal deficit present.      Mental Status: She is alert and oriented to person,  place, and time.   Psychiatric:         Mood and Affect: Mood normal.         Behavior: Behavior normal.         Thought Content: Thought content normal.         Judgment: Judgment normal.         PATIENT RECORDS:Reviewed.    IMAGING STUDIES:   Reviewed  CT of Chest     PULMONARY PARAMETERS:  Six Minute Walk Test Results    DATE: 07/23/24    Pre Vitals:  BP: BP: 135/86  HR:65    Testing at rest WITHOUT oxygen  SpO2: 87%      Testing with activity WITH oxygen  SpO2:95%  Liter flow: 4    Patient Required _4____ Lpm    If patient requires liter flow greater than 4L, patient must also be tested on 4L    Testing on 4L  SpO2: 95    Post Vitals:  BP: 128/84  HR: 78     Impression  Evidence of exertional hypoxia requiring 4 L/min O2 to correct exertional hypoxia.    11/20/2020 FVC 2.25 L, 76% of predicted  FEV1 1.74 L, 74% predicted ratio 78%  No change after bronchodilator  TLC 3.44 L, 71% predicted  Diffusion 91% of predicted     Supine spirometry  FVC 1.76 L, 60% predicted with -22% change compared to sitting spirometry.  FEV1 1.43 L, -18% change  Impression     Mild restrictive physiology  Significant drop in FVC and FEV1 in the supine position which may indicate neuromuscular weakness    [place] Score     [place] total score    --          Assessment/Plan:  Problem List Items Addressed This Visit        Pulmonary and Pneumonias    Chronic obstructive pulmonary disease (COPD)  (CMD)     She was admitted to [facility]/23/2024 through 6/30/2024 for acute on chronic hypoxic respiratory failure secondary to influenza A leading to COPD exacerbation.  She was treated with Tamiflu, systemic corticosteroids and bronchodilators.  She was weaned back to her baseline supplemental oxygen but does need 4lpm w activity.    On exam today, no evidence of exacerbation or bronchospasm.  Continue with Bevespi.  Continue wearing supplemental oxygen with activity  Consider repeating pulmonary function test in the near  future once she is little further out from her exacerbation as last PFTs were in 2020           Chronic hypoxemic respiratory failure  (CMD) - Primary     Using and benefiting from supplemental oxygen.  Patient to wear 4 L/min O2 with walking and recommend 2 L/min at night.  Evaluate nocturnal saturations with overnight trended pulse oximetry study on 2 L/min         Relevant Orders    SERVICE TO HOME CARE DME    PFT (Completed)       Tobacco    Personal history of tobacco use, presenting hazards to health     Lung Cancer Screening: Patient is a former smoker who quit 4 years ago, has a 30 pack year smoking history, is age 50-77, does not have signs/symptoms of lung cancer, is agreeable to participate in lung cancer screening program after discussing risks, benefits, and participating in shared decision making. Needs ST f/u CT chest 12/1/2024.  Order placed            Relevant Orders    CT LUNG CANCER SCREENING WO CONTRAST FOLLOW UP LRADS 3 OR 4       Return in about 6 months (around 1/23/2025).    The patient indicated understanding of the diagnosis and agreed with the plan of care. All questions answered to the best of my ability.     I have reviewed the results from each unique test that was performed, independently interpreted test that I have not performed, reviewed prior external notes from unique sources and I have ordered each unique test noted above Patient has multiple medical problems with moderate level decision making. Total time spent on today's encounter including pre chart review, time with patient and documenting was 30 minutes.     Electronically signed by: Gracia Wilder CNP 07/24/24   no

## 2024-10-19 NOTE — PROGRESS NOTE ADULT - ASSESSMENT
Cardiology 81 yo F with DM2 (A1c=7.7), 83 yo F with DM2 (A1c=7.7), chronic HFpEF p/w SOB 2/2 acute on chronic decompensated CHF.